# Patient Record
Sex: MALE | Race: OTHER | Employment: OTHER | URBAN - METROPOLITAN AREA
[De-identification: names, ages, dates, MRNs, and addresses within clinical notes are randomized per-mention and may not be internally consistent; named-entity substitution may affect disease eponyms.]

---

## 2018-05-11 ENCOUNTER — APPOINTMENT (OUTPATIENT)
Dept: CT IMAGING | Age: 68
DRG: 389 | End: 2018-05-11
Attending: EMERGENCY MEDICINE
Payer: MEDICARE

## 2018-05-11 ENCOUNTER — HOSPITAL ENCOUNTER (INPATIENT)
Age: 68
LOS: 5 days | Discharge: NURSING, REHAB OR LONG-TERM CARE | DRG: 389 | End: 2018-05-17
Attending: EMERGENCY MEDICINE | Admitting: HOSPITALIST
Payer: MEDICARE

## 2018-05-11 DIAGNOSIS — K56.609 SBO (SMALL BOWEL OBSTRUCTION) (HCC): Primary | ICD-10-CM

## 2018-05-11 LAB
ANION GAP SERPL CALC-SCNC: 12 MMOL/L (ref 3–18)
BASOPHILS # BLD: 0 K/UL (ref 0–0.06)
BASOPHILS NFR BLD: 0 % (ref 0–2)
BUN SERPL-MCNC: 18 MG/DL (ref 7–18)
BUN/CREAT SERPL: 23 (ref 12–20)
CALCIUM SERPL-MCNC: 8.3 MG/DL (ref 8.5–10.1)
CHLORIDE SERPL-SCNC: 101 MMOL/L (ref 100–108)
CO2 SERPL-SCNC: 25 MMOL/L (ref 21–32)
CREAT SERPL-MCNC: 0.78 MG/DL (ref 0.6–1.3)
DIFFERENTIAL METHOD BLD: ABNORMAL
EOSINOPHIL # BLD: 0 K/UL (ref 0–0.4)
EOSINOPHIL NFR BLD: 0 % (ref 0–5)
ERYTHROCYTE [DISTWIDTH] IN BLOOD BY AUTOMATED COUNT: 15.1 % (ref 11.6–14.5)
GLUCOSE SERPL-MCNC: 244 MG/DL (ref 74–99)
HCT VFR BLD AUTO: 45.3 % (ref 36–48)
HGB BLD-MCNC: 15.5 G/DL (ref 13–16)
LYMPHOCYTES # BLD: 2.9 K/UL (ref 0.9–3.6)
LYMPHOCYTES NFR BLD: 25 % (ref 21–52)
MCH RBC QN AUTO: 29.5 PG (ref 24–34)
MCHC RBC AUTO-ENTMCNC: 34.2 G/DL (ref 31–37)
MCV RBC AUTO: 86.3 FL (ref 74–97)
MONOCYTES # BLD: 0.9 K/UL (ref 0.05–1.2)
MONOCYTES NFR BLD: 8 % (ref 3–10)
NEUTS SEG # BLD: 7.6 K/UL (ref 1.8–8)
NEUTS SEG NFR BLD: 67 % (ref 40–73)
PLATELET # BLD AUTO: 284 K/UL (ref 135–420)
PMV BLD AUTO: 10.9 FL (ref 9.2–11.8)
POTASSIUM SERPL-SCNC: 3.5 MMOL/L (ref 3.5–5.5)
RBC # BLD AUTO: 5.25 M/UL (ref 4.7–5.5)
SODIUM SERPL-SCNC: 138 MMOL/L (ref 136–145)
WBC # BLD AUTO: 11.4 K/UL (ref 4.6–13.2)

## 2018-05-11 PROCEDURE — 83036 HEMOGLOBIN GLYCOSYLATED A1C: CPT | Performed by: HOSPITALIST

## 2018-05-11 PROCEDURE — 96375 TX/PRO/DX INJ NEW DRUG ADDON: CPT

## 2018-05-11 PROCEDURE — 96361 HYDRATE IV INFUSION ADD-ON: CPT

## 2018-05-11 PROCEDURE — 96374 THER/PROPH/DIAG INJ IV PUSH: CPT

## 2018-05-11 PROCEDURE — 80048 BASIC METABOLIC PNL TOTAL CA: CPT | Performed by: EMERGENCY MEDICINE

## 2018-05-11 PROCEDURE — 36415 COLL VENOUS BLD VENIPUNCTURE: CPT | Performed by: HOSPITALIST

## 2018-05-11 PROCEDURE — 74011636320 HC RX REV CODE- 636/320: Performed by: EMERGENCY MEDICINE

## 2018-05-11 PROCEDURE — 74011250636 HC RX REV CODE- 250/636: Performed by: EMERGENCY MEDICINE

## 2018-05-11 PROCEDURE — 85025 COMPLETE CBC W/AUTO DIFF WBC: CPT | Performed by: EMERGENCY MEDICINE

## 2018-05-11 PROCEDURE — 99285 EMERGENCY DEPT VISIT HI MDM: CPT

## 2018-05-11 PROCEDURE — 74177 CT ABD & PELVIS W/CONTRAST: CPT

## 2018-05-11 RX ORDER — POLYVINYL ALCOHOL 14 MG/ML
1 SOLUTION/ DROPS OPHTHALMIC AS NEEDED
COMMUNITY
End: 2020-01-01

## 2018-05-11 RX ORDER — FENOFIBRATE 67 MG/1
67 CAPSULE ORAL
COMMUNITY
End: 2020-01-01

## 2018-05-11 RX ORDER — ONDANSETRON 2 MG/ML
4 INJECTION INTRAMUSCULAR; INTRAVENOUS
Status: COMPLETED | OUTPATIENT
Start: 2018-05-11 | End: 2018-05-11

## 2018-05-11 RX ORDER — GLUCOSAMINE SULFATE 1500 MG
1000 POWDER IN PACKET (EA) ORAL DAILY
COMMUNITY
End: 2020-01-01

## 2018-05-11 RX ORDER — MORPHINE SULFATE 10 MG/ML
4 INJECTION, SOLUTION INTRAMUSCULAR; INTRAVENOUS
Status: COMPLETED | OUTPATIENT
Start: 2018-05-11 | End: 2018-05-11

## 2018-05-11 RX ORDER — MECLIZINE HCL 12.5 MG 12.5 MG/1
12.5 TABLET ORAL
COMMUNITY
End: 2020-01-01

## 2018-05-11 RX ADMIN — SODIUM CHLORIDE 1000 ML: 900 INJECTION, SOLUTION INTRAVENOUS at 23:03

## 2018-05-11 RX ADMIN — IOPAMIDOL 100 ML: 612 INJECTION, SOLUTION INTRAVENOUS at 23:41

## 2018-05-11 RX ADMIN — MORPHINE SULFATE 4 MG: 10 INJECTION INTRAMUSCULAR; INTRAVENOUS; SUBCUTANEOUS at 23:01

## 2018-05-11 RX ADMIN — ONDANSETRON 4 MG: 2 INJECTION INTRAMUSCULAR; INTRAVENOUS at 23:01

## 2018-05-12 ENCOUNTER — APPOINTMENT (OUTPATIENT)
Dept: GENERAL RADIOLOGY | Age: 68
DRG: 389 | End: 2018-05-12
Attending: EMERGENCY MEDICINE
Payer: MEDICARE

## 2018-05-12 PROBLEM — K56.609 SMALL BOWEL OBSTRUCTION (HCC): Status: ACTIVE | Noted: 2018-05-12

## 2018-05-12 PROBLEM — F81.9 COGNITIVE DEVELOPMENTAL DELAY: Status: ACTIVE | Noted: 2018-05-12

## 2018-05-12 LAB
EST. AVERAGE GLUCOSE BLD GHB EST-MCNC: 171 MG/DL
GLUCOSE BLD STRIP.AUTO-MCNC: 140 MG/DL (ref 70–110)
GLUCOSE BLD STRIP.AUTO-MCNC: 160 MG/DL (ref 70–110)
GLUCOSE BLD STRIP.AUTO-MCNC: 191 MG/DL (ref 70–110)
GLUCOSE BLD STRIP.AUTO-MCNC: 200 MG/DL (ref 70–110)
HBA1C MFR BLD: 7.6 % (ref 4.2–5.6)

## 2018-05-12 PROCEDURE — 74011250636 HC RX REV CODE- 250/636

## 2018-05-12 PROCEDURE — 0D9670Z DRAINAGE OF STOMACH WITH DRAINAGE DEVICE, VIA NATURAL OR ARTIFICIAL OPENING: ICD-10-PCS | Performed by: INTERNAL MEDICINE

## 2018-05-12 PROCEDURE — 74011250636 HC RX REV CODE- 250/636: Performed by: HOSPITALIST

## 2018-05-12 PROCEDURE — 77030037878 HC DRSG MEPILEX >48IN BORD MOLN -B

## 2018-05-12 PROCEDURE — 74011636637 HC RX REV CODE- 636/637: Performed by: HOSPITALIST

## 2018-05-12 PROCEDURE — 77030010545

## 2018-05-12 PROCEDURE — 74011250636 HC RX REV CODE- 250/636: Performed by: EMERGENCY MEDICINE

## 2018-05-12 PROCEDURE — 65270000029 HC RM PRIVATE

## 2018-05-12 PROCEDURE — 71045 X-RAY EXAM CHEST 1 VIEW: CPT

## 2018-05-12 PROCEDURE — 82962 GLUCOSE BLOOD TEST: CPT

## 2018-05-12 RX ORDER — MORPHINE SULFATE 10 MG/ML
4 INJECTION, SOLUTION INTRAMUSCULAR; INTRAVENOUS
Status: DISCONTINUED | OUTPATIENT
Start: 2018-05-12 | End: 2018-05-12

## 2018-05-12 RX ORDER — MAGNESIUM SULFATE 100 %
4 CRYSTALS MISCELLANEOUS AS NEEDED
Status: DISCONTINUED | OUTPATIENT
Start: 2018-05-12 | End: 2018-05-17 | Stop reason: HOSPADM

## 2018-05-12 RX ORDER — DEXTROSE MONOHYDRATE 25 G/50ML
25-50 INJECTION, SOLUTION INTRAVENOUS AS NEEDED
Status: DISCONTINUED | OUTPATIENT
Start: 2018-05-12 | End: 2018-05-17 | Stop reason: HOSPADM

## 2018-05-12 RX ORDER — MORPHINE SULFATE 10 MG/ML
4 INJECTION, SOLUTION INTRAMUSCULAR; INTRAVENOUS
Status: COMPLETED | OUTPATIENT
Start: 2018-05-12 | End: 2018-05-12

## 2018-05-12 RX ORDER — MORPHINE SULFATE 10 MG/ML
INJECTION, SOLUTION INTRAMUSCULAR; INTRAVENOUS
Status: DISPENSED
Start: 2018-05-12 | End: 2018-05-12

## 2018-05-12 RX ORDER — DEXTROSE, SODIUM CHLORIDE, AND POTASSIUM CHLORIDE 5; .45; .15 G/100ML; G/100ML; G/100ML
125 INJECTION INTRAVENOUS CONTINUOUS
Status: DISCONTINUED | OUTPATIENT
Start: 2018-05-12 | End: 2018-05-17 | Stop reason: HOSPADM

## 2018-05-12 RX ORDER — INSULIN LISPRO 100 [IU]/ML
INJECTION, SOLUTION INTRAVENOUS; SUBCUTANEOUS EVERY 6 HOURS
Status: DISCONTINUED | OUTPATIENT
Start: 2018-05-12 | End: 2018-05-17 | Stop reason: HOSPADM

## 2018-05-12 RX ADMIN — DEXTROSE MONOHYDRATE, SODIUM CHLORIDE, AND POTASSIUM CHLORIDE 125 ML/HR: 50; 4.5; 1.49 INJECTION, SOLUTION INTRAVENOUS at 04:26

## 2018-05-12 RX ADMIN — SODIUM CHLORIDE 1000 ML: 900 INJECTION, SOLUTION INTRAVENOUS at 01:20

## 2018-05-12 RX ADMIN — INSULIN LISPRO 4 UNITS: 100 INJECTION, SOLUTION INTRAVENOUS; SUBCUTANEOUS at 18:36

## 2018-05-12 RX ADMIN — INSULIN LISPRO 2 UNITS: 100 INJECTION, SOLUTION INTRAVENOUS; SUBCUTANEOUS at 23:35

## 2018-05-12 RX ADMIN — MORPHINE SULFATE 4 MG: 10 INJECTION, SOLUTION INTRAMUSCULAR; INTRAVENOUS at 01:24

## 2018-05-12 RX ADMIN — DEXTROSE MONOHYDRATE, SODIUM CHLORIDE, AND POTASSIUM CHLORIDE 125 ML/HR: 50; 4.5; 1.49 INJECTION, SOLUTION INTRAVENOUS at 22:47

## 2018-05-12 RX ADMIN — DEXTROSE MONOHYDRATE, SODIUM CHLORIDE, AND POTASSIUM CHLORIDE 125 ML/HR: 50; 4.5; 1.49 INJECTION, SOLUTION INTRAVENOUS at 15:06

## 2018-05-12 NOTE — CONSULTS
[facility]  Colon and Rectal Surgery  [address] [address], [address] [place] [address]                Colon and Rectal Surgery Consult    Subjective:      Sonia Avila is a 79 y.o. male who is being seen for evaluation of small bowel obstruction. He has a history of cerebral palsy, mental retardation, and hypertension. History was obtained from medical records. The patient presented to ED last night  from [facility] with abdominal pain and emesis. Initial CT scan in ED showed low grade small bowel obstruction. Following admission to [facility], the patient was treated with bowel rest, NG decompression and IVF. He appears to be comfortable. Patient Active Problem List    Diagnosis Date Noted    Small bowel obstruction ([address] 05/12/2018    Cognitive developmental delay 05/12/2018    Hypotension 06/12/2015    Tachycardia 06/12/2015    Lactic acidosis 06/12/2015    Cerebral palsy ([address] 06/12/2015    History of post-polio syndrome 06/12/2015    Hypertension 06/12/2015    Mild mental retardation 06/12/2015    UTI (lower urinary tract infection) 06/11/2015     Past Medical History:   Diagnosis Date    Cerebral palsy ([address]     Hypertension     Mild mental retardation     Polio       History reviewed. No pertinent surgical history. Social History   Substance Use Topics    Smoking status: Never Smoker    Smokeless tobacco: Not on file    Alcohol use No      History reviewed. No pertinent family history. Prior to Admission medications    Medication Sig Start Date End Date Taking? Authorizing Provider   cholecalciferol (VITAMIN D3) 1,000 unit cap Take 1,000 Units by mouth daily. Yes Mak Gannon, MD   meclizine (ANTIVERT) 12.5 mg tablet Take 12.5 mg by mouth three (3) times daily as needed. Yes Mak Other, MD   fenofibrate micronized (LOFIBRA) 67 mg capsule Take 67 mg by mouth every morning.    Yes Mak Gannon MD   polyvinyl alcohol (LIQUIFILM TEARS) 1.4 % ophthalmic solution Administer 1 Drop to both eyes as needed. Yes Mak Gannon MD   aspirin 81 mg chewable tablet Take 81 mg by mouth daily. Yes Mak Gannon MD   baclofen (LIORESAL) 10 mg tablet Take  by mouth three (3) times daily. Yes Mak Gannon MD   calcium carbonate (TUMS) 200 mg calcium (500 mg) chew Take 1 Tab by mouth three (3) times daily. Yes Mak Gannon MD   metFORMIN (GLUCOPHAGE) 850 mg tablet Take 500 mg by mouth two (2) times daily (with meals). Yes Mak Gannon MD   multivitamin, tx-iron-ca-min (THERAPY M) 9 mg iron-400 mcg tab tablet Take 1 Tab by mouth daily. Yes Mak Gannon MD   loperamide (IMMODIUM) 2 mg tablet Take 2 mg by mouth four (4) times daily as needed for Diarrhea. Yes Mak Gannon MD   fenofibrate nanocrystallized (TRICOR) 48 mg tablet Take 1 Tab by mouth daily. 6/15/15   Jim Pena MD   polyvinyl alcohol (LIQUIFILM TEARS) 1.4 % ophthalmic solution Administer 1 Drop to both eyes four (4) times daily. Mak Gannon MD   FENOFIBRATE MICRONIZED PO Take 200 mg by mouth daily. Mak Gannon MD   calcium-vitamin D (OYSTER SHELL CALCIUM-VIT D3) 250-125 mg-unit tablet Take 1 Tab by mouth two (2) times a day. Mak Gannon MD   polyethylene glycol (MIRALAX) 17 gram packet Take 17 g by mouth every other day. Mak Gannon MD   camphor-methyl salicyl-menthol (SALONPAS) ptmd 1 Patch by Apply Externally route daily. Mak Gannon MD   ergocalciferol (VITAMIN D2) 50,000 unit capsule Take 50,000 Units by mouth every Tuesday. Mak Gannon MD   lisinopril (PRINIVIL, ZESTRIL) 2.5 mg tablet Take 2.5 mg by mouth daily. Mak Gannon MD   acetaminophen (TYLENOL ARTHRITIS PAIN) 650 mg CR tablet Take 650 mg by mouth every six (6) hours as needed for Pain. Mak Gannon MD   promethazine (PHENERGAN) 25 mg tablet Take 25 mg by mouth every eight (8) hours as needed for Nausea.     Mak Gannon MD     Current Facility-Administered Medications   Medication Dose Route Frequency    dextrose 5% - 0.45% NaCl with KCl 20 mEq/L infusion  125 mL/hr IntraVENous CONTINUOUS    insulin lispro (HUMALOG) injection   SubCUTAneous Q6H    glucose chewable tablet 16 g  4 Tab Oral PRN    glucagon (GLUCAGEN) injection 1 mg  1 mg IntraMUSCular PRN    dextrose (D50) infusion 12.5-25 g  25-50 mL IntraVENous PRN      No Known Allergies    Review of Systems:    Pertinent items are noted in the History of Present Illness. Objective:        Visit Vitals    /84 (BP 1 Location: Right arm, BP Patient Position: Supine)    Pulse 84    Temp 98.3 °F (36.8 °C)    Resp 18    Ht 5' 5\" (1.651 m)    Wt 60.8 kg (134 lb)    SpO2 99%    BMI 22.3 kg/m2       Physical Exam:   GENERAL: fatigued, cooperative, no distress, slowed mentation, appears stated age  LUNG: clear to auscultation bilaterally  HEART: regular rate and rhythm  ABDOMEN: soft, minimally distended. Bowel sounds normal. No masses,  no organomegaly. Non-tender  EXTREMITIES:  extremities normal, atraumatic, no cyanosis or edema       Imaging:  reviewed  CT of Abdomen and Pelvis (5/11/2018)  IMPRESSION:  1. Mildly distended and fluid opacified bowel loops throughout the abdomen and  pelvis. Morphology is somewhat nonspecific, suggestive of either ileus or  partial/intermittent but relatively low-grade small bowel obstruction. Normal  appendix. Prominent rectal fecal material suggests impaction. 2. Moderate right hydroureteronephrosis with layering dependent, nonobstructive  5 mm calculus within the right extrarenal pelvis. Distal right ureterovesicular  junction appears somewhat abnormal, raising potential of intermittent  ureterovesicular junction obstruction. This appears relatively chronic and not  acute. There is also nonspecific mural thickening throughout the bladder,  concerning for cystitis.   3. 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 4. Bilateral L5 pars interarticularis defects. 5. Patulous and fluid opacified distal thoracic esophagus. Lab/Data Review:  CMP:   Lab Results   Component Value Date/Time     05/11/2018 10:51 PM    K 3.5 05/11/2018 10:51 PM     05/11/2018 10:51 PM    CO2 25 05/11/2018 10:51 PM    AGAP 12 05/11/2018 10:51 PM     (H) 05/11/2018 10:51 PM    BUN 18 05/11/2018 10:51 PM    CREA 0.78 05/11/2018 10:51 PM    GFRAA >60 05/11/2018 10:51 PM    GFRNA >60 05/11/2018 10:51 PM    CA 8.3 (L) 05/11/2018 10:51 PM     CBC:   Lab Results   Component Value Date/Time    WBC 11.4 05/11/2018 10:51 PM    HGB 15.5 05/11/2018 10:51 PM    HCT 45.3 05/11/2018 10:51 PM     05/11/2018 10:51 PM     Recent Glucose Results:   Lab Results   Component Value Date/Time     (H) 05/11/2018 10:51 PM         Assessment:      Nadia Adamson is a 79 y.o. Male with a history of cerebral palsy, mental retardation, and hypertension who presents with small bowel obstruction. He has evidence of mild fecal impaction also. Plan:     Continue NG decompression, IVF, and bowel rest.    Fleets enema later today if needed. Thank you for allowing me to participate in the patient's care.             Fili Giordano MD, FACS, FASCRS  Colon and Rectal Surgery  [facility]  Office [phone]  Fax     [phone]  5/12/2018  3:19 PM

## 2018-05-12 NOTE — PROGRESS NOTES
Problem: Falls - Risk of  Goal: *Absence of Falls  Document Shwetha Fall Risk and appropriate interventions in the flowsheet. Outcome: Progressing Towards Goal  Fall Risk Interventions:       Mentation Interventions: Adequate sleep, hydration, pain control, Bed/chair exit alarm, Door open when patient unattended, Evaluate medications/consider consulting pharmacy, More frequent rounding, Reorient patient, Room close to nurse's station    Medication Interventions: Bed/chair exit alarm, Evaluate medications/consider consulting pharmacy    Elimination Interventions: Bed/chair exit alarm, Call light in reach             Problem: Pressure Injury - Risk of  Goal: *Prevention of pressure injury  Document Alex Scale and appropriate interventions in the flowsheet. Outcome: Progressing Towards Goal  Pressure Injury Interventions:  Sensory Interventions: Assess changes in LOC, Check visual cues for pain, Keep linens dry and wrinkle-free, Minimize linen layers, Pad between skin to skin, Pressure redistribution bed/mattress (bed type)    Moisture Interventions: Absorbent underpads, Check for incontinence Q2 hours and as needed, Internal/External urinary devices    Activity Interventions: Assess need for specialty bed, Pressure redistribution bed/mattress(bed type)    Mobility Interventions: Assess need for specialty bed, HOB 30 degrees or less, Pressure redistribution bed/mattress (bed type), Turn and reposition approx.  every two hours(pillow and wedges)    Nutrition Interventions: Document food/fluid/supplement intake, Offer support with meals,snacks and hydration    Friction and Shear Interventions: HOB 30 degrees or less

## 2018-05-12 NOTE — PROGRESS NOTES
I was not able to assess the patient at this time and will perform a follow up visit in a few days. Carrie Barrios M.Div.   [facility]  [phone]

## 2018-05-12 NOTE — ED PROVIDER NOTES
EMERGENCY DEPARTMENT HISTORY AND PHYSICAL EXAM    10:25 PM      Date: 5/11/2018  Patient Name: Cheryl Schuler    History of Presenting Illness     Chief Complaint   Patient presents with    Vomiting         History Provided By: Patient    Chief Complaint: Emesis  Duration:  Days (x1)  Timing:  Intermittent  Location: N/A  Quality: Coffee-ground  Severity: N/A  Modifying Factors: No identifiable modifying factors   Associated Symptoms: Exhibits abdominal pain; denies SOB and CP      Additional History (Context): Cheryl Schuler is a 79 y.o. male with cerebral palsy, mental retardation, and HTN who presents to the ED from [address] [place] [address] for evaluation of intermittent emesis onset today. Per [employer] staff, pt had two episodes of coffee-ground emesis this morning and one more this evening. [facility] did a KUB that showed possible KBO/ileus. Pt has c/o diffuse abdominal pain ut denies CP and SOB. Pt is a poor historian. PCP: Lawyer Kirstin MD    Current Facility-Administered Medications   Medication Dose Route Frequency Provider Last Rate Last Dose    dextrose 5% - 0.45% NaCl with KCl 20 mEq/L infusion  125 mL/hr IntraVENous CONTINUOUS Elkin Whitman  mL/hr at 05/12/18 0426 125 mL/hr at 05/12/18 0426    morphine 10 mg/ml injection                Past History     Past Medical History:  Past Medical History:   Diagnosis Date    Cerebral palsy ([address]     Hypertension     Mild mental retardation     Polio        Past Surgical History:  History reviewed. No pertinent surgical history. Family History:  History reviewed. No pertinent family history. Social History:  Social History   Substance Use Topics    Smoking status: Never Smoker    Smokeless tobacco: None    Alcohol use No       Allergies:  No Known Allergies      Review of Systems     Review of Systems   Respiratory: Negative for shortness of breath. Cardiovascular: Negative for chest pain.    Gastrointestinal: Positive for abdominal pain and vomiting. All other systems reviewed and are negative. Physical Exam     Visit Vitals    /73 (BP 1 Location: Right arm, BP Patient Position: At rest)    Pulse 85    Temp 97.9 °F (36.6 °C)    Resp 18    Ht 5' 5\" (1.651 m)    Wt 60.8 kg (134 lb)    SpO2 95%    BMI 22.3 kg/m2         Physical Exam   Constitutional:     General:  Well-developed, well-nourished, uncomfortable nontoxic nondiaphoretic  Head:  Normocephalic atraumatic. Eyes:  Pupils midrange extraocular movements intact. No pallor or conjunctival injection. Nose:  No rhinorrhea, inspection grossly normal.    Ears:  Grossly normal to inspection, no discharge. Mouth:  Mucous membranes moist, no appreciable intraoral lesion. Neck/Back:  Trachea midline, no asymmetry. Chest:  Grossly normal inspection, symmetric chest rise. Pulmonary:  Clear to auscultation bilaterally no wheezes rhonchi or rales. Cardiovascular:  S1-S2 no murmurs rubs or gallops. Abdomen: Distended diffusely tender no focality no CVA tenderness  Extremities: Chronic changes, is a palpable pulses in all 4 extremities  Neurologic:  Alert and oriented  Skin:  Warm and dry  Psychiatric:  Grossly normal mood and affect. Nursing note reviewed, vital signs reviewed. Diagnostic Study Results     Labs -  Recent Results (from the past 12 hour(s))   CBC WITH AUTOMATED DIFF    Collection Time: 05/11/18 10:51 PM   Result Value Ref Range    WBC 11.4 4.6 - 13.2 K/uL    RBC 5.25 4.70 - 5.50 M/uL    HGB 15.5 13.0 - 16.0 g/dL    HCT 45.3 36.0 - 48.0 %    MCV 86.3 74.0 - 97.0 FL    MCH 29.5 24.0 - 34.0 PG    MCHC 34.2 31.0 - 37.0 g/dL    RDW 15.1 (H) 11.6 - 14.5 %    PLATELET 805 710 - 475 K/uL    MPV 10.9 9.2 - 11.8 FL    NEUTROPHILS 67 40 - 73 %    LYMPHOCYTES 25 21 - 52 %    MONOCYTES 8 3 - 10 %    EOSINOPHILS 0 0 - 5 %    BASOPHILS 0 0 - 2 %    ABS. NEUTROPHILS 7.6 1.8 - 8.0 K/UL    ABS. LYMPHOCYTES 2.9 0.9 - 3.6 K/UL    ABS. MONOCYTES 0.9 0.05 - 1.2 K/UL    ABS. EOSINOPHILS 0.0 0.0 - 0.4 K/UL    ABS. BASOPHILS 0.0 0.0 - 0.06 K/UL    DF AUTOMATED     METABOLIC PANEL, BASIC    Collection Time: 05/11/18 10:51 PM   Result Value Ref Range    Sodium 138 136 - 145 mmol/L    Potassium 3.5 3.5 - 5.5 mmol/L    Chloride 101 100 - 108 mmol/L    CO2 25 21 - 32 mmol/L    Anion gap 12 3.0 - 18 mmol/L    Glucose 244 (H) 74 - 99 mg/dL    BUN 18 7.0 - 18 MG/DL    Creatinine 0.78 0.6 - 1.3 MG/DL    BUN/Creatinine ratio 23 (H) 12 - 20      GFR est AA >60 >60 ml/min/1.73m2    GFR est non-AA >60 >60 ml/min/1.73m2    Calcium 8.3 (L) 8.5 - 10.1 MG/DL       Radiologic Studies -   CT ABD PELV W CONT      Final Results:    Findings:   1. Numerous loops of mildly dilated, fluid filled loops of proximal small bowel with some air fluid levels with more decompressed loops of distal small bowel in the RLQ. Findings consistent with ileus versus early SBO. 2. Large amount of fecal material in the rectum concerning for fecal impaction. 3. Large right extrarenal pelvis with dependent 5 mm calculus. Right sided hydroureter. No ureteral stone. The peripelvic fat planes are preserved arguing against acute hydronephrosis. Other findings:   - Non-obstructing punctate left lower pole renal calculus   - Bilateral adrenal gland thickening without focal nodularity   - Bilateral depended densities likely representing atelectasis, superimposed infiltrate not excluded   - Numerous bilateral renal cysts   - Cholecystectomy   - Hepatic steatosis            Medical Decision Making   I am the first provider for this patient. I reviewed the vital signs, available nursing notes, past medical history, past surgical history, family history and social history. Vital Signs-Reviewed the patient's vital signs.     Pulse Oximetry Analysis -  95% on room air (Non-hypoxic)     Cardiac Monitor:  Rate: 117  Rhythm:  Sinus Tachycardia     Records Reviewed: Nursing Notes and Old Medical Records (Time of Review: 10:25 PM)    ED Course: Progress Notes, Reevaluation, and Consults:      ED course:  Patient sent in from nursing home with abnormal abdominal x-ray, reports coffee ground emesis ×3, his abdomen is markedly distended, concerning for small bowel obstruction. Did have feces retained in the fall, this also could be a constipation causing his obstruction. He is afebrile and  tachycardic saturation normal on room air abdomen is distended and minimally tender but no guarding or peritoneal signs. We'll hydrate check labs and CT    Labs unremarkable      CT small bowel obstruction, stool in vault    NG tube placed by nursing staff, it seems deep and was withdrawn    Patient reevaluated, reports relief of his pain, has been pain associated with NG tube placement, given morphine for that pain. His abdomen was reexamined, seems to have gone down slightly, no focal tenderness. Pain is reasonable for trial of conservative therapy, be admitted to medicine for further management        Patient's presentation, history, physical exam and laboratory evaluations were reviewed. I felt the patient would benefit from inpatient management and treatment. Consult:  Discussed care with Dr. Roz Ruby. Standard discussion; including history of patients chief complaint, available diagnostic results, and treatment course. Patient was accepted to their service. Disposition:    Admitted to medicine service      Portions of this chart were created with Dragon medical speech to text program.   Unrecognized errors may be present. Diagnosis     Clinical Impression:   1. SBO (small bowel obstruction) ([facility])        Disposition: Admit    Follow-up Information     None           Current Discharge Medication List      CONTINUE these medications which have NOT CHANGED    Details   cholecalciferol (VITAMIN D3) 1,000 unit cap Take 1,000 Units by mouth daily.       meclizine (ANTIVERT) 12.5 mg tablet Take 12.5 mg by mouth three (3) times daily as needed. !! fenofibrate micronized (LOFIBRA) 67 mg capsule Take 67 mg by mouth every morning. !! polyvinyl alcohol (LIQUIFILM TEARS) 1.4 % ophthalmic solution Administer 1 Drop to both eyes as needed. aspirin 81 mg chewable tablet Take 81 mg by mouth daily. baclofen (LIORESAL) 10 mg tablet Take  by mouth three (3) times daily. calcium carbonate (TUMS) 200 mg calcium (500 mg) chew Take 1 Tab by mouth three (3) times daily. metFORMIN (GLUCOPHAGE) 850 mg tablet Take 500 mg by mouth two (2) times daily (with meals). multivitamin, tx-iron-ca-min (THERAPY M) 9 mg iron-400 mcg tab tablet Take 1 Tab by mouth daily. loperamide (IMMODIUM) 2 mg tablet Take 2 mg by mouth four (4) times daily as needed for Diarrhea. fenofibrate nanocrystallized (TRICOR) 48 mg tablet Take 1 Tab by mouth daily. Qty: 30 Tab, Refills: 0      !! polyvinyl alcohol (LIQUIFILM TEARS) 1.4 % ophthalmic solution Administer 1 Drop to both eyes four (4) times daily. !! FENOFIBRATE MICRONIZED PO Take 200 mg by mouth daily. calcium-vitamin D (OYSTER SHELL CALCIUM-VIT D3) 250-125 mg-unit tablet Take 1 Tab by mouth two (2) times a day. polyethylene glycol (MIRALAX) 17 gram packet Take 17 g by mouth every other day. camphor-methyl salicyl-menthol (SALONPAS) ptmd 1 Patch by Apply Externally route daily. ergocalciferol (VITAMIN D2) 50,000 unit capsule Take 50,000 Units by mouth every Tuesday. lisinopril (PRINIVIL, ZESTRIL) 2.5 mg tablet Take 2.5 mg by mouth daily. acetaminophen (TYLENOL ARTHRITIS PAIN) 650 mg CR tablet Take 650 mg by mouth every six (6) hours as needed for Pain. promethazine (PHENERGAN) 25 mg tablet Take 25 mg by mouth every eight (8) hours as needed for Nausea. !! - Potential duplicate medications found.  Please discuss with provider.        _______________________________    Attestations:  [address] Serene Odonnell acting as a scribe for and in the presence of Jim Jackson MD      May 11, 2018 at 10:25 PM       Provider Attestation:      I personally performed the services described in the documentation, reviewed the documentation, as recorded by the scribe in my presence, and it accurately and completely records my words and actions.  May 11, 2018 at 10:25 PM - Jim Jackson MD    _______________________________

## 2018-05-12 NOTE — ED TRIAGE NOTES
Vomiting brown emesis x3.   [facility] did a KUB that showed Venida Mean

## 2018-05-12 NOTE — PROGRESS NOTES
0710 Bedside and Verbal shift change report given to Enzo Lobo (oncoming nurse) by Otelia Sandhoff RN (off going nurse). Report included the following information SBAR, Kardex, [facility] and Recent Results,nontele      2020 Shift assessment completed,patient is a/o x 2 ,call bell within reach ,bed lowered and locked, no signs of distress noted      1220 Shift reassessment completed,patient soundly sleeping, no changes in previous assessment      1620 Shift reassessment done,call bell within reach no signs of distress noted      1925 Bedside and Verbal shift change report given to Otelia Sandhoff  RN (oncoming nurse) by James Pringle RN (off going nurse).  Report included the following information SBAR, Kardex, [facility] and Recent Results

## 2018-05-12 NOTE — PROGRESS NOTES
Pt seen and examined. Already 1.1L in NG suction. Pt feeling improved.   Continue NG decompression, IVF, and bowel rest per Surgeons

## 2018-05-12 NOTE — H&P
History and Physical    Patient: Lorena Meredith               Sex: male          DOA: 5/11/2018       YOB: 1950      Age:  79 y.o.        LOS:  LOS: 0 days        HPI:     Lorena Meredith is a 79 y.o. male who presented to the ER from [facility]. He has a long history of Cerebral Palsy and Post Polio Syndrome. He had abdominal pain with decreased oral intake. No fever. No cough. In the ER he was found to have SBO. NG tube was placed and put on suction. He does not have a surgical abdomen currently. He will be admitted for ongoing management. Past Medical History:   Diagnosis Date    Cerebral palsy ([address]     Hypertension     Mild mental retardation     Polio        Social History:   Tobacco use:  Patient does not smoke   Alcohol use:  Patient does not use alcohol   Patient is from [facility]    Family History:   Multiple family members with HTN    Review of Systems    Constitutional:  No fever or weight loss  HEENT:  No headache or visual changes  Cardiovascular:  No chest pain or diaphoresis  Respiratory:  No coughing, wheezing, or shortness of breath. GI:  Abdominal pain without vomiting. :  No hematuria or dysuria  Skin:  No rashes or moles  Neuro:  No seizures or syncope  Hematological:  No bruising or bleeding  Endocrine:  No diabetes or thyroid disease    Physical Exam:      Visit Vitals    /73 (BP 1 Location: Right arm, BP Patient Position: At rest)    Pulse 85    Temp 97.9 °F (36.6 °C)    Resp 18    Ht 5' 5\" (1.651 m)    Wt 60.8 kg (134 lb)    SpO2 95%    BMI 22.3 kg/m2       Physical Exam:    Gen:  No distress, alert  HEENT:  Normal cephalic atraumatic, extra-occular movements are intact. Neck:  Supple, No JVD  Lungs:  Clear bilaterally, no wheeze, no rales, normal effort  Heart:  Regular Rate and Rhythm, normal S1 and S2, no edema  Abdomen:  Soft, non tender, normal bowel sounds, no guarding.   Extremities:  Well perfused, no cyanosis or edema  Neurological:  Awake and alert, CN's are intact, normal strength throughout extremities  Skin:  No rashes or moles  Mental Status:  Normal thought process, does not appear anxious    Laboratory Studies:    BMP:   Lab Results   Component Value Date/Time     05/11/2018 10:51 PM    K 3.5 05/11/2018 10:51 PM     05/11/2018 10:51 PM    CO2 25 05/11/2018 10:51 PM    AGAP 12 05/11/2018 10:51 PM     (H) 05/11/2018 10:51 PM    BUN 18 05/11/2018 10:51 PM    CREA 0.78 05/11/2018 10:51 PM    GFRAA >60 05/11/2018 10:51 PM    GFRNA >60 05/11/2018 10:51 PM     CBC:   Lab Results   Component Value Date/Time    WBC 11.4 05/11/2018 10:51 PM    HGB 15.5 05/11/2018 10:51 PM    HCT 45.3 05/11/2018 10:51 PM     05/11/2018 10:51 PM       Assessment/Plan     Principal Problem:    Small bowel obstruction ([address] (5/12/2018)    Active Problems:    Cerebral palsy ([address] (6/12/2015)      History of post-polio syndrome (6/12/2015)      Cognitive developmental delay (5/12/2018)        PLAN:    Continue with NG to suction  NPO  IVF   Will consult with surgery regarding NG tube  DVT prophylaxis.

## 2018-05-12 NOTE — PROGRESS NOTES
Nutrition initial assessment      RECOMMENDATIONS:     1. Advance diet when medically indicated  2. Monitor weight, labs and PO intake  3. RD to follow     GOALS:     1. PO intake meets >75% of protein/calorie needs by 5/17  2. Weight maintenance (+/- 1-2 lb) by 5/19    ASSESSMENT:     Weight status is classified as normal per BMI of 22.3. However, patient is at risk d/t BMI <23 and age >65 years. Currently NPO w/ NGT to suction. Pt w/ hypocalcemia and hyperglycemia; A1C (7.6). Labs noted. Nutrition recommendations listed. RD to follow. Nutrition Diagnoses: Altered GI function related to SBO as evidenced by NPO w/ NGT to suction    Altered nutrition related lab values related to diabetes as evidenced by A1C (7.6). Nutrition Risk:  [] High  [x] Moderate []  Low    SUBJECTIVE/OBJECTIVE:   Pt admitted for SBO. PMHx including HTN, Cerebral Palsy and Post Polio Syndrome. Pt is from SNF and is non verbal.  Stage II pressure injury to sacrum noted. Pt seen in room resting appears thin. NGT to suction with  output noted;  ~50 mL in wall canister. Will monitor diet advancement. Information Obtained from:    [x] Chart Review   [x] Patient   [] Family/Caregiver   [] Nurse/Physician   [] Interdisciplinary Meeting/Rounds    Diet: NPO w/ NGT to LIS  Medications: [x] Reviewed  IV: D5 1/2 NS w/ KCl 20 mEq/L @125 mL/hr (510 Kcal/day)   Allergies: [x] Reviewed   Encounter Diagnoses     ICD-10-CM ICD-9-CM   1.  SBO (small bowel obstruction) ([facility] K56.609 560.9     Past Medical History:   Diagnosis Date    [facility] ([facility]     Hypertension     Mild mental retardation     Polio       Labs:    Lab Results   Component Value Date/Time    Sodium 138 05/11/2018 10:51 PM    Potassium 3.5 05/11/2018 10:51 PM    Chloride 101 05/11/2018 10:51 PM    CO2 25 05/11/2018 10:51 PM    Anion gap 12 05/11/2018 10:51 PM    Glucose 244 (H) 05/11/2018 10:51 PM    BUN 18 05/11/2018 10:51 PM    Creatinine 0.78 05/11/2018 10:51 PM Calcium 8.3 (L) 05/11/2018 10:51 PM    Albumin 3.1 (L) 06/11/2015 04:20 PM     Anthropometrics: BMI (calculated): 22.3  Last 3 Recorded Weights in this Encounter    05/11/18 2223   Weight: 60.8 kg (134 lb)      Ht Readings from Last 1 Encounters:   05/11/18 5' 5\" (1.651 m)     Weight Metrics 5/11/2018 6/12/2015   Weight 134 lb 100 lb   BMI 22.3 kg/m2 19.53 kg/m2       Patient Vitals for the past 100 hrs:   % Diet Eaten   05/12/18 0907 0 %     IBW: 136 lb %IBW: 99%    Estimated Nutrition Needs: [x] MSJ  [] Other:  Calories:  6575-3258 Kcal Based on:   [x] Actual BW    Protein:   61-73 g Based on:   [x] Actual BW    Fluid:       2986-2491 ml Based on:   [x] Actual BW      [x] No Cultural, Jainism or ethnic dietary need identified.     [] Cultural, Jainism and ethnic food preferences identified and addressed     Wt Status:  [x] Normal (18.6 - 24.9) [] Underweight (<18.5) [] Overweight (25 - 29.9) [] Mild Obesity (30 - 34.9)  [] Moderate Obesity (35 - 39.9) [] Morbid Obesity (40+)     Nutrition Problems Identified:   [x] Suboptimal PO intake v/s NPO  [] Food Allergies  [x] Difficulty chewing/swallowing/poor dentition  [] Constipation/Diarrhea   [] Nausea/Vomiting   [] None  [] Other:     Plan:   [] Therapeutic Diet  []  Obtained/adjusted food preferences/tolerances and/or snacks options   []  Supplements added   [] Occupational therapy following for feeding techniques  []  HS snack added   [x]  Modify diet texture   []  Modify diet for food allergies   []  Educate patient   []  Assist with menu selection   []  Monitor PO intake on meal rounds   [x]  Continue inpatient monitoring and intervention   []  Participated in discharge planning/Interdisciplinary rounds/Team meetings   []  Other:     Education Needs:   [x] Not appropriate for teaching at this time d/t NPO    [] Identified and addressed    Nutrition Monitoring and Evaluation:  [x] Continue ongoing monitoring and intervention  [] Jagdeep Kimbrough

## 2018-05-12 NOTE — PROGRESS NOTES
Pt received from ED in [facility], alert and in no apparent distress. Moved to bed in room 3018 without incident. Dual skin verification completed with Shruti Lang RN, small stage II pressure injury on sacral region surrounded by nonblanchable red skin. Unable to complete all admission documentation due to pt cognitive impairment. Soap suds enema performed at 0600 hours, scant stool produced. .    Bedside shift change report given to Missael Broderick (oncoming nurse) by Raymond Pascual RN (offgoing nurse). Report included the following information SBAR, Kardex, ED Summary, Intake/Output, MAR and Recent Results.

## 2018-05-12 NOTE — PROGRESS NOTES
Reason for Admission:  Small bowel obstruction, Cerebral palsy, Cognitive developmental delay                   RRAT Score:   17               Do you (patient/family) have any concerns for transition/discharge?   none                Plan for utilizing home health:  Resident of [facility]       Likelihood of readmission?  mod             Transition of Care Plan:     Return to nursing facility    Patient is a resident of [facility]. He is bed bound. There is no phone number listed for the patients NOK. The only contact number os for a Sarai Marie,  [phone], this number is from 2015. Dialing this number and a recording states the number is not in service. The emergency  is listed as Renard Lutz, no phone number listed. He is see by facility doctors at [facility]. His discharge plan is to return to [address] [place] [address]. Matched to [address] [place] [address]. Care Management Interventions  PCP Verified by CM:  Yes  Palliative Care Criteria Met (RRAT>21 & CHF Dx)?: No  Mode of Transport at Discharge: BLS  Transition of Care Consult (CM Consult): SNF ([facility])  Partner SNF: Yes  MyChart Signup: No  Discharge Durable Medical Equipment: No  Health Maintenance Reviewed: Yes  Physical Therapy Consult: No  Occupational Therapy Consult: No  Speech Therapy Consult: No  Current Support Network: [facility] [address]  Confirm Follow Up Transport: Other (see comment) (s)  Plan discussed with Pt/Family/Caregiver: Yes   Resource Information Provided?: No  Discharge Location  Discharge Placement: Skilled nursing facility

## 2018-05-13 LAB
ANION GAP SERPL CALC-SCNC: 6 MMOL/L (ref 3–18)
BASOPHILS # BLD: 0 K/UL (ref 0–0.06)
BASOPHILS NFR BLD: 0 % (ref 0–2)
BUN SERPL-MCNC: 11 MG/DL (ref 7–18)
BUN/CREAT SERPL: 19 (ref 12–20)
CALCIUM SERPL-MCNC: 8.1 MG/DL (ref 8.5–10.1)
CHLORIDE SERPL-SCNC: 107 MMOL/L (ref 100–108)
CO2 SERPL-SCNC: 28 MMOL/L (ref 21–32)
CREAT SERPL-MCNC: 0.59 MG/DL (ref 0.6–1.3)
DIFFERENTIAL METHOD BLD: ABNORMAL
EOSINOPHIL # BLD: 0.3 K/UL (ref 0–0.4)
EOSINOPHIL NFR BLD: 2 % (ref 0–5)
ERYTHROCYTE [DISTWIDTH] IN BLOOD BY AUTOMATED COUNT: 15.4 % (ref 11.6–14.5)
GLUCOSE BLD STRIP.AUTO-MCNC: 140 MG/DL (ref 70–110)
GLUCOSE BLD STRIP.AUTO-MCNC: 148 MG/DL (ref 70–110)
GLUCOSE BLD STRIP.AUTO-MCNC: 167 MG/DL (ref 70–110)
GLUCOSE BLD STRIP.AUTO-MCNC: 183 MG/DL (ref 70–110)
GLUCOSE SERPL-MCNC: 147 MG/DL (ref 74–99)
HCT VFR BLD AUTO: 42.3 % (ref 36–48)
HGB BLD-MCNC: 13.9 G/DL (ref 13–16)
LYMPHOCYTES # BLD: 3.6 K/UL (ref 0.9–3.6)
LYMPHOCYTES NFR BLD: 27 % (ref 21–52)
MCH RBC QN AUTO: 29 PG (ref 24–34)
MCHC RBC AUTO-ENTMCNC: 32.9 G/DL (ref 31–37)
MCV RBC AUTO: 88.1 FL (ref 74–97)
MONOCYTES # BLD: 1 K/UL (ref 0.05–1.2)
MONOCYTES NFR BLD: 7 % (ref 3–10)
NEUTS SEG # BLD: 8.4 K/UL (ref 1.8–8)
NEUTS SEG NFR BLD: 64 % (ref 40–73)
PLATELET # BLD AUTO: 262 K/UL (ref 135–420)
PMV BLD AUTO: 11.1 FL (ref 9.2–11.8)
POTASSIUM SERPL-SCNC: 3.8 MMOL/L (ref 3.5–5.5)
RBC # BLD AUTO: 4.8 M/UL (ref 4.7–5.5)
SODIUM SERPL-SCNC: 141 MMOL/L (ref 136–145)
WBC # BLD AUTO: 13.2 K/UL (ref 4.6–13.2)

## 2018-05-13 PROCEDURE — 82962 GLUCOSE BLOOD TEST: CPT

## 2018-05-13 PROCEDURE — 74011250636 HC RX REV CODE- 250/636: Performed by: HOSPITALIST

## 2018-05-13 PROCEDURE — 36415 COLL VENOUS BLD VENIPUNCTURE: CPT | Performed by: HOSPITALIST

## 2018-05-13 PROCEDURE — 85025 COMPLETE CBC W/AUTO DIFF WBC: CPT | Performed by: HOSPITALIST

## 2018-05-13 PROCEDURE — 80048 BASIC METABOLIC PNL TOTAL CA: CPT | Performed by: HOSPITALIST

## 2018-05-13 PROCEDURE — 65270000029 HC RM PRIVATE

## 2018-05-13 PROCEDURE — 74011636637 HC RX REV CODE- 636/637: Performed by: HOSPITALIST

## 2018-05-13 RX ADMIN — DEXTROSE MONOHYDRATE, SODIUM CHLORIDE, AND POTASSIUM CHLORIDE 125 ML/HR: 50; 4.5; 1.49 INJECTION, SOLUTION INTRAVENOUS at 06:37

## 2018-05-13 RX ADMIN — INSULIN LISPRO 2 UNITS: 100 INJECTION, SOLUTION INTRAVENOUS; SUBCUTANEOUS at 07:12

## 2018-05-13 RX ADMIN — DEXTROSE MONOHYDRATE, SODIUM CHLORIDE, AND POTASSIUM CHLORIDE 125 ML/HR: 50; 4.5; 1.49 INJECTION, SOLUTION INTRAVENOUS at 22:10

## 2018-05-13 RX ADMIN — INSULIN LISPRO 2 UNITS: 100 INJECTION, SOLUTION INTRAVENOUS; SUBCUTANEOUS at 12:30

## 2018-05-13 RX ADMIN — DEXTROSE MONOHYDRATE, SODIUM CHLORIDE, AND POTASSIUM CHLORIDE 125 ML/HR: 50; 4.5; 1.49 INJECTION, SOLUTION INTRAVENOUS at 14:26

## 2018-05-13 NOTE — PROGRESS NOTES
[facility] 3S CARDIAC TELE  [address] [place] [address]  [phone]  Colon and Rectal Surgery Progress Note      Patient: Gregg Hawkins MRN: [id number]  SSN: [id number]    YOB: 1950  Age: 79 y.o. Sex: male      Admit Date: 5/11/2018    LOS: 1 day     Subjective:     Appears comfortable. Not clear if the patient responded to enema therapy. Objective:     Vitals:    05/13/18 0130 05/13/18 0434 05/13/18 0830 05/13/18 1225   BP: 119/78 114/76 102/73 105/71   Pulse: 80 76 86 83   Resp: 18 16 15 20   Temp: 99.1 °F (37.3 °C) 98.2 °F (36.8 °C) 98 °F (36.7 °C) 98.1 °F (36.7 °C)   SpO2: 95% 94% 96% 93%   Weight:       Height:            Intake and Output:  Current Shift: 05/13 0701 - 05/13 1900  In: 193.8 [I.V.:193.8]  Out: 1700   Last three shifts: 05/11 1901 - 05/13 0700  In: 4152.1 [I.V.:3252.1]  Out: 1000 [Urine:1000]    NG output 1700 mL (past 24 hours)    Physical Exam:   GENERAL: alert, cooperative, no distress, slowed mentation, appears stated age  LUNG: clear to auscultation bilaterally  HEART: regular rate and rhythm  ABDOMEN: soft, non-tender. Bowel sounds few. No masses,  no organomegaly. Mild distention.   EXTREMITIES:  extremities normal, atraumatic, no cyanosis or edema    Lab/Data Review:  CMP:   Lab Results   Component Value Date/Time     05/13/2018 04:45 AM    K 3.8 05/13/2018 04:45 AM     05/13/2018 04:45 AM    CO2 28 05/13/2018 04:45 AM    AGAP 6 05/13/2018 04:45 AM     (H) 05/13/2018 04:45 AM    BUN 11 05/13/2018 04:45 AM    CREA 0.59 (L) 05/13/2018 04:45 AM    GFRAA >60 05/13/2018 04:45 AM    GFRNA >60 05/13/2018 04:45 AM    CA 8.1 (L) 05/13/2018 04:45 AM     CBC:   Lab Results   Component Value Date/Time    WBC 13.2 05/13/2018 04:45 AM    HGB 13.9 05/13/2018 04:45 AM    HCT 42.3 05/13/2018 04:45 AM     05/13/2018 04:45 AM     Recent Glucose Results:   Lab Results   Component Value Date/Time     (H) 05/13/2018 04:45 AM         Assessment: Principal Problem:    Small bowel obstruction ([facility] (5/12/2018)    Active Problems:    Cerebral palsy ([facility] (6/12/2015)      History of post-polio syndrome (6/12/2015)      Cognitive developmental delay (5/12/2018)    Clinically stable. NG output still high. Plan:     Continue NG, bowel rest, IVF. Check KUB tomorrow.         Symone Guevara MD, FACS, FASCRS  Colon and Rectal Surgery  [facility]  Office [phone]  Fax     [phone]  5/13/2018  3:32 PM

## 2018-05-13 NOTE — PROGRESS NOTES
26- Report received from Grace Richardson RN and assumed care of patient. Patient alert and oriented to self and place. No signs of distress. NG tube to suction. Black sharpie gemma placed on NG tube to ensure tube does not get displaced. Will continue to monitor. 0800- Mouth care provided at this time. 1000- Dr. Earl Garvey at bedside. New orders received. 1200- Mouth care provided at this time. 1415- Patient resting comfortably. No signs of distress. Will continue to monitor. 1600- Mouth care provided at this time. Bedside and Verbal shift change report given to Grace Richardson RN  (oncoming nurse) by Mey Scott RN  (offgoing nurse). Report included the following information SBAR, Kardex, Intake/Output, MAR and Recent Results.

## 2018-05-13 NOTE — PROGRESS NOTES
Problem: Falls - Risk of  Goal: *Absence of Falls  Document Shwetha Fall Risk and appropriate interventions in the flowsheet. Outcome: Progressing Towards Goal  Fall Risk Interventions:       Mentation Interventions: Adequate sleep, hydration, pain control, Bed/chair exit alarm, Door open when patient unattended, More frequent rounding, Reorient patient    Medication Interventions: Bed/chair exit alarm, Patient to call before getting OOB    Elimination Interventions: Bed/chair exit alarm, Elevated toilet seat, Toileting schedule/hourly rounds             Problem: Pressure Injury - Risk of  Goal: *Prevention of pressure injury  Document Alex Scale and appropriate interventions in the flowsheet. Outcome: Progressing Towards Goal  Pressure Injury Interventions:  Sensory Interventions: Assess changes in LOC, Keep linens dry and wrinkle-free, Pressure redistribution bed/mattress (bed type)    Moisture Interventions: Absorbent underpads, Apply protective barrier, creams and emollients, Minimize layers    Activity Interventions: Increase time out of bed, Pressure redistribution bed/mattress(bed type)    Mobility Interventions: HOB 30 degrees or less, Turn and reposition approx.  every two hours(pillow and wedges)    Nutrition Interventions: Document food/fluid/supplement intake, Offer support with meals,snacks and hydration    Friction and Shear Interventions: Apply protective barrier, creams and emollients, HOB 30 degrees or less

## 2018-05-13 NOTE — PROGRESS NOTES
2000-no signs of distress. Ordered meds given throughout the night. Patient slept entire night without incident. Bedside shift change report given to YAS Leigh (oncoming nurse) by Myles Camarena (offgoing nurse). Report included the following information SBAR.

## 2018-05-13 NOTE — PROGRESS NOTES
Progress Note      Patient: Mara Arenas               Sex: male          DOA: 5/11/2018       YOB: 1950      Age:  79 y.o.        LOS:  LOS: 1 day             CHIEF COMPLAINT:  SBO    Subjective:     NG is in place  Patient feels much more comfortable    Objective:      Visit Vitals    /76    Pulse 76    Temp 98.2 °F (36.8 °C)    Resp 16    Ht 5' 5\" (1.651 m)    Wt 60.8 kg (134 lb)    SpO2 94%    BMI 22.3 kg/m2       Physical Exam:  Gen:  No distress, no complaint  Lungs:  Clear bilaterally, no wheeze or rhonchi  Heart:  Regular rate and rhythm, no murmurs or gallops  Abdomen:  Soft, non-tender, NG in place        Lab/Data Reviewed:  BMP:   Lab Results   Component Value Date/Time     05/13/2018 04:45 AM    K 3.8 05/13/2018 04:45 AM     05/13/2018 04:45 AM    CO2 28 05/13/2018 04:45 AM    AGAP 6 05/13/2018 04:45 AM     (H) 05/13/2018 04:45 AM    BUN 11 05/13/2018 04:45 AM    CREA 0.59 (L) 05/13/2018 04:45 AM    GFRAA >60 05/13/2018 04:45 AM    GFRNA >60 05/13/2018 04:45 AM     CBC:   Lab Results   Component Value Date/Time    WBC 13.2 05/13/2018 04:45 AM    HGB 13.9 05/13/2018 04:45 AM    HCT 42.3 05/13/2018 04:45 AM     05/13/2018 04:45 AM           Assessment/Plan     Principal Problem:    Small bowel obstruction ([address] (5/12/2018)    Active Problems:    Cerebral palsy ([address] (6/12/2015)      History of post-polio syndrome (6/12/2015)      Cognitive developmental delay (5/12/2018)        Plan:  Continue with conservative management  Hopefully he will not require operative management  NG out when okay with surgery, it is still maintaining output  DVT prophylaxis

## 2018-05-14 ENCOUNTER — APPOINTMENT (OUTPATIENT)
Dept: GENERAL RADIOLOGY | Age: 68
DRG: 389 | End: 2018-05-14
Attending: COLON & RECTAL SURGERY
Payer: MEDICARE

## 2018-05-14 LAB
GLUCOSE BLD STRIP.AUTO-MCNC: 134 MG/DL (ref 70–110)
GLUCOSE BLD STRIP.AUTO-MCNC: 135 MG/DL (ref 70–110)
GLUCOSE BLD STRIP.AUTO-MCNC: 140 MG/DL (ref 70–110)
GLUCOSE BLD STRIP.AUTO-MCNC: 164 MG/DL (ref 70–110)

## 2018-05-14 PROCEDURE — 65270000029 HC RM PRIVATE

## 2018-05-14 PROCEDURE — 77030037878 HC DRSG MEPILEX >48IN BORD MOLN -B

## 2018-05-14 PROCEDURE — 82962 GLUCOSE BLOOD TEST: CPT

## 2018-05-14 PROCEDURE — 74019 RADEX ABDOMEN 2 VIEWS: CPT

## 2018-05-14 NOTE — PROGRESS NOTES
Hospitalist Progress Note  Mickey Arciniega MD  Internal medicine/ Hospitalist    Daily Progress Note: 2018 2:31 PM      Interval history / Subjective:   Idalmis Juan is a 79 y.o. male who presented to the ER from SNF. He has a long history of Cerebral Palsy and Post Polio Syndrome. He had abdominal pain with decreased oral intake. No fever. No cough. In the ER he was found to have SBO. NG tube was placed and put on suction. Pt did not have acute abdomen. Surgery saw patient on  and advised to continue NG decompression,iv fluid,enema as patient was also noted to have fecal impaction. Xray abdomen done today showing nonobstructing bowel gas pattern. Surgery has instructed clamping NG,then evaluate prior to removing it. Current Facility-Administered Medications   Medication Dose Route Frequency    dextrose 5% - 0.45% NaCl with KCl 20 mEq/L infusion  125 mL/hr IntraVENous CONTINUOUS    insulin lispro (HUMALOG) injection   SubCUTAneous Q6H    glucose chewable tablet 16 g  4 Tab Oral PRN    glucagon (GLUCAGEN) injection 1 mg  1 mg IntraMUSCular PRN    dextrose (D50) infusion 12.5-25 g  25-50 mL IntraVENous PRN        Review of Systems  No complaint. Objective:     Visit Vitals    /72    Pulse 100    Temp 97.9 °F (36.6 °C)    Resp 16    Ht 5' 5\" (1.651 m)    Wt 61.2 kg (134 lb 14.7 oz)    SpO2 95%    BMI 22.45 kg/m2      O2 Device: Room air    Temp (24hrs), Av.9 °F (36.6 °C), Min:97.4 °F (36.3 °C), Max:98.2 °F (36.8 °C)      701 - 1900  In: -   Out: 800 [Urine:300]  1901 -  0700  In: 5354.2 [I.V.:4454.2]  Out: 1181 [CIXIB:1638]  P/E  NAD,lying comfortably in bed. Heent:perrla,at/nc,mouth moist.  Lungs ctab  Heart s1s2 nl,no m/g  Abdm:soft,not tender,bs present. Extr:no edema,good pedis pulses. Neuro:cerebral palsy.   Data Review    Recent Results (from the past 12 hour(s))   GLUCOSE, POC    Collection Time: 18  6:18 AM   Result Value Ref Range Glucose (POC) 140 (H) 70 - 110 mg/dL   GLUCOSE, POC    Collection Time: 05/14/18 11:46 AM   Result Value Ref Range    Glucose (POC) 135 (H) 70 - 110 mg/dL         Assessment/Plan:     Principal Problem:    Small bowel obstruction ([address] (5/12/2018)    Active Problems:    Cerebral palsy ([facility] (6/12/2015)      History of post-polio syndrome (6/12/2015)      Cognitive developmental delay (5/12/2018)      Care Plan   1-Small bowel obstruction:    -NPO    -Xray today:Nonobstructing bowel gas pattern. Enteric tube in place.    -Surgery has instructed clamping NG tube today,then assess for vomiting,abdominal pain. If none,then d/c NGT. -Will continue to follow surgery instructions    2-Hyperglycemia    -On ssi    3-Cerebral palsy/Debility/Cognitive developmental delay    -Supportive care.     DVT prophylaxis:scds  Full code  Disposition:possibly tomorrow

## 2018-05-14 NOTE — PROGRESS NOTES
[facility] 3S CARDIAC TELE  [address] [place] [address]  [phone]  Colon and Rectal Surgery Progress Note      Patient: Shanice Maravilla MRN: [id number]  SSN: [id number]    YOB: 1950  Age: 79 y.o. Sex: male      Admit Date: 5/11/2018    LOS: 2 days       I examined the patient independently and agree with evaluation as documented by the nurse practitioner, Danii Hidalgo NP    Patient feels well even with NG clamped. Hungry. Remove NG.  NPO for now.         Jimenez Diamond MD, FACS, FASCRS  Colon and Rectal Surgery  [facility]  Office [phone]  Fax     [phone]  5/14/2018  6:14 PM

## 2018-05-14 NOTE — INTERDISCIPLINARY ROUNDS
IDR Summary      Patient: Martha Escoto MRN: [id number]    Age: 79 y.o.  : 1950     Admit Diagnosis: Small bowel obstruction (HCC)  Cerebral palsy ([address]  Cognitive developmental delay      Problems pertinent to hospital stay: NGT clamped today- repeat Xray normal    Consults:Case Management     Testing due for patient today? NO    Nutrition plan:Yes     Mobility needs: Yes      Lines/Tubes:   IV: YES   Needed: YES  [place]: NO  Needed:NO  Central Line: NO Needed: NO      VTE Prophylaxis: Mechanical      Care Management:  Discharge plan: back to [facility]   PCP: Smita Moon MD    : NO  Financial concerns:No   Interventions:       LOS: 2 days     Expected days until discharge: TBD      Signed:      MARCIANO Varma  [facility]  Hospitalist Division  Pager:  [phone]  Office:  [phone]

## 2018-05-14 NOTE — PROGRESS NOTES
[facility] 3S CARDIAC TELE  [address] [place] [address]  [phone]  Colon and Rectal Surgery Progress Note      Patient: Leslie Walden MRN: [id number]  SSN: [id number]    YOB: 1950  Age: 79 y.o. Sex: male      Admit Date: 5/11/2018    LOS: 2 days     Subjective:   Patient alert, requesting water  NG tube connected to suction with large output  He denies pain at this time  According to primary nurse patient had multiple small BM's after receiving laxative    Objective:     Vitals:    05/14/18 0003 05/14/18 0516 05/14/18 0841 05/14/18 1149   BP: 112/74 105/72 103/75 100/72   Pulse: 86 (!) 107 (!) 109 100   Resp: 16 16 14 16   Temp: 98.2 °F (36.8 °C) 98.1 °F (36.7 °C) 97.4 °F (36.3 °C) 97.9 °F (36.6 °C)   SpO2: 97% 95% 94% 95%   Weight:  61.2 kg (134 lb 14.7 oz)     Height:            Intake and Output:  Current Shift: 05/14 0701 - 05/14 1900  In: -   Out: 800 [Urine:300]  Last three shifts: 05/12 1901 - 05/14 0700  In: 5354.2 [I.V.:4454.2]  Out: 6075 [Urine:1875]    Physical Exam:   GENERAL: alert, cooperative, no distress, appears stated age  LUNG: clear to auscultation bilaterally  HEART: regular rate and rhythm, S1, S2 normal, no murmur, click, rub or gallop  ABDOMEN: soft, non-tender, non distended. Bowel sounds normal. No masses,  no organomegaly. NG tube connected to suction with large output, patient tolerating well. NEUROLOGIC: negative    Lab/Data Review: All lab results for the last 24 hours reviewed. Assessment:     Principal Problem:    Small bowel obstruction ([facility] (5/12/2018)    Active Problems:    Cerebral palsy ([address] (6/12/2015)      History of post-polio syndrome (6/12/2015)      Cognitive developmental delay (5/12/2018)        Plan:   Continue NPO for now  KUB negative for obstruction, clamp NG to evaluate vomiting or pain prior to removal  No surgical intervention required at this time, will continue to monitor.        Signed By: Kashmir Cisneros NP        May 14, 2018

## 2018-05-14 NOTE — PROGRESS NOTES
2000-no signs of distress. Ordered meds given throughout night. Patient slept entire night. Bedside shift change report given to YAS Kauffman (oncoming nurse) by Christina Jimenez (offgoing nurse). Report included the following information SBAR.

## 2018-05-14 NOTE — PROGRESS NOTES
Problem: Falls - Risk of  Goal: *Absence of Falls  Document Shwetha Fall Risk and appropriate interventions in the flowsheet. Outcome: Progressing Towards Goal  Fall Risk Interventions:       Mentation Interventions: Adequate sleep, hydration, pain control, Bed/chair exit alarm, Door open when patient unattended, Evaluate medications/consider consulting pharmacy, More frequent rounding, Reorient patient, Room close to nurse's station    Medication Interventions: Bed/chair exit alarm, Evaluate medications/consider consulting pharmacy    Elimination Interventions: Call light in reach, Patient to call for help with toileting needs, Toileting schedule/hourly rounds    History of Falls Interventions: Bed/chair exit alarm, Door open when patient unattended, Evaluate medications/consider consulting pharmacy, Investigate reason for fall, Room close to nurse's station        Problem: Pressure Injury - Risk of  Goal: *Prevention of pressure injury  Document Alex Scale and appropriate interventions in the flowsheet. Outcome: Progressing Towards Goal  Pressure Injury Interventions:  Sensory Interventions: Assess changes in LOC, Assess need for specialty bed, Check visual cues for pain, Keep linens dry and wrinkle-free, Minimize linen layers, Pad between skin to skin    Moisture Interventions: Absorbent underpads, Internal/External urinary devices, Limit adult briefs    Activity Interventions: Assess need for specialty bed, Pressure redistribution bed/mattress(bed type)    Mobility Interventions: HOB 30 degrees or less, Assess need for specialty bed, Pressure redistribution bed/mattress (bed type), Turn and reposition approx.  every two hours(pillow and wedges)    Nutrition Interventions: Document food/fluid/supplement intake    Friction and Shear Interventions: Apply protective barrier, creams and emollients, Foam dressings/transparent film/skin sealants, HOB 30 degrees or less, Lift sheet

## 2018-05-14 NOTE — PROGRESS NOTES
Bedside shift change report given to Daly RN (oncoming nurse) by Hank Caballero RN (offgoing nurse). Report included the following information SBAR, Kardex, Intake/Output, MAR and Recent Results. 0730 -- Patient off unit for XR of Abdomen. 0503 -- Patient back on unit from XR of Abdomen.      1115 -- Reassessment completed, no change in patient condition, will continue to monitor. 1323 -- Spoke with Carol Jones NP to clamp the NG Tube.      1530 -- Reassessment completed, no change in patient condition, will continue to monitor. [phone] -- Spoke with Dr. Diana Santos, instructions were to pull NG Tube, but do not feed the patient. 7811 -- NG Tube removed, abdomen not distended, will continue to monitor.      Bedside shift change report given to Severo Hopkins, [address]) by Sydni Isbell RN (offgoing nurse). Report included the following information SBAR, Kardex, Intake/Output, MAR and Recent Results.

## 2018-05-14 NOTE — PROGRESS NOTES
Problem: Falls - Risk of  Goal: *Absence of Falls  Document Shwetha Fall Risk and appropriate interventions in the flowsheet. Outcome: Progressing Towards Goal  Fall Risk Interventions:       Mentation Interventions: Adequate sleep, hydration, pain control    Medication Interventions: Bed/chair exit alarm    Elimination Interventions: Call light in reach    History of Falls Interventions: Bed/chair exit alarm, Door open when patient unattended, Evaluate medications/consider consulting pharmacy, Investigate reason for fall, Room close to nurse's station        Problem: Pressure Injury - Risk of  Goal: *Prevention of pressure injury  Document Alex Scale and appropriate interventions in the flowsheet.    Outcome: Progressing Towards Goal  Pressure Injury Interventions:  Sensory Interventions: Assess changes in LOC    Moisture Interventions: Absorbent underpads    Activity Interventions: Assess need for specialty bed    Mobility Interventions: HOB 30 degrees or less    Nutrition Interventions: Document food/fluid/supplement intake    Friction and Shear Interventions: Apply protective barrier, creams and emollients, Foam dressings/transparent film/skin sealants, HOB 30 degrees or less, Lift sheet

## 2018-05-14 NOTE — PROGRESS NOTES
Bedside shift change report given to [facility] RN  (oncoming nurse) by Romero Barrett (offgoing nurse). Report included the following information SBAR, Kardex, Intake/Output, MAR and Recent Results. Pt rested quietly throughout evening, had several loose bowel movements over the course of the shift. Bedside shift change report given to Daly RN (oncoming nurse) by [facility] RN and Candace Daniel (offgoing nurse). Report included the following information SBAR, Kardex, Intake/Output, MAR and Recent Results.

## 2018-05-14 NOTE — DIABETES MGMT
GLYCEMIC CONTROL-  Lab Results   Component Value Date/Time    Hemoglobin A1c 7.6 (H) 05/11/2018 10:51 PM    equivalent  to ave Blood Glucose of 168 mg/dl for 2-3 months prior to admission  BG well controlled on corrective lispro.  Delano LOUISE

## 2018-05-15 LAB
GLUCOSE BLD STRIP.AUTO-MCNC: 123 MG/DL (ref 70–110)
GLUCOSE BLD STRIP.AUTO-MCNC: 129 MG/DL (ref 70–110)
GLUCOSE BLD STRIP.AUTO-MCNC: 149 MG/DL (ref 70–110)
GLUCOSE BLD STRIP.AUTO-MCNC: 151 MG/DL (ref 70–110)
GLUCOSE BLD STRIP.AUTO-MCNC: 155 MG/DL (ref 70–110)

## 2018-05-15 PROCEDURE — 77030037878 HC DRSG MEPILEX >48IN BORD MOLN -B

## 2018-05-15 PROCEDURE — 82962 GLUCOSE BLOOD TEST: CPT

## 2018-05-15 PROCEDURE — 65270000029 HC RM PRIVATE

## 2018-05-15 PROCEDURE — 74011636637 HC RX REV CODE- 636/637: Performed by: HOSPITALIST

## 2018-05-15 PROCEDURE — 74011250636 HC RX REV CODE- 250/636: Performed by: HOSPITALIST

## 2018-05-15 RX ADMIN — INSULIN LISPRO 2 UNITS: 100 INJECTION, SOLUTION INTRAVENOUS; SUBCUTANEOUS at 17:42

## 2018-05-15 RX ADMIN — DEXTROSE MONOHYDRATE, SODIUM CHLORIDE, AND POTASSIUM CHLORIDE 125 ML/HR: 50; 4.5; 1.49 INJECTION, SOLUTION INTRAVENOUS at 12:10

## 2018-05-15 RX ADMIN — INSULIN LISPRO 2 UNITS: 100 INJECTION, SOLUTION INTRAVENOUS; SUBCUTANEOUS at 01:18

## 2018-05-15 RX ADMIN — DEXTROSE MONOHYDRATE, SODIUM CHLORIDE, AND POTASSIUM CHLORIDE 125 ML/HR: 50; 4.5; 1.49 INJECTION, SOLUTION INTRAVENOUS at 20:22

## 2018-05-15 RX ADMIN — DEXTROSE MONOHYDRATE, SODIUM CHLORIDE, AND POTASSIUM CHLORIDE 125 ML/HR: 50; 4.5; 1.49 INJECTION, SOLUTION INTRAVENOUS at 03:48

## 2018-05-15 NOTE — PROGRESS NOTES
Hospitalist Progress Note  Moustapha Hernandez MD  Internal medicine/ Hospitalist    Daily Progress Note: 5/15/2018 2:31 PM      Interval history / Subjective:   Kristin Manriquez is a 79 y.o. male who presented to the ER from SNF. He has a long history of Cerebral Palsy and Post Polio Syndrome. He had abdominal pain with decreased oral intake. No fever. No cough. In the ER he was found to have SBO. NG tube was placed and put on suction. Pt did not have acute abdomen. Surgery saw patient on  and advised to continue NG decompression,iv fluid,enema as patient was also noted to have fecal impaction. Xray abdomen done today showing nonobstructing bowel gas pattern. Surgery has instructed clamping NG,then evaluate prior to removing it. Current Facility-Administered Medications   Medication Dose Route Frequency    dextrose 5% - 0.45% NaCl with KCl 20 mEq/L infusion  125 mL/hr IntraVENous CONTINUOUS    insulin lispro (HUMALOG) injection   SubCUTAneous Q6H    glucose chewable tablet 16 g  4 Tab Oral PRN    glucagon (GLUCAGEN) injection 1 mg  1 mg IntraMUSCular PRN    dextrose (D50) infusion 12.5-25 g  25-50 mL IntraVENous PRN        Review of Systems  No complaint. Objective:     Visit Vitals    /78 (BP 1 Location: Right arm, BP Patient Position: Supine)    Pulse 75    Temp 97.9 °F (36.6 °C)    Resp 17    Ht 5' 5\" (1.651 m)    Wt 53.7 kg (118 lb 6.4 oz)    SpO2 96%    BMI 19.7 kg/m2      O2 Device: Room air    Temp (24hrs), Av.1 °F (36.7 °C), Min:97.8 °F (36.6 °C), Max:98.2 °F (36.8 °C)          1901 - 05/15 0700  In: 2954.2 [I.V.:2954.2]  Out: 9902 [Urine:1050]  P/E  NAD,lying comfortably in bed. Heent:perrla,at/nc,mouth moist.  Lungs ctab  Heart s1s2 nl,no m/g  Abdm:soft,not tender,bs present. Extr:no edema,good pedis pulses. Neuro:cerebral palsy.   Data Review    Recent Results (from the past 12 hour(s))   GLUCOSE, POC    Collection Time: 05/15/18  6:09 AM   Result Value Ref Range Glucose (POC) 123 (H) 70 - 110 mg/dL   GLUCOSE, POC    Collection Time: 05/15/18 12:01 PM   Result Value Ref Range    Glucose (POC) 149 (H) 70 - 110 mg/dL         Assessment/Plan:     Principal Problem:    Small bowel obstruction ([address] (5/12/2018)    Active Problems:    Cerebral palsy ([facility] [address] (6/12/2015)      History of post-polio syndrome (6/12/2015)      Cognitive developmental delay (5/12/2018)      Care Plan   1-Small bowel obstruction:    -NPO    -Xray today:Nonobstructing bowel gas pattern. Enteric tube in place.    -On 5/14,Surgery has instructed clamping NG tube today,then assess for vomiting,abdominal pain. If none,then d/c NGT.    -NGT d/gino since yesterday,surgery to decide if pt to start po today    -Will continue to follow surgery instructions    2-Hyperglycemia    -On ssi    3-Cerebral palsy/Debility/Cognitive developmental delay    -Supportive care.     DVT prophylaxis:scds  Full code  Disposition:possibly tomorrow

## 2018-05-15 NOTE — PROGRESS NOTES
Bedside shift change report given to Sharif Bonilla RN (oncoming nurse) by Corrina Oliver RN (offgoing nurse). Report included the following information SBAR, Kardex, Intake/Output, MAR and Accordion. Pt resting in bed, AxOx3. Disoriented to time. On room air. Lungs are clear. Condom cath on and intact. Urine draining. Fluids running. No c/o pain. NAD. Bed locked and in low position. Call bell within reach. Will continue to monitor. 2339-- reassessment complete. No changes in pt's condition. Will continue to monitor. 0118-- 2 units of humalog administered. Pt tolerated well. Will continue to monitor. 3713-- new bag of potassium chloride hung    0545-- pt bathed. Linens, gown, and condom cath changed. Vandana care performed. Pt tolerated well.     5503-- BS- 123. Humalog not indicated. Pt resting in bed. No other needs at this time. Bedside shift change report given to YAS Kauffman (oncoming nurse) by Sharif Bonilla RN (offgoing nurse). Report included the following information SBAR, Kardex, Intake/Output, MAR, Accordion and Cardiac Rhythm Sinus Tach.

## 2018-05-15 NOTE — PROGRESS NOTES
[facility] 3S CARDIAC TELE  [address] [place] [address]  [phone]  Colon and Rectal Surgery Progress Note      Patient: Adriano Bailey MRN: [id number]  SSN: [id number]    YOB: 1950  Age: 79 y.o. Sex: male      Admit Date: 5/11/2018    LOS: 3 days     Subjective:   Feeling well today, \"Hungry\"  No Nausea/vomiting  BM today  NG removed yesterday    Objective:     Vitals:    05/14/18 2352 05/15/18 0320 05/15/18 0737 05/15/18 1224   BP: 117/81 110/79 124/80 115/78   Pulse: 79 78 76 75   Resp: 16 17 17 17   Temp: 98.1 °F (36.7 °C) 98.2 °F (36.8 °C) 97.8 °F (36.6 °C) 97.9 °F (36.6 °C)   SpO2: 97% 98% 100% 96%   Weight:  53.7 kg (118 lb 6.4 oz)     Height:  5' 5\" (1.651 m)          Intake and Output:  Current Shift:    Last three shifts: 05/13 1901 - 05/15 0700  In: 2954.2 [I.V.:2954.2]  Out: 3050 [Urine:1050]    Physical Exam:   GENERAL: alert, cooperative, no distress, appears stated age  LUNG: clear to auscultation bilaterally  HEART: regular rate and rhythm, S1, S2 normal, no murmur, click, rub or gallop  ABDOMEN: soft, non-tender, non distended. Bowel sounds normal. No masses,  no organomegaly    Lab/Data Review: All lab results for the last 24 hours reviewed.        Assessment:     Principal Problem:    Small bowel obstruction ([address] (5/12/2018)    Active Problems:    Cerebral palsy ([address] (6/12/2015)      History of post-polio syndrome (6/12/2015)      Cognitive developmental delay (5/12/2018)        Plan:   No surgical intervention required at this time,   Advance to clear liquids    Signed By: Levi Quiles NP        May 15, 2018

## 2018-05-15 NOTE — PROGRESS NOTES
1924  Received bedside verbal shift report from [address] [place] [address]. Pt lying in bed resting comfortably. Piv # 20  to right a/c with D5 1/2 NS w/ 20 Meq KCl infusing at 100 ml/hr. condom cath to gravity with clear yellow urine draining. Call bell within reach, side rails up x 3, bed low and locked. No complaints offered, pt instructed to call for assistance. Bedside and Verbal shift change report given to sierra hughes rn (oncoming nurse) by Robb Cameron rn (offgoing nurse). Report included the following information SBAR, Kardex, Intake/Output, MAR, Recent Results and Cardiac Rhythm . Chace Valdez

## 2018-05-15 NOTE — PROGRESS NOTES
Bedside shift change report given to Daly RN (oncoming nurse) by Kassandra Pena RN (offgoing nurse). Report included the following information SBAR, Kardex, Intake/Output, MAR and Recent Results.      1210 -- Medications given, well tolerated, will continue to monitor.      1224 -- Reassessment completed, no change in patient condition, will continue to monitor.      1551 -- Reassessment completed, no change in patient condition, will continue to monitor.      Bedside shift change report given to Pinky Burt, [address]) by Macie Alexandre RN (offgoing nurse). Report included the following information SBAR, Kardex, Intake/Output, MAR and Recent Results.

## 2018-05-15 NOTE — PROGRESS NOTES
[facility] 3S CARDIAC TELE  [address] [place] [address]  [phone]  Colon and Rectal Surgery Progress Note      Patient: Bharath Macias MRN: [id number]  SSN: [id number]    YOB: 1950  Age: 79 y.o.   Sex: male      Admit Date: 5/11/2018    LOS: 3 days       I examined the patient independently and agree with evaluation as documented by the nurse practitioner, DAVID Weiss MD, [facility], FASCRS  Colon and Rectal Surgery  [facility]  Office [phone]  Fax     [phone]  5/15/2018  3:52 PM

## 2018-05-15 NOTE — PROGRESS NOTES
Problem: Falls - Risk of  Goal: *Absence of Falls  Document Shwetha Fall Risk and appropriate interventions in the flowsheet. Outcome: Progressing Towards Goal  Fall Risk Interventions:       Mentation Interventions: Adequate sleep, hydration, pain control, Door open when patient unattended    Medication Interventions: Bed/chair exit alarm    Elimination Interventions: Call light in reach, Toileting schedule/hourly rounds    History of Falls Interventions: Bed/chair exit alarm        Problem: Pressure Injury - Risk of  Goal: *Prevention of pressure injury  Document Alex Scale and appropriate interventions in the flowsheet.    Outcome: Progressing Towards Goal  Pressure Injury Interventions:  Sensory Interventions: Assess changes in LOC, Check visual cues for pain, Keep linens dry and wrinkle-free, Pressure redistribution bed/mattress (bed type)    Moisture Interventions: Absorbent underpads, Check for incontinence Q2 hours and as needed, Maintain skin hydration (lotion/cream)    Activity Interventions: Pressure redistribution bed/mattress(bed type)    Mobility Interventions: Pressure redistribution bed/mattress (bed type)    Nutrition Interventions: Document food/fluid/supplement intake, Offer support with meals,snacks and hydration    Friction and Shear Interventions: Apply protective barrier, creams and emollients, Foam dressings/transparent film/skin sealants

## 2018-05-15 NOTE — INTERDISCIPLINARY ROUNDS
IDR Summary      Patient: She Yap MRN: [id number]    Age: 79 y.o.  : 1950     Admit Diagnosis: Small bowel obstruction (HCC)  Cerebral palsy ([facility] [address]  Cognitive developmental delay      Problems pertinent to hospital stay: NGT removed - need clarification on diet orders- nurse to follow up with surgery team    Consults:Case Management     Testing due for patient today? NO    Nutrition plan:Yes     Mobility needs: Yes      Lines/Tubes:   IV: YES   Needed: YES  [place]: NO  Needed:NO  Central Line: NO Needed: NO      VTE Prophylaxis: Mechanical      Care Management:  Discharge plan: back to [facility]   PCP: Joseph Barrow MD    : NO  Financial concerns:No   Interventions:       LOS: 3 days     Expected days until discharge: possibly tomorrow      Signed:      MARCIANO Conde  [facility]  Hospitalist Division  Pager:  [phone]  Office:  [phone]

## 2018-05-15 NOTE — CDMP QUERY
In order to reflect severity of illness please document per  nurse    =>Stage 2 pressure injury to sacrum POA  =>Other Explanation of clinical findings  =>Unable to Determine (no explanation of clinical findings)    The medical record reflects the following:    Risk: 78 yo male w/PMH cerebral palsy, resides in SNF    Clinical Indicators: Per  NN  5/12  0300 Dual skin verification completed with Fer Blue RN, small stage II pressure injury on sacral region surrounded by nonblanchable red skin      Treatment: Wound care, mepilex applied, turn & repositioning    Please clarify and document your clinical opinion in the progress notes and discharge summary including the definitive and/or presumptive diagnosis, (suspected or probable), related to the above clinical findings. Please include clinical findings supporting your diagnosis. If you DECLINE this query or would like to communicate with [facility], please utilize the \"[employer] message box\" at the TOP of the Progress Note on the right.       Thank you,  Matt Pearl RN [facility] [phone]

## 2018-05-16 LAB
ANION GAP SERPL CALC-SCNC: 10 MMOL/L (ref 3–18)
BUN SERPL-MCNC: 8 MG/DL (ref 7–18)
BUN/CREAT SERPL: 17 (ref 12–20)
CALCIUM SERPL-MCNC: 7.3 MG/DL (ref 8.5–10.1)
CHLORIDE SERPL-SCNC: 110 MMOL/L (ref 100–108)
CO2 SERPL-SCNC: 22 MMOL/L (ref 21–32)
CREAT SERPL-MCNC: 0.46 MG/DL (ref 0.6–1.3)
GLUCOSE BLD STRIP.AUTO-MCNC: 156 MG/DL (ref 70–110)
GLUCOSE BLD STRIP.AUTO-MCNC: 175 MG/DL (ref 70–110)
GLUCOSE BLD STRIP.AUTO-MCNC: 195 MG/DL (ref 70–110)
GLUCOSE SERPL-MCNC: 148 MG/DL (ref 74–99)
POTASSIUM SERPL-SCNC: 4.2 MMOL/L (ref 3.5–5.5)
SODIUM SERPL-SCNC: 142 MMOL/L (ref 136–145)

## 2018-05-16 PROCEDURE — 77030037878 HC DRSG MEPILEX >48IN BORD MOLN -B

## 2018-05-16 PROCEDURE — 65270000029 HC RM PRIVATE

## 2018-05-16 PROCEDURE — 36415 COLL VENOUS BLD VENIPUNCTURE: CPT | Performed by: INTERNAL MEDICINE

## 2018-05-16 PROCEDURE — 80048 BASIC METABOLIC PNL TOTAL CA: CPT | Performed by: INTERNAL MEDICINE

## 2018-05-16 PROCEDURE — 74011636637 HC RX REV CODE- 636/637: Performed by: HOSPITALIST

## 2018-05-16 PROCEDURE — 74011250636 HC RX REV CODE- 250/636: Performed by: HOSPITALIST

## 2018-05-16 PROCEDURE — 82962 GLUCOSE BLOOD TEST: CPT

## 2018-05-16 RX ADMIN — DEXTROSE MONOHYDRATE, SODIUM CHLORIDE, AND POTASSIUM CHLORIDE 125 ML/HR: 50; 4.5; 1.49 INJECTION, SOLUTION INTRAVENOUS at 22:12

## 2018-05-16 RX ADMIN — INSULIN LISPRO 2 UNITS: 100 INJECTION, SOLUTION INTRAVENOUS; SUBCUTANEOUS at 06:28

## 2018-05-16 RX ADMIN — INSULIN LISPRO 2 UNITS: 100 INJECTION, SOLUTION INTRAVENOUS; SUBCUTANEOUS at 12:38

## 2018-05-16 RX ADMIN — INSULIN LISPRO 2 UNITS: 100 INJECTION, SOLUTION INTRAVENOUS; SUBCUTANEOUS at 18:00

## 2018-05-16 RX ADMIN — DEXTROSE MONOHYDRATE, SODIUM CHLORIDE, AND POTASSIUM CHLORIDE 125 ML/HR: 50; 4.5; 1.49 INJECTION, SOLUTION INTRAVENOUS at 13:14

## 2018-05-16 RX ADMIN — DEXTROSE MONOHYDRATE, SODIUM CHLORIDE, AND POTASSIUM CHLORIDE 125 ML/HR: 50; 4.5; 1.49 INJECTION, SOLUTION INTRAVENOUS at 06:28

## 2018-05-16 NOTE — PROGRESS NOTES
[facility] 3S CARDIAC TELE  [address] [place] [address]  [phone]  Colon and Rectal Surgery Progress Note      Patient: Sonia Avila MRN: [id number]  SSN: [id number]    YOB: 1950  Age: 79 y.o. Sex: male      Admit Date: 5/11/2018    LOS: 4 days     Subjective:   Resting in bed  Denies pain, nausea, vomiting  Tolerating liquid diet    Objective:     Vitals:    05/15/18 2030 05/16/18 0014 05/16/18 0403 05/16/18 0754   BP: 107/72 115/76 115/76 131/88   Pulse: 71 70 68 74   Resp: 16 16 16 17   Temp: 98.2 °F (36.8 °C) 98.2 °F (36.8 °C) 97.9 °F (36.6 °C) 98.4 °F (36.9 °C)   SpO2: 97% 98% 97% 96%   Weight:   53.7 kg (118 lb 6.4 oz)    Height:   5' 5\" (1.651 m)         Intake and Output:  Current Shift: 05/16 0701 - 05/16 1900  In: 240 [P.O.:240]  Out: -   Last three shifts: 05/14 1901 - 05/16 0700  In: 2100 [P.O.:600; I.V.:1500]  Out: 1630 [Urine:1630]    Physical Exam:   GENERAL: alert, cooperative, no distress, appears stated age  LUNG: clear to auscultation bilaterally  HEART: regular rate and rhythm, S1, S2 normal, no murmur, click, rub or gallop  ABDOMEN: soft, non-tender. Bowel sounds normal. No masses,  no organomegaly    Lab/Data Review: All lab results for the last 24 hours reviewed.          Assessment:     Principal Problem:    Small bowel obstruction ([address] (5/12/2018)    Active Problems:    Cerebral palsy ([address] (6/12/2015)      History of post-polio syndrome (6/12/2015)      Cognitive developmental delay (5/12/2018)        Plan:   Advanced to soft diet  Patient doing well, possible discharge today if he tolerates soft diet    Signed By: Preethi Cavanaugh NP        May 16, 2018

## 2018-05-16 NOTE — PROGRESS NOTES
[facility] 3S CARDIAC TELE  [address] [place] [address]  [phone]  Colon and Rectal Surgery Progress Note      Patient: Bravo Lane MRN: [id number]  SSN: [id number]    YOB: 1950  Age: 79 y.o.   Sex: male      Admit Date: 5/11/2018    LOS: 4 days       I examined the patient independently and agree with evaluation as documented by the nurse practitioner, DAVID Hearn MD, [address], FASCRS  Colon and Rectal Surgery  [facility]  Office [phone]  Fax     [phone]  5/16/2018  3:50 PM

## 2018-05-16 NOTE — ANCILLARY DISCHARGE INSTRUCTIONS
Patient and/or next of kin has been given the [facility] Important Message From Medicare About Your Rights\" letter and all questions were answered.

## 2018-05-16 NOTE — PROGRESS NOTES
Hospitalist Progress Note  Keysha Laureano MD  Internal medicine/ Hospitalist    Daily Progress Note: 2018 2:31 PM      Interval history / Subjective:   Viktor Dougherty is a 79 y.o. male who presented to the ER from SNF. He has a long history of Cerebral Palsy and Post Polio Syndrome. He had abdominal pain with decreased oral intake. No fever. No cough. In the ER he was found to have SBO. NG tube was placed and put on suction. Pt did not have acute abdomen. Surgery saw patient on  and advised to continue NG decompression,iv fluid,enema as patient was also noted to have fecal impaction. Xray abdomen done today showing nonobstructing bowel gas pattern. Surgery has instructed clamping NG,then evaluate prior to removing it which was done on . Pt allowed clears on 5/15. Current Facility-Administered Medications   Medication Dose Route Frequency    dextrose 5% - 0.45% NaCl with KCl 20 mEq/L infusion  125 mL/hr IntraVENous CONTINUOUS    insulin lispro (HUMALOG) injection   SubCUTAneous Q6H    glucose chewable tablet 16 g  4 Tab Oral PRN    glucagon (GLUCAGEN) injection 1 mg  1 mg IntraMUSCular PRN    dextrose (D50) infusion 12.5-25 g  25-50 mL IntraVENous PRN        Review of Systems  No complaint. Objective:     Visit Vitals    /88 (BP 1 Location: Right arm, BP Patient Position: At rest)    Pulse 74    Temp 98.4 °F (36.9 °C)    Resp 17    Ht 5' 5\" (1.651 m)    Wt 53.7 kg (118 lb 6.4 oz)    SpO2 96%    BMI 19.7 kg/m2      O2 Device: Room air    Temp (24hrs), Av.1 °F (36.7 °C), Min:97.9 °F (36.6 °C), Max:98.4 °F (36.9 °C)      701 -  1900  In: 240 [P.O.:240]  Out: -   1901 -  0700  In: 2100 [P.O.:600; I.V.:1500]  Out: 1630 [Urine:1630]  P/E  NAD,lying comfortably in bed. Heent:perrla,at/nc,mouth moist.  Lungs ctab  Heart s1s2 nl,no m/g  Abdm:soft,not tender,bs present. Extr:no edema,good pedis pulses.   Neuro:more awake today  Data Review    Recent Results (from the past 12 hour(s))   GLUCOSE, POC    Collection Time: 05/15/18 11:40 PM   Result Value Ref Range    Glucose (POC) 129 (H) 70 - 088 mg/dL   METABOLIC PANEL, BASIC    Collection Time: 05/16/18  4:00 AM   Result Value Ref Range    Sodium 142 136 - 145 mmol/L    Potassium 4.2 3.5 - 5.5 mmol/L    Chloride 110 (H) 100 - 108 mmol/L    CO2 22 21 - 32 mmol/L    Anion gap 10 3.0 - 18 mmol/L    Glucose 148 (H) 74 - 99 mg/dL    BUN 8 7.0 - 18 MG/DL    Creatinine 0.46 (L) 0.6 - 1.3 MG/DL    BUN/Creatinine ratio 17 12 - 20      GFR est AA >60 >60 ml/min/1.73m2    GFR est non-AA >60 >60 ml/min/1.73m2    Calcium 7.3 (L) 8.5 - 10.1 MG/DL   GLUCOSE, POC    Collection Time: 05/16/18  6:00 AM   Result Value Ref Range    Glucose (POC) 156 (H) 70 - 110 mg/dL         Assessment/Plan:     Principal Problem:    Small bowel obstruction (HCC) (5/12/2018)    Active Problems:    Cerebral palsy ([address] (6/12/2015)      History of post-polio syndrome (6/12/2015)      Cognitive developmental delay (5/12/2018)      Care Plan   1-Small bowel obstruction:    -NPO initially    -Xray 5/14:Nonobstructing bowel gas pattern. Enteric tube in place.    -On 5/14,Surgery has instructed clamping NG tube today,then assess for vomiting,abdominal pain. If none,then d/c NGT.    -NGT d/gino since 5/14. Started on clears on 5/15    -Will continue to follow surgery instructions    2-Hyperglycemia    -On ssi    3-Cerebral palsy/Debility/Cognitive developmental delay    -Supportive care.     DVT prophylaxis:scds  Full code  Disposition:once cleared by surgery

## 2018-05-16 NOTE — PROGRESS NOTES
Problem: Falls - Risk of  Goal: *Absence of Falls  Document Shwetha Fall Risk and appropriate interventions in the flowsheet. Outcome: Progressing Towards Goal  Fall Risk Interventions:       Mentation Interventions: Adequate sleep, hydration, pain control, Door open when patient unattended, Evaluate medications/consider consulting pharmacy, More frequent rounding, Reorient patient, Toileting rounds    Medication Interventions: Evaluate medications/consider consulting pharmacy    Elimination Interventions: Call light in reach, Patient to call for help with toileting needs, Toileting schedule/hourly rounds    History of Falls Interventions: Consult care management for discharge planning, Door open when patient unattended, Evaluate medications/consider consulting pharmacy        Problem: Pressure Injury - Risk of  Goal: *Prevention of pressure injury  Document Alex Scale and appropriate interventions in the flowsheet. Pressure Injury Interventions:  Sensory Interventions: Assess changes in LOC, Avoid rigorous massage over bony prominences, Check visual cues for pain, Keep linens dry and wrinkle-free, Minimize linen layers, Pressure redistribution bed/mattress (bed type), Turn and reposition approx. every two hours (pillows and wedges if needed)    Moisture Interventions: Absorbent underpads, Apply protective barrier, creams and emollients, Check for incontinence Q2 hours and as needed, Internal/External urinary devices, Maintain skin hydration (lotion/cream), Minimize layers, Offer toileting Q_hr    Activity Interventions: Pressure redistribution bed/mattress(bed type)    Mobility Interventions: HOB 30 degrees or less, Pressure redistribution bed/mattress (bed type), Turn and reposition approx.  every two hours(pillow and wedges)    Nutrition Interventions: Document food/fluid/supplement intake    Friction and Shear Interventions: Apply protective barrier, creams and emollients, Foam dressings/transparent film/skin sealants, HOB 30 degrees or less, Lift sheet, Minimize layers

## 2018-05-16 NOTE — MANAGEMENT PLAN
Discharge/Transition Planning    Updated Mel at [facility] about pt. States may d/c today if tolerating diet. Appears more likely it will be tomorrow discharge. PCS form completed for today or tomorrow discharge. Bed at [facility] is booked for pt.  Pt is Long Term Resident        Imtiaz Fuchs RN BSN  Outcomes Manager  Pager # [phone]

## 2018-05-16 NOTE — PROGRESS NOTES
conducted a Follow up consultation and Spiritual Assessment for Idalmis Juan, who is a 79 y.o.,male. The  provided the following Interventions:  Continued the relationship of care and support. Listened empathically. Offered prayer and assurance of continued prayer on patients behalf. Chart reviewed. The following outcomes were achieved:  Patient expressed gratitude for pastoral care visit. Assessment:  There are no further spiritual or Anabaptist issues which require Spiritual Care Services interventions at this time. Plan:  Chaplains will continue to follow and will provide pastoral care on an as needed/requested basis.  recommends bedside caregivers page  on duty if patient shows signs of acute spiritual or emotional distress.      [address] [facility]   Staff [address] [facility]   [phone]

## 2018-05-17 VITALS
SYSTOLIC BLOOD PRESSURE: 131 MMHG | HEIGHT: 65 IN | WEIGHT: 124 LBS | OXYGEN SATURATION: 100 % | BODY MASS INDEX: 20.66 KG/M2 | DIASTOLIC BLOOD PRESSURE: 93 MMHG | RESPIRATION RATE: 16 BRPM | HEART RATE: 95 BPM | TEMPERATURE: 97.4 F

## 2018-05-17 LAB
GLUCOSE BLD STRIP.AUTO-MCNC: 142 MG/DL (ref 70–110)
GLUCOSE BLD STRIP.AUTO-MCNC: 155 MG/DL (ref 70–110)
GLUCOSE BLD STRIP.AUTO-MCNC: 218 MG/DL (ref 70–110)

## 2018-05-17 PROCEDURE — 74011636637 HC RX REV CODE- 636/637: Performed by: HOSPITALIST

## 2018-05-17 PROCEDURE — 74011250636 HC RX REV CODE- 250/636: Performed by: HOSPITALIST

## 2018-05-17 PROCEDURE — 82962 GLUCOSE BLOOD TEST: CPT

## 2018-05-17 RX ADMIN — DEXTROSE MONOHYDRATE, SODIUM CHLORIDE, AND POTASSIUM CHLORIDE 125 ML/HR: 50; 4.5; 1.49 INJECTION, SOLUTION INTRAVENOUS at 06:08

## 2018-05-17 RX ADMIN — INSULIN LISPRO 4 UNITS: 100 INJECTION, SOLUTION INTRAVENOUS; SUBCUTANEOUS at 14:16

## 2018-05-17 RX ADMIN — INSULIN LISPRO 2 UNITS: 100 INJECTION, SOLUTION INTRAVENOUS; SUBCUTANEOUS at 01:56

## 2018-05-17 NOTE — ROUTINE PROCESS
Bedside and Verbal shift change report given to YAS Bello (oncoming nurse) by Lucy Dykes RN (offgoing nurse). Report included the following information Kardex, Intake/Output, MAR and Recent Results.

## 2018-05-17 NOTE — PROGRESS NOTES
Chart reviewed. Pt will discharge back to [facility] [address] today via [facility] @ 1500. Met with pt & made him aware of above. No current phone# to [facility]. Pt's nurse made aware of above. Available as needed. Jamia Ortiz,RN,ext [phone]. Transition of Care (QUINCY) Plan:  Return to [address] Transportation:       How is patient being transported at discharge? [facility]      When? 5/17/2018 @ 1500     Is transport scheduled? YES    Follow-up appointment and transportation:     PCP? NA: follows with MD @ nursing home     Specialist?  NA     Time/Date? NA      Who is transporting to the follow-up appointment? Nursing home      Is transport for follow up appointment scheduled? NA    Communication plan (with patient/family): Who is being called? Patient or Next of Kin? Responsible party? Patient      What number(s) is to be used? NA, nursing home      What service provider is calling for [facility] services? NA      When are they calling? NA    Readmission Risk? (Green/Low; Yellow/Moderate; Red/High):    Mod

## 2018-05-17 NOTE — PROGRESS NOTES
Transportation at Discharge:  5/17/18    Transport Company: [id number])  Reference number:None    Estimated pick-up time: 3:00p    Method of Transport[facility] stret[facility] / [facility]  Authorization:  [id number] (per Soarida meyer/[facility])    Made DC transportation arrangements at the request of Outcomes Manager Jamia Macedo, [facility] Specialist ext [phone]

## 2018-05-17 NOTE — PROGRESS NOTES
0740 Received pt resting on bed, upper extremities placed on pillows, both arms cannot be moved. Pt alert and oriented to person and place. Pt does not seem to understand when asked of too much questions all at once. IV site no sign of infiltration. Abdomen is soft, active bowel sounds. Pt on clear liquid diet, no nausea no vomiting, denies any pain at this time. Pt on bed rest. Pt has redness on the groin and pressure ulcer on sacrum area with Mepilex on. Condom cath intact. 1100 Pt now on soft diet. Per NP Leandra Gonzalez, inform Dr. Lynda Pan once pt has eaten and tolerated his soft diet so MD can discharge pt.    1200 Denies any pain, no nausea, no vomiting as of this time. IV site no sign of infiltration. Arms on pillows. VS within normal range. Report given to incoming RN.

## 2018-05-17 NOTE — ROUTINE PROCESS
Bedside and Verbal shift change report given to [address] [facility] (oncoming nurse) by Nallely Lozano (offgoing nurse). Report included the following information SBAR, Kardex, Intake/Output, MAR and Recent Results. 0945 Pt resting in bed with no complaints of pain and no change to condition. Will continue to monitor. 1200 Pt resting in bed with no complaints of     1528 Called report to Trival RN at [facility]. Report consisted of situation, background, assessment, and recommendation. Questions answered and clarifications provided.

## 2018-05-17 NOTE — ROUTINE PROCESS
Bedside and Verbal shift change report given to [facility] (oncoming nurse) by Lucas Hogan (offgoing nurse). Report included the following information SBAR, Kardex, Intake/Output, MAR and Recent Results. 0945 Pt resting in bed with no complaints of pain and no change to condition. Will continue to monitor.

## 2018-05-17 NOTE — ROUTINE PROCESS
Bedside and Verbal shift change report given to Eugenia SWEENEY RN (oncoming nurse) by Juanpablo Mcdonald RN (offgoing nurse). Report included the following information Kardex, Intake/Output, MAR and Recent Results. Pt is not on cardiac monitoring, in bed resting. 2348  Shift assessment completed. Denies pain. 0520  Pt in bed sleeping. Denies pain. Bedside shift change report given to King Rsoi RN (oncoming nurse) by Pilar Bhardwaj RN (offgoing nurse). Report included the following information SBAR, Kardex, Intake/Output, MAR and Recent Results.

## 2018-05-17 NOTE — PROGRESS NOTES
Nutrition follow-up/Plan of Care      RECOMMENDATIONS:     1. GI Soft   2. Monitor weight, labs and PO intake  3. RD to follow     GOALS:     1. PO intake meets >75% of protein/calorie needs by 5/24  2. Weight maintenance (+/- 1-2 lb) by 5/24    ASSESSMENT:     Weight status is classified as normal per BMI of 22.3. However, patient is at risk d/t BMI <23 and age >65 years. Labs noted. BG range (142-218) over the past 24 hours. A1C (7.6). Nutrition recommendations listed. RD to follow. Nutrition Risk:  [] High  [] Moderate [x]  Low    SUBJECTIVE/OBJECTIVE:   Pt admitted for SBO. PMHx including HTN, Cerebral Palsy and Post Polio Syndrome. Pt is from SNF and is non verbal.  Stage II pressure injury to sacrum noted. Pt seen in room after lunch. NGT was d/c and diet gradually advanced. Pt now on GI soft and tolerating well. Appetite has been good and PO intake is improving. Pt to D/C later today. Information Obtained from:    [x] Chart Review   [x] Patient   [] Family/Caregiver   [] Nurse/Physician   [] Interdisciplinary Meeting/Rounds    Diet: GI Soft  Medications: [x] Reviewed  IV: D5 1/2 NS w/ KCl 20 mEq/L @125 mL/hr (510 Kcal/day)   Allergies: [x] Reviewed   Encounter Diagnoses     ICD-10-CM ICD-9-CM   1.  [facility] K56.609 560.9     Past Medical History:   Diagnosis Date    Cerebral palsy ([facility]     Hypertension     Mild mental retardation     Polio       Labs:    Lab Results   Component Value Date/Time    Sodium 142 05/16/2018 04:00 AM    Potassium 4.2 05/16/2018 04:00 AM    Chloride 110 (H) 05/16/2018 04:00 AM    CO2 22 05/16/2018 04:00 AM    Anion gap 10 05/16/2018 04:00 AM    Glucose 148 (H) 05/16/2018 04:00 AM    BUN 8 05/16/2018 04:00 AM    Creatinine 0.46 (L) 05/16/2018 04:00 AM    Calcium 7.3 (L) 05/16/2018 04:00 AM    Albumin 3.1 (L) 06/11/2015 04:20 PM     Anthropometrics: BMI (calculated): 20.6  Last 3 Recorded Weights in this Encounter    05/15/18 0320 05/16/18 0403 05/17/18 0511   Weight: 53.7 kg (118 lb 6.4 oz) 53.7 kg (118 lb 6.4 oz) 56.2 kg (124 lb)      Ht Readings from Last 1 Encounters:   05/16/18 5' 5\" (1.651 m)     Weight Metrics 5/17/2018 6/12/2015   Weight 124 lb 100 lb   BMI 20.63 kg/m2 19.53 kg/m2       Patient Vitals for the past 100 hrs:   % Diet Eaten   05/17/18 1336 100 %   05/17/18 0908 100 %   05/16/18 1853 50 %   05/16/18 1230 50 %   05/16/18 1025 100 %   05/15/18 1700 100 %         Estimated Nutrition Needs: [x] MSJ  [] Other:  Calories:  2063-2119 Kcal Based on:   [x] Actual BW    Protein:   61-73 g Based on:   [x] Actual BW    Fluid:       7078-3428 ml Based on:   [x] Actual BW     Nutrition Problems Identified:   [] Suboptimal PO intake  [] Food Allergies  [x] Difficulty chewing/swallowing/poor dentition  [] Constipation/Diarrhea   [] Nausea/Vomiting   [] None  [] Other:     Plan:   [x] Therapeutic Diet  []  Obtained/adjusted food preferences/tolerances and/or snacks options   []  Supplements added   [] Occupational therapy following for feeding techniques  []  HS snack added   [x]  Modify diet texture   []  Modify diet for food allergies   []  Educate patient   []  Assist with menu selection   [x]  Monitor PO intake on meal rounds   [x]  Continue inpatient monitoring and intervention   []  Participated in discharge planning/Interdisciplinary rounds/Team meetings   []  Other:     Education Needs:   [x] Not appropriate for teaching at this time   [] Identified and addressed    Nutrition Monitoring and Evaluation:  [x] Continue ongoing monitoring and intervention  [] Jagdeep Meeks

## 2018-05-17 NOTE — DISCHARGE SUMMARY
Discharge Summary    Patient: Leslie Walden               Sex: male          DOA: 5/11/2018         YOB: 1950      Age:  79 y.o.        LOS:  LOS: 5 days                Admit Date: 5/11/2018    Discharge Date: 5/17/2018    Admission Diagnoses: Small bowel obstruction ([facility]  Cerebral palsy ([facility]  Cognitive developmental delay    Discharge Diagnoses:    Problem List as of 5/17/2018  Never Reviewed          Codes Class Noted - Resolved    * (Principal)Small bowel obstruction ([facility] ICD-10-CM: S41.130  ICD-9-CM: 560.9  5/12/2018 - Present        Cognitive developmental delay ICD-10-CM: F81.9  ICD-9-CM: 315.9  5/12/2018 - Present        Hypotension ICD-10-CM: I95.9  ICD-9-CM: 458.9  6/12/2015 - Present        Tachycardia ICD-10-CM: R00.0  ICD-9-CM: 785.0  6/12/2015 - Present        Lactic acidosis ICD-10-CM: E87.2  ICD-9-CM: 276.2  6/12/2015 - Present        Cerebral palsy (HCC) (Chronic) ICD-10-CM: G80.9  ICD-9-CM: 343.9  6/12/2015 - Present        History of post-polio syndrome (Chronic) ICD-10-CM: N51.59  ICD-9-CM: V12.02  6/12/2015 - Present        Hypertension (Chronic) ICD-10-CM: I10  ICD-9-CM: 401.9  6/12/2015 - Present        Mild mental retardation (Chronic) ICD-10-CM: F70  ICD-9-CM: 542  6/12/2015 - Present        UTI (lower urinary tract infection) ICD-10-CM: N39.0  ICD-9-CM: 599.0  6/11/2015 - Present              Discharge Medications:     Current Discharge Medication List      CONTINUE these medications which have NOT CHANGED    Details   cholecalciferol (VITAMIN D3) 1,000 unit cap Take 1,000 Units by mouth daily. meclizine (ANTIVERT) 12.5 mg tablet Take 12.5 mg by mouth three (3) times daily as needed. !! fenofibrate micronized (LOFIBRA) 67 mg capsule Take 67 mg by mouth every morning. !! polyvinyl alcohol (LIQUIFILM TEARS) 1.4 % ophthalmic solution Administer 1 Drop to both eyes as needed. aspirin 81 mg chewable tablet Take 81 mg by mouth daily.       baclofen (LIORESAL) 10 mg tablet Take  by mouth three (3) times daily. calcium carbonate (TUMS) 200 mg calcium (500 mg) chew Take 1 Tab by mouth three (3) times daily. metFORMIN (GLUCOPHAGE) 850 mg tablet Take 500 mg by mouth two (2) times daily (with meals). multivitamin, tx-iron-ca-min (THERAPY M) 9 mg iron-400 mcg tab tablet Take 1 Tab by mouth daily. loperamide (IMMODIUM) 2 mg tablet Take 2 mg by mouth four (4) times daily as needed for Diarrhea. fenofibrate nanocrystallized (TRICOR) 48 mg tablet Take 1 Tab by mouth daily. Qty: 30 Tab, Refills: 0      !! polyvinyl alcohol (LIQUIFILM TEARS) 1.4 % ophthalmic solution Administer 1 Drop to both eyes four (4) times daily. !! FENOFIBRATE MICRONIZED PO Take 200 mg by mouth daily. calcium-vitamin D (OYSTER SHELL CALCIUM-VIT D3) 250-125 mg-unit tablet Take 1 Tab by mouth two (2) times a day. polyethylene glycol (MIRALAX) 17 gram packet Take 17 g by mouth every other day. camphor-methyl salicyl-menthol (SALONPAS) ptmd 1 Patch by Apply Externally route daily. ergocalciferol (VITAMIN D2) 50,000 unit capsule Take 50,000 Units by mouth every Tuesday. lisinopril (PRINIVIL, ZESTRIL) 2.5 mg tablet Take 2.5 mg by mouth daily. acetaminophen (TYLENOL ARTHRITIS PAIN) 650 mg CR tablet Take 650 mg by mouth every six (6) hours as needed for Pain. promethazine (PHENERGAN) 25 mg tablet Take 25 mg by mouth every eight (8) hours as needed for Nausea. !! - Potential duplicate medications found. Please discuss with provider.           Follow-up:pcp    Discharge Condition:good    Activity:as tolerated    Diet:per routine    Disposition:[facility]    Labs:  Labs: Results:       Chemistry Recent Labs      05/16/18   0400   GLU  148*   NA  142   K  4.2   CL  110*   CO2  22   BUN  8   CREA  0.46*   CA  7.3*   AGAP  10   BUCR  17      CBC w/Diff No results for input(s): WBC, RBC, HGB, HCT, PLT, GRANS, LYMPH, EOS, HGBEXT, HCTEXT, PLTEXT in the last 72 hours. Cardiac Enzymes No results for input(s): CPK, CKND1, JOHN in the last 72 hours. No lab exists for component: CKRMB, TROIP   Coagulation No results for input(s): PTP, INR, APTT in the last 72 hours. No lab exists for component: INREXT    Lipid Panel Lab Results   Component Value Date/Time    Cholesterol, total 159 06/11/2015 10:00 PM    HDL Cholesterol 19 (L) 06/11/2015 10:00 PM    LDL, calculated 107 (H) 06/11/2015 10:00 PM    VLDL, calculated 33 06/11/2015 10:00 PM    Triglyceride 165 (H) 06/11/2015 10:00 PM    CHOL/HDL Ratio 8.4 (H) 06/11/2015 10:00 PM      BNP No results for input(s): BNPP in the last 72 hours. Liver Enzymes No results for input(s): TP, ALB, TBIL, AP, SGOT, GPT in the last 72 hours. No lab exists for component: DBIL   Thyroid Studies No results found for: T4, T3U, TSH, TSHEXT       Imaging:  CT abdm/pelvis on 5/11/2018:  1. Mildly distended and fluid opacified bowel loops throughout the abdomen and  pelvis. Morphology is somewhat nonspecific, suggestive of either ileus or  partial/intermittent but relatively low-grade small bowel obstruction. Normal  appendix. Prominent rectal fecal material suggests impaction. 2. Moderate right hydroureteronephrosis with layering dependent, nonobstructive  5 mm calculus within the right extrarenal pelvis. Distal right ureterovesicular  junction appears somewhat abnormal, raising potential of intermittent  ureterovesicular junction obstruction. This appears relatively chronic and not  acute. There is also nonspecific mural thickening throughout the bladder,  concerning for cystitis. 3. 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 4. Bilateral L5 pars interarticularis defects. 5. Patulous and fluid opacified distal thoracic esophagus. Xray abdomen on 5/14/2018:   Nonobstructing bowel gas pattern.  Enteric tube in place.    Consults: colorectal surgery:    Treatment Team: Treatment Team: Attending Provider: Agnes Terrell MD; Consulting Provider: Shayne Whyte MD; Care Manager: Brooke Rolle; Consulting Provider: Sania Sotomayor MD; Utilization Review: Charlotte You RN    Significant Diagnostic Studies: labs: see recent lab results    History/Hospital Course:  Greg Stevens a 79 y. o. male who presented to the ER from SNF. Felicia Schmitz has a long history of Cerebral Palsy and Post Polio Syndrome.  He had abdominal pain with decreased oral intake.  No fever.  No cough.  In the ER he was found to have SBO.  NG tube was placed and put on suction. Pt did not have acute abdomen. Surgery saw patient on 5/12 and advised to continue NG decompression,iv fluid,enema as patient was also noted to have fecal impaction. Xray abdomen on 5/14 showing nonobstructing bowel gas pattern. Surgery has instructed clamping NG,then evaluate prior to removing it which was done on 5/14. Pt allowed clears on 5/15. Pt now tolerating diet well. 1-Small bowel obstruction:    -NPO initially    -Xray 5/14:Nonobstructing bowel gas pattern. Enteric tube in place.    -On 5/14,Surgery has instructed clamping NG tube 5/14,then assess for vomiting,abdominal pain. If none,then d/c NGT.    -NGT d/gino since 5/14. Started on clears on 5/15    -Pt tolerating diet well. Cleared for discharge     2-Hyperglycemia    -Was on ssi     3-Cerebral palsy/Debility/Cognitive developmental delay    -Supportive care. Discharge plan discussed with colorectal surgery,. Time for discharge:35 minutes.     Agnes Terrell MD  May 17, 2018

## 2018-05-18 NOTE — ROUTINE PROCESS
1527 Previous not is locked. Below is the completion of the aforementioned note.     ---------------------------------------------------------------------------------------------------------------------------------------------------------  1115 Pt resting in bed with no complaints of pain and no change to condition. Will continue to monitor. 1300 Pt resting in bed with no complaints of pain and no change to condition. Will continue to monitor. Pt tolerated both breakfast and lunch without difficulty. 645 [address] report to Arron SORENSON at [facility]. Report consisted of situation, background, assessment, and recommendation. 1540 Pt transferred via medical transport. Condom cath removed and brief placed on pt for transport.

## 2019-01-01 ENCOUNTER — HOSPITAL ENCOUNTER (OUTPATIENT)
Dept: WOUND CARE | Age: 69
Discharge: HOME | End: 2019-12-03

## 2019-01-01 ENCOUNTER — APPOINTMENT (OUTPATIENT)
Dept: GENERAL RADIOLOGY | Age: 69
DRG: 871 | End: 2019-01-01
Attending: HOSPITALIST
Payer: MEDICARE

## 2019-01-01 ENCOUNTER — APPOINTMENT (OUTPATIENT)
Dept: CT IMAGING | Age: 69
DRG: 871 | End: 2019-01-01
Attending: EMERGENCY MEDICINE
Payer: MEDICARE

## 2019-01-01 ENCOUNTER — HOSPITAL ENCOUNTER (INPATIENT)
Age: 69
LOS: 8 days | Discharge: NURSING, REHAB OR LONG-TERM CARE | DRG: 871 | End: 2019-11-25
Attending: EMERGENCY MEDICINE | Admitting: INTERNAL MEDICINE
Payer: MEDICARE

## 2019-01-01 ENCOUNTER — APPOINTMENT (OUTPATIENT)
Dept: ULTRASOUND IMAGING | Age: 69
DRG: 871 | End: 2019-01-01
Attending: NURSE PRACTITIONER
Payer: MEDICARE

## 2019-01-01 ENCOUNTER — APPOINTMENT (OUTPATIENT)
Dept: GENERAL RADIOLOGY | Age: 69
DRG: 871 | End: 2019-01-01
Attending: EMERGENCY MEDICINE
Payer: MEDICARE

## 2019-01-01 VITALS
WEIGHT: 135 LBS | OXYGEN SATURATION: 98 % | DIASTOLIC BLOOD PRESSURE: 78 MMHG | SYSTOLIC BLOOD PRESSURE: 119 MMHG | HEART RATE: 100 BPM | HEIGHT: 64 IN | TEMPERATURE: 97.3 F | BODY MASS INDEX: 23.05 KG/M2 | RESPIRATION RATE: 18 BRPM

## 2019-01-01 VITALS
OXYGEN SATURATION: 97 % | TEMPERATURE: 97 F | SYSTOLIC BLOOD PRESSURE: 97 MMHG | DIASTOLIC BLOOD PRESSURE: 62 MMHG | HEART RATE: 106 BPM

## 2019-01-01 DIAGNOSIS — E87.0 HYPERNATREMIA: ICD-10-CM

## 2019-01-01 DIAGNOSIS — A41.9 SEPTIC SHOCK (HCC): Primary | ICD-10-CM

## 2019-01-01 DIAGNOSIS — R33.9 URINARY RETENTION: ICD-10-CM

## 2019-01-01 DIAGNOSIS — D72.829 LEUKOCYTOSIS, UNSPECIFIED TYPE: ICD-10-CM

## 2019-01-01 DIAGNOSIS — R65.21 SEPTIC SHOCK (HCC): Primary | ICD-10-CM

## 2019-01-01 DIAGNOSIS — R73.9 HYPERGLYCEMIA: ICD-10-CM

## 2019-01-01 LAB
ADMINISTERED INITIALS, ADMINIT: NORMAL
ALBUMIN SERPL-MCNC: 3.1 G/DL (ref 3.4–5)
ALBUMIN/GLOB SERPL: 0.8 {RATIO} (ref 0.8–1.7)
ALP SERPL-CCNC: 105 U/L (ref 45–117)
ALT SERPL-CCNC: 44 U/L (ref 16–61)
ANION GAP SERPL CALC-SCNC: 10 MMOL/L (ref 3–18)
ANION GAP SERPL CALC-SCNC: 13 MMOL/L (ref 3–18)
ANION GAP SERPL CALC-SCNC: 2 MMOL/L (ref 3–18)
ANION GAP SERPL CALC-SCNC: 3 MMOL/L (ref 3–18)
ANION GAP SERPL CALC-SCNC: 4 MMOL/L (ref 3–18)
ANION GAP SERPL CALC-SCNC: 4 MMOL/L (ref 3–18)
ANION GAP SERPL CALC-SCNC: 5 MMOL/L (ref 3–18)
ANION GAP SERPL CALC-SCNC: 6 MMOL/L (ref 3–18)
ANION GAP SERPL CALC-SCNC: 7 MMOL/L (ref 3–18)
ANION GAP SERPL CALC-SCNC: 7 MMOL/L (ref 3–18)
ANION GAP SERPL CALC-SCNC: 8 MMOL/L (ref 3–18)
ANION GAP SERPL CALC-SCNC: 9 MMOL/L (ref 3–18)
APPEARANCE UR: ABNORMAL
APPEARANCE UR: ABNORMAL
AST SERPL-CCNC: 19 U/L (ref 10–38)
ATRIAL RATE: 114 BPM
BACTERIA SPEC CULT: ABNORMAL
BACTERIA SPEC CULT: NORMAL
BACTERIA URNS QL MICRO: ABNORMAL /HPF
BACTERIA URNS QL MICRO: ABNORMAL /HPF
BASOPHILS # BLD: 0 K/UL (ref 0–0.1)
BASOPHILS NFR BLD: 0 % (ref 0–2)
BILIRUB SERPL-MCNC: 0.7 MG/DL (ref 0.2–1)
BILIRUB UR QL: ABNORMAL
BILIRUB UR QL: NEGATIVE
BNP SERPL-MCNC: 578 PG/ML (ref 0–900)
BUN SERPL-MCNC: 10 MG/DL (ref 7–18)
BUN SERPL-MCNC: 12 MG/DL (ref 7–18)
BUN SERPL-MCNC: 14 MG/DL (ref 7–18)
BUN SERPL-MCNC: 14 MG/DL (ref 7–18)
BUN SERPL-MCNC: 16 MG/DL (ref 7–18)
BUN SERPL-MCNC: 18 MG/DL (ref 7–18)
BUN SERPL-MCNC: 22 MG/DL (ref 7–18)
BUN SERPL-MCNC: 27 MG/DL (ref 7–18)
BUN SERPL-MCNC: 27 MG/DL (ref 7–18)
BUN SERPL-MCNC: 28 MG/DL (ref 7–18)
BUN SERPL-MCNC: 35 MG/DL (ref 7–18)
BUN SERPL-MCNC: 36 MG/DL (ref 7–18)
BUN SERPL-MCNC: 67 MG/DL (ref 7–18)
BUN/CREAT SERPL: 19 (ref 12–20)
BUN/CREAT SERPL: 21 (ref 12–20)
BUN/CREAT SERPL: 22 (ref 12–20)
BUN/CREAT SERPL: 24 (ref 12–20)
BUN/CREAT SERPL: 25 (ref 12–20)
BUN/CREAT SERPL: 26 (ref 12–20)
BUN/CREAT SERPL: 26 (ref 12–20)
BUN/CREAT SERPL: 31 (ref 12–20)
BUN/CREAT SERPL: 31 (ref 12–20)
BUN/CREAT SERPL: 43 (ref 12–20)
BUN/CREAT SERPL: 43 (ref 12–20)
BUN/CREAT SERPL: 44 (ref 12–20)
BUN/CREAT SERPL: 47 (ref 12–20)
BUN/CREAT SERPL: 48 (ref 12–20)
BUN/CREAT SERPL: 56 (ref 12–20)
CALCIUM SERPL-MCNC: 6.6 MG/DL (ref 8.5–10.1)
CALCIUM SERPL-MCNC: 6.6 MG/DL (ref 8.5–10.1)
CALCIUM SERPL-MCNC: 7 MG/DL (ref 8.5–10.1)
CALCIUM SERPL-MCNC: 7.1 MG/DL (ref 8.5–10.1)
CALCIUM SERPL-MCNC: 7.2 MG/DL (ref 8.5–10.1)
CALCIUM SERPL-MCNC: 7.2 MG/DL (ref 8.5–10.1)
CALCIUM SERPL-MCNC: 7.5 MG/DL (ref 8.5–10.1)
CALCIUM SERPL-MCNC: 7.7 MG/DL (ref 8.5–10.1)
CALCIUM SERPL-MCNC: 8 MG/DL (ref 8.5–10.1)
CALCIUM SERPL-MCNC: 8.1 MG/DL (ref 8.5–10.1)
CALCIUM SERPL-MCNC: 8.2 MG/DL (ref 8.5–10.1)
CALCIUM SERPL-MCNC: 8.2 MG/DL (ref 8.5–10.1)
CALCIUM SERPL-MCNC: 8.3 MG/DL (ref 8.5–10.1)
CALCIUM SERPL-MCNC: 8.8 MG/DL (ref 8.5–10.1)
CALCIUM SERPL-MCNC: 9.6 MG/DL (ref 8.5–10.1)
CALCULATED P AXIS, ECG09: 85 DEGREES
CALCULATED R AXIS, ECG10: 99 DEGREES
CALCULATED T AXIS, ECG11: 51 DEGREES
CHLORIDE SERPL-SCNC: 115 MMOL/L (ref 100–111)
CHLORIDE SERPL-SCNC: 115 MMOL/L (ref 100–111)
CHLORIDE SERPL-SCNC: 116 MMOL/L (ref 100–111)
CHLORIDE SERPL-SCNC: 122 MMOL/L (ref 100–111)
CHLORIDE SERPL-SCNC: 122 MMOL/L (ref 100–111)
CHLORIDE SERPL-SCNC: 125 MMOL/L (ref 100–111)
CHLORIDE SERPL-SCNC: 125 MMOL/L (ref 100–111)
CHLORIDE SERPL-SCNC: 126 MMOL/L (ref 100–111)
CHLORIDE SERPL-SCNC: 128 MMOL/L (ref 100–111)
CHLORIDE SERPL-SCNC: 130 MMOL/L (ref 100–111)
CHLORIDE SERPL-SCNC: 136 MMOL/L (ref 100–111)
CHLORIDE SERPL-SCNC: 137 MMOL/L (ref 100–111)
CHLORIDE SERPL-SCNC: 138 MMOL/L (ref 100–111)
CHLORIDE SERPL-SCNC: 140 MMOL/L (ref 100–111)
CHLORIDE SERPL-SCNC: 140 MMOL/L (ref 100–111)
CK MB CFR SERPL CALC: 1.5 % (ref 0–4)
CK MB CFR SERPL CALC: 1.7 % (ref 0–4)
CK MB SERPL-MCNC: 1.5 NG/ML (ref 5–25)
CK MB SERPL-MCNC: 1.7 NG/ML (ref 5–25)
CK SERPL-CCNC: 103 U/L (ref 39–308)
CK SERPL-CCNC: 103 U/L (ref 39–308)
CO2 SERPL-SCNC: 16 MMOL/L (ref 21–32)
CO2 SERPL-SCNC: 17 MMOL/L (ref 21–32)
CO2 SERPL-SCNC: 18 MMOL/L (ref 21–32)
CO2 SERPL-SCNC: 19 MMOL/L (ref 21–32)
CO2 SERPL-SCNC: 20 MMOL/L (ref 21–32)
CO2 SERPL-SCNC: 21 MMOL/L (ref 21–32)
CO2 SERPL-SCNC: 22 MMOL/L (ref 21–32)
CO2 SERPL-SCNC: 22 MMOL/L (ref 21–32)
CO2 SERPL-SCNC: 23 MMOL/L (ref 21–32)
CO2 SERPL-SCNC: 24 MMOL/L (ref 21–32)
CO2 SERPL-SCNC: 25 MMOL/L (ref 21–32)
CO2 SERPL-SCNC: 25 MMOL/L (ref 21–32)
CO2 SERPL-SCNC: 26 MMOL/L (ref 21–32)
COLOR UR: ABNORMAL
COLOR UR: YELLOW
CREAT SERPL-MCNC: 0.42 MG/DL (ref 0.6–1.3)
CREAT SERPL-MCNC: 0.46 MG/DL (ref 0.6–1.3)
CREAT SERPL-MCNC: 0.48 MG/DL (ref 0.6–1.3)
CREAT SERPL-MCNC: 0.52 MG/DL (ref 0.6–1.3)
CREAT SERPL-MCNC: 0.56 MG/DL (ref 0.6–1.3)
CREAT SERPL-MCNC: 0.58 MG/DL (ref 0.6–1.3)
CREAT SERPL-MCNC: 0.62 MG/DL (ref 0.6–1.3)
CREAT SERPL-MCNC: 0.62 MG/DL (ref 0.6–1.3)
CREAT SERPL-MCNC: 0.63 MG/DL (ref 0.6–1.3)
CREAT SERPL-MCNC: 0.65 MG/DL (ref 0.6–1.3)
CREAT SERPL-MCNC: 0.66 MG/DL (ref 0.6–1.3)
CREAT SERPL-MCNC: 0.7 MG/DL (ref 0.6–1.3)
CREAT SERPL-MCNC: 0.71 MG/DL (ref 0.6–1.3)
CREAT SERPL-MCNC: 0.84 MG/DL (ref 0.6–1.3)
CREAT SERPL-MCNC: 1.53 MG/DL (ref 0.6–1.3)
D50 ADMINISTERED, D50ADM: 0 ML
D50 ORDER, D50ORD: 0 ML
DIAGNOSIS, 93000: NORMAL
DIFFERENTIAL METHOD BLD: ABNORMAL
EOSINOPHIL # BLD: 0 K/UL (ref 0–0.4)
EOSINOPHIL # BLD: 0.5 K/UL (ref 0–0.4)
EOSINOPHIL # BLD: 0.5 K/UL (ref 0–0.4)
EOSINOPHIL # BLD: 0.7 K/UL (ref 0–0.4)
EOSINOPHIL NFR BLD: 0 % (ref 0–5)
EOSINOPHIL NFR BLD: 5 % (ref 0–5)
EOSINOPHIL NFR BLD: 5 % (ref 0–5)
EOSINOPHIL NFR BLD: 6 % (ref 0–5)
EPITH CASTS URNS QL MICRO: ABNORMAL /LPF (ref 0–5)
EPITH CASTS URNS QL MICRO: NEGATIVE /LPF (ref 0–5)
ERYTHROCYTE [DISTWIDTH] IN BLOOD BY AUTOMATED COUNT: 16.3 % (ref 11.6–14.5)
ERYTHROCYTE [DISTWIDTH] IN BLOOD BY AUTOMATED COUNT: 16.6 % (ref 11.6–14.5)
ERYTHROCYTE [DISTWIDTH] IN BLOOD BY AUTOMATED COUNT: 16.6 % (ref 11.6–14.5)
ERYTHROCYTE [DISTWIDTH] IN BLOOD BY AUTOMATED COUNT: 16.9 % (ref 11.6–14.5)
ERYTHROCYTE [DISTWIDTH] IN BLOOD BY AUTOMATED COUNT: 17 % (ref 11.6–14.5)
ERYTHROCYTE [DISTWIDTH] IN BLOOD BY AUTOMATED COUNT: 17.8 % (ref 11.6–14.5)
EST. AVERAGE GLUCOSE BLD GHB EST-MCNC: 235 MG/DL
FLUAV AG NPH QL IA: NEGATIVE
FLUAV RNA SPEC QL NAA+PROBE: NEGATIVE
FLUBV AG NOSE QL IA: NEGATIVE
FLUBV RNA SPEC QL NAA+PROBE: NEGATIVE
GLOBULIN SER CALC-MCNC: 3.8 G/DL (ref 2–4)
GLUCOSE BLD STRIP.AUTO-MCNC: 105 MG/DL (ref 70–110)
GLUCOSE BLD STRIP.AUTO-MCNC: 108 MG/DL (ref 70–110)
GLUCOSE BLD STRIP.AUTO-MCNC: 114 MG/DL (ref 70–110)
GLUCOSE BLD STRIP.AUTO-MCNC: 117 MG/DL (ref 70–110)
GLUCOSE BLD STRIP.AUTO-MCNC: 132 MG/DL (ref 70–110)
GLUCOSE BLD STRIP.AUTO-MCNC: 134 MG/DL (ref 70–110)
GLUCOSE BLD STRIP.AUTO-MCNC: 138 MG/DL (ref 70–110)
GLUCOSE BLD STRIP.AUTO-MCNC: 139 MG/DL (ref 70–110)
GLUCOSE BLD STRIP.AUTO-MCNC: 140 MG/DL (ref 70–110)
GLUCOSE BLD STRIP.AUTO-MCNC: 142 MG/DL (ref 70–110)
GLUCOSE BLD STRIP.AUTO-MCNC: 143 MG/DL (ref 70–110)
GLUCOSE BLD STRIP.AUTO-MCNC: 151 MG/DL (ref 70–110)
GLUCOSE BLD STRIP.AUTO-MCNC: 152 MG/DL (ref 70–110)
GLUCOSE BLD STRIP.AUTO-MCNC: 153 MG/DL (ref 70–110)
GLUCOSE BLD STRIP.AUTO-MCNC: 156 MG/DL (ref 70–110)
GLUCOSE BLD STRIP.AUTO-MCNC: 160 MG/DL (ref 70–110)
GLUCOSE BLD STRIP.AUTO-MCNC: 161 MG/DL (ref 70–110)
GLUCOSE BLD STRIP.AUTO-MCNC: 163 MG/DL (ref 70–110)
GLUCOSE BLD STRIP.AUTO-MCNC: 163 MG/DL (ref 70–110)
GLUCOSE BLD STRIP.AUTO-MCNC: 172 MG/DL (ref 70–110)
GLUCOSE BLD STRIP.AUTO-MCNC: 173 MG/DL (ref 70–110)
GLUCOSE BLD STRIP.AUTO-MCNC: 178 MG/DL (ref 70–110)
GLUCOSE BLD STRIP.AUTO-MCNC: 178 MG/DL (ref 70–110)
GLUCOSE BLD STRIP.AUTO-MCNC: 180 MG/DL (ref 70–110)
GLUCOSE BLD STRIP.AUTO-MCNC: 181 MG/DL (ref 70–110)
GLUCOSE BLD STRIP.AUTO-MCNC: 189 MG/DL (ref 70–110)
GLUCOSE BLD STRIP.AUTO-MCNC: 189 MG/DL (ref 70–110)
GLUCOSE BLD STRIP.AUTO-MCNC: 193 MG/DL (ref 70–110)
GLUCOSE BLD STRIP.AUTO-MCNC: 193 MG/DL (ref 70–110)
GLUCOSE BLD STRIP.AUTO-MCNC: 196 MG/DL (ref 70–110)
GLUCOSE BLD STRIP.AUTO-MCNC: 199 MG/DL (ref 70–110)
GLUCOSE BLD STRIP.AUTO-MCNC: 204 MG/DL (ref 70–110)
GLUCOSE BLD STRIP.AUTO-MCNC: 207 MG/DL (ref 70–110)
GLUCOSE BLD STRIP.AUTO-MCNC: 210 MG/DL (ref 70–110)
GLUCOSE BLD STRIP.AUTO-MCNC: 219 MG/DL (ref 70–110)
GLUCOSE BLD STRIP.AUTO-MCNC: 220 MG/DL (ref 70–110)
GLUCOSE BLD STRIP.AUTO-MCNC: 225 MG/DL (ref 70–110)
GLUCOSE BLD STRIP.AUTO-MCNC: 228 MG/DL (ref 70–110)
GLUCOSE BLD STRIP.AUTO-MCNC: 228 MG/DL (ref 70–110)
GLUCOSE BLD STRIP.AUTO-MCNC: 231 MG/DL (ref 70–110)
GLUCOSE BLD STRIP.AUTO-MCNC: 234 MG/DL (ref 70–110)
GLUCOSE BLD STRIP.AUTO-MCNC: 237 MG/DL (ref 70–110)
GLUCOSE BLD STRIP.AUTO-MCNC: 242 MG/DL (ref 70–110)
GLUCOSE BLD STRIP.AUTO-MCNC: 250 MG/DL (ref 70–110)
GLUCOSE BLD STRIP.AUTO-MCNC: 251 MG/DL (ref 70–110)
GLUCOSE BLD STRIP.AUTO-MCNC: 257 MG/DL (ref 70–110)
GLUCOSE BLD STRIP.AUTO-MCNC: 260 MG/DL (ref 70–110)
GLUCOSE BLD STRIP.AUTO-MCNC: 269 MG/DL (ref 70–110)
GLUCOSE BLD STRIP.AUTO-MCNC: 269 MG/DL (ref 70–110)
GLUCOSE BLD STRIP.AUTO-MCNC: 290 MG/DL (ref 70–110)
GLUCOSE BLD STRIP.AUTO-MCNC: 302 MG/DL (ref 70–110)
GLUCOSE BLD STRIP.AUTO-MCNC: 304 MG/DL (ref 70–110)
GLUCOSE BLD STRIP.AUTO-MCNC: 311 MG/DL (ref 70–110)
GLUCOSE BLD STRIP.AUTO-MCNC: 321 MG/DL (ref 70–110)
GLUCOSE BLD STRIP.AUTO-MCNC: 328 MG/DL (ref 70–110)
GLUCOSE BLD STRIP.AUTO-MCNC: 329 MG/DL (ref 70–110)
GLUCOSE BLD STRIP.AUTO-MCNC: 426 MG/DL (ref 70–110)
GLUCOSE BLD STRIP.AUTO-MCNC: 516 MG/DL (ref 70–110)
GLUCOSE BLD STRIP.AUTO-MCNC: 548 MG/DL (ref 70–110)
GLUCOSE BLD STRIP.AUTO-MCNC: 572 MG/DL (ref 70–110)
GLUCOSE BLD STRIP.AUTO-MCNC: 88 MG/DL (ref 70–110)
GLUCOSE SERPL-MCNC: 121 MG/DL (ref 74–99)
GLUCOSE SERPL-MCNC: 132 MG/DL (ref 74–99)
GLUCOSE SERPL-MCNC: 138 MG/DL (ref 74–99)
GLUCOSE SERPL-MCNC: 151 MG/DL (ref 74–99)
GLUCOSE SERPL-MCNC: 161 MG/DL (ref 74–99)
GLUCOSE SERPL-MCNC: 171 MG/DL (ref 74–99)
GLUCOSE SERPL-MCNC: 180 MG/DL (ref 74–99)
GLUCOSE SERPL-MCNC: 203 MG/DL (ref 74–99)
GLUCOSE SERPL-MCNC: 221 MG/DL (ref 74–99)
GLUCOSE SERPL-MCNC: 250 MG/DL (ref 74–99)
GLUCOSE SERPL-MCNC: 256 MG/DL (ref 74–99)
GLUCOSE SERPL-MCNC: 283 MG/DL (ref 74–99)
GLUCOSE SERPL-MCNC: 310 MG/DL (ref 74–99)
GLUCOSE SERPL-MCNC: 339 MG/DL (ref 74–99)
GLUCOSE SERPL-MCNC: 985 MG/DL (ref 74–99)
GLUCOSE UR STRIP.AUTO-MCNC: >1000 MG/DL
GLUCOSE UR STRIP.AUTO-MCNC: >1000 MG/DL
GLUCOSE, GLC: 105 MG/DL
GLUCOSE, GLC: 114 MG/DL
GLUCOSE, GLC: 117 MG/DL
GLUCOSE, GLC: 132 MG/DL
GLUCOSE, GLC: 134 MG/DL
GLUCOSE, GLC: 138 MG/DL
GLUCOSE, GLC: 142 MG/DL
GLUCOSE, GLC: 151 MG/DL
GLUCOSE, GLC: 152 MG/DL
GLUCOSE, GLC: 153 MG/DL
GLUCOSE, GLC: 160 MG/DL
GLUCOSE, GLC: 178 MG/DL
GLUCOSE, GLC: 178 MG/DL
GLUCOSE, GLC: 181 MG/DL
GLUCOSE, GLC: 193 MG/DL
GLUCOSE, GLC: 199 MG/DL
GLUCOSE, GLC: 207 MG/DL
GLUCOSE, GLC: 210 MG/DL
GLUCOSE, GLC: 219 MG/DL
GLUCOSE, GLC: 234 MG/DL
GLUCOSE, GLC: 304 MG/DL
GLUCOSE, GLC: 426 MG/DL
GLUCOSE, GLC: 516 MG/DL
GLUCOSE, GLC: 572 MG/DL
GLUCOSE, GLC: 600 MG/DL
GLUCOSE, GLC: 88 MG/DL
HADV DNA SPEC QL NAA+PROBE: NEGATIVE
HBA1C MFR BLD: 9.8 % (ref 4.2–5.6)
HCT VFR BLD AUTO: 34.4 % (ref 36–48)
HCT VFR BLD AUTO: 35.6 % (ref 36–48)
HCT VFR BLD AUTO: 35.9 % (ref 36–48)
HCT VFR BLD AUTO: 36.1 % (ref 36–48)
HCT VFR BLD AUTO: 38.9 % (ref 36–48)
HCT VFR BLD AUTO: 54.6 % (ref 36–48)
HGB BLD-MCNC: 10.8 G/DL (ref 13–16)
HGB BLD-MCNC: 11.2 G/DL (ref 13–16)
HGB BLD-MCNC: 11.6 G/DL (ref 13–16)
HGB BLD-MCNC: 11.7 G/DL (ref 13–16)
HGB BLD-MCNC: 12.5 G/DL (ref 13–16)
HGB BLD-MCNC: 16.4 G/DL (ref 13–16)
HGB UR QL STRIP: ABNORMAL
HGB UR QL STRIP: ABNORMAL
HIGH TARGET, HITG: 180 MG/DL
HMPV RNA SPEC QL NAA+PROBE: NEGATIVE
HPIV1 RNA SPEC QL NAA+PROBE: NEGATIVE
HPIV2 RNA SPEC QL NAA+PROBE: NEGATIVE
HPIV3 RNA SPEC QL NAA+PROBE: NEGATIVE
INSULIN ADMINSTERED, INSADM: 0.5 UNITS/HOUR
INSULIN ADMINSTERED, INSADM: 0.6 UNITS/HOUR
INSULIN ADMINSTERED, INSADM: 0.9 UNITS/HOUR
INSULIN ADMINSTERED, INSADM: 1 UNITS/HOUR
INSULIN ADMINSTERED, INSADM: 1.2 UNITS/HOUR
INSULIN ADMINSTERED, INSADM: 1.4 UNITS/HOUR
INSULIN ADMINSTERED, INSADM: 10.8 UNITS/HOUR
INSULIN ADMINSTERED, INSADM: 11 UNITS/HOUR
INSULIN ADMINSTERED, INSADM: 15.4 UNITS/HOUR
INSULIN ADMINSTERED, INSADM: 18.2 UNITS/HOUR
INSULIN ADMINSTERED, INSADM: 2 UNITS/HOUR
INSULIN ADMINSTERED, INSADM: 2.3 UNITS/HOUR
INSULIN ADMINSTERED, INSADM: 2.8 UNITS/HOUR
INSULIN ADMINSTERED, INSADM: 2.8 UNITS/HOUR
INSULIN ADMINSTERED, INSADM: 3.7 UNITS/HOUR
INSULIN ADMINSTERED, INSADM: 4.4 UNITS/HOUR
INSULIN ADMINSTERED, INSADM: 4.7 UNITS/HOUR
INSULIN ADMINSTERED, INSADM: 4.9 UNITS/HOUR
INSULIN ADMINSTERED, INSADM: 5 UNITS/HOUR
INSULIN ADMINSTERED, INSADM: 5.2 UNITS/HOUR
INSULIN ADMINSTERED, INSADM: 5.3 UNITS/HOUR
INSULIN ADMINSTERED, INSADM: 6.4 UNITS/HOUR
INSULIN ADMINSTERED, INSADM: 7.3 UNITS/HOUR
INSULIN ADMINSTERED, INSADM: 7.5 UNITS/HOUR
INSULIN ORDER, INSORD: 0.5 UNITS/HOUR
INSULIN ORDER, INSORD: 0.6 UNITS/HOUR
INSULIN ORDER, INSORD: 0.9 UNITS/HOUR
INSULIN ORDER, INSORD: 1 UNITS/HOUR
INSULIN ORDER, INSORD: 1.2 UNITS/HOUR
INSULIN ORDER, INSORD: 1.4 UNITS/HOUR
INSULIN ORDER, INSORD: 10.8 UNITS/HOUR
INSULIN ORDER, INSORD: 11 UNITS/HOUR
INSULIN ORDER, INSORD: 15.4 UNITS/HOUR
INSULIN ORDER, INSORD: 18.2 UNITS/HOUR
INSULIN ORDER, INSORD: 2 UNITS/HOUR
INSULIN ORDER, INSORD: 2.3 UNITS/HOUR
INSULIN ORDER, INSORD: 2.8 UNITS/HOUR
INSULIN ORDER, INSORD: 2.8 UNITS/HOUR
INSULIN ORDER, INSORD: 3.7 UNITS/HOUR
INSULIN ORDER, INSORD: 4.4 UNITS/HOUR
INSULIN ORDER, INSORD: 4.7 UNITS/HOUR
INSULIN ORDER, INSORD: 4.9 UNITS/HOUR
INSULIN ORDER, INSORD: 5 UNITS/HOUR
INSULIN ORDER, INSORD: 5.2 UNITS/HOUR
INSULIN ORDER, INSORD: 5.3 UNITS/HOUR
INSULIN ORDER, INSORD: 6.4 UNITS/HOUR
INSULIN ORDER, INSORD: 7.3 UNITS/HOUR
INSULIN ORDER, INSORD: 7.5 UNITS/HOUR
KETONES UR QL STRIP.AUTO: ABNORMAL MG/DL
KETONES UR QL STRIP.AUTO: NEGATIVE MG/DL
LACTATE BLD-SCNC: 2.3 MMOL/L (ref 0.4–2)
LACTATE BLD-SCNC: 4.7 MMOL/L (ref 0.4–2)
LACTATE SERPL-SCNC: 1.8 MMOL/L (ref 0.4–2)
LACTATE SERPL-SCNC: 2 MMOL/L (ref 0.4–2)
LACTATE SERPL-SCNC: 2.2 MMOL/L (ref 0.4–2)
LACTATE SERPL-SCNC: 2.8 MMOL/L (ref 0.4–2)
LACTATE SERPL-SCNC: 3.2 MMOL/L (ref 0.4–2)
LACTATE SERPL-SCNC: 3.2 MMOL/L (ref 0.4–2)
LACTATE SERPL-SCNC: 3.3 MMOL/L (ref 0.4–2)
LACTATE SERPL-SCNC: 3.7 MMOL/L (ref 0.4–2)
LACTATE SERPL-SCNC: 4.5 MMOL/L (ref 0.4–2)
LEUKOCYTE ESTERASE UR QL STRIP.AUTO: ABNORMAL
LEUKOCYTE ESTERASE UR QL STRIP.AUTO: ABNORMAL
LIPASE SERPL-CCNC: 176 U/L (ref 73–393)
LIPASE SERPL-CCNC: 811 U/L (ref 73–393)
LOW TARGET, LOT: 140 MG/DL
LYMPHOCYTES # BLD: 2 K/UL (ref 0.9–3.6)
LYMPHOCYTES # BLD: 2.5 K/UL (ref 0.9–3.6)
LYMPHOCYTES # BLD: 2.7 K/UL (ref 0.9–3.6)
LYMPHOCYTES # BLD: 2.8 K/UL (ref 0.9–3.6)
LYMPHOCYTES NFR BLD: 13 % (ref 21–52)
LYMPHOCYTES NFR BLD: 20 % (ref 21–52)
LYMPHOCYTES NFR BLD: 26 % (ref 21–52)
LYMPHOCYTES NFR BLD: 27 % (ref 21–52)
MAGNESIUM SERPL-MCNC: 1.3 MG/DL (ref 1.6–2.6)
MAGNESIUM SERPL-MCNC: 1.7 MG/DL (ref 1.6–2.6)
MAGNESIUM SERPL-MCNC: 1.7 MG/DL (ref 1.6–2.6)
MAGNESIUM SERPL-MCNC: 1.8 MG/DL (ref 1.6–2.6)
MAGNESIUM SERPL-MCNC: 1.8 MG/DL (ref 1.6–2.6)
MAGNESIUM SERPL-MCNC: 1.9 MG/DL (ref 1.6–2.6)
MAGNESIUM SERPL-MCNC: 1.9 MG/DL (ref 1.6–2.6)
MAGNESIUM SERPL-MCNC: 2 MG/DL (ref 1.6–2.6)
MAGNESIUM SERPL-MCNC: 2.1 MG/DL (ref 1.6–2.6)
MAGNESIUM SERPL-MCNC: 2.2 MG/DL (ref 1.6–2.6)
MAGNESIUM SERPL-MCNC: 2.3 MG/DL (ref 1.6–2.6)
MAGNESIUM SERPL-MCNC: 2.3 MG/DL (ref 1.6–2.6)
MAGNESIUM SERPL-MCNC: 2.5 MG/DL (ref 1.6–2.6)
MAGNESIUM SERPL-MCNC: 3.4 MG/DL (ref 1.6–2.6)
MCH RBC QN AUTO: 27.8 PG (ref 24–34)
MCH RBC QN AUTO: 27.9 PG (ref 24–34)
MCH RBC QN AUTO: 28.3 PG (ref 24–34)
MCH RBC QN AUTO: 29 PG (ref 24–34)
MCH RBC QN AUTO: 29.2 PG (ref 24–34)
MCH RBC QN AUTO: 29.3 PG (ref 24–34)
MCHC RBC AUTO-ENTMCNC: 30 G/DL (ref 31–37)
MCHC RBC AUTO-ENTMCNC: 31.2 G/DL (ref 31–37)
MCHC RBC AUTO-ENTMCNC: 31.4 G/DL (ref 31–37)
MCHC RBC AUTO-ENTMCNC: 32.1 G/DL (ref 31–37)
MCHC RBC AUTO-ENTMCNC: 32.1 G/DL (ref 31–37)
MCHC RBC AUTO-ENTMCNC: 32.9 G/DL (ref 31–37)
MCV RBC AUTO: 88 FL (ref 74–97)
MCV RBC AUTO: 88.1 FL (ref 74–97)
MCV RBC AUTO: 88.7 FL (ref 74–97)
MCV RBC AUTO: 89.5 FL (ref 74–97)
MCV RBC AUTO: 90.9 FL (ref 74–97)
MCV RBC AUTO: 97.5 FL (ref 74–97)
MINUTES UNTIL NEXT BG, NBG: 60 MIN
MONOCYTES # BLD: 0.5 K/UL (ref 0.05–1.2)
MONOCYTES # BLD: 0.6 K/UL (ref 0.05–1.2)
MONOCYTES # BLD: 0.7 K/UL (ref 0.05–1.2)
MONOCYTES # BLD: 1.4 K/UL (ref 0.05–1.2)
MONOCYTES NFR BLD: 5 % (ref 3–10)
MONOCYTES NFR BLD: 6 % (ref 3–10)
MONOCYTES NFR BLD: 6 % (ref 3–10)
MONOCYTES NFR BLD: 7 % (ref 3–10)
MULTIPLIER, MUL: 0.01
MULTIPLIER, MUL: 0.02
MULTIPLIER, MUL: 0.03
MULTIPLIER, MUL: 0.04
MULTIPLIER, MUL: 0.05
MULTIPLIER, MUL: 0.06
NEUTS SEG # BLD: 15.4 K/UL (ref 1.8–8)
NEUTS SEG # BLD: 6.2 K/UL (ref 1.8–8)
NEUTS SEG # BLD: 6.7 K/UL (ref 1.8–8)
NEUTS SEG # BLD: 7.1 K/UL (ref 1.8–8)
NEUTS SEG NFR BLD: 62 % (ref 40–73)
NEUTS SEG NFR BLD: 63 % (ref 40–73)
NEUTS SEG NFR BLD: 69 % (ref 40–73)
NEUTS SEG NFR BLD: 80 % (ref 40–73)
NITRITE UR QL STRIP.AUTO: NEGATIVE
NITRITE UR QL STRIP.AUTO: POSITIVE
ORDER INITIALS, ORDINIT: NORMAL
P-R INTERVAL, ECG05: 168 MS
PH UR STRIP: 5 [PH] (ref 5–8)
PH UR STRIP: 6.5 [PH] (ref 5–8)
PHOSPHATE SERPL-MCNC: 1.7 MG/DL (ref 2.5–4.9)
PHOSPHATE SERPL-MCNC: 2 MG/DL (ref 2.5–4.9)
PHOSPHATE SERPL-MCNC: 2.2 MG/DL (ref 2.5–4.9)
PHOSPHATE SERPL-MCNC: 2.7 MG/DL (ref 2.5–4.9)
PHOSPHATE SERPL-MCNC: 3.5 MG/DL (ref 2.5–4.9)
PHOSPHATE SERPL-MCNC: 3.5 MG/DL (ref 2.5–4.9)
PHOSPHATE SERPL-MCNC: 3.9 MG/DL (ref 2.5–4.9)
PLATELET # BLD AUTO: 171 K/UL (ref 135–420)
PLATELET # BLD AUTO: 189 K/UL (ref 135–420)
PLATELET # BLD AUTO: 192 K/UL (ref 135–420)
PLATELET # BLD AUTO: 200 K/UL (ref 135–420)
PLATELET # BLD AUTO: 208 K/UL (ref 135–420)
PLATELET # BLD AUTO: 304 K/UL (ref 135–420)
PMV BLD AUTO: 11.2 FL (ref 9.2–11.8)
PMV BLD AUTO: 11.3 FL (ref 9.2–11.8)
PMV BLD AUTO: 11.5 FL (ref 9.2–11.8)
PMV BLD AUTO: 11.5 FL (ref 9.2–11.8)
PMV BLD AUTO: 11.7 FL (ref 9.2–11.8)
PMV BLD AUTO: 12.2 FL (ref 9.2–11.8)
POTASSIUM SERPL-SCNC: 2.5 MMOL/L (ref 3.5–5.5)
POTASSIUM SERPL-SCNC: 3.1 MMOL/L (ref 3.5–5.5)
POTASSIUM SERPL-SCNC: 3.2 MMOL/L (ref 3.5–5.5)
POTASSIUM SERPL-SCNC: 3.4 MMOL/L (ref 3.5–5.5)
POTASSIUM SERPL-SCNC: 3.7 MMOL/L (ref 3.5–5.5)
POTASSIUM SERPL-SCNC: 3.8 MMOL/L (ref 3.5–5.5)
POTASSIUM SERPL-SCNC: 3.9 MMOL/L (ref 3.5–5.5)
POTASSIUM SERPL-SCNC: 3.9 MMOL/L (ref 3.5–5.5)
POTASSIUM SERPL-SCNC: 4.1 MMOL/L (ref 3.5–5.5)
POTASSIUM SERPL-SCNC: 4.1 MMOL/L (ref 3.5–5.5)
POTASSIUM SERPL-SCNC: 4.2 MMOL/L (ref 3.5–5.5)
POTASSIUM SERPL-SCNC: 4.4 MMOL/L (ref 3.5–5.5)
POTASSIUM SERPL-SCNC: 4.5 MMOL/L (ref 3.5–5.5)
PROT SERPL-MCNC: 6.9 G/DL (ref 6.4–8.2)
PROT UR STRIP-MCNC: 300 MG/DL
PROT UR STRIP-MCNC: NEGATIVE MG/DL
Q-T INTERVAL, ECG07: 318 MS
QRS DURATION, ECG06: 68 MS
QTC CALCULATION (BEZET), ECG08: 438 MS
RBC # BLD AUTO: 3.88 M/UL (ref 4.7–5.5)
RBC # BLD AUTO: 4.01 M/UL (ref 4.7–5.5)
RBC # BLD AUTO: 4.04 M/UL (ref 4.7–5.5)
RBC # BLD AUTO: 4.1 M/UL (ref 4.7–5.5)
RBC # BLD AUTO: 4.28 M/UL (ref 4.7–5.5)
RBC # BLD AUTO: 5.6 M/UL (ref 4.7–5.5)
RBC #/AREA URNS HPF: ABNORMAL /HPF (ref 0–5)
RBC #/AREA URNS HPF: ABNORMAL /HPF (ref 0–5)
RHINOVIRUS RNA SPEC QL NAA+PROBE: NEGATIVE
RSV A RNA SPEC QL NAA+PROBE: NEGATIVE
RSV B RNA SPEC QL NAA+PROBE: NEGATIVE
SERVICE CMNT-IMP: ABNORMAL
SERVICE CMNT-IMP: NORMAL
SODIUM SERPL-SCNC: 142 MMOL/L (ref 136–145)
SODIUM SERPL-SCNC: 145 MMOL/L (ref 136–145)
SODIUM SERPL-SCNC: 146 MMOL/L (ref 136–145)
SODIUM SERPL-SCNC: 150 MMOL/L (ref 136–145)
SODIUM SERPL-SCNC: 152 MMOL/L (ref 136–145)
SODIUM SERPL-SCNC: 152 MMOL/L (ref 136–145)
SODIUM SERPL-SCNC: 154 MMOL/L (ref 136–145)
SODIUM SERPL-SCNC: 154 MMOL/L (ref 136–145)
SODIUM SERPL-SCNC: 156 MMOL/L (ref 136–145)
SODIUM SERPL-SCNC: 157 MMOL/L (ref 136–145)
SODIUM SERPL-SCNC: 160 MMOL/L (ref 136–145)
SODIUM SERPL-SCNC: 163 MMOL/L (ref 136–145)
SODIUM SERPL-SCNC: 163 MMOL/L (ref 136–145)
SODIUM SERPL-SCNC: 165 MMOL/L (ref 136–145)
SODIUM SERPL-SCNC: 165 MMOL/L (ref 136–145)
SP GR UR REFRACTOMETRY: >1.03 (ref 1–1.03)
SP GR UR REFRACTOMETRY: >1.03 (ref 1–1.03)
SPECIMEN SOURCE: NORMAL
TROPONIN I SERPL-MCNC: 0.04 NG/ML (ref 0–0.04)
TROPONIN I SERPL-MCNC: 0.07 NG/ML (ref 0–0.04)
TROPONIN I SERPL-MCNC: 0.07 NG/ML (ref 0–0.04)
TROPONIN I SERPL-MCNC: 0.08 NG/ML (ref 0–0.04)
UROBILINOGEN UR QL STRIP.AUTO: 0.2 EU/DL (ref 0.2–1)
UROBILINOGEN UR QL STRIP.AUTO: 0.2 EU/DL (ref 0.2–1)
VANCOMYCIN TROUGH SERPL-MCNC: 17.4 UG/ML (ref 10–20)
VENTRICULAR RATE, ECG03: 114 BPM
WBC # BLD AUTO: 10.3 K/UL (ref 4.6–13.2)
WBC # BLD AUTO: 10.9 K/UL (ref 4.6–13.2)
WBC # BLD AUTO: 11.9 K/UL (ref 4.6–13.2)
WBC # BLD AUTO: 13.9 K/UL (ref 4.6–13.2)
WBC # BLD AUTO: 19.3 K/UL (ref 4.6–13.2)
WBC # BLD AUTO: 9.8 K/UL (ref 4.6–13.2)
WBC URNS QL MICRO: ABNORMAL /HPF (ref 0–4)
WBC URNS QL MICRO: ABNORMAL /HPF (ref 0–4)

## 2019-01-01 PROCEDURE — 74011250637 HC RX REV CODE- 250/637: Performed by: INTERNAL MEDICINE

## 2019-01-01 PROCEDURE — 65610000006 HC RM INTENSIVE CARE

## 2019-01-01 PROCEDURE — 74011000258 HC RX REV CODE- 258: Performed by: HOSPITALIST

## 2019-01-01 PROCEDURE — 74011250636 HC RX REV CODE- 250/636: Performed by: PHYSICIAN ASSISTANT

## 2019-01-01 PROCEDURE — 74011636637 HC RX REV CODE- 636/637: Performed by: INTERNAL MEDICINE

## 2019-01-01 PROCEDURE — 74011000258 HC RX REV CODE- 258: Performed by: PHYSICIAN ASSISTANT

## 2019-01-01 PROCEDURE — 97161 PT EVAL LOW COMPLEX 20 MIN: CPT

## 2019-01-01 PROCEDURE — 80048 BASIC METABOLIC PNL TOTAL CA: CPT

## 2019-01-01 PROCEDURE — 82962 GLUCOSE BLOOD TEST: CPT

## 2019-01-01 PROCEDURE — 84100 ASSAY OF PHOSPHORUS: CPT

## 2019-01-01 PROCEDURE — 77030037877 HC DRSG MEPILEX >48IN BORD MOLN -A

## 2019-01-01 PROCEDURE — 74011250637 HC RX REV CODE- 250/637: Performed by: PHYSICIAN ASSISTANT

## 2019-01-01 PROCEDURE — 96368 THER/DIAG CONCURRENT INF: CPT

## 2019-01-01 PROCEDURE — 74011636637 HC RX REV CODE- 636/637: Performed by: PHYSICIAN ASSISTANT

## 2019-01-01 PROCEDURE — 92610 EVALUATE SWALLOWING FUNCTION: CPT

## 2019-01-01 PROCEDURE — 74011250637 HC RX REV CODE- 250/637: Performed by: HOSPITALIST

## 2019-01-01 PROCEDURE — 84484 ASSAY OF TROPONIN QUANT: CPT

## 2019-01-01 PROCEDURE — 71260 CT THORAX DX C+: CPT

## 2019-01-01 PROCEDURE — 97535 SELF CARE MNGMENT TRAINING: CPT

## 2019-01-01 PROCEDURE — 74011250636 HC RX REV CODE- 250/636: Performed by: INTERNAL MEDICINE

## 2019-01-01 PROCEDURE — 83605 ASSAY OF LACTIC ACID: CPT

## 2019-01-01 PROCEDURE — 74011250636 HC RX REV CODE- 250/636: Performed by: EMERGENCY MEDICINE

## 2019-01-01 PROCEDURE — 83735 ASSAY OF MAGNESIUM: CPT

## 2019-01-01 PROCEDURE — 87186 SC STD MICRODIL/AGAR DIL: CPT

## 2019-01-01 PROCEDURE — 74011250636 HC RX REV CODE- 250/636: Performed by: HOSPITALIST

## 2019-01-01 PROCEDURE — 83690 ASSAY OF LIPASE: CPT

## 2019-01-01 PROCEDURE — 74011636637 HC RX REV CODE- 636/637: Performed by: HOSPITALIST

## 2019-01-01 PROCEDURE — 85027 COMPLETE CBC AUTOMATED: CPT

## 2019-01-01 PROCEDURE — 74011000250 HC RX REV CODE- 250: Performed by: INTERNAL MEDICINE

## 2019-01-01 PROCEDURE — 3E033XZ INTRODUCTION OF VASOPRESSOR INTO PERIPHERAL VEIN, PERCUTANEOUS APPROACH: ICD-10-PCS | Performed by: EMERGENCY MEDICINE

## 2019-01-01 PROCEDURE — 96367 TX/PROPH/DG ADDL SEQ IV INF: CPT

## 2019-01-01 PROCEDURE — 85025 COMPLETE CBC W/AUTO DIFF WBC: CPT

## 2019-01-01 PROCEDURE — 65270000029 HC RM PRIVATE

## 2019-01-01 PROCEDURE — 81001 URINALYSIS AUTO W/SCOPE: CPT

## 2019-01-01 PROCEDURE — 77010033678 HC OXYGEN DAILY

## 2019-01-01 PROCEDURE — 97166 OT EVAL MOD COMPLEX 45 MIN: CPT

## 2019-01-01 PROCEDURE — 80202 ASSAY OF VANCOMYCIN: CPT

## 2019-01-01 PROCEDURE — 74230 X-RAY XM SWLNG FUNCJ C+: CPT

## 2019-01-01 PROCEDURE — 74011000258 HC RX REV CODE- 258: Performed by: EMERGENCY MEDICINE

## 2019-01-01 PROCEDURE — 74011636637 HC RX REV CODE- 636/637: Performed by: EMERGENCY MEDICINE

## 2019-01-01 PROCEDURE — 83880 ASSAY OF NATRIURETIC PEPTIDE: CPT

## 2019-01-01 PROCEDURE — 36415 COLL VENOUS BLD VENIPUNCTURE: CPT

## 2019-01-01 PROCEDURE — 83036 HEMOGLOBIN GLYCOSYLATED A1C: CPT

## 2019-01-01 PROCEDURE — 82550 ASSAY OF CK (CPK): CPT

## 2019-01-01 PROCEDURE — 87804 INFLUENZA ASSAY W/OPTIC: CPT

## 2019-01-01 PROCEDURE — 74011000258 HC RX REV CODE- 258: Performed by: INTERNAL MEDICINE

## 2019-01-01 PROCEDURE — 87633 RESP VIRUS 12-25 TARGETS: CPT

## 2019-01-01 PROCEDURE — 77030037878 HC DRSG MEPILEX >48IN BORD MOLN -B

## 2019-01-01 PROCEDURE — 87086 URINE CULTURE/COLONY COUNT: CPT

## 2019-01-01 PROCEDURE — 99285 EMERGENCY DEPT VISIT HI MDM: CPT

## 2019-01-01 PROCEDURE — 77030041247 HC PROTECTOR HEEL HEELMEDIX MDII -B

## 2019-01-01 PROCEDURE — 92526 ORAL FUNCTION THERAPY: CPT

## 2019-01-01 PROCEDURE — 96365 THER/PROPH/DIAG IV INF INIT: CPT

## 2019-01-01 PROCEDURE — 87641 MR-STAPH DNA AMP PROBE: CPT

## 2019-01-01 PROCEDURE — 76775 US EXAM ABDO BACK WALL LIM: CPT

## 2019-01-01 PROCEDURE — 80053 COMPREHEN METABOLIC PANEL: CPT

## 2019-01-01 PROCEDURE — 74011250637 HC RX REV CODE- 250/637: Performed by: EMERGENCY MEDICINE

## 2019-01-01 PROCEDURE — 71045 X-RAY EXAM CHEST 1 VIEW: CPT

## 2019-01-01 PROCEDURE — C1751 CATH, INF, PER/CENT/MIDLINE: HCPCS

## 2019-01-01 PROCEDURE — 87077 CULTURE AEROBIC IDENTIFY: CPT

## 2019-01-01 PROCEDURE — 96366 THER/PROPH/DIAG IV INF ADDON: CPT

## 2019-01-01 PROCEDURE — 74011636320 HC RX REV CODE- 636/320: Performed by: EMERGENCY MEDICINE

## 2019-01-01 PROCEDURE — 96361 HYDRATE IV INFUSION ADD-ON: CPT

## 2019-01-01 PROCEDURE — 77030038269 HC DRN EXT URIN PURWCK BARD -A

## 2019-01-01 PROCEDURE — 92611 MOTION FLUOROSCOPY/SWALLOW: CPT

## 2019-01-01 PROCEDURE — 74011000255 HC RX REV CODE- 255: Performed by: HOSPITALIST

## 2019-01-01 PROCEDURE — 74011000250 HC RX REV CODE- 250: Performed by: EMERGENCY MEDICINE

## 2019-01-01 PROCEDURE — 51702 INSERT TEMP BLADDER CATH: CPT

## 2019-01-01 PROCEDURE — 93005 ELECTROCARDIOGRAM TRACING: CPT

## 2019-01-01 PROCEDURE — 87040 BLOOD CULTURE FOR BACTERIA: CPT

## 2019-01-01 PROCEDURE — 74011000250 HC RX REV CODE- 250: Performed by: PHYSICIAN ASSISTANT

## 2019-01-01 PROCEDURE — 77030021352 HC CBL LD SYS DISP COVD -B

## 2019-01-01 RX ORDER — NOREPINEPHRINE BIT/0.9 % NACL 8 MG/250ML
2-16 INFUSION BOTTLE (ML) INTRAVENOUS
Status: DISCONTINUED | OUTPATIENT
Start: 2019-01-01 | End: 2019-01-01

## 2019-01-01 RX ORDER — POTASSIUM CHLORIDE 7.45 MG/ML
10 INJECTION INTRAVENOUS
Status: DISPENSED | OUTPATIENT
Start: 2019-01-01 | End: 2019-01-01

## 2019-01-01 RX ORDER — SODIUM,POTASSIUM PHOSPHATES 280-250MG
1 POWDER IN PACKET (EA) ORAL 4 TIMES DAILY
Status: COMPLETED | OUTPATIENT
Start: 2019-01-01 | End: 2019-01-01

## 2019-01-01 RX ORDER — INSULIN LISPRO 100 [IU]/ML
INJECTION, SOLUTION INTRAVENOUS; SUBCUTANEOUS
Status: DISCONTINUED | OUTPATIENT
Start: 2019-01-01 | End: 2019-01-01

## 2019-01-01 RX ORDER — DEXTROSE MONOHYDRATE 50 MG/ML
150 INJECTION, SOLUTION INTRAVENOUS CONTINUOUS
Status: DISCONTINUED | OUTPATIENT
Start: 2019-01-01 | End: 2019-01-01

## 2019-01-01 RX ORDER — DEXTROSE MONOHYDRATE AND SODIUM CHLORIDE 5; .225 G/100ML; G/100ML
150 INJECTION, SOLUTION INTRAVENOUS CONTINUOUS
Status: DISCONTINUED | OUTPATIENT
Start: 2019-01-01 | End: 2019-01-01

## 2019-01-01 RX ORDER — MAGNESIUM SULFATE 1 G/100ML
1 INJECTION INTRAVENOUS ONCE
Status: COMPLETED | OUTPATIENT
Start: 2019-01-01 | End: 2019-01-01

## 2019-01-01 RX ORDER — TERAZOSIN 5 MG/1
5 CAPSULE ORAL DAILY
Qty: 30 CAP | Refills: 0 | Status: SHIPPED | OUTPATIENT
Start: 2019-01-01

## 2019-01-01 RX ORDER — SODIUM CHLORIDE 0.9 % (FLUSH) 0.9 %
5-40 SYRINGE (ML) INJECTION EVERY 8 HOURS
Status: DISCONTINUED | OUTPATIENT
Start: 2019-01-01 | End: 2019-01-01 | Stop reason: HOSPADM

## 2019-01-01 RX ORDER — INSULIN GLARGINE 100 [IU]/ML
6 INJECTION, SOLUTION SUBCUTANEOUS ONCE
Status: COMPLETED | OUTPATIENT
Start: 2019-01-01 | End: 2019-01-01

## 2019-01-01 RX ORDER — FINASTERIDE 5 MG/1
5 TABLET, FILM COATED ORAL DAILY
Status: DISCONTINUED | OUTPATIENT
Start: 2019-01-01 | End: 2019-01-01 | Stop reason: HOSPADM

## 2019-01-01 RX ORDER — INSULIN GLARGINE 100 [IU]/ML
20 INJECTION, SOLUTION SUBCUTANEOUS DAILY
Status: DISCONTINUED | OUTPATIENT
Start: 2019-01-01 | End: 2019-01-01 | Stop reason: HOSPADM

## 2019-01-01 RX ORDER — MAGNESIUM SULFATE 100 %
4 CRYSTALS MISCELLANEOUS AS NEEDED
Status: DISCONTINUED | OUTPATIENT
Start: 2019-01-01 | End: 2019-01-01

## 2019-01-01 RX ORDER — POTASSIUM CHLORIDE 7.45 MG/ML
10 INJECTION INTRAVENOUS
Status: COMPLETED | OUTPATIENT
Start: 2019-01-01 | End: 2019-01-01

## 2019-01-01 RX ORDER — POTASSIUM CHLORIDE 7.45 MG/ML
10 INJECTION INTRAVENOUS ONCE
Status: COMPLETED | OUTPATIENT
Start: 2019-01-01 | End: 2019-01-01

## 2019-01-01 RX ORDER — HEPARIN SODIUM 5000 [USP'U]/ML
5000 INJECTION, SOLUTION INTRAVENOUS; SUBCUTANEOUS EVERY 8 HOURS
Status: DISCONTINUED | OUTPATIENT
Start: 2019-01-01 | End: 2019-01-01 | Stop reason: HOSPADM

## 2019-01-01 RX ORDER — TERAZOSIN 5 MG/1
5 CAPSULE ORAL DAILY
Status: DISCONTINUED | OUTPATIENT
Start: 2019-01-01 | End: 2019-01-01 | Stop reason: HOSPADM

## 2019-01-01 RX ORDER — AMOXICILLIN 250 MG/1
500 CAPSULE ORAL EVERY 8 HOURS
Status: DISCONTINUED | OUTPATIENT
Start: 2019-01-01 | End: 2019-01-01

## 2019-01-01 RX ORDER — SODIUM CHLORIDE 450 MG/100ML
75 INJECTION, SOLUTION INTRAVENOUS CONTINUOUS
Status: DISPENSED | OUTPATIENT
Start: 2019-01-01 | End: 2019-01-01

## 2019-01-01 RX ORDER — FAMOTIDINE 20 MG/1
20 TABLET, FILM COATED ORAL 2 TIMES DAILY
Status: DISCONTINUED | OUTPATIENT
Start: 2019-01-01 | End: 2019-01-01 | Stop reason: HOSPADM

## 2019-01-01 RX ORDER — ACETAMINOPHEN 650 MG/1
325 SUPPOSITORY RECTAL
Status: COMPLETED | OUTPATIENT
Start: 2019-01-01 | End: 2019-01-01

## 2019-01-01 RX ORDER — VANCOMYCIN/0.9 % SOD CHLORIDE 1.5G/250ML
1500 PLASTIC BAG, INJECTION (ML) INTRAVENOUS ONCE
Status: COMPLETED | OUTPATIENT
Start: 2019-01-01 | End: 2019-01-01

## 2019-01-01 RX ORDER — INSULIN GLARGINE 100 [IU]/ML
18 INJECTION, SOLUTION SUBCUTANEOUS DAILY
Status: DISCONTINUED | OUTPATIENT
Start: 2019-01-01 | End: 2019-01-01

## 2019-01-01 RX ORDER — DEXTROSE MONOHYDRATE 50 MG/ML
75 INJECTION, SOLUTION INTRAVENOUS CONTINUOUS
Status: DISCONTINUED | OUTPATIENT
Start: 2019-01-01 | End: 2019-01-01

## 2019-01-01 RX ORDER — SODIUM CHLORIDE 9 MG/ML
200 INJECTION, SOLUTION INTRAVENOUS ONCE
Status: COMPLETED | OUTPATIENT
Start: 2019-01-01 | End: 2019-01-01

## 2019-01-01 RX ORDER — SODIUM CHLORIDE 0.9 % (FLUSH) 0.9 %
5-40 SYRINGE (ML) INJECTION AS NEEDED
Status: DISCONTINUED | OUTPATIENT
Start: 2019-01-01 | End: 2019-01-01 | Stop reason: HOSPADM

## 2019-01-01 RX ORDER — GUAIFENESIN 100 MG/5ML
81 LIQUID (ML) ORAL DAILY
Status: DISCONTINUED | OUTPATIENT
Start: 2019-01-01 | End: 2019-01-01 | Stop reason: HOSPADM

## 2019-01-01 RX ORDER — SODIUM CHLORIDE 9 MG/ML
150 INJECTION, SOLUTION INTRAVENOUS CONTINUOUS
Status: CANCELLED | OUTPATIENT
Start: 2019-01-01

## 2019-01-01 RX ORDER — DEXTROSE MONOHYDRATE 100 MG/ML
125-250 INJECTION, SOLUTION INTRAVENOUS AS NEEDED
Status: DISCONTINUED | OUTPATIENT
Start: 2019-01-01 | End: 2019-01-01 | Stop reason: HOSPADM

## 2019-01-01 RX ORDER — DEXTROSE MONOHYDRATE 100 MG/ML
125-250 INJECTION, SOLUTION INTRAVENOUS AS NEEDED
Status: DISCONTINUED | OUTPATIENT
Start: 2019-01-01 | End: 2019-01-01 | Stop reason: SDUPTHER

## 2019-01-01 RX ORDER — MAGNESIUM SULFATE 100 %
4 CRYSTALS MISCELLANEOUS AS NEEDED
Status: DISCONTINUED | OUTPATIENT
Start: 2019-01-01 | End: 2019-01-01 | Stop reason: HOSPADM

## 2019-01-01 RX ORDER — INSULIN LISPRO 100 [IU]/ML
3 INJECTION, SOLUTION INTRAVENOUS; SUBCUTANEOUS
Status: DISCONTINUED | OUTPATIENT
Start: 2019-01-01 | End: 2019-01-01 | Stop reason: HOSPADM

## 2019-01-01 RX ORDER — POTASSIUM CHLORIDE 750 MG/1
40 TABLET, FILM COATED, EXTENDED RELEASE ORAL ONCE
Status: ACTIVE | OUTPATIENT
Start: 2019-01-01 | End: 2019-01-01

## 2019-01-01 RX ORDER — INSULIN GLARGINE 100 [IU]/ML
12 INJECTION, SOLUTION SUBCUTANEOUS DAILY
Status: DISCONTINUED | OUTPATIENT
Start: 2019-01-01 | End: 2019-01-01

## 2019-01-01 RX ORDER — LEVOFLOXACIN 5 MG/ML
750 INJECTION, SOLUTION INTRAVENOUS
Status: DISCONTINUED | OUTPATIENT
Start: 2019-01-01 | End: 2019-01-01

## 2019-01-01 RX ORDER — INSULIN LISPRO 100 [IU]/ML
INJECTION, SOLUTION INTRAVENOUS; SUBCUTANEOUS
Status: DISCONTINUED | OUTPATIENT
Start: 2019-01-01 | End: 2019-01-01 | Stop reason: HOSPADM

## 2019-01-01 RX ORDER — BACLOFEN 10 MG/1
10 TABLET ORAL 3 TIMES DAILY
Status: DISCONTINUED | OUTPATIENT
Start: 2019-01-01 | End: 2019-01-01 | Stop reason: HOSPADM

## 2019-01-01 RX ORDER — INSULIN LISPRO 100 [IU]/ML
8 INJECTION, SOLUTION INTRAVENOUS; SUBCUTANEOUS ONCE
Status: COMPLETED | OUTPATIENT
Start: 2019-01-01 | End: 2019-01-01

## 2019-01-01 RX ORDER — MAGNESIUM SULFATE HEPTAHYDRATE 40 MG/ML
2 INJECTION, SOLUTION INTRAVENOUS ONCE
Status: COMPLETED | OUTPATIENT
Start: 2019-01-01 | End: 2019-01-01

## 2019-01-01 RX ORDER — LANOLIN ALCOHOL/MO/W.PET/CERES
400 CREAM (GRAM) TOPICAL ONCE
Status: COMPLETED | OUTPATIENT
Start: 2019-01-01 | End: 2019-01-01

## 2019-01-01 RX ORDER — DEXTROSE MONOHYDRATE AND SODIUM CHLORIDE 5; .45 G/100ML; G/100ML
150 INJECTION, SOLUTION INTRAVENOUS CONTINUOUS
Status: DISCONTINUED | OUTPATIENT
Start: 2019-01-01 | End: 2019-01-01

## 2019-01-01 RX ORDER — FINASTERIDE 5 MG/1
5 TABLET, FILM COATED ORAL DAILY
Qty: 30 TAB | Refills: 0 | Status: SHIPPED | OUTPATIENT
Start: 2019-01-01

## 2019-01-01 RX ORDER — POTASSIUM CHLORIDE 7.45 MG/ML
10 INJECTION INTRAVENOUS
Status: DISCONTINUED | OUTPATIENT
Start: 2019-01-01 | End: 2019-01-01

## 2019-01-01 RX ORDER — SODIUM CHLORIDE 450 MG/100ML
150 INJECTION, SOLUTION INTRAVENOUS CONTINUOUS
Status: DISCONTINUED | OUTPATIENT
Start: 2019-01-01 | End: 2019-01-01

## 2019-01-01 RX ORDER — LEVOFLOXACIN 5 MG/ML
750 INJECTION, SOLUTION INTRAVENOUS EVERY 24 HOURS
Status: DISCONTINUED | OUTPATIENT
Start: 2019-01-01 | End: 2019-01-01

## 2019-01-01 RX ORDER — ACETAMINOPHEN 325 MG/1
650 TABLET ORAL
Status: DISCONTINUED | OUTPATIENT
Start: 2019-01-01 | End: 2019-01-01 | Stop reason: HOSPADM

## 2019-01-01 RX ORDER — SODIUM CHLORIDE 450 MG/100ML
500 INJECTION, SOLUTION INTRAVENOUS ONCE
Status: COMPLETED | OUTPATIENT
Start: 2019-01-01 | End: 2019-01-01

## 2019-01-01 RX ORDER — ACETAMINOPHEN 650 MG/1
650 SUPPOSITORY RECTAL
Status: DISCONTINUED | OUTPATIENT
Start: 2019-01-01 | End: 2019-01-01 | Stop reason: HOSPADM

## 2019-01-01 RX ADMIN — DEXTROSE MONOHYDRATE 75 ML/HR: 5 INJECTION, SOLUTION INTRAVENOUS at 05:24

## 2019-01-01 RX ADMIN — FAMOTIDINE 20 MG: 20 TABLET ORAL at 17:43

## 2019-01-01 RX ADMIN — INSULIN LISPRO 3 UNITS: 100 INJECTION, SOLUTION INTRAVENOUS; SUBCUTANEOUS at 22:42

## 2019-01-01 RX ADMIN — ASPIRIN 81 MG 81 MG: 81 TABLET ORAL at 08:34

## 2019-01-01 RX ADMIN — INSULIN LISPRO 9 UNITS: 100 INJECTION, SOLUTION INTRAVENOUS; SUBCUTANEOUS at 12:16

## 2019-01-01 RX ADMIN — SODIUM CHLORIDE 75 ML/HR: 450 INJECTION, SOLUTION INTRAVENOUS at 08:45

## 2019-01-01 RX ADMIN — INSULIN GLARGINE 18 UNITS: 100 INJECTION, SOLUTION SUBCUTANEOUS at 09:28

## 2019-01-01 RX ADMIN — SODIUM CHLORIDE 1000 ML: 900 INJECTION, SOLUTION INTRAVENOUS at 11:14

## 2019-01-01 RX ADMIN — AMOXICILLIN 500 MG: 250 CAPSULE ORAL at 14:00

## 2019-01-01 RX ADMIN — BACLOFEN 10 MG: 10 TABLET ORAL at 22:00

## 2019-01-01 RX ADMIN — HEPARIN SODIUM 5000 UNITS: 5000 INJECTION INTRAVENOUS; SUBCUTANEOUS at 17:23

## 2019-01-01 RX ADMIN — INSULIN LISPRO 9 UNITS: 100 INJECTION, SOLUTION INTRAVENOUS; SUBCUTANEOUS at 12:35

## 2019-01-01 RX ADMIN — BACLOFEN 10 MG: 10 TABLET ORAL at 09:13

## 2019-01-01 RX ADMIN — POTASSIUM CHLORIDE 10 MEQ: 7.46 INJECTION, SOLUTION INTRAVENOUS at 03:08

## 2019-01-01 RX ADMIN — HEPARIN SODIUM 5000 UNITS: 5000 INJECTION INTRAVENOUS; SUBCUTANEOUS at 09:40

## 2019-01-01 RX ADMIN — PIPERACILLIN AND TAZOBACTAM 3.38 G: 3; .375 INJECTION, POWDER, LYOPHILIZED, FOR SOLUTION INTRAVENOUS at 10:56

## 2019-01-01 RX ADMIN — NOREPINEPHRINE BITARTRATE 6 MCG/MIN: 1 INJECTION INTRAVENOUS at 15:08

## 2019-01-01 RX ADMIN — HEPARIN SODIUM 5000 UNITS: 5000 INJECTION INTRAVENOUS; SUBCUTANEOUS at 17:36

## 2019-01-01 RX ADMIN — AMOXICILLIN 500 MG: 250 CAPSULE ORAL at 05:25

## 2019-01-01 RX ADMIN — INSULIN LISPRO 12 UNITS: 100 INJECTION, SOLUTION INTRAVENOUS; SUBCUTANEOUS at 17:36

## 2019-01-01 RX ADMIN — INSULIN LISPRO 3 UNITS: 100 INJECTION, SOLUTION INTRAVENOUS; SUBCUTANEOUS at 12:00

## 2019-01-01 RX ADMIN — SODIUM CHLORIDE 15.4 UNITS/HR: 9 INJECTION, SOLUTION INTRAVENOUS at 13:57

## 2019-01-01 RX ADMIN — INSULIN LISPRO 4 UNITS: 100 INJECTION, SOLUTION INTRAVENOUS; SUBCUTANEOUS at 21:59

## 2019-01-01 RX ADMIN — POTASSIUM CHLORIDE 10 MEQ: 7.46 INJECTION, SOLUTION INTRAVENOUS at 10:33

## 2019-01-01 RX ADMIN — SODIUM CHLORIDE 10 ML: 9 INJECTION, SOLUTION INTRAMUSCULAR; INTRAVENOUS; SUBCUTANEOUS at 13:07

## 2019-01-01 RX ADMIN — Medication 10 ML: at 17:24

## 2019-01-01 RX ADMIN — FAMOTIDINE 20 MG: 20 TABLET ORAL at 10:12

## 2019-01-01 RX ADMIN — HEPARIN SODIUM 5000 UNITS: 5000 INJECTION INTRAVENOUS; SUBCUTANEOUS at 17:44

## 2019-01-01 RX ADMIN — HEPARIN SODIUM 5000 UNITS: 5000 INJECTION INTRAVENOUS; SUBCUTANEOUS at 17:54

## 2019-01-01 RX ADMIN — PIPERACILLIN AND TAZOBACTAM 3.38 G: 3; .375 INJECTION, POWDER, LYOPHILIZED, FOR SOLUTION INTRAVENOUS at 12:12

## 2019-01-01 RX ADMIN — HEPARIN SODIUM 5000 UNITS: 5000 INJECTION INTRAVENOUS; SUBCUTANEOUS at 09:13

## 2019-01-01 RX ADMIN — HEPARIN SODIUM 5000 UNITS: 5000 INJECTION INTRAVENOUS; SUBCUTANEOUS at 02:00

## 2019-01-01 RX ADMIN — ACETAMINOPHEN 650 MG: 325 TABLET, FILM COATED ORAL at 15:04

## 2019-01-01 RX ADMIN — BACLOFEN 10 MG: 10 TABLET ORAL at 09:42

## 2019-01-01 RX ADMIN — SODIUM CHLORIDE 200 ML/HR: 900 INJECTION, SOLUTION INTRAVENOUS at 14:00

## 2019-01-01 RX ADMIN — FAMOTIDINE 20 MG: 10 INJECTION, SOLUTION INTRAVENOUS at 21:32

## 2019-01-01 RX ADMIN — TERAZOSIN HYDROCHLORIDE ANHYDROUS 5 MG: 5 CAPSULE ORAL at 13:06

## 2019-01-01 RX ADMIN — HEPARIN SODIUM 5000 UNITS: 5000 INJECTION INTRAVENOUS; SUBCUTANEOUS at 09:34

## 2019-01-01 RX ADMIN — INSULIN LISPRO 3 UNITS: 100 INJECTION, SOLUTION INTRAVENOUS; SUBCUTANEOUS at 08:38

## 2019-01-01 RX ADMIN — INSULIN LISPRO 2 UNITS: 100 INJECTION, SOLUTION INTRAVENOUS; SUBCUTANEOUS at 09:36

## 2019-01-01 RX ADMIN — ASPIRIN 81 MG 81 MG: 81 TABLET ORAL at 09:15

## 2019-01-01 RX ADMIN — SODIUM CHLORIDE 10 ML: 9 INJECTION, SOLUTION INTRAMUSCULAR; INTRAVENOUS; SUBCUTANEOUS at 21:52

## 2019-01-01 RX ADMIN — FINASTERIDE 5 MG: 5 TABLET, FILM COATED ORAL at 10:25

## 2019-01-01 RX ADMIN — SODIUM CHLORIDE 10 ML: 9 INJECTION, SOLUTION INTRAMUSCULAR; INTRAVENOUS; SUBCUTANEOUS at 06:00

## 2019-01-01 RX ADMIN — SODIUM CHLORIDE 500 ML: 450 INJECTION, SOLUTION INTRAVENOUS at 16:32

## 2019-01-01 RX ADMIN — ASPIRIN 81 MG 81 MG: 81 TABLET ORAL at 08:46

## 2019-01-01 RX ADMIN — FINASTERIDE 5 MG: 5 TABLET, FILM COATED ORAL at 08:50

## 2019-01-01 RX ADMIN — INSULIN LISPRO 3 UNITS: 100 INJECTION, SOLUTION INTRAVENOUS; SUBCUTANEOUS at 17:00

## 2019-01-01 RX ADMIN — FAMOTIDINE 20 MG: 20 TABLET ORAL at 08:37

## 2019-01-01 RX ADMIN — INSULIN GLARGINE 12 UNITS: 100 INJECTION, SOLUTION SUBCUTANEOUS at 08:37

## 2019-01-01 RX ADMIN — AMOXICILLIN 500 MG: 250 CAPSULE ORAL at 05:00

## 2019-01-01 RX ADMIN — FAMOTIDINE 20 MG: 20 TABLET ORAL at 09:15

## 2019-01-01 RX ADMIN — BARIUM SULFATE 15 ML: 400 SUSPENSION ORAL at 11:40

## 2019-01-01 RX ADMIN — SODIUM CHLORIDE 10 ML: 9 INJECTION, SOLUTION INTRAMUSCULAR; INTRAVENOUS; SUBCUTANEOUS at 06:04

## 2019-01-01 RX ADMIN — SODIUM CHLORIDE 6.4 UNITS/HR: 9 INJECTION, SOLUTION INTRAVENOUS at 19:37

## 2019-01-01 RX ADMIN — POTASSIUM CHLORIDE 10 MEQ: 7.46 INJECTION, SOLUTION INTRAVENOUS at 12:54

## 2019-01-01 RX ADMIN — MAGNESIUM SULFATE HEPTAHYDRATE 2 G: 40 INJECTION, SOLUTION INTRAVENOUS at 21:07

## 2019-01-01 RX ADMIN — INSULIN LISPRO 9 UNITS: 100 INJECTION, SOLUTION INTRAVENOUS; SUBCUTANEOUS at 22:28

## 2019-01-01 RX ADMIN — TERAZOSIN HYDROCHLORIDE ANHYDROUS 5 MG: 5 CAPSULE ORAL at 08:46

## 2019-01-01 RX ADMIN — POTASSIUM CHLORIDE 10 MEQ: 7.46 INJECTION, SOLUTION INTRAVENOUS at 11:32

## 2019-01-01 RX ADMIN — ASPIRIN 81 MG 81 MG: 81 TABLET ORAL at 13:06

## 2019-01-01 RX ADMIN — POTASSIUM CHLORIDE 10 MEQ: 7.46 INJECTION, SOLUTION INTRAVENOUS at 01:01

## 2019-01-01 RX ADMIN — HEPARIN SODIUM 5000 UNITS: 5000 INJECTION INTRAVENOUS; SUBCUTANEOUS at 02:21

## 2019-01-01 RX ADMIN — SODIUM CHLORIDE 11 UNITS/HR: 9 INJECTION, SOLUTION INTRAVENOUS at 16:18

## 2019-01-01 RX ADMIN — POTASSIUM & SODIUM PHOSPHATES POWDER PACK 280-160-250 MG 1 PACKET: 280-160-250 PACK at 22:42

## 2019-01-01 RX ADMIN — INSULIN LISPRO 6 UNITS: 100 INJECTION, SOLUTION INTRAVENOUS; SUBCUTANEOUS at 13:06

## 2019-01-01 RX ADMIN — HEPARIN SODIUM 5000 UNITS: 5000 INJECTION INTRAVENOUS; SUBCUTANEOUS at 02:14

## 2019-01-01 RX ADMIN — PIPERACILLIN AND TAZOBACTAM 3.38 G: 3; .375 INJECTION, POWDER, LYOPHILIZED, FOR SOLUTION INTRAVENOUS at 03:08

## 2019-01-01 RX ADMIN — INSULIN LISPRO 3 UNITS: 100 INJECTION, SOLUTION INTRAVENOUS; SUBCUTANEOUS at 10:16

## 2019-01-01 RX ADMIN — SODIUM CHLORIDE 10 ML: 9 INJECTION, SOLUTION INTRAMUSCULAR; INTRAVENOUS; SUBCUTANEOUS at 16:14

## 2019-01-01 RX ADMIN — DEXTROSE MONOHYDRATE 150 ML/HR: 5 INJECTION, SOLUTION INTRAVENOUS at 20:32

## 2019-01-01 RX ADMIN — INSULIN GLARGINE 20 UNITS: 100 INJECTION, SOLUTION SUBCUTANEOUS at 08:34

## 2019-01-01 RX ADMIN — MAGNESIUM SULFATE HEPTAHYDRATE 1 G: 1 INJECTION, SOLUTION INTRAVENOUS at 11:33

## 2019-01-01 RX ADMIN — POTASSIUM BICARBONATE 40 MEQ: 782 TABLET, EFFERVESCENT ORAL at 11:32

## 2019-01-01 RX ADMIN — HEPARIN SODIUM 5000 UNITS: 5000 INJECTION INTRAVENOUS; SUBCUTANEOUS at 02:25

## 2019-01-01 RX ADMIN — HEPARIN SODIUM 5000 UNITS: 5000 INJECTION INTRAVENOUS; SUBCUTANEOUS at 09:32

## 2019-01-01 RX ADMIN — FINASTERIDE 5 MG: 5 TABLET, FILM COATED ORAL at 13:23

## 2019-01-01 RX ADMIN — SODIUM CHLORIDE 10 ML: 9 INJECTION, SOLUTION INTRAMUSCULAR; INTRAVENOUS; SUBCUTANEOUS at 21:47

## 2019-01-01 RX ADMIN — FAMOTIDINE 20 MG: 10 INJECTION, SOLUTION INTRAVENOUS at 08:37

## 2019-01-01 RX ADMIN — INSULIN LISPRO 6 UNITS: 100 INJECTION, SOLUTION INTRAVENOUS; SUBCUTANEOUS at 08:34

## 2019-01-01 RX ADMIN — AMOXICILLIN 500 MG: 250 CAPSULE ORAL at 14:19

## 2019-01-01 RX ADMIN — PIPERACILLIN AND TAZOBACTAM 3.38 G: 3; .375 INJECTION, POWDER, LYOPHILIZED, FOR SOLUTION INTRAVENOUS at 03:00

## 2019-01-01 RX ADMIN — INSULIN LISPRO 9 UNITS: 100 INJECTION, SOLUTION INTRAVENOUS; SUBCUTANEOUS at 12:05

## 2019-01-01 RX ADMIN — FAMOTIDINE 20 MG: 10 INJECTION, SOLUTION INTRAVENOUS at 08:46

## 2019-01-01 RX ADMIN — Medication 400 MG: at 09:13

## 2019-01-01 RX ADMIN — HEPARIN SODIUM 5000 UNITS: 5000 INJECTION INTRAVENOUS; SUBCUTANEOUS at 09:33

## 2019-01-01 RX ADMIN — DEXTROSE MONOHYDRATE 75 ML/HR: 5 INJECTION, SOLUTION INTRAVENOUS at 15:30

## 2019-01-01 RX ADMIN — FAMOTIDINE 20 MG: 10 INJECTION, SOLUTION INTRAVENOUS at 09:32

## 2019-01-01 RX ADMIN — BACLOFEN 10 MG: 10 TABLET ORAL at 22:45

## 2019-01-01 RX ADMIN — INSULIN LISPRO 9 UNITS: 100 INJECTION, SOLUTION INTRAVENOUS; SUBCUTANEOUS at 17:54

## 2019-01-01 RX ADMIN — TERAZOSIN HYDROCHLORIDE ANHYDROUS 5 MG: 5 CAPSULE ORAL at 10:12

## 2019-01-01 RX ADMIN — SODIUM CHLORIDE 75 ML/HR: 450 INJECTION, SOLUTION INTRAVENOUS at 17:07

## 2019-01-01 RX ADMIN — BACLOFEN 10 MG: 10 TABLET ORAL at 18:30

## 2019-01-01 RX ADMIN — POTASSIUM & SODIUM PHOSPHATES POWDER PACK 280-160-250 MG 1 PACKET: 280-160-250 PACK at 14:00

## 2019-01-01 RX ADMIN — ACETAMINOPHEN 650 MG: 325 TABLET, FILM COATED ORAL at 02:29

## 2019-01-01 RX ADMIN — SODIUM CHLORIDE 10 ML: 9 INJECTION, SOLUTION INTRAMUSCULAR; INTRAVENOUS; SUBCUTANEOUS at 14:43

## 2019-01-01 RX ADMIN — HEPARIN SODIUM 5000 UNITS: 5000 INJECTION INTRAVENOUS; SUBCUTANEOUS at 10:56

## 2019-01-01 RX ADMIN — BACLOFEN 10 MG: 10 TABLET ORAL at 09:33

## 2019-01-01 RX ADMIN — DEXTROSE MONOHYDRATE 50 ML/HR: 5 INJECTION, SOLUTION INTRAVENOUS at 08:47

## 2019-01-01 RX ADMIN — DIBASIC SODIUM PHOSPHATE, MONOBASIC POTASSIUM PHOSPHATE AND MONOBASIC SODIUM PHOSPHATE 1 TABLET: 852; 155; 130 TABLET ORAL at 18:20

## 2019-01-01 RX ADMIN — DEXTROSE MONOHYDRATE 75 ML/HR: 5 INJECTION, SOLUTION INTRAVENOUS at 23:03

## 2019-01-01 RX ADMIN — INSULIN GLARGINE 20 UNITS: 100 INJECTION, SOLUTION SUBCUTANEOUS at 08:58

## 2019-01-01 RX ADMIN — DEXTROSE MONOHYDRATE AND SODIUM CHLORIDE 150 ML/HR: 5; .45 INJECTION, SOLUTION INTRAVENOUS at 05:00

## 2019-01-01 RX ADMIN — POTASSIUM CHLORIDE 10 MEQ: 7.46 INJECTION, SOLUTION INTRAVENOUS at 08:37

## 2019-01-01 RX ADMIN — BACLOFEN 10 MG: 10 TABLET ORAL at 16:40

## 2019-01-01 RX ADMIN — ACETAMINOPHEN 325 MG: 650 SUPPOSITORY RECTAL at 11:13

## 2019-01-01 RX ADMIN — SODIUM CHLORIDE 1000 ML: 900 INJECTION, SOLUTION INTRAVENOUS at 15:14

## 2019-01-01 RX ADMIN — SODIUM CHLORIDE 10 ML: 9 INJECTION, SOLUTION INTRAMUSCULAR; INTRAVENOUS; SUBCUTANEOUS at 22:00

## 2019-01-01 RX ADMIN — INSULIN GLARGINE 6 UNITS: 100 INJECTION, SOLUTION SUBCUTANEOUS at 11:33

## 2019-01-01 RX ADMIN — FAMOTIDINE 20 MG: 10 INJECTION, SOLUTION INTRAVENOUS at 21:41

## 2019-01-01 RX ADMIN — INSULIN LISPRO 9 UNITS: 100 INJECTION, SOLUTION INTRAVENOUS; SUBCUTANEOUS at 21:46

## 2019-01-01 RX ADMIN — SODIUM CHLORIDE 1000 ML: 900 INJECTION, SOLUTION INTRAVENOUS at 12:51

## 2019-01-01 RX ADMIN — SODIUM CHLORIDE 750 MG: 900 INJECTION, SOLUTION INTRAVENOUS at 23:39

## 2019-01-01 RX ADMIN — SODIUM CHLORIDE 10 ML: 9 INJECTION, SOLUTION INTRAMUSCULAR; INTRAVENOUS; SUBCUTANEOUS at 14:24

## 2019-01-01 RX ADMIN — HEPARIN SODIUM 5000 UNITS: 5000 INJECTION INTRAVENOUS; SUBCUTANEOUS at 01:27

## 2019-01-01 RX ADMIN — HEPARIN SODIUM 5000 UNITS: 5000 INJECTION INTRAVENOUS; SUBCUTANEOUS at 17:28

## 2019-01-01 RX ADMIN — ACETAMINOPHEN 650 MG: 325 TABLET, FILM COATED ORAL at 20:26

## 2019-01-01 RX ADMIN — BACLOFEN 10 MG: 10 TABLET ORAL at 17:43

## 2019-01-01 RX ADMIN — INSULIN GLARGINE 20 UNITS: 100 INJECTION, SOLUTION SUBCUTANEOUS at 08:37

## 2019-01-01 RX ADMIN — SODIUM CHLORIDE 10 ML: 9 INJECTION, SOLUTION INTRAMUSCULAR; INTRAVENOUS; SUBCUTANEOUS at 19:17

## 2019-01-01 RX ADMIN — PIPERACILLIN AND TAZOBACTAM 3.38 G: 3; .375 INJECTION, POWDER, LYOPHILIZED, FOR SOLUTION INTRAVENOUS at 11:32

## 2019-01-01 RX ADMIN — INSULIN LISPRO 12 UNITS: 100 INJECTION, SOLUTION INTRAVENOUS; SUBCUTANEOUS at 23:00

## 2019-01-01 RX ADMIN — BACLOFEN 10 MG: 10 TABLET ORAL at 17:54

## 2019-01-01 RX ADMIN — INSULIN LISPRO 3 UNITS: 100 INJECTION, SOLUTION INTRAVENOUS; SUBCUTANEOUS at 12:04

## 2019-01-01 RX ADMIN — SODIUM CHLORIDE 10 ML: 9 INJECTION, SOLUTION INTRAMUSCULAR; INTRAVENOUS; SUBCUTANEOUS at 14:00

## 2019-01-01 RX ADMIN — HEPARIN SODIUM 5000 UNITS: 5000 INJECTION INTRAVENOUS; SUBCUTANEOUS at 02:57

## 2019-01-01 RX ADMIN — HEPARIN SODIUM 5000 UNITS: 5000 INJECTION INTRAVENOUS; SUBCUTANEOUS at 10:01

## 2019-01-01 RX ADMIN — INSULIN GLARGINE 20 UNITS: 100 INJECTION, SOLUTION SUBCUTANEOUS at 10:13

## 2019-01-01 RX ADMIN — INSULIN LISPRO 3 UNITS: 100 INJECTION, SOLUTION INTRAVENOUS; SUBCUTANEOUS at 17:44

## 2019-01-01 RX ADMIN — BARIUM SULFATE 15 ML: 400 PASTE ORAL at 11:40

## 2019-01-01 RX ADMIN — LEVOFLOXACIN 750 MG: 5 INJECTION, SOLUTION INTRAVENOUS at 13:21

## 2019-01-01 RX ADMIN — AMOXICILLIN 500 MG: 250 CAPSULE ORAL at 06:03

## 2019-01-01 RX ADMIN — FAMOTIDINE 20 MG: 10 INJECTION, SOLUTION INTRAVENOUS at 09:34

## 2019-01-01 RX ADMIN — INSULIN LISPRO 3 UNITS: 100 INJECTION, SOLUTION INTRAVENOUS; SUBCUTANEOUS at 08:37

## 2019-01-01 RX ADMIN — INSULIN LISPRO 6 UNITS: 100 INJECTION, SOLUTION INTRAVENOUS; SUBCUTANEOUS at 22:45

## 2019-01-01 RX ADMIN — INSULIN LISPRO 9 UNITS: 100 INJECTION, SOLUTION INTRAVENOUS; SUBCUTANEOUS at 16:14

## 2019-01-01 RX ADMIN — PIPERACILLIN AND TAZOBACTAM 3.38 G: 3; .375 INJECTION, POWDER, LYOPHILIZED, FOR SOLUTION INTRAVENOUS at 19:39

## 2019-01-01 RX ADMIN — INSULIN LISPRO 2 UNITS: 100 INJECTION, SOLUTION INTRAVENOUS; SUBCUTANEOUS at 08:59

## 2019-01-01 RX ADMIN — FAMOTIDINE 20 MG: 20 TABLET ORAL at 17:23

## 2019-01-01 RX ADMIN — BACLOFEN 10 MG: 10 TABLET ORAL at 23:03

## 2019-01-01 RX ADMIN — HEPARIN SODIUM 5000 UNITS: 5000 INJECTION INTRAVENOUS; SUBCUTANEOUS at 19:39

## 2019-01-01 RX ADMIN — TERAZOSIN HYDROCHLORIDE ANHYDROUS 5 MG: 5 CAPSULE ORAL at 09:15

## 2019-01-01 RX ADMIN — SODIUM CHLORIDE 10 ML: 9 INJECTION, SOLUTION INTRAMUSCULAR; INTRAVENOUS; SUBCUTANEOUS at 05:46

## 2019-01-01 RX ADMIN — BACLOFEN 10 MG: 10 TABLET ORAL at 22:42

## 2019-01-01 RX ADMIN — HEPARIN SODIUM 5000 UNITS: 5000 INJECTION INTRAVENOUS; SUBCUTANEOUS at 18:20

## 2019-01-01 RX ADMIN — FAMOTIDINE 20 MG: 10 INJECTION, SOLUTION INTRAVENOUS at 20:52

## 2019-01-01 RX ADMIN — INSULIN GLARGINE 12 UNITS: 100 INJECTION, SOLUTION SUBCUTANEOUS at 14:41

## 2019-01-01 RX ADMIN — BACLOFEN 10 MG: 10 TABLET ORAL at 17:25

## 2019-01-01 RX ADMIN — DEXTROSE MONOHYDRATE AND SODIUM CHLORIDE 150 ML/HR: 5; .45 INJECTION, SOLUTION INTRAVENOUS at 22:43

## 2019-01-01 RX ADMIN — FINASTERIDE 5 MG: 5 TABLET, FILM COATED ORAL at 08:53

## 2019-01-01 RX ADMIN — AMOXICILLIN 500 MG: 250 CAPSULE ORAL at 22:42

## 2019-01-01 RX ADMIN — AMOXICILLIN 500 MG: 250 CAPSULE ORAL at 15:04

## 2019-01-01 RX ADMIN — INSULIN LISPRO 3 UNITS: 100 INJECTION, SOLUTION INTRAVENOUS; SUBCUTANEOUS at 12:38

## 2019-01-01 RX ADMIN — POTASSIUM & SODIUM PHOSPHATES POWDER PACK 280-160-250 MG 1 PACKET: 280-160-250 PACK at 10:01

## 2019-01-01 RX ADMIN — DEXTROSE MONOHYDRATE 75 ML/HR: 5 INJECTION, SOLUTION INTRAVENOUS at 02:26

## 2019-01-01 RX ADMIN — FAMOTIDINE 20 MG: 10 INJECTION, SOLUTION INTRAVENOUS at 21:52

## 2019-01-01 RX ADMIN — NOREPINEPHRINE BITARTRATE 8 MCG/MIN: 1 INJECTION INTRAVENOUS at 15:29

## 2019-01-01 RX ADMIN — POTASSIUM CHLORIDE 10 MEQ: 7.46 INJECTION, SOLUTION INTRAVENOUS at 02:01

## 2019-01-01 RX ADMIN — SODIUM CHLORIDE 18.2 UNITS/HR: 9 INJECTION, SOLUTION INTRAVENOUS at 15:10

## 2019-01-01 RX ADMIN — BACLOFEN 10 MG: 10 TABLET ORAL at 10:12

## 2019-01-01 RX ADMIN — SODIUM CHLORIDE 1000 MG: 900 INJECTION, SOLUTION INTRAVENOUS at 12:08

## 2019-01-01 RX ADMIN — AMOXICILLIN 500 MG: 250 CAPSULE ORAL at 21:32

## 2019-01-01 RX ADMIN — DIBASIC SODIUM PHOSPHATE, MONOBASIC POTASSIUM PHOSPHATE AND MONOBASIC SODIUM PHOSPHATE 1 TABLET: 852; 155; 130 TABLET ORAL at 09:13

## 2019-01-01 RX ADMIN — POTASSIUM CHLORIDE 10 MEQ: 7.46 INJECTION, SOLUTION INTRAVENOUS at 23:57

## 2019-01-01 RX ADMIN — DEXTROSE MONOHYDRATE 75 ML/HR: 5 INJECTION, SOLUTION INTRAVENOUS at 13:31

## 2019-01-01 RX ADMIN — SODIUM CHLORIDE 10 ML: 9 INJECTION, SOLUTION INTRAMUSCULAR; INTRAVENOUS; SUBCUTANEOUS at 21:41

## 2019-01-01 RX ADMIN — POTASSIUM CHLORIDE 10 MEQ: 7.46 INJECTION, SOLUTION INTRAVENOUS at 22:48

## 2019-01-01 RX ADMIN — INSULIN LISPRO 8 UNITS: 100 INJECTION, SOLUTION INTRAVENOUS; SUBCUTANEOUS at 05:27

## 2019-01-01 RX ADMIN — SODIUM CHLORIDE 10 ML: 9 INJECTION, SOLUTION INTRAMUSCULAR; INTRAVENOUS; SUBCUTANEOUS at 14:20

## 2019-01-01 RX ADMIN — HEPARIN SODIUM 5000 UNITS: 5000 INJECTION INTRAVENOUS; SUBCUTANEOUS at 10:15

## 2019-01-01 RX ADMIN — NOREPINEPHRINE BITARTRATE 10 MCG/MIN: 1 INJECTION INTRAVENOUS at 16:19

## 2019-01-01 RX ADMIN — INSULIN LISPRO 6 UNITS: 100 INJECTION, SOLUTION INTRAVENOUS; SUBCUTANEOUS at 12:30

## 2019-01-01 RX ADMIN — PIPERACILLIN SODIUM AND TAZOBACTAM SODIUM 4.5 G: 4; .5 INJECTION, POWDER, LYOPHILIZED, FOR SOLUTION INTRAVENOUS at 12:42

## 2019-01-01 RX ADMIN — POTASSIUM & SODIUM PHOSPHATES POWDER PACK 280-160-250 MG 1 PACKET: 280-160-250 PACK at 17:43

## 2019-01-01 RX ADMIN — FAMOTIDINE 20 MG: 20 TABLET ORAL at 18:20

## 2019-01-01 RX ADMIN — TERAZOSIN HYDROCHLORIDE ANHYDROUS 5 MG: 5 CAPSULE ORAL at 08:37

## 2019-01-01 RX ADMIN — NOREPINEPHRINE BITARTRATE 2 MCG/MIN: 1 INJECTION INTRAVENOUS at 14:34

## 2019-01-01 RX ADMIN — AMOXICILLIN 500 MG: 250 CAPSULE ORAL at 22:00

## 2019-01-01 RX ADMIN — PIPERACILLIN AND TAZOBACTAM 3.38 G: 3; .375 INJECTION, POWDER, LYOPHILIZED, FOR SOLUTION INTRAVENOUS at 02:57

## 2019-01-01 RX ADMIN — SODIUM CHLORIDE 10 ML: 9 INJECTION, SOLUTION INTRAMUSCULAR; INTRAVENOUS; SUBCUTANEOUS at 06:29

## 2019-01-01 RX ADMIN — DEXTROSE MONOHYDRATE 150 ML/HR: 5 INJECTION, SOLUTION INTRAVENOUS at 03:44

## 2019-01-01 RX ADMIN — PIPERACILLIN AND TAZOBACTAM 3.38 G: 3; .375 INJECTION, POWDER, LYOPHILIZED, FOR SOLUTION INTRAVENOUS at 18:57

## 2019-01-01 RX ADMIN — VANCOMYCIN HYDROCHLORIDE 1500 MG: 10 INJECTION, POWDER, LYOPHILIZED, FOR SOLUTION INTRAVENOUS at 13:18

## 2019-01-01 RX ADMIN — INSULIN LISPRO 3 UNITS: 100 INJECTION, SOLUTION INTRAVENOUS; SUBCUTANEOUS at 09:27

## 2019-01-01 RX ADMIN — ASPIRIN 81 MG 81 MG: 81 TABLET ORAL at 08:37

## 2019-01-01 RX ADMIN — INSULIN LISPRO 3 UNITS: 100 INJECTION, SOLUTION INTRAVENOUS; SUBCUTANEOUS at 08:47

## 2019-01-01 RX ADMIN — INSULIN LISPRO 3 UNITS: 100 INJECTION, SOLUTION INTRAVENOUS; SUBCUTANEOUS at 10:14

## 2019-01-01 RX ADMIN — FAMOTIDINE 20 MG: 20 TABLET ORAL at 08:33

## 2019-01-01 RX ADMIN — INSULIN LISPRO 3 UNITS: 100 INJECTION, SOLUTION INTRAVENOUS; SUBCUTANEOUS at 18:10

## 2019-01-01 RX ADMIN — AMOXICILLIN 500 MG: 250 CAPSULE ORAL at 21:23

## 2019-01-01 RX ADMIN — SODIUM CHLORIDE 10.8 UNITS/HR: 9 INJECTION, SOLUTION INTRAVENOUS at 12:48

## 2019-01-01 RX ADMIN — FINASTERIDE 5 MG: 5 TABLET, FILM COATED ORAL at 08:34

## 2019-01-01 RX ADMIN — BARIUM SULFATE 148 ML: 0.81 POWDER, FOR SUSPENSION ORAL at 11:39

## 2019-01-01 RX ADMIN — INSULIN GLARGINE 20 UNITS: 100 INJECTION, SOLUTION SUBCUTANEOUS at 11:35

## 2019-01-01 RX ADMIN — INSULIN LISPRO 3 UNITS: 100 INJECTION, SOLUTION INTRAVENOUS; SUBCUTANEOUS at 08:35

## 2019-01-01 RX ADMIN — NOREPINEPHRINE BITARTRATE 4 MCG/MIN: 1 INJECTION INTRAVENOUS at 14:48

## 2019-01-01 RX ADMIN — ASPIRIN 81 MG 81 MG: 81 TABLET ORAL at 10:12

## 2019-01-01 RX ADMIN — AMOXICILLIN 500 MG: 250 CAPSULE ORAL at 06:21

## 2019-01-01 RX ADMIN — INSULIN LISPRO 12 UNITS: 100 INJECTION, SOLUTION INTRAVENOUS; SUBCUTANEOUS at 18:20

## 2019-01-01 RX ADMIN — HEPARIN SODIUM 5000 UNITS: 5000 INJECTION INTRAVENOUS; SUBCUTANEOUS at 18:58

## 2019-01-01 RX ADMIN — AMOXICILLIN 500 MG: 250 CAPSULE ORAL at 13:23

## 2019-01-01 RX ADMIN — DEXTROSE MONOHYDRATE AND SODIUM CHLORIDE 125 ML/HR: 5; .225 INJECTION, SOLUTION INTRAVENOUS at 08:16

## 2019-01-01 RX ADMIN — FINASTERIDE 5 MG: 5 TABLET, FILM COATED ORAL at 13:54

## 2019-01-01 RX ADMIN — INSULIN LISPRO 6 UNITS: 100 INJECTION, SOLUTION INTRAVENOUS; SUBCUTANEOUS at 18:10

## 2019-01-01 RX ADMIN — FAMOTIDINE 20 MG: 20 TABLET ORAL at 17:28

## 2019-01-01 RX ADMIN — INSULIN LISPRO 3 UNITS: 100 INJECTION, SOLUTION INTRAVENOUS; SUBCUTANEOUS at 13:23

## 2019-01-01 RX ADMIN — HEPARIN SODIUM 5000 UNITS: 5000 INJECTION INTRAVENOUS; SUBCUTANEOUS at 01:00

## 2019-01-01 RX ADMIN — SODIUM CHLORIDE 728 ML: 900 INJECTION, SOLUTION INTRAVENOUS at 11:34

## 2019-01-01 RX ADMIN — BACLOFEN 10 MG: 10 TABLET ORAL at 10:01

## 2019-01-01 RX ADMIN — IOPAMIDOL 80 ML: 612 INJECTION, SOLUTION INTRAVENOUS at 12:44

## 2019-01-01 RX ADMIN — PIPERACILLIN AND TAZOBACTAM 3.38 G: 3; .375 INJECTION, POWDER, LYOPHILIZED, FOR SOLUTION INTRAVENOUS at 18:24

## 2019-01-01 RX ADMIN — INSULIN LISPRO 3 UNITS: 100 INJECTION, SOLUTION INTRAVENOUS; SUBCUTANEOUS at 12:16

## 2019-11-17 PROBLEM — E87.0 HYPERNATREMIA: Status: ACTIVE | Noted: 2019-01-01

## 2019-11-17 PROBLEM — N17.9 AKI (ACUTE KIDNEY INJURY) (HCC): Status: ACTIVE | Noted: 2019-01-01

## 2019-11-17 PROBLEM — R77.8 ELEVATED TROPONIN: Status: ACTIVE | Noted: 2019-01-01

## 2019-11-17 PROBLEM — R33.9 URINARY RETENTION: Status: ACTIVE | Noted: 2019-01-01

## 2019-11-17 PROBLEM — N13.30 HYDRONEPHROSIS: Status: ACTIVE | Noted: 2019-01-01

## 2019-11-17 PROBLEM — E11.00 UNCONTROLLED TYPE 2 DIABETES MELLITUS WITH HYPEROSMOLAR NONKETOTIC HYPERGLYCEMIA (HCC): Status: ACTIVE | Noted: 2019-01-01

## 2019-11-17 PROBLEM — A41.9 SEPSIS (HCC): Status: ACTIVE | Noted: 2019-01-01

## 2019-11-17 NOTE — PROGRESS NOTES
Pharmacy Dosing Services: Vancomycin Indication: CAP Day of therapy: 1 Other Antimicrobials (Include dose, start day & day of therapy): 
Pip/tazo 3.375 grams IV every 8 hours extended infusion Levofloxacin 750 mg IV every 48 hours Loading dose (date given): 1500 mg 
Current Maintenance dose: None at this time Goal Vancomycin Level: 15-20 
(Trough 15-20 for most infections, 20 for meningitis/osteomyelitis, pre-HD level ~25) Vancomycin Level (if drawn): None at this time Significant Cultures: Pending Renal function stable? (unstable defined as SCr increase of 0.5 mg/dL or > 50% increase from baseline, whichever is greater) (Y/N): N?  
 
CAPD, Hemodialysis or Renal Replacement Therapy (Y/N): N Recent Labs 19 
1101 CREA 1.53* BUN 67* WBC 19.3* Temp (24hrs), Av.4 °F (38 °C), Min:98.1 °F (36.7 °C), Max:102.6 °F (39.2 °C) Creatinine Clearance (Creatinine Clearance (ml/min)): 40 ml/min Regimen assessment: New start - Possible MATTIE - address first thing in AM - BMP ordered Maintenance dose: 1000 mg IV every 24 hours Next scheduled level: Trough on  at 1330 Pharmacy will follow daily and adjust medications as appropriate for renal function and/or serum levels.  
 
Thank you, 
Dion Borjas, PHARMD

## 2019-11-17 NOTE — H&P
Internal Medicine History and Physical 
   
 
 
Subjective HPI: Amanda Kinsey is a 81 y.o. male with a PMHx of Cerebral palsy, HTN who presented to the ED from nursing home due to complaints of SOB and dysphagia. Per nursing staff, he is usually fed wet bread, but a new CNA this morning gave him dry bread which he struggled swallowing. Once bread was moistened, he had no further difficulty finishing his breakfast. As far as the SOB, patient states this has persisted for 1 week. He is not able to tell me any more about it. When asked about abdominal pain, he also states \"1 week\". Unsure of patient's mental status at baseline, and no family at bedside. Patient reportedly has quadriparesis at baseline. In the ED, labs are significant for hyperosmolar, hyperglycemic, nonketotic state; hypernatremia; MATTIE; trop 0.08. Temp initially elevated at 102. 6. He is hypotensive started on levophed. CT abd/pelvis revealed b/l hydronephrosis with distended bladder, b/l nonobstructing ureteral stones. Nephrology, PCCM, urology were consulted. Patient will be admitted to ICU for further management. PMHx: 
Past Medical History:  
Diagnosis Date  Cerebral palsy ([facility] [address]  Hypertension  Mild mental retardation  Polio PSurgHx: 
No past surgical history on file. SocialHx: 
Social History Socioeconomic History  Marital status: LEGALLY  Spouse name: Not on file  Number of children: Not on file  Years of education: Not on file  Highest education level: Not on file Occupational History  Not on file Social Needs  Financial resource strain: Not on file  Food insecurity:  
  Worry: Not on file Inability: Not on file  Transportation needs:  
  Medical: Not on file Non-medical: Not on file Tobacco Use  Smoking status: Never Smoker Substance and Sexual Activity  Alcohol use: No  
 Drug use: Not on file  Sexual activity: Not on file Lifestyle  Physical activity:  
  Days per week: Not on file Minutes per session: Not on file  Stress: Not on file Relationships  Social connections:  
  Talks on phone: Not on file Gets together: Not on file Attends Hindu service: Not on file Active member of club or organization: Not on file Attends meetings of clubs or organizations: Not on file Relationship status: Not on file  Intimate partner violence:  
  Fear of current or ex partner: Not on file Emotionally abused: Not on file Physically abused: Not on file Forced sexual activity: Not on file Other Topics Concern  Not on file Social History Narrative  Not on file FamilyHx: 
No family history on file. Home Medications: 
Prior to Admission Medications Prescriptions Last Dose Informant Patient Reported? Taking? FENOFIBRATE MICRONIZED PO   Yes No  
Sig: Take 200 mg by mouth daily. acetaminophen (TYLENOL ARTHRITIS PAIN) 650 mg CR tablet   Yes No  
Sig: Take 650 mg by mouth every six (6) hours as needed for Pain. aspirin 81 mg chewable tablet   Yes No  
Sig: Take 81 mg by mouth daily. baclofen (LIORESAL) 10 mg tablet   Yes No  
Sig: Take  by mouth three (3) times daily. calcium carbonate (TUMS) 200 mg calcium (500 mg) chew   Yes No  
Sig: Take 1 Tab by mouth three (3) times daily. calcium-vitamin D (OYSTER SHELL CALCIUM-VIT D3) 250-125 mg-unit tablet   Yes No  
Sig: Take 1 Tab by mouth two (2) times a day. camphor-methyl salicyl-menthol (SALONPAS) ptmd   Yes No  
Si Patch by Apply Externally route daily. cholecalciferol (VITAMIN D3) 1,000 unit cap   Yes No  
Sig: Take 1,000 Units by mouth daily. ergocalciferol (VITAMIN D2) 50,000 unit capsule   Yes No  
Sig: Take 50,000 Units by mouth every Tuesday. fenofibrate micronized (LOFIBRA) 67 mg capsule   Yes No  
Sig: Take 67 mg by mouth every morning.   
fenofibrate nanocrystallized (TRICOR) 48 mg tablet   No No  
 Sig: Take 1 Tab by mouth daily. lisinopril (PRINIVIL, ZESTRIL) 2.5 mg tablet   Yes No  
Sig: Take 2.5 mg by mouth daily. loperamide (IMMODIUM) 2 mg tablet   Yes No  
Sig: Take 2 mg by mouth four (4) times daily as needed for Diarrhea. meclizine (ANTIVERT) 12.5 mg tablet   Yes No  
Sig: Take 12.5 mg by mouth three (3) times daily as needed. metFORMIN (GLUCOPHAGE) 850 mg tablet   Yes No  
Sig: Take 500 mg by mouth two (2) times daily (with meals). multivitamin, tx-iron-ca-min (THERAPY M) 9 mg iron-400 mcg tab tablet   Yes No  
Sig: Take 1 Tab by mouth daily. polyethylene glycol (MIRALAX) 17 gram packet   Yes No  
Sig: Take 17 g by mouth every other day. polyvinyl alcohol (LIQUIFILM TEARS) 1.4 % ophthalmic solution   Yes No  
Sig: Administer 1 Drop to both eyes four (4) times daily. polyvinyl alcohol (LIQUIFILM TEARS) 1.4 % ophthalmic solution   Yes No  
Sig: Administer 1 Drop to both eyes as needed. promethazine (PHENERGAN) 25 mg tablet   Yes No  
Sig: Take 25 mg by mouth every eight (8) hours as needed for Nausea. Facility-Administered Medications: None Allergies: 
No Known Allergies Review of Systems: 
CONST: Fever, weight loss, fatigue or chills HEENT: Recent changes in vision, vertigo, epistaxis, dysphagia and hoarseness CV: Chest pain, palpitations, HTN, edema and varicosities RESP: Cough, shortness of breath, wheezing, hemoptysis, snoring and reactive airway disease GI: Nausea, vomiting, abdominal pain, change in bowel habits, hematochezia, melena, and GERD  
: Hematuria, dysuria, frequency, urgency, nocturia and stress urinary incontinence MS: Weakness, joint pain and arthritis ENDO: Diabetes, thyroid disease, polyuria, polydipsia, polyphagia, poor wound healing, heat intolerance, cold intolerance LYMPH/HEME: Anemia, bruising and history of blood transfusions INTEG: Dermatitis, abnormal moles NEURO: Dizziness, headache, fainting, seizures and stroke PSYCH: Anxiety and depression Objective Visit Vitals BP (!) 81/59 Pulse 99 Temp 98.1 °F (36.7 °C) Resp 20 Wt 57.4 kg (126 lb 9 oz) SpO2 99% BMI 21.06 kg/m² Physical Exam: 
General Appearance: NAD, conversant, but doesn't answer all questions appropriately HENT: normocephalic/atraumatic, dry mucus membranes Lungs: CTA with normal respiratory effort Cardiovascular: RRR, no m/r/g Abdomen: soft, non-tender, normal bowel sounds Extremities: no cyanosis, no peripheral edema Neuro: unable to move BUE/BLE extremities Laboratory Studies: 
CMP:  
Lab Results Component Value Date/Time  (H) 11/17/2019 11:01 AM  
 K 4.4 11/17/2019 11:01 AM  
  (H) 11/17/2019 11:01 AM  
 CO2 22 11/17/2019 11:01 AM  
 AGAP 13 11/17/2019 11:01 AM  
  (HH) 11/17/2019 11:01 AM  
 BUN 67 (H) 11/17/2019 11:01 AM  
 CREA 1.53 (H) 11/17/2019 11:01 AM  
 GFRAA 55 (L) 11/17/2019 11:01 AM  
 GFRNA 46 (L) 11/17/2019 11:01 AM  
 CA 9.6 11/17/2019 11:01 AM  
 MG 2.3 11/17/2019 11:01 AM  
 PHOS 3.9 11/17/2019 11:00 AM  
 ALB 3.1 (L) 11/17/2019 11:01 AM  
 TP 6.9 11/17/2019 11:01 AM  
 GLOB 3.8 11/17/2019 11:01 AM  
 AGRAT 0.8 11/17/2019 11:01 AM  
 SGOT 19 11/17/2019 11:01 AM  
 ALT 44 11/17/2019 11:01 AM  
 
CBC:  
Lab Results Component Value Date/Time WBC 19.3 (H) 11/17/2019 11:01 AM  
 HGB 16.4 (H) 11/17/2019 11:01 AM  
 HCT 54.6 (H) 11/17/2019 11:01 AM  
  11/17/2019 11:01 AM  
 
All Cardiac Markers in the last 24 hours:  
Lab Results Component Value Date/Time TROIQ 0.08 (H) 11/17/2019 11:01 AM  
 
 
Imaging Reviewed: 
Ct Chest Abd Pelv W Cont Result Date: 11/17/2019 EXAM: CT of the chest, abdomen and pelvis CLINICAL HISTORY/INDICATION: fever/sepsis/abdominal pain/cough/SOB -Additional: None COMPARISON: CT abdomen and pelvis from 05/11/18 TECHNIQUE: Axial CT imaging of the chest, abdomen and pelvis was performed after the uneventful administration of 100 cc of Isovue-300 intravenous contrast. Multiplanar reformats were generated. One or more dose reduction techniques were used on this CT: automated exposure control, adjustment of the mAs and/or kVp according to patient size, and iterative reconstruction techniques. The specific techniques used on this CT exam have been documented in the patient's electronic medical record. Digital Imaging and Communications in Medicine (DICOM) format image data are available to nonaffiliated external healthcare facilities or entities on a secure, media free, reciprocally searchable basis with patient authorization for at least a 12-month period after this study. _______________ FINDINGS: Chest: Lungs: No suspicious nodule or mass. Bilateral dependent atelectasis is present. Pleura: Normal, with no effusion or pneumothorax. Airway: Bronchial wall thickening with opacified small airways is present at both lung bases compatible with bronchiolitis Mediastinum: Normal heart size. No pericardial effusion. Given the limitations of cardiac motion, great vessels are unremarkable. Lymph Nodes: No enlarged lymph nodes. Diffuse muscular atrophy is present =============== Abdomen And Pelvis: Liver, Biliary: The liver demonstrates diminished density compatible with hepatic steatosis. No focal liver masses are present. No biliary dilation. The gallbladder has been previously removed. Pancreas: Normal. Spleen: Normal. Adrenals: Normal. Kidneys: Bilateral renal cysts are present. Both kidneys demonstrate hydronephrosis. There are layering calculi present within the distended urinary collecting system/pelvis. Bilateral hydroureter is present. The dilated ureters can be followed to the bladder. There are small layering calculi present within the dilated ureters. Lymph Nodes: No enlarged lymph nodes. Gastrointestinal Tract: There is a large amount of fecal material noted within the rectum.  The overlying the conus appears mildly thickened. The appearance is consistent with stercoral colitis. Vasculature: Atherosclerosis is present Pelvic Organs:  No suspicious masses Bones: No acute or aggressive osseous abnormalities identified. Chronic spondylosis is present Other: The bladder is distended suggesting urinary retention _______________ IMPRESSION: Bilateral hydronephrosis is present. The bladder is distended with a diffusely mildly thickened wall. The appearance suggests urinary retention. There are nonobstructing bilateral urinary tract calculi seen both within the dilated ureters as well as within the dilated renal collecting systems Large amount of fecal material within the rectum with adjacent colonic wall thickening consistent with stercoral colitis Status post cholecystectomy Hepatic steatosis Mild bronchial wall thickening and opacification at the lung bases compatible with bronchiolitis Xr Chest [facility] Result Date: 11/17/2019 EXAM: Portable Chest CLINICAL INDICATION: meets SIRS criteria -Additional: Shortness of breath, difficulty swallowing COMPARISON: 05/12/18 TECHNIQUE: AP portable view at 1048 ______________ FINDINGS: HEART AND MEDIASTINUM: The heart size is normal. LUNGS AND AIRWAYS: The lungs are clear. PLEURA: No pleural effusion or pneumothorax. BONES: Chronic deformity of the left shoulder is present OTHER: Shunt catheter appears present overlying the left chest. Cardiac monitor leads are present overlying the chest ______________ IMPRESSION: No active cardiopulmonary disease. EKG:  
Sinus tach Rightward axis Assessment/Plan Principal Problem: 
  Uncontrolled type 2 diabetes mellitus with hyperosmolar nonketotic hyperglycemia ([address] (11/17/2019) Active Problems: 
  Sepsis ([address] (11/17/2019) Hypernatremia (11/17/2019) MATTIE (acute kidney injury) ([address] (11/17/2019) Hydronephrosis (11/17/2019) UTI (urinary tract infection) (6/11/2015) Cerebral palsy ([address] (6/12/2015) Elevated troponin (11/17/2019) Urinary retention (11/17/2019) Hyperosmolar, hyperglycemia 
- insulin gtt, manage per protocol 
- 7.6L free water deficit; Serum osm 393 
- IV fluids - 4L NS given in ED, will switch to 1/2NS given severity of hypernatremia 
- monitor for s/sx of osmotic demyelination due to hypertonic injury 
- decrease tonicity slowly to avoid cerebral edema; goal for blood glucose reduction should be 50 to 75 mg/dL per hour Sepsis 
- hypotension, elevated WBC, febrile (102.6) on arrival 
- UTI is only source, CXR clear, no other evidence of infection 
- hypotension and elevated WBC could be explained by hypovolemia and hemoconcentration - BCx, Ucx obtained - IV fluids 
- vanc/zosyn/levaquin and de-escalate as cultures result 
- cont levophed and wean as tolerated - PCCM consulted - appreciate Hypernatremia 
- nephrology consulted - appreciate 
- corrected sodium is 171 
- monitor BMP q6 
- aim for 10-12meq decrease per 24 hours - switch to 1/2NS, has received >4 L NS in ED MATTIE 
- 2/2 dehydration, hydronephrosis - IV fluids 
- monitor BMP Hydronephrosis 2/2 urinary retention 
- urology consulted - appreciate 
- continue holm Elevated trop - trend cardiac enzymes - Cont acceptable home medications for chronic conditions  
- DVT protocol I have personally reviewed all pertinent labs, films and EKGs that have officially resulted. I reviewed available electronic documentation outlining the initial presentation as well as the emergency room physician's encounter. Mary Ellen Wells PA-C [address] [place], [place] [address] Office:  [phone] Pager: [phone]

## 2019-11-17 NOTE — PROGRESS NOTES
Problem: Discharge Planning Goal: *Discharge to safe environment Outcome: Progressing Towards Goal 
 Plan is to return to [facility] for [address] Reason for Admission:  Hypernatremia, Hydronephrosis, Sepsis RRAT Score:     17 Do you (patient/family) have any concerns for transition/discharge? Plan for utilizing home health:  no  
 
Current Advanced Directive/Advance Care Plan:  none Transition of Care Plan:   [address]. Pt is a resident at [facility] Chart reviewed. Unable to verify facesheet information. Pt from [facility]. Bed bound, total care. Has Medicare A&B and [facility] Medicaid. Pt is a full code on his nursing home records. No next of kin. Difficulty communicating, can say yes or no, nods head. Care Management Interventions PCP Verified by CM: No(pt unable to communicate) Palliative Care Criteria Met (RRAT>21 & CHF Dx)?: No 
Mode of Transport at Discharge: BLS Transition of Care Consult (CM Consult): Discharge Planning Physical Therapy Consult: No 
Occupational Therapy Consult: No 
Speech Therapy Consult: No 
Current Support Network: [address] [place] [address] Confirm Follow Up Transport: Other (see comment)(resident at [facility]) Plan discussed with Pt/Family/Caregiver: Yes(told pt he's returning to [facility] but not sure if pt comprehend) Discharge Location Discharge Placement: [address] Tried to call [employer] but no one in the unit answered.

## 2019-11-17 NOTE — ED TRIAGE NOTES
Pt brought to ED via EMS from [facility] for shortness of breath. Pt also complaining of difficulty swallowing.

## 2019-11-17 NOTE — ED NOTES
Called [facility] and spoke with [place] [employer] at [phone]. Per staff, patient has SOB and tachycardia started today. New CNA fed patient breakfast this morning, gave patient dry bread and patient was having difficulty swallowing. CNA normally wet bread first before giving to patient. After bread was made moist, patient did not have difficulty swallowing. Patient can normally move head only (unable to move rest of body), very talkative and sing.

## 2019-11-17 NOTE — ED PROVIDER NOTES
EMERGENCY DEPARTMENT HISTORY AND PHYSICAL EXAM 
 
11:12 AM 
 
 
Date: 11/17/2019 Patient Name: Ion Jesus History of Presenting Illness Chief Complaint Patient presents with  Shortness of Breath  Dysphagia  Irregular Heart Beat HISTORY: Ion Jesus is a 81 y.o. male with medical history as listed below who presents with fever and shortness of breath since yesterday. History is very limited as patient is unable to provide much and EMS history was also limited. Patient is a resident of [address] [place] [address]. He has a history of cerebral palsy. Per EMS they were called because the patient appeared to be having trouble breathing and trouble swallowing. It is unclear exactly when his symptoms started but likely started sometime yesterday. He was noted to have a fever yesterday. Per EMS he is currently at his baseline and his speech and mentation. Very difficult to understand. Code S Level 1 called by RN. Upon arrival, patient is tachycardic is warm to the touch and EMS reported that he had a fever yesterday. I canceled the code S as the patient likely needs a sepsis work-up and was reported to be at his normal self. PCP: Angle Villarreal MD 
 
Current Facility-Administered Medications Medication Dose Route Frequency Provider Last Rate Last Dose  acetaminophen (TYLENOL) suppository 325 mg  325 mg Rectal NOW Palak Kelley MD      
 sodium chloride 0.9 % bolus infusion 1,000 mL  1,000 mL IntraVENous ONCE Palak Kelley MD      
 Followed by  
Maxime Bello sodium chloride 0.9 % bolus infusion 728 mL  728 mL IntraVENous ONCE Palak Kelley MD      
 
Current Outpatient Medications Medication Sig Dispense Refill  cholecalciferol (VITAMIN D3) 1,000 unit cap Take 1,000 Units by mouth daily.  meclizine (ANTIVERT) 12.5 mg tablet Take 12.5 mg by mouth three (3) times daily as needed.  fenofibrate micronized (LOFIBRA) 67 mg capsule Take 67 mg by mouth every morning.  polyvinyl alcohol (LIQUIFILM TEARS) 1.4 % ophthalmic solution Administer 1 Drop to both eyes as needed.  fenofibrate nanocrystallized (TRICOR) 48 mg tablet Take 1 Tab by mouth daily. 30 Tab 0  
 polyvinyl alcohol (LIQUIFILM TEARS) 1.4 % ophthalmic solution Administer 1 Drop to both eyes four (4) times daily.  aspirin 81 mg chewable tablet Take 81 mg by mouth daily.  baclofen (LIORESAL) 10 mg tablet Take  by mouth three (3) times daily.  calcium carbonate (TUMS) 200 mg calcium (500 mg) chew Take 1 Tab by mouth three (3) times daily.  FENOFIBRATE MICRONIZED PO Take 200 mg by mouth daily.  metFORMIN (GLUCOPHAGE) 850 mg tablet Take 500 mg by mouth two (2) times daily (with meals).  calcium-vitamin D (OYSTER SHELL CALCIUM-VIT D3) 250-125 mg-unit tablet Take 1 Tab by mouth two (2) times a day.  polyethylene glycol (MIRALAX) 17 gram packet Take 17 g by mouth every other day.  camphor-methyl salicyl-menthol (SALONPAS) ptmd 1 Patch by Apply Externally route daily.  multivitamin, tx-iron-ca-min (THERAPY M) 9 mg iron-400 mcg tab tablet Take 1 Tab by mouth daily.  ergocalciferol (VITAMIN D2) 50,000 unit capsule Take 50,000 Units by mouth every Tuesday.  lisinopril (PRINIVIL, ZESTRIL) 2.5 mg tablet Take 2.5 mg by mouth daily.  acetaminophen (TYLENOL ARTHRITIS PAIN) 650 mg CR tablet Take 650 mg by mouth every six (6) hours as needed for Pain.  loperamide (IMMODIUM) 2 mg tablet Take 2 mg by mouth four (4) times daily as needed for Diarrhea.  promethazine (PHENERGAN) 25 mg tablet Take 25 mg by mouth every eight (8) hours as needed for Nausea. Past History Past Medical History: 
Past Medical History:  
Diagnosis Date  Cerebral palsy ([address]  Hypertension  Mild mental retardation  Polio Past Surgical History: No past surgical history on file. Family History: No family history on file. Social History: 
Social History Tobacco Use  Smoking status: Never Smoker Substance Use Topics  Alcohol use: No  
 Drug use: Not on file Allergies: 
No Known Allergies Review of Systems Review of Systems Constitutional: Positive for fever. Respiratory: Positive for shortness of breath. Cardiovascular: Negative for chest pain. Gastrointestinal: Positive for abdominal pain. Neurological: Positive for weakness. All other systems reviewed and are negative. Physical Exam  
 
Visit Vitals /78 Pulse (!) 136 Temp (!) 102.6 °F (39.2 °C) Resp 30 Wt 57.6 kg (126 lb 14.4 oz) SpO2 90% BMI 21.12 kg/m² Physical Exam 
General Exam: Patient is a well developed and well nourished in mild distress. Patient does not appear acutely ill or toxic. Chronically ill appearing, frail. Eye Exam: Lids and conjunctiva are normal 
ENT Exam: The general head and facial exam is normal.  The neck is supple without meningeal signs. No significant adenopathy. Oral Mucosa is dry. Pulmonary Exam: No respiratory distress. The respiratory rate is tachypneic. No stridor. The breath sounds are equal bilaterally. There are no wheezes, rales, or rhonchi noted. Cardiac Exam: The cardiac rate is tachycardic and rhythm are normal.  No significant murmurs, rubs, or gallops. The peripheral pulses of the upper extremities are normal. 
Abdominal Exam: Abdomen is soft and non-distended. No pulsatile masses. Mild generalized abdominal tenderness. there is no rebound or guarding noted. Skin and Soft Tissue: The skin is warm and dry, without significant abnormality. Good color. Musculoskeletal Exam: No peripheral edema. Muscle atrophy to all extremities. Diagnostic Study Results Labs - No results found for this or any previous visit (from the past 12 hour(s)). Radiologic Studies -  
XR CHEST PORT Final Result IMPRESSION:  
  
No active cardiopulmonary disease. CT CHEST ABD PELV W CONT    (Results Pending) Medical Decision Making I am the first provider for this patient. I reviewed the vital signs, available nursing notes, past medical history, past surgical history, family history and social history. Vital Signs-Reviewed the patient's vital signs. EKG: Interpreted by the EP. EKG performed at 1152. Interpretation rate 114, sinus tachycardia, no STEMI Records Reviewed: Nursing Notes (Time of Review: 11:12 AM) 
 
ED Course: Progress Notes, Reevaluation, and Consults: 
 
 
Provider Notes (Medical Decision Making): Pt arriving with fever/tachycardia/tachypnea. Sepsis workup initiated. Appears to be at baseline neurologically. Labs reviewed - leukocytosis, Hypernatremia with hyperglycemia, true Na likely closer to 171. CXR clear. Initial urine not impressive although this was from specimen after traumatic straight cath. Started on broad spectrum antibiotics. CT chest/abd/pelv with bladder distension and hydro. Greenwood inserted and urine cleared. Urology consulted given presence of stones - stones however not causing an obstruction. Sepsis likely from urinary source. BP borderline. Given fluid bolus but still with low BP. Started on low dose levophed. Likely needs further fluid boluses given hypernatremia and total free water deficit. Discussed with ICU. Has at least 2 18G peripheral IV. Okay for vasopressor through these IVs for now as pt may not need pressor for long. Discussed case with hospitalist and ICU. 
 
11:00 AM 
Per initial RN, Jairo Botello, pt had purulent appearing urine. Ever she was not able to catch the specimen and then straight catheter in. However she encountered resistance and it was reportedly a traumatic attempt at straight cath and she was not successful. She placed a condom catheter. Urine specimen is grossly bloody. 11:45 AM 
Requested RN to obtain info from José Miguel as history was very limited. Per [facility] they noted that he was having tachycardia and tachypnea this morning. A new CNA was attempting to feed him this morning and she was unaware that he is bread needs to be moistened. Once they did that as it was better. At baseline he is only oriented to self and place. He does not move anything other than his head. Consult:  Discussed care with Dr. Lin Becerril (UROLOGY). Standard discussion; including history of patients chief complaint, available diagnostic results, and treatment course. Reviewed CT results (read report to Dr. Lin Becerril). Agrees with plan for Greenwood catheter. Feels this is what the patient likely needs to help with relieving this obstruction as it does not appear that the stones are causing the obstruction. He will see the patient. Agrees with admission to hospitalist. 
2:05 PM, 11/17/2019 Consult:  Discussed care with Dr. Lidia Urbina (hospitalist). Standard discussion; including history of patients chief complaint, available diagnostic results, and treatment course. Will see the pt. 
2:30 PM, 11/17/2019 Consult:  Discussed care with Dr. Ebenezer Norton (ICU). Standard discussion; including history of patients chief complaint, available diagnostic results, and treatment course. Will see the pt. Discussed that levophed was ordered to be started at low dose and concern pt may need significant amount of fluids (more than the 1700ml sepsis bolus) given hypernatremia and free water deficit. 2:41 PM, 11/17/2019 Critical Care Time:  
Critical Care Time: The services I provided to this patient were to treat and/or prevent clinically significant deterioration that could result in the failure of one or more body systems and/or organ systems due to septic shock. Services included the following: 
-reviewing nursing notes and old charts 
-vital sign assessments 
-direct patient care 
-medication orders and management 
-interpreting and reviewing diagnostic studies/labs 
-re-evaluations -documentation time Aggregate critical care time was 53 minutes, which includes only time during which I was engaged in work directly related to the patient's care as described above, whether I was at bedside or elsewhere in the Emergency Department. It did not include time spent performing other reported procedures or the services of residents, students, nurses, or advance practice providers. Era Jackson MD 
 
2:41 PM 
 
 
Diagnosis Clinical Impression: 1. Septic shock ([address] 2. Urinary retention 3. Hypernatremia 4. Hyperglycemia 5. Leukocytosis, unspecified type Disposition: ADMIT Follow-up Information None Patient's Medications Start Taking No medications on file Continue Taking ACETAMINOPHEN (TYLENOL ARTHRITIS PAIN) 650 MG CR TABLET    Take 650 mg by mouth every six (6) hours as needed for Pain. ASPIRIN 81 MG CHEWABLE TABLET    Take 81 mg by mouth daily. BACLOFEN (LIORESAL) 10 MG TABLET    Take  by mouth three (3) times daily. CALCIUM CARBONATE (TUMS) 200 MG CALCIUM (500 MG) CHEW    Take 1 Tab by mouth three (3) times daily. CALCIUM-VITAMIN D (OYSTER SHELL CALCIUM-VIT D3) 250-125 MG-UNIT TABLET    Take 1 Tab by mouth two (2) times a day. CAMPHOR-METHYL SALICYL-MENTHOL (SALONPAS) PTMD    1 Patch by Apply Externally route daily. CHOLECALCIFEROL (VITAMIN D3) 1,000 UNIT CAP    Take 1,000 Units by mouth daily. ERGOCALCIFEROL (VITAMIN D2) 50,000 UNIT CAPSULE    Take 50,000 Units by mouth every Tuesday. FENOFIBRATE MICRONIZED (LOFIBRA) 67 MG CAPSULE    Take 67 mg by mouth every morning. FENOFIBRATE MICRONIZED PO    Take 200 mg by mouth daily. FENOFIBRATE NANOCRYSTALLIZED (TRICOR) 48 MG TABLET    Take 1 Tab by mouth daily. LISINOPRIL (PRINIVIL, ZESTRIL) 2.5 MG TABLET    Take 2.5 mg by mouth daily. LOPERAMIDE (IMMODIUM) 2 MG TABLET    Take 2 mg by mouth four (4) times daily as needed for Diarrhea. MECLIZINE (ANTIVERT) 12.5 MG TABLET    Take 12.5 mg by mouth three (3) times daily as needed. METFORMIN (GLUCOPHAGE) 850 MG TABLET    Take 500 mg by mouth two (2) times daily (with meals). MULTIVITAMIN, TX-IRON-CA-MIN (THERAPY M) 9 MG IRON-400 MCG TAB TABLET    Take 1 Tab by mouth daily. POLYETHYLENE GLYCOL (MIRALAX) 17 GRAM PACKET    Take 17 g by mouth every other day. POLYVINYL ALCOHOL (LIQUIFILM TEARS) 1.4 % OPHTHALMIC SOLUTION    Administer 1 Drop to both eyes four (4) times daily. POLYVINYL ALCOHOL (LIQUIFILM TEARS) 1.4 % OPHTHALMIC SOLUTION    Administer 1 Drop to both eyes as needed. PROMETHAZINE (PHENERGAN) 25 MG TABLET    Take 25 mg by mouth every eight (8) hours as needed for Nausea. These Medications have changed No medications on file Stop Taking No medications on file  
 
_______________________________ Please note that this dictation was completed with [employer] voice recognition software. Quite often unanticipated grammatical, syntax, homophones, and other interpretive errors are inadvertently transcribed by the computer software. Please disregard these errors. Please excuse any errors that have escaped final proofreading.

## 2019-11-17 NOTE — ED NOTES
TRANSFER - ED to INPATIENT REPORT: 
 
SBAR report made available to receiving floor on this patient being transferred to [facility] ICU [phone])  for routine progression of care Admitting diagnosis Hypernatremia [E87.0] Hydronephrosis [N13.30] Sepsis ([facility] [address] [facility] [A41.9] Information from the following report(s) SBAR, ED Summary, Intake/Output, MAR, Recent Results and Cardiac Rhythm sinus rhythm was made available to receiving floor. Lines:  
Peripheral IV 11/17/19 Right Wrist (Active) Site Assessment Clean, dry, & intact 11/17/2019 11:00 AM  
Phlebitis Assessment 0 11/17/2019 11:00 AM  
Infiltration Assessment 0 11/17/2019 11:00 AM  
Dressing Status Clean, dry, & intact 11/17/2019 11:00 AM  
Dressing Type Transparent 11/17/2019 11:00 AM  
Hub Color/Line Status Flushed 11/17/2019 11:00 AM  
   
Peripheral IV 11/17/19 Right Wrist (Active) Site Assessment Clean, dry, & intact 11/17/2019 11:30 AM  
Phlebitis Assessment 0 11/17/2019 11:30 AM  
Infiltration Assessment 0 11/17/2019 11:30 AM  
Dressing Type Transparent 11/17/2019 11:30 AM  
Hub Color/Line Status Flushed 11/17/2019 11:30 AM  
   
Peripheral IV 11/17/19 Right Hand (Active) Site Assessment Clean, dry, & intact 11/17/2019  2:38 PM  
Phlebitis Assessment 0 11/17/2019  2:38 PM  
Infiltration Assessment 0 11/17/2019  2:38 PM  
Dressing Status Clean, dry, & intact 11/17/2019  2:38 PM  
Dressing Type Transparent 11/17/2019  2:38 PM  
Hub Color/Line Status Flushed  11/17/2019  2:38 PM  
  
Opportunity for questions and clarification was provided. Patient is only aware of  person Patient is  incontinent and non-ambulatory Patient transported with: 
 Monitor O2 @ 4 liters MAP (Monitor): 67 =Monitored (most recent) Vitals w/ MEWS Score (last day) Date/Time MEWS Score Pulse Resp Temp BP Level of Consciousness SpO2  
 11/17/19 1620  3  80  26  98.8 °F (37.1 °C)  (!) 88/60  Alert  99 % 11/17/19 1610    84  21    (!) 84/56    99 % 11/17/19 1600    83  25    (!) 86/56    98 % 11/17/19 1550    84  25    (!) 84/57    98 % 11/17/19 1530    86  23    91/63    98 % 11/17/19 1510    84  26    (!) 80/54    98 % 11/17/19 1500    88  25    (!) 74/51    99 % 11/17/19 1430    88  (!) 31    (!) 71/45    100 % 11/17/19 1400    99  20    (!) 81/59    99 % 11/17/19 1350    98  27    (!) 77/53    98 % 11/17/19 13:24:57        98.1 °F (36.7 °C)        
 11/17/19 1310    97  (!) 32    (!) 73/50      
 11/17/19 1300    99  30    (!) 81/55    99 % 11/17/19 1200    (!) 112  (!) 34    93/64    97 % 11/17/19 1130    (!) 119  25    93/67    96 % 11/17/19 1100    (!) 134  (!) 36    100/76  Alert  91 % 11/17/19 1043    (!) 136  30  (!) 102.6 °F (39.2 °C)  105/78    90 % Septic Patients:  
 
Lactic Acid Lab Results Component Value Date LACPOC 4.70 ([facility]) 11/17/2019 LACPOC 2.30 ([facility]) 11/17/2019  
 (Most recent on top) Repeat drawn: YES Time: 1640 ALL LACTIC ACIDS GREATER THAN 2 MUST BE REPEATED POC WITHIN 4 HOURS OR PRIOR TO ADMISSION Total Fluid Bolus initiated and documented on MAR: YES All ordered antibiotics initiated within first 3 hours of TIME ZERO?   YES

## 2019-11-17 NOTE — PROGRESS NOTES
Physical Exam  
Skin:  
 
  
1708 Admitted to ICU. Pt is alert; responding appropriately; oriented to person only. Extremities are flaccid; withdraws to pain. Pt is able to move his head from side to side. SR on the monitor. On Levophed gtt. On Insulin gtt per gluco stabilizer. Mouth is very dry. Oral care done Greenwood intact, with small urethral bleeding noted. Urine output is adequate 1930 Bedside and Verbal shift change report given to Cee Valdez (oncoming nurse) by Lars Peters RN 
 (offgoing nurse).  Report included the following information SBAR, Kardex, Intake/Output, MAR, Recent Results and Cardiac Rhythm SR.

## 2019-11-17 NOTE — PROGRESS NOTES
Admitted with: 
Sepsis Hyperosmolar nonketotic hyperglycemia 
arf 
b hydronephrosis 2/2 urinary retention Hypernatremia Agree with current management Received 4l NS and this was appropriate despite the hypernatremia as NS is IVF of choice in volume depletion and his sbp was as low as 70's in ER Now that has stabilized from this perspective is appropriate to start to address the na with 1/2ns Greenwood in and note gu consulted for the hydro 
abx per medicine team for uti/sepsis

## 2019-11-18 NOTE — PROGRESS NOTES
Progress Note Patient: Tomasa Ramírez               Sex: male          DOA: 11/17/2019 YOB: 1950      Age:  81 y.o.        LOS:  LOS: 1 day CHIEF COMPLAINT:  Shortness of Breath; Dysphagia; and Irregular Heart Beat Assessment/Plan:  
 
Principal Problem: 
  Uncontrolled type 2 diabetes mellitus with hyperosmolar nonketotic hyperglycemia ([facility] (11/17/2019) Active Problems: 
  UTI (urinary tract infection) (6/11/2015) Cerebral palsy ([address] (6/12/2015) Hydronephrosis (11/17/2019) Hypernatremia (11/17/2019) Sepsis ([address] (11/17/2019) Elevated troponin (11/17/2019) Urinary retention (11/17/2019) MATTIE (acute kidney injury) ([facility] (11/17/2019) PLAN: 
Hyperosmolar, hyperglycemia 
- insulin gtt, manage per protocol 
- 7.6L free water deficit; Serum osm 393 
- IV fluids - 4L NS given in ED, will switch to 1/2NS given severity of hypernatremia 
- monitor for s/sx of osmotic demyelination due to hypertonic injury 
- decrease tonicity slowly to avoid cerebral edema; goal for blood glucose reduction should be 50 to 75 mg/dL per hour Sepsis 
- hypotension, elevated WBC, febrile (102.6) on arrival 
- UTI is only source, CXR clear, no other evidence of infection 
- hypotension and elevated WBC could be explained by hypovolemia and hemoconcentration - BCx, Ucx obtained - IV fluids 
- vanc/zosyn/levaquin and de-escalate as cultures result 
- cont levophed and wean as tolerated - PCCM consulted - appreciate Hypernatremia 
- nephrology consulted - appreciate 
- corrected sodium is 171 
- monitor BMP q6 
- aim for 10-12meq decrease per 24 hours - switch to 1/2NS, has received >4 L NS in ED MATTEI 
- 2/2 dehydration, hydronephrosis - IV fluids 
- monitor BMP Hydronephrosis 2/2 urinary retention 
- urology consulted - appreciate 
- continue holm Elevated trop - trend cardiac enzymes Dysphagia - SLP consulted, MBS ordered - Cont acceptable home medications for chronic conditions  
- DVT protocol Subjective:  
 
Pt not in distress, he seemed to indicate he wanted food but unable to comprehend fully due to speech abnormalities. Objective:  
 
Visit Vitals /63 (BP 1 Location: Right arm, BP Patient Position: At rest) Pulse 65 Temp 97.4 °F (36.3 °C) Resp 20 Ht 5' 4\" (1.626 m) Wt 53.3 kg (117 lb 8.1 oz) SpO2 99% BMI 20.17 kg/m² Physical Exam: 
Ears: hearing is intact Eyes: Icterus is not present Lungs: clear to auscultation bilaterally Heart: regular rate and rhythm, S1, S2 normal, no murmur, click, rub or gallop Gastrointestinal: soft, non-tender. Bowel sounds normal. No masses,  no organomegaly Neurological:  New Focal Deficits are not present Psychiatric:  Mood is stable Lab/Data Reviewed: 
CMP:  
Lab Results Component Value Date/Time  (H) 11/18/2019 10:30 AM  
 K 4.2 11/18/2019 10:30 AM  
  (H) 11/18/2019 10:30 AM  
 CO2 19 (L) 11/18/2019 10:30 AM  
 AGAP 5 11/18/2019 10:30 AM  
  (H) 11/18/2019 10:30 AM  
 BUN 27 (H) 11/18/2019 10:30 AM  
 CREA 0.58 (L) 11/18/2019 10:30 AM  
 GFRAA >60 11/18/2019 10:30 AM  
 GFRNA >60 11/18/2019 10:30 AM  
 CA 7.1 (L) 11/18/2019 10:30 AM  
 MG 2.2 11/18/2019 10:30 AM  
 
CBC:  
Lab Results Component Value Date/Time WBC 13.9 (H) 11/18/2019 06:50 AM  
 HGB 12.5 (L) 11/18/2019 06:50 AM  
 HCT 38.9 11/18/2019 06:50 AM  
  11/18/2019 06:50 AM  
 
 
Imaging Reviewed: 
Ct Chest Abd Pelv W Cont Result Date: 11/17/2019 EXAM: CT of the chest, abdomen and pelvis CLINICAL HISTORY/INDICATION: fever/sepsis/abdominal pain/cough/SOB -Additional: None COMPARISON: CT abdomen and pelvis from 05/11/18 TECHNIQUE: Axial CT imaging of the chest, abdomen and pelvis was performed after the uneventful administration of 100 cc of Isovue-300 intravenous contrast. Multiplanar reformats were generated. One or more dose reduction techniques were used on this CT: automated exposure control, adjustment of the mAs and/or kVp according to patient size, and iterative reconstruction techniques. The specific techniques used on this CT exam have been documented in the patient's electronic medical record. Digital Imaging and Communications in Medicine (DICOM) format image data are available to nonaffiliated external healthcare facilities or entities on a secure, media free, reciprocally searchable basis with patient authorization for at least a 12-month period after this study. _______________ FINDINGS: Chest: Lungs: No suspicious nodule or mass. Bilateral dependent atelectasis is present. Pleura: Normal, with no effusion or pneumothorax. Airway: Bronchial wall thickening with opacified small airways is present at both lung bases compatible with bronchiolitis Mediastinum: Normal heart size. No pericardial effusion. Given the limitations of cardiac motion, great vessels are unremarkable. Lymph Nodes: No enlarged lymph nodes. Diffuse muscular atrophy is present =============== Abdomen And Pelvis: Liver, Biliary: The liver demonstrates diminished density compatible with hepatic steatosis. No focal liver masses are present. No biliary dilation. The gallbladder has been previously removed. Pancreas: Normal. Spleen: Normal. Adrenals: Normal. Kidneys: Bilateral renal cysts are present. Both kidneys demonstrate hydronephrosis. There are layering calculi present within the distended urinary collecting system/pelvis. Bilateral hydroureter is present. The dilated ureters can be followed to the bladder. There are small layering calculi present within the dilated ureters. Lymph Nodes: No enlarged lymph nodes. Gastrointestinal Tract: There is a large amount of fecal material noted within the rectum. The overlying the conus appears mildly thickened. The appearance is consistent with stercoral colitis. Vasculature: Atherosclerosis is present Pelvic Organs:  No suspicious masses Bones: No acute or aggressive osseous abnormalities identified. Chronic spondylosis is present Other: The bladder is distended suggesting urinary retention _______________ IMPRESSION: Bilateral hydronephrosis is present. The bladder is distended with a diffusely mildly thickened wall. The appearance suggests urinary retention. There are nonobstructing bilateral urinary tract calculi seen both within the dilated ureters as well as within the dilated renal collecting systems Large amount of fecal material within the rectum with adjacent colonic wall thickening consistent with stercoral colitis Status post cholecystectomy Hepatic steatosis Mild bronchial wall thickening and opacification at the lung bases compatible with bronchiolitis

## 2019-11-18 NOTE — PROGRESS NOTES
Speech Pathology Note MBS completed with recs of puree solids via HALF tsp only and HONEY thick liquids via TSP ONLY, strict aspiration precautions. Pt must be 55-60* for all po feeds and >/= 45* at least 30 minutes following. Full report to follow. Thank you, 
Michelet Atkinson MS, CCC/SLP Speech- Language Pathologist

## 2019-11-18 NOTE — PROGRESS NOTES
Problem: Diabetes Self-Management Goal: *Disease process and treatment process Description Define diabetes and identify own type of diabetes; list 3 options for treating diabetes. Outcome: Progressing Towards Goal 
Goal: *Incorporating nutritional management into lifestyle Description Describe effect of type, amount and timing of food on blood glucose; list 3 methods for planning meals. Outcome: Progressing Towards Goal 
Goal: *Incorporating physical activity into lifestyle Description State effect of exercise on blood glucose levels. Outcome: Progressing Towards Goal 
Goal: *Developing strategies to promote health/change behavior Description Define the ABC's of diabetes; identify appropriate screenings, schedule and personal plan for screenings. Outcome: Progressing Towards Goal 
Goal: *Using medications safely Description State effect of diabetes medications on diabetes; name diabetes medication taking, action and side effects. Outcome: Progressing Towards Goal 
Goal: *Monitoring blood glucose, interpreting and using results Description Identify recommended blood glucose targets  and personal targets. Outcome: Progressing Towards Goal 
Goal: *Prevention, detection, treatment of acute complications Description List symptoms of hyper- and hypoglycemia; describe how to treat low blood sugar and actions for lowering  high blood glucose level. Outcome: Progressing Towards Goal 
Goal: *Prevention, detection and treatment of chronic complications Description Define the natural course of diabetes and describe the relationship of blood glucose levels to long term complications of diabetes. Outcome: Progressing Towards Goal 
Goal: *Developing strategies to address psychosocial issues Description Describe feelings about living with diabetes; identify support needed and support network Outcome: Progressing Towards Goal 
Goal: *Insulin pump training Outcome: Progressing Towards Goal 
 Goal: *Sick day guidelines Outcome: Progressing Towards Goal 
Goal: *Patient Specific Goal (EDIT GOAL, INSERT TEXT) Outcome: Progressing Towards Goal 
  
Problem: Patient Education: Go to Patient Education Activity Goal: Patient/Family Education Outcome: Progressing Towards Goal 
  
Problem: Discharge Planning Goal: *Discharge to safe environment Outcome: Progressing Towards Goal 
  
Problem: Pressure Injury - Risk of 
Goal: *Prevention of pressure injury Description Document Alex Scale and appropriate interventions in the flowsheet. Outcome: Progressing Towards Goal 
Note:  
Pressure Injury Interventions: 
Sensory Interventions: Assess changes in LOC, Assess need for specialty bed, Minimize linen layers, Pad between skin to skin, Turn and reposition approx. every two hours (pillows and wedges if needed) Moisture Interventions: Absorbent underpads, Apply protective barrier, creams and emollients, Internal/External urinary devices, Minimize layers Activity Interventions: Assess need for specialty bed, Pressure redistribution bed/mattress(bed type) Mobility Interventions: Assess need for specialty bed, HOB 30 degrees or less, PT/OT evaluation, Turn and reposition approx. every two hours(pillow and wedges) Nutrition Interventions: Discuss nutritional consult with provider Friction and Shear Interventions: Foam dressings/transparent film/skin sealants, HOB 30 degrees or less Problem: Patient Education: Go to Patient Education Activity Goal: Patient/Family Education Outcome: Progressing Towards Goal 
  
Problem: Falls - Risk of 
Goal: *Absence of Falls Description Document Rashid Mcgowan Fall Risk and appropriate interventions in the flowsheet. Outcome: Progressing Towards Goal 
Note:  
Fall Risk Interventions: 
  
 
Mentation Interventions: Adequate sleep, hydration, pain control, Bed/chair exit alarm, Door open when patient unattended Medication Interventions: Bed/chair exit alarm, Evaluate medications/consider consulting pharmacy Elimination Interventions: Bed/chair exit alarm, Call light in reach, Toileting schedule/hourly rounds Problem: Patient Education: Go to Patient Education Activity Goal: Patient/Family Education Outcome: Progressing Towards Goal

## 2019-11-18 NOTE — PROGRESS NOTES
Problem: Mobility Impaired (Adult and Pediatric) Goal: *Acute Goals and Plan of Care (Insert Text) Description Physical Therapy Goals Initiated 11/18/2019 and to be accomplished within 7 day(s) 1. Patient will participate in [facility] for LE ROM to prevent LE contractures and skin breakdown PLOF: total care, larry lift to w/c per patient PHYSICAL THERAPY EVALUATION Patient: Eric Kaye (73 y.o. male) Date: 11/18/2019 Primary Diagnosis: Hypernatremia [E87.0] Hydronephrosis [N13.30] Sepsis ([facility] [address] [A41.9] Precautions: fall PLOF: see above ASSESSMENT : 
Based on the objective data described below, the patient presents at baseline which is total assist with mobility. Pt will benefit from [facility] to maintain LE ROM to prevent contractures and skin breakdown. Patient will benefit from skilled intervention to address the above impairments. Patient's rehabilitation potential is considered to be Good for stated goals Factors which may influence rehabilitation potential include:  
? None noted ? Mental ability/status ? Medical condition ? Home/family situation and support systems ? Safety awareness 
? Pain tolerance/management 
? Other: PLAN : 
Recommendations and Planned Interventions:  
?           Bed Mobility Training             ? Neuromuscular Re-Education ? Transfer Training                   ? Orthotic/Prosthetic Training 
? Gait Training                          ? Modalities ? Therapeutic Exercises           ? Edema Management/Control ? Therapeutic Activities            ? Family Training/Education ? Patient Education ? Other (comment): FMP for LE ROM Frequency/Duration: Patient will be followed by physical therapy tech 3-5 times a week to address goals of LE FMP.  
Discharge Recommendations: return to LTC  
 Further Equipment Recommendations for Discharge: N/A  
 
SUBJECTIVE:  
Patient nodded yes to participating in PT OBJECTIVE DATA SUMMARY:  
 
Past Medical History:  
Diagnosis Date Cerebral palsy ([address] Hypertension Mild mental retardation Polio No past surgical history on file. Barriers to Learning/Limitations: yes;  altered mental status (i.e.Sedation, Confusion) Compensate with: Visual Cues and Tactile Cues Home Situation: 
Home Situation Home Environment: Assisted living Care Facility Name: [facility] # Steps to Enter: 0 One/Two Story Residence: One story Living Alone: No 
Support Systems: Assisted living Patient Expects to be Discharged to[de-identified] Assisted living Current DME Used/Available at Home: None Critical Behavior: 
Neurologic State: Alert Orientation Level: Oriented to person;Oriented to place Cognition: Follows commands Safety/Judgement: Fall prevention;Lack of insight into deficits Strength:   
Strength: Grossly decreased, non-functional(B LES) Tone & Sensation:  
Tone: Abnormal(B LEs) Range Of Motion: 
AROM: Grossly decreased, non-functional(B LEs) PROM: Generally decreased, functional(B LEs) Functional Mobility: 
Bed Mobility: 
Rolling: Total assistance Therapeutic Exercises: PROM to B LEs at all joints Pain: 
Pain level pre-treatment: 0/10 Pain level post-treatment: 0/10 Pain Intervention(s) : n/a Activity Tolerance:  
fair Please refer to the flowsheet for vital signs taken during this treatment. After treatment:  
?         Patient left in no apparent distress sitting up in chair ? Patient left in no apparent distress in bed 
? Call bell left within reach ? Nursing notified ? Caregiver present ? Bed alarm activated ? SCDs applied COMMUNICATION/EDUCATION:  
?         Role of Physical Therapy in the acute care setting. ?         Fall prevention education was provided and the patient/caregiver indicated understanding. ? Patient/family have participated as able in goal setting and plan of care. ?         Patient/family agree to work toward stated goals and plan of care. ?         Patient understands intent and goals of therapy, but is neutral about his/her participation. ? Patient is unable to participate in goal setting/plan of care: ongoing with therapy staff. ?         Other: Thank you for this referral. 
Emili Metzger, PT Time Calculation: 10 mins Eval Complexity: History: HIGH Complexity :3+ comorbidities / personal factors will impact the outcome/ POC Exam:LOW Complexity : 1-2 Standardized tests and measures addressing body structure, function, activity limitation and / or participation in recreation  Presentation: MEDIUM Complexity : Evolving with changing characteristics  Clinical Decision Making:Low Complexity    Overall Complexity:LOW

## 2019-11-18 NOTE — PROGRESS NOTES
conducted an initial consultation and Spiritual Assessment for Chip Regalado, who is a 81 y.o.,male. Patients Primary Language is: [place]. According to the patients EMR Yazdanism Affiliation is: Non Latter day.  
 
The reason the Patient came to the hospital is:  
Patient Active Problem List  
 Diagnosis Date Noted  Uncontrolled type 2 diabetes mellitus with hyperosmolar nonketotic hyperglycemia ([address] 11/17/2019  Hydronephrosis 11/17/2019  Hypernatremia 11/17/2019  Sepsis ([address] 11/17/2019  Elevated troponin 11/17/2019  Urinary retention 11/17/2019  MATTIE (acute kidney injury) ([address] 11/17/2019  Small bowel obstruction ([address] 05/12/2018  Cognitive developmental delay 05/12/2018  Hypotension 06/12/2015  Tachycardia 06/12/2015  Lactic acidosis 06/12/2015  Cerebral palsy ([facility] [address] 06/12/2015  History of post-polio syndrome 06/12/2015  Hypertension 06/12/2015  Mild mental retardation 06/12/2015  UTI (urinary tract infection) 06/11/2015 The  provided the following Interventions: 
 attempted to Initiate  a relationship of care and support with patient in room 2606 this morning but found the patient not responsive and resting peacefully at this time. No family seen at this visit. Provided information about Spiritual Care Services. Offered prayer and assurance of continued prayers on patients behalf. Chart reviewed. Assessment: 
Patient does not have any Denominational/cultural needs that will affect patients preferences in health care. There are no further spiritual or Denominational issues which require Spiritual Care Services interventions at this time. Plan: 
Chaplains will continue to follow and will provide pastoral care on an as needed/requested basis Kaylah Alexander 3 Board Certified [employer] Spiritual Care  
[phone]

## 2019-11-18 NOTE — CONSULTS
1. Septic shock ([facility] [address] 2. Urinary retention 3. Hypernatremia 4. Hyperglycemia 5. Leukocytosis, unspecified type ASSESSMENT:  
Ion Jesus is a 81 y.o. male Admitted with urosepsis Urinary retention Bilateral hydronephrosis secondary to his retention MATTIE PLAN:   
Continue holm catheter I do not recommend cysto and stent placement at this point as his hydronephrosis appears to be secondary to his retention and his ureteral stones appear to be fine debris laying in the depending portions of his dilated ureters, they are nonobstructive. Agree with broad spectrum ATBs per IM Await urine cx results 
following Sarai Spence MD 
 
[phone] Chief Complaint Patient presents with  Shortness of Breath  Dysphagia  Irregular Heart Beat HISTORY OF PRESENT ILLNESS:  Ion Jesus is a 81 y.o. male who is seen in consultation as referred by [facility]  for urosepsis, urinary retention, bilateral hydronephrosis. HPI: 
Location bladder Duration unknown Associated signs/symptoms fevers Modifying factors none Severity severe Mr Renetta Hyde was transferred from his SNF today and found to be febrile to 102. 6. He is difficult to attain what seems to be a reliable history. He reports he voids into a diaper at home and denies dysuria and gross hematuria. Otherwise his voiding history is unknown. In the ED he was found to have bilateral hydronephrosis and a distended bladder. Small nonobstructing stones in his renal pelvis and ureters. Holm catheter was placed and broad spectrum ATBx started. No flowsheet data found. Past Medical History:  
Diagnosis Date  Cerebral palsy ([facility] [address]  Hypertension  Mild mental retardation  Polio No past surgical history on file. Social History Tobacco Use  Smoking status: Never Smoker Substance Use Topics  Alcohol use: No  
 Drug use: Not on file No Known Allergies No family history on file. Current Facility-Administered Medications Medication Dose Route Frequency Provider Last Rate Last Dose  VANCOMYCIN INFORMATION NOTE   Other Rx Dosing/Monitoring Amber Tucker PA      
 insulin regular (NOVOLIN R, HUMULIN R) 100 Units in 0.9% sodium chloride 100 mL infusion  0-50 Units/hr IntraVENous TITRATE Amber Tucker PA 5.2 mL/hr at 11/17/19 1831 5.2 Units/hr at 11/17/19 1831  
 glucose chewable tablet 16 g  4 Tab Oral PRN Amber Tucker PA      
 glucagon (GLUCAGEN) injection 1 mg  1 mg IntraMUSCular PRN Amber Tucker PA      
 dextrose 10% infusion 125-250 mL  125-250 mL IntraVENous PRN Amber Tucker PA      
 Please enter Height in [facility]  1 Each Other NOW Amber Tucker PA      
 NOREPINephrine (LEVOPHED) 8 mg in 0.9% NS 250ml infusion  2-16 mcg/min IntraVENous TITRATE Amber Tucker PA 18.8 mL/hr at 11/17/19 1914 10 mcg/min at 11/17/19 1914  sodium chloride (NS) flush 5-40 mL  5-40 mL IntraVENous Q8H Amber Tucker PA      
 sodium chloride (NS) flush 5-40 mL  5-40 mL IntraVENous PRN Amber Tucker PA      
 heparin (porcine) injection 5,000 Units  5,000 Units SubCUTAneous Q8H Amber Tucker PA      
 acetaminophen (TYLENOL) tablet 650 mg  650 mg Oral Q6H PRN Amber Tucker PA Or  
 acetaminophen (TYLENOL) suppository 650 mg  650 mg Rectal Q6H PRN Amber Tucker PA      
 0.45% sodium chloride infusion  75 mL/hr IntraVENous CONTINUOUS Amber Tucker PA 75 mL/hr at 11/17/19 1707 75 mL/hr at 11/17/19 1707  [START ON 11/20/2019] VANCOMYCIN INFORMATION NOTE   Other Aarti Gresham MD      
 [START ON 11/18/2019] vancomycin (VANCOCIN) 1,000 mg in 0.9% sodium chloride (MBP/ADV) 250 mL adv  1,000 mg IntraVENous Q24H Clifford Simmons MD      
 [START ON 11/19/2019] levoFLOXacin (LEVAQUIN) 750 mg in D5W IVPB  750 mg IntraVENous Q48H Juan Balderas MD      
 piperacillin-tazobactam (ZOSYN) 3.375 g in 0.9% sodium chloride (MBP/ADV) 100 mL MBP  #EXTENDED 4-HOUR INFUSION##  3.375 g IntraVENous Q8H Juan Balderas MD      
 
 
Review of Systems Per Dr Oni Lu CONST: Fever, weight loss, fatigue or chills HEENT: Recent changes in vision, vertigo, epistaxis, dysphagia and hoarseness CV: Chest pain, palpitations, HTN, edema and varicosities RESP: Cough, shortness of breath, wheezing, hemoptysis, snoring and reactive airway disease GI: Nausea, vomiting, abdominal pain, change in bowel habits, hematochezia, melena, and GERD  
: Hematuria, dysuria, frequency, urgency, nocturia and stress urinary incontinence MS: Weakness, joint pain and arthritis ENDO: Diabetes, thyroid disease, polyuria, polydipsia, polyphagia, poor wound healing, heat intolerance, cold intolerance LYMPH/HEME: Anemia, bruising and history of blood transfusions INTEG: Dermatitis, abnormal moles NEURO: Dizziness, headache, fainting, seizures and stroke PSYCH: Anxiety and depression PHYSICAL EXAMINATION:  
 
Physical Examination:  
Visit Vitals BP 99/63 Pulse 74 Temp 98 °F (36.7 °C) Resp 23 Ht 5' 4\" (1.626 m) Wt 117 lb 8.1 oz (53.3 kg) SpO2 100% BMI 20.17 kg/m² General appearance - alert, well appearing, and in no distress Eyes - sclera anicteric Chest - no tachypnea, retractions or cyanosis Abdomen - soft, nontender, nondistended, no masses or organomegaly  Male - no penile lesions or discharge, no testicular masses or tenderness, no hernias, rectum normal, no masses, prostate size 20 grms and flat, symmetric, no nodules or tenderness Neurological - unable to move upper and lower extremities Extremities - atrophic REVIEW OF LABS AND IMAGING:   
 
CT 11/17/19 IMPRESSION: 
  
Bilateral hydronephrosis is present. The bladder is distended with a diffusely mildly thickened wall. The appearance suggests urinary retention. There are 
nonobstructing bilateral urinary tract calculi seen both within the dilated 
ureters as well as within the dilated renal collecting systems 
  
Large amount of fecal material within the rectum with adjacent colonic wall 
thickening consistent with stercoral colitis 
  
Status post cholecystectomy 
  
Hepatic steatosis 
  
Mild bronchial wall thickening and opacification at the lung bases compatible 
with bronchiolitis Labs: Results:  
Chemistry Recent Labs 11/17/19 
1101 * * K 4.4  
* CO2 22 BUN 67* CREA 1.53* CA 9.6 AGAP 13 BUCR 44*   
TP 6.9 ALB 3.1*  
GLOB 3.8 AGRAT 0.8 CBC w/Diff Recent Labs 11/17/19 
1101 WBC 19.3*  
RBC 5.60* HGB 16.4* HCT 54.6*  
 GRANS 80* LYMPH 13* EOS 0 Cultures No results for input(s): CULT in the last 72 hours. All Micro Results Procedure Component Value Units Date/Time RESPIRATORY VIRAL PANEL, PCR [[id number]] Order Status:  Sent Specimen:  Sputum CULTURE, URINE [[id number]] Collected:  11/17/19 1445 Order Status:  Completed Specimen:  Cath Urine Updated:  11/17/19 1459 CULTURE, URINE [[id number]] Collected:  11/17/19 1128 Order Status:  Completed Specimen:  Cath Urine Updated:  11/17/19 1459 INFLUENZA A & B AG (RAPID TEST) [[id number]] Collected:  11/17/19 1143 Order Status:  Completed Specimen:  Nasopharyngeal from Nasal washing Updated:  11/17/19 1211 Influenza A Antigen NEGATIVE Comment: A negative result does not exclude influenza virus infection, seasonal or H1N1 due to suboptimal sensitivity. If influenza is circulating in your community, a diagnosis of influenza should be considered based on a patients clinical presentation and empiric antiviral treatment should be considered, if indicated.   
  
  Influenza B Antigen NEGATIVE      
 CULTURE, BLOOD [[id number]] Collected:  11/17/19 1143 Order Status:  Completed Specimen:  Blood Updated:  11/17/19 1148 CULTURE, BLOOD [[id number]] Collected:  11/17/19 1101 Order Status:  Completed Specimen:  Blood Updated:  11/17/19 1139 Urinalysis Color Date Value Ref Range Status 11/17/2019 YELLOW   Final  
 
Appearance Date Value Ref Range Status 11/17/2019 CLOUDY   Final  
 
Specific gravity Date Value Ref Range Status 11/17/2019 >1.030 (H) 1.005 - 1.030 Final  
 
pH (UA) Date Value Ref Range Status 11/17/2019 5.0 5.0 - 8.0   Final  
 
Protein Date Value Ref Range Status 11/17/2019 NEGATIVE  NEG mg/dL Final  
 
Ketone Date Value Ref Range Status 11/17/2019 TRACE (A) NEG mg/dL Final  
 
Bilirubin Date Value Ref Range Status 11/17/2019 NEGATIVE  NEG   Final  
 
Blood Date Value Ref Range Status 11/17/2019 LARGE (A) NEG   Final  
 
Urobilinogen Date Value Ref Range Status 11/17/2019 0.2 0.2 - 1.0 EU/dL Final  
 
Nitrites Date Value Ref Range Status 11/17/2019 NEGATIVE  NEG   Final  
 
Leukocyte Esterase Date Value Ref Range Status 11/17/2019 MODERATE (A) NEG   Final  
 
Potassium Date Value Ref Range Status 11/17/2019 4.4 3.5 - 5.5 mmol/L Final  
 
Creatinine Date Value Ref Range Status 11/17/2019 1.53 (H) 0.6 - 1.3 MG/DL Final  
 
BUN Date Value Ref Range Status 11/17/2019 67 (H) 7.0 - 18 MG/DL Final  
  
PSA No results for input(s): PSA in the last 72 hours.   
Coagulation No results found for: PTP, INR, APTT, INREXT

## 2019-11-18 NOTE — PROGRESS NOTES
Speech Pathology Note Swallow eval completed b/s with recs of NPO, ice chips PRN for oral care/ comfort/ swallow stim until MBS completed. Orders written. Study to be completed later this date. Full report to follow. Thank you for this referral, 
Duglas Murguia MS, CCC/SLP Speech- Language Pathologist

## 2019-11-18 NOTE — PROGRESS NOTES
Problem: Dysphagia (Adult) Goal: *Acute Goals and Plan of Care (Insert Text) Description Dysphagia Present: mod oral, mod- severe pharyngeal 
Aspiration: silent with thin and nectar Recommendations: 
Diet: puree via half tsp, HONEY via TSP Meds: crushed in puree Aspiration Precautions Oral Care TID Other: HOB 55-60* for all po feeds, >/= 45* at least 30 minutes following Goals:  Patient will: 1. Tolerate PO trials with no overt s/s aspiration or distress in 4/5 trials 2. Utilize compensatory swallow strategies/maneuvers (decrease bite/sip, size/rate, alt. liq/sol) with min cues in 4/5 trials 3. Perform oral-motor/laryngeal strengthening exercises with min cues to increase oropharyngeal swallow function 4. Complete an objective swallow study (i.e., MBSS) to assess swallow integrity, r/o aspiration, and determine of safest LRD, min A-- met 11/18/19 11/18/2019 1142 by Rosenda Serrano Outcome: Progressing Towards Goal 
 
SPEECH PATHOLOGY MODIFIED BARIUM SWALLOW STUDY & TREATMENT Patient: Tomasa Ramírez (47 y.o. male) Date: 11/18/2019 Primary Diagnosis: Hypernatremia [E87.0] Hydronephrosis [N13.30] Sepsis ([facility] [address] [A41.9] Precautions: silent aspiration PLOF:as per H&P 
 
ASSESSMENT : 
Based on the objective data described below, the patient presents with moderate oral, moderate- severe pharyngeal dysphagia with silent aspiration during the swallow of thin via tsp and honey thick via straw, silent penetration and aspiration after the swallow of nectar via tsp. Pt able to follow throat clear and repeat swallow, however, no improvement in swallow function. Pt also accepted honey thick via tsp/ straw, and pudding via half tsp. Pt demo slow oral manipulation with mild tongue pumping noted, slow A-P transit, premature spillage of all liquids into pyriform sinuses, mildly delayed swallow response with decreased epiglottic inversion, improved with weight of pudding solids.   Pt with mild- moderate valleculae and pyriform sinus residue following liquids which pt was able to mostly clear with spontaneous repeat swallow. No penetration or aspiration of honey via tsp or pudding via half tsp. Rec: initiate puree solids via HALF TSP, HONEY thick liquids via TSP ONLY, strict aspiration precautions, HOB 55-60* for all po feeds and >/= 45* for at least 30 minutes following, meds crushed in puree. Treatment: 
Skilled therapy initiated: Educated pt on MBS results, aspiration precautions, and importance of compensatory swallow techniques to decrease aspiration risk (HOB 55-60* for all po intake and >/= 45* ~30 minutes after po, puree via half tsp, honey via tsp); pt shook head in understanding, ?level carry over? SLP to follow as indicated. Patient will benefit from skilled intervention to address the above impairments. Patient's rehabilitation potential is considered to be Fair Factors which may influence rehabilitation potential include:  
? None noted ? Mental ability/status ? Medical condition ? Home/family situation and support systems ? Safety awareness ? Pain tolerance/management ? Other: PLAN : 
Recommendations and Planned Interventions: 
 initiate puree solids via HALF TSP, HONEY thick liquids via TSP ONLY, strict aspiration precautions, HOB 55-60* for all po feeds and >/= 45* for at least 30 minutes following, meds crushed in puree. Frequency/Duration: Patient will be followed by speech-language pathology 1-2 times per day/3-5 days per week to address goals. Discharge Recommendations: Missael Michel and To Be Determined SUBJECTIVE:  
Patient stated Is that me?  when looking at screen during study OBJECTIVE:  
 
Past Medical History:  
Diagnosis Date Cerebral palsy ([facility] [address] Hypertension Mild mental retardation Polio No past surgical history on file. Home Situation:  
Home Situation Home Environment: Assisted living Care Facility Name: [facility] # Steps to Enter: 0 One/Two Story Residence: One story Living Alone: No 
Support Systems: Assisted living Patient Expects to be Discharged to[de-identified] Assisted living Current DME Used/Available at Home: None Diet prior to admission: unknown Current Diet:  NPO, ice chips PRN Radiologist: [facility] Film Views: Lateral;Fluoro Patient Position: Hausted chair Trial 1: Trial 2:  
Consistency Presented: Thin liquid; Nectar thick liquid;Honey thick liquid;Pudding How Presented: Spoon;Straw;Successive swallows;SLP-fed/presented Bolus Acceptance: No impairment Bolus Formation/Control: Impaired: Delayed;Premature spillage; Anterior;Piecemeal  :    
Propulsion: Delayed (# of seconds); Tongue pumping Oral Residue: Lingual    
Initiation of Swallow: Triggered at pyriform sinus(es) Timing: Pooling 1-5 sec Penetration: During swallow; After swallow; To laryngeal vestibule;From initial swallow;From residual    
Aspiration/Timing: During; After;From initial swallow;From residual;Silent Pharyngeal Clearance: Vallecular residue;Pyriform residue ;10-50% Attempted Modifications: Double swallow;Spoon Effective Modifications: Spoon;Small sips and bites Cues for Modifications: Moderate Trial 3: Trial 4:  
     
     
     
     
 :    :    
     
     
    
     
     
     
     
     
     
     
     
 
Decreased Tongue Base Retraction?: Yes Laryngeal Elevation: Incomplete laryngeal closure(improved with weight of pudding) Aspiration/Penetration Score: 8 (Aspiration-Contrast passes cords/glottis with no effort to eject, ie/silent aspiration) Pharyngeal Symmetry: Not assessed Pharyngeal-Esophageal Segment: No impairment Pharyngeal Dysfunction: Decreased tongue base retraction;Decreased strength;Decreased elevation/closure;Decreased pharyngeal wall constriction Oral Phase Severity: Mild-moderate Pharyngeal Phase Severity: Moderately severe 8-point Penetration-Aspiration Scale: Score 8 PAIN: 
Pain level pre-treatment: 0/10 Pain level post-treatment: 0/10 COMMUNICATION/EDUCATION:  
?  Patient educated regarding MBS results and diet recommendations. ?  Patient/family have participated as able in goal setting and plan of care. ?  Patient/family agree to work toward stated goals and plan of care. ?  Patient understands intent and goals of therapy, but is neutral about his/her participation. ? Patient is unable to participate in goal setting and plan of care. Thank you for this referral. 
Michelet Atkinson MS, CCC/SLP Time Calculation: 36 mins MBS Time: 25 minutes Treatment Time: 11 minutes

## 2019-11-18 NOTE — DIABETES MGMT
NUTRITIONAL ASSESSMENT GLYCEMIC CONTROL/ PLAN OF CARE Winifred Guillen           81 y.o.           11/17/2019 1. Septic shock ([address] 2. Urinary retention 3. Hypernatremia 4. Hyperglycemia 5. Leukocytosis, unspecified type INTERVENTIONS/PLAN:  
1. When pt ready to transition off insulin drip, suggest starting 12 units Lantus/day with insulin drip to continue x 2 hours after first Lantus injection. Also suggest corrective lispro, normal insulin sensitivity ACHS. 2.  Honey thick po supplements BID ([place] Instant Breakfast) to provide additional protein for wound healing. 3.  Monitor glycemic control, labs and weights. ASSESSMENT:  
Nutritional Status:  Pt is 100% ideal weight (BMI - 20.2 kg/m2; normal BMI) but with thin apparance and muscle atrophy noted upper and lower extremities r/t cerebral palsy. MBS results, labs noted. Last  Na - 160 Nutrition Diagnoses:  
Difficulty swallowing due to dysphagia as evidenced by MBS results and orders for pureed diet via 1/2 tsp, honey thick liquids via teaspoon. Altered nutrition related labs due to T2DM as evidenced by admission with HHS, glucose of 985 mg/dl, A1C - 9.8%. Increased nutrient needs due to wound healing as evidenced by stage 2 wound sacrum. Diabetes Management:  
 
Recent blood glucose: Within target range (non-ICU: <140; ICU<180): [x] Yes   []  No 
 
Current Insulin regimen:  
Glucostabilizer insulin drip now infusing at 3.7 units/hr for last BG of 160 mg/dL. Home medication/insulin regimen:  
Per [facility]:  Metformin 500 mg BID HbA1c: 9.8% - ave BG ~ 234 mg/dL over past 3 months Adequate glycemic control PTA:  [] Yes  [x] No 
  
 
SUBJECTIVE/OBJECTIVE: Information obtained from: chart review, MD, RN 
Pt with PMHx of Cerebral palsy, Mental retardation, HTN, and DM, presented to the ER from [facility] with c/o SOB, Fever, dysphagia, UTI, urinary retention, hypernatremia, sepsis, MATTIE. [facility] show pt was receiving mechancal soft diet w/ thin liquids. Diet:  Orders written for pureed via 1/2 tsp, honey thick liquids via teaspoon. No data found. Medications: [x]                Reviewed IVF:  D5 0.2% NS at 125 ml/hr Most Recent POC Glucose:  
Recent Labs 11/18/19 
1030 11/18/19 
0650 11/17/19 
2300 11/17/19 
1836 * 221* 121* 256* Labs:  
Lab Results Component Value Date/Time Hemoglobin A1c 9.8 (H) 11/17/2019 11:00 AM  
 
Lab Results Component Value Date/Time Hemoglobin A1c 9.8 (H) 11/17/2019 11:00 AM  
 Hemoglobin A1c 7.6 (H) 05/11/2018 10:51 PM  
 Hemoglobin A1c 5.7 06/11/2015 10:00 PM  
 
Lab Results Component Value Date/Time Sodium 160 (H) 11/18/2019 10:30 AM  
 Potassium 4.2 11/18/2019 10:30 AM  
 Chloride 136 (H) 11/18/2019 10:30 AM  
 CO2 19 (L) 11/18/2019 10:30 AM  
 Anion gap 5 11/18/2019 10:30 AM  
 Glucose 180 (H) 11/18/2019 10:30 AM  
 BUN 27 (H) 11/18/2019 10:30 AM  
 Creatinine 0.58 (L) 11/18/2019 10:30 AM  
 Calcium 7.1 (L) 11/18/2019 10:30 AM  
 Magnesium 2.2 11/18/2019 10:30 AM  
 Phosphorus 3.9 11/17/2019 11:00 AM  
 Albumin 3.1 (L) 11/17/2019 11:01 AM  
 
 
Anthropometrics: IBW : 53.2 kg (117 lb 4.6 oz)([place]wi method minus 10%), % IBW (Calculated): 100.19 %, BMI (calculated): 20.2 Wt Readings from Last 1 Encounters:  
11/17/19 53.3 kg (117 lb 8.1 oz) Ht Readings from Last 1 Encounters:  
11/17/19 5' 4\" (1.626 m) Last Weight Metrics: 
Weight Loss Metrics 11/17/2019 5/17/2018 6/12/2015 Today's Wt 117 lb 8.1 oz 124 lb 100 lb BMI 20.17 kg/m2 20.63 kg/m2 19.53 kg/m2 Estimated Nutrition Needs:  1866 Kcals/day, Protein (g): 80 g Fluid (ml): 1900 ml Based on:   [x]          Actual BW    []          ABW   []            Adjusted BW   
    
Nutrition Interventions: 
Insulin recommendations Po supplements - No Added Sugar [employer] Instant Breakfast BID - honey thick Goal: Blood glucose will be within target range of  mg/dL by 11/21/19. Pt will consume > 75% meals by 11/23/19. Weight maintenance (+/- 1-2 kg) or weight gain of 1-2 lbs/week by 11/30/19. Nutrition Monitoring and Evaluation   
 
[x]     Monitor po intake on meal rounds 
[x]     Continue inpatient monitoring and intervention 
[]     Other: 
 
 
Nutrition Risk:  [x]   High     []  Moderate    []  Minimal/Uncompromised Cheryln Mcardle, RD, CDE Office:  [phone] [place] Pager:  [phone]

## 2019-11-18 NOTE — PROGRESS NOTES
[facility] Pulmonary, Critical Care, and Sleep Medicine Name: Berny Montanez MRN: [id number] : 1950 Hospital: [address] [place] [address] Date: 2019  Consulted By:  TYE BLAKE  
 
Critical Care History and Physical 
 
 
Subjective/History:  
Patient is a 81 y.o. male with hx Cerebral palsy, Mental retardation, HTN, and DM, presents to the ER from [facility] with c/o SOB, Fever, and Dysphagia x 1 day. Patient awake and obeying commands but garbled speech and is not oriented; per EMS, this is patient's baseline. In the ER patient found to be in septic shock due to urosepsis with Temp 102. 6. He was also found to have nonketotic hyperglycemia with , MATTIE, and Severe hypernatremia of 157 (171 when corrected for blood glucose). Patient given 2728cc NS bolus in ER, starting in insulin gtt, and broad spectrum antibiotics. His BP remained low at 80/54 and he was started on levophed peripherally. Overnight: levophed weaned off at 1:40am. UOP approx 50cc/hr overnight. Urine is still cloudy but no longer frankly purulent. MATTIE has improved overnight, as has his hyperglycemia. Hypernatremia slowly improving as desired/expected. Patient sitting up in bed this AM, denies pain, says he feels \"pretty good. \" The patient is critically ill and can not provide additional history due to Unable to comprehend. Past Medical History:  
Diagnosis Date  Cerebral palsy ([address]  Hypertension  Mild mental retardation  Polio Prior to Admission medications Medication Sig Start Date End Date Taking? Authorizing Provider  
cholecalciferol (VITAMIN D3) 1,000 unit cap Take 1,000 Units by mouth daily. Yes Other, MD Mak  
meclizine (ANTIVERT) 12.5 mg tablet Take 12.5 mg by mouth three (3) times daily as needed. Yes Other, MD Mak  
fenofibrate micronized (LOFIBRA) 67 mg capsule Take 67 mg by mouth every morning.    Yes Jagdeep, MD Mak  
 polyvinyl alcohol (LIQUIFILM TEARS) 1.4 % ophthalmic solution Administer 1 Drop to both eyes as needed. Yes Jagdeep, MD Mak  
fenofibrate nanocrystallized (TRICOR) 48 mg tablet Take 1 Tab by mouth daily. 6/15/15  Yes Sincere Groves MD  
polyvinyl alcohol (LIQUIFILM TEARS) 1.4 % ophthalmic solution Administer 1 Drop to both eyes four (4) times daily. Yes Jagdeep, MD Mak  
aspirin 81 mg chewable tablet Take 81 mg by mouth daily. Yes Jagdeep, MD Mak  
baclofen (LIORESAL) 10 mg tablet Take  by mouth three (3) times daily. Yes Mak Gannon MD  
calcium carbonate (TUMS) 200 mg calcium (500 mg) chew Take 1 Tab by mouth three (3) times daily. Yes Mak Gannon MD  
FENOFIBRATE MICRONIZED PO Take 200 mg by mouth daily. Yes Jagdeep, MD Mak  
metFORMIN (GLUCOPHAGE) 850 mg tablet Take 500 mg by mouth two (2) times daily (with meals). Yes Jagdeep, MD Mak  
calcium-vitamin D (OYSTER SHELL CALCIUM-VIT D3) 250-125 mg-unit tablet Take 1 Tab by mouth two (2) times a day. Yes Jagdeep, MD Mak  
polyethylene glycol (MIRALAX) 17 gram packet Take 17 g by mouth every other day. Yes Jagdeep, MD Mak  
camphor-methyl salicyl-menthol (SALONPAS) ptmd 1 Patch by Apply Externally route daily. Yes Jagdeep, MD Mak  
multivitamin, tx-iron-ca-min (THERAPY M) 9 mg iron-400 mcg tab tablet Take 1 Tab by mouth daily. Yes Jagdeep, MD Mak  
ergocalciferol (VITAMIN D2) 50,000 unit capsule Take 50,000 Units by mouth every Tuesday. Yes Jagdeep, MD Mak  
lisinopril (PRINIVIL, ZESTRIL) 2.5 mg tablet Take 2.5 mg by mouth daily. Yes Mak Gannon MD  
acetaminophen (TYLENOL ARTHRITIS PAIN) 650 mg CR tablet Take 650 mg by mouth every six (6) hours as needed for Pain. Yes Jagdeep, MD Mak  
loperamide (IMMODIUM) 2 mg tablet Take 2 mg by mouth four (4) times daily as needed for Diarrhea. Yes Mak Gannon MD  
promethazine (PHENERGAN) 25 mg tablet Take 25 mg by mouth every eight (8) hours as needed for Nausea.    Yes Other, MD Mak  
 
 Current Facility-Administered Medications Medication Dose Route Frequency  dextrose 5 % - 0.2% NaCl infusion  125 mL/hr IntraVENous CONTINUOUS  
 barium sulfate (VARIBAR NECTAR) 40 % (w/v) contrast suspension 15 mL  15 mL Oral RAD ONCE  
 barium sulfate (VARIBAR THIN) 40 % (w/v) oral powder 30 mL  30 mL Oral RAD ONCE  
 barium sulfate (EZ PAQUE) 96 % (w/w) contrast suspension 176 g  176 g Oral RAD ONCE  
 barium sulfate (VARIBAR HONEY) 40 % (w/v) 29% (w/w) contrast suspension 15 mL  15 mL Oral RAD ONCE  
 barium sulfate (VARIBAR PUDDING) 40 % (w/v), 30% (w/w) contrast oral paste 15 mL  15 mL Oral RAD ONCE  
 barium sulfate (E-Z-DISK) contrast tablet 700 mg  700 mg Oral RAD ONCE  
 VANCOMYCIN INFORMATION NOTE   Other Rx Dosing/Monitoring  insulin regular (NOVOLIN R, HUMULIN R) 100 Units in 0.9% sodium chloride 100 mL infusion  0-50 Units/hr IntraVENous TITRATE  sodium chloride (NS) flush 5-40 mL  5-40 mL IntraVENous Q8H  
 heparin (porcine) injection 5,000 Units  5,000 Units SubCUTAneous Q8H  
 [START ON 11/20/2019] VANCOMYCIN INFORMATION NOTE   Other ONCE  
 vancomycin (VANCOCIN) 1,000 mg in 0.9% sodium chloride (MBP/ADV) 250 mL adv  1,000 mg IntraVENous Q24H  piperacillin-tazobactam (ZOSYN) 3.375 g in 0.9% sodium chloride (MBP/ADV) 100 mL MBP  #EXTENDED 4-HOUR INFUSION##  3.375 g IntraVENous Q8H  
 famotidine (PF) (PEPCID) 20 mg in sodium chloride 0.9% 10 mL injection  20 mg IntraVENous Q12H  
 influenza vaccine 2019-20 (6 mos+)(PF) (FLUARIX/FLULAVAL/FLUZONE QUAD) injection 0.5 mL  0.5 mL IntraMUSCular PRIOR TO DISCHARGE No Known Allergies Social History Tobacco Use  Smoking status: Never Smoker Substance Use Topics  Alcohol use: No  
  
No family history on file. Review of Systems: 
Review of systems not obtained due to patient factors. Objective:  
Vital Signs:   
Visit Vitals /63 (BP 1 Location: Right arm, BP Patient Position: At rest) Pulse 65  
 Temp 97.4 °F (36.3 °C) Resp 20 Ht 5' 4\" (1.626 m) Wt 53.3 kg (117 lb 8.1 oz) SpO2 99% BMI 20.17 kg/m² O2 Device: Room air O2 Flow Rate (L/min): 4 l/min Temp (24hrs), Av.9 °F (36.6 °C), Min:97.4 °F (36.3 °C), Max:98.8 °F (37.1 °C) Intake/Output:  
Last shift:       07 - 1900 In: 5.3 [I.V.:5.3] Out: 160 [Urine:160] Last 3 shifts: 1901 -  07 In: 18069.4 [I.V.:45789.4] Out: 1615 [MTUQE:6857] Intake/Output Summary (Last 24 hours) at 2019 1052 Last data filed at 2019 1000 Gross per 24 hour Intake 82249.69 ml Output 1775 ml Net 9308.69 ml Physical Exam:  
General:   Well nourished adult male, Awake/alert, Atrophied upper and lower extremities related to his cerebral palsy, cooperative, NAD HEENT:   NCAT, PERRL, OP clear, MM are MOIST (as opposed to  when they were very dry) Neck: Supple, trachea midline. Lungs:   Symmetrical chest rise; good AE bilat; CTAB; no wheezes/rhonchi/rales noted. Heart:  RRR, S1, S2 normal, no m/r/g Abdomen:   Soft, non-tender. Bowel sounds normal.   
Extremities: Extremities with muscle atrophy, atraumatic, no cyanosis or edema. Pulses: 2+ and symmetric all extremities. Skin: Skin color, texture, turgor normal. No rashes or lesions Neurologic: Grossly nonfocal, moving all extremities, obeying commands, garbled/slurred speech, not oriented :  Cloudy urine in holm catheter Data:  
 
Recent Results (from the past 24 hour(s)) LIPASE Collection Time: 19 11:00 AM  
Result Value Ref Range Lipase 811 (H) 73 - 393 U/L  
PHOSPHORUS Collection Time: 19 11:00 AM  
Result Value Ref Range Phosphorus 3.9 2.5 - 4.9 MG/DL  
HEMOGLOBIN A1C WITH EAG Collection Time: 19 11:00 AM  
Result Value Ref Range Hemoglobin A1c 9.8 (H) 4.2 - 5.6 % Est. average glucose 235 mg/dL CULTURE, BLOOD Collection Time: 19 11:01 AM  
Result Value Ref Range Special Requests: PERIPHERAL Culture result: NO GROWTH AFTER 20 HOURS METABOLIC PANEL, COMPREHENSIVE Collection Time: 11/17/19 11:01 AM  
Result Value Ref Range Sodium 157 (H) 136 - 145 mmol/L Potassium 4.4 3.5 - 5.5 mmol/L Chloride 122 (H) 100 - 111 mmol/L  
 CO2 22 21 - 32 mmol/L Anion gap 13 3.0 - 18 mmol/L Glucose 985 (HH) 74 - 99 mg/dL BUN 67 (H) 7.0 - 18 MG/DL Creatinine 1.53 (H) 0.6 - 1.3 MG/DL  
 BUN/Creatinine ratio 44 (H) 12 - 20 GFR est AA 55 (L) >60 ml/min/1.73m2 GFR est non-AA 46 (L) >60 ml/min/1.73m2 Calcium 9.6 8.5 - 10.1 MG/DL Bilirubin, total 0.7 0.2 - 1.0 MG/DL  
 ALT (SGPT) 44 16 - 61 U/L  
 AST (SGOT) 19 10 - 38 U/L Alk. phosphatase 105 45 - 117 U/L Protein, total 6.9 6.4 - 8.2 g/dL Albumin 3.1 (L) 3.4 - 5.0 g/dL Globulin 3.8 2.0 - 4.0 g/dL A-G Ratio 0.8 0.8 - 1.7    
CBC WITH AUTOMATED DIFF Collection Time: 11/17/19 11:01 AM  
Result Value Ref Range WBC 19.3 (H) 4.6 - 13.2 K/uL  
 RBC 5.60 (H) 4.70 - 5.50 M/uL  
 HGB 16.4 (H) 13.0 - 16.0 g/dL HCT 54.6 (H) 36.0 - 48.0 % MCV 97.5 (H) 74.0 - 97.0 FL  
 MCH 29.3 24.0 - 34.0 PG  
 MCHC 30.0 (L) 31.0 - 37.0 g/dL  
 RDW 17.8 (H) 11.6 - 14.5 % PLATELET 840 409 - 390 K/uL MPV 12.2 (H) 9.2 - 11.8 FL  
 NEUTROPHILS 80 (H) 40 - 73 % LYMPHOCYTES 13 (L) 21 - 52 % MONOCYTES 7 3 - 10 % EOSINOPHILS 0 0 - 5 % BASOPHILS 0 0 - 2 %  
 ABS. NEUTROPHILS 15.4 (H) 1.8 - 8.0 K/UL  
 ABS. LYMPHOCYTES 2.5 0.9 - 3.6 K/UL  
 ABS. MONOCYTES 1.4 (H) 0.05 - 1.2 K/UL  
 ABS. EOSINOPHILS 0.0 0.0 - 0.4 K/UL  
 ABS. BASOPHILS 0.0 0.0 - 0.1 K/UL  
 DF AUTOMATED    
NT-PRO BNP Collection Time: 11/17/19 11:01 AM  
Result Value Ref Range NT pro- 0 - 900 PG/ML  
TROPONIN I Collection Time: 11/17/19 11:01 AM  
Result Value Ref Range Troponin-I, QT 0.08 (H) 0.0 - 0.045 NG/ML  
MAGNESIUM Collection Time: 11/17/19 11:01 AM  
Result Value Ref Range Magnesium 2.3 1.6 - 2.6 mg/dL POC LACTIC ACID Collection Time: 11/17/19 11:10 AM  
Result Value Ref Range Lactic Acid (POC) 2.30 (HH) 0.40 - 2.00 mmol/L  
URINALYSIS W/ RFLX MICROSCOPIC Collection Time: 11/17/19 11:28 AM  
Result Value Ref Range Color RED Appearance TURBID Specific gravity >1.030 (H) 1.005 - 1.030  
 pH (UA) 6.5 5.0 - 8.0 Protein 300 (A) NEG mg/dL Glucose >1,000 (A) NEG mg/dL Ketone NEGATIVE  NEG mg/dL Bilirubin SMALL (A) NEG Blood LARGE (A) NEG Urobilinogen 0.2 0.2 - 1.0 EU/dL Nitrites POSITIVE (A) NEG Leukocyte Esterase SMALL (A) NEG URINE MICROSCOPIC ONLY Collection Time: 11/17/19 11:28 AM  
Result Value Ref Range WBC 4 to 10 0 - 4 /hpf  
 RBC TOO NUMEROUS TO COUNT 0 - 5 /hpf Epithelial cells NEGATIVE  0 - 5 /lpf Bacteria 1+ (A) NEG /hpf CULTURE, BLOOD Collection Time: 11/17/19 11:43 AM  
Result Value Ref Range Special Requests: PERIPHERAL Culture result: NO GROWTH AFTER 20 HOURS INFLUENZA A & B AG (RAPID TEST) Collection Time: 11/17/19 11:43 AM  
Result Value Ref Range Influenza A Antigen NEGATIVE  NEG Influenza B Antigen NEGATIVE  NEG    
EKG, 12 LEAD, INITIAL Collection Time: 11/17/19 11:52 AM  
Result Value Ref Range Ventricular Rate 114 BPM  
 Atrial Rate 114 BPM  
 P-R Interval 168 ms QRS Duration 68 ms Q-T Interval 318 ms QTC Calculation (Bezet) 438 ms Calculated P Axis 85 degrees Calculated R Axis 99 degrees Calculated T Axis 51 degrees Diagnosis Sinus tachycardia Rightward axis Borderline ECG When compared with ECG of 11-JUN-2015 16:12, 
Vent. rate has increased BY  47 BPM 
ST no longer elevated in Inferior leads ST no longer elevated in Lateral leads Confirmed by Suresh Grullon ([id number]) on 11/18/2019 10:34:05 AM 
  
GLUCOSTABILIZER Collection Time: 11/17/19 12:47 PM  
Result Value Ref Range Glucose 600 mg/dL Insulin order 10.8 units/hour Insulin adminstered 10.8 units/hour Multiplier 0.020 Low target 140 mg/dL High target 180 mg/dL D50 order 0.0 ml  
 D50 administered 0.00 ml Minutes until next BG 60 min Order initials el Administered initials el GLUCOSE, POC Collection Time: 11/17/19  1:52 PM  
Result Value Ref Range Glucose (POC) 572 (HH) 70 - 110 mg/dL [facility]: 11/17/19  1:54 PM  
Result Value Ref Range Glucose 572 mg/dL Insulin order 15.4 units/hour Insulin adminstered 15.4 units/hour Multiplier 0.030 Low target 140 mg/dL High target 180 mg/dL D50 order 0.0 ml  
 D50 administered 0.00 ml Minutes until next BG 60 min Order initials el Administered initials el URINALYSIS W/ RFLX MICROSCOPIC Collection Time: 11/17/19  2:45 PM  
Result Value Ref Range Color YELLOW Appearance CLOUDY Specific gravity >1.030 (H) 1.005 - 1.030  
 pH (UA) 5.0 5.0 - 8.0 Protein NEGATIVE  NEG mg/dL Glucose >1,000 (A) NEG mg/dL Ketone TRACE (A) NEG mg/dL Bilirubin NEGATIVE  NEG Blood LARGE (A) NEG Urobilinogen 0.2 0.2 - 1.0 EU/dL Nitrites NEGATIVE  NEG Leukocyte Esterase MODERATE (A) NEG URINE MICROSCOPIC ONLY Collection Time: 11/17/19  2:45 PM  
Result Value Ref Range WBC 15 to 20 0 - 4 /hpf  
 RBC 25 to 30 0 - 5 /hpf Epithelial cells FEW 0 - 5 /lpf Bacteria FEW (A) NEG /hpf  
GLUCOSE, POC Collection Time: 11/17/19  3:04 PM  
Result Value Ref Range Glucose (POC) 516 (HH) 70 - 110 mg/dL [facility] Collection Time: 11/17/19  3:05 PM  
Result Value Ref Range Glucose 516 mg/dL Insulin order 18.2 units/hour Insulin adminstered 18.2 units/hour Multiplier 0.040 Low target 140 mg/dL High target 180 mg/dL D50 order 0.0 ml  
 D50 administered 0.00 ml Minutes until next BG 60 min Order initials el Administered initials el GLUCOSE, POC  Collection Time: 11/17/19  4:14 PM  
 Result Value Ref Range Glucose (POC) 426 (HH) 70 - 110 mg/dL [facility] Collection Time: 11/17/19  4:17 PM  
Result Value Ref Range Glucose 426 mg/dL Insulin order 11.0 units/hour Insulin adminstered 11.0 units/hour Multiplier 0.030 Low target 140 mg/dL High target 180 mg/dL D50 order 0.0 ml  
 D50 administered 0.00 ml Minutes until next BG 60 min Order initials el Administered initials el POC LACTIC ACID Collection Time: 11/17/19  4:44 PM  
Result Value Ref Range Lactic Acid (POC) 4.70 (HH) 0.40 - 2.00 mmol/L  
GLUCOSE, POC Collection Time: 11/17/19  5:09 PM  
Result Value Ref Range Glucose (POC) 304 (H) 70 - 110 mg/dL [facility] Collection Time: 11/17/19  5:11 PM  
Result Value Ref Range Glucose 304 mg/dL Insulin order 4.9 units/hour Insulin adminstered 4.9 units/hour Multiplier 0.020 Low target 140 mg/dL High target 180 mg/dL D50 order 0.0 ml  
 D50 administered 0.00 ml Minutes until next BG 60 min Order initials vv   
 Administered initials vv   
GLUCOSE, POC Collection Time: 11/17/19  6:30 PM  
Result Value Ref Range Glucose (POC) 234 (H) 70 - 110 mg/dL [facility] Collection Time: 11/17/19  6:30 PM  
Result Value Ref Range Glucose 234 mg/dL Insulin order 5.2 units/hour Insulin adminstered 5.2 units/hour Multiplier 0.030 Low target 140 mg/dL High target 180 mg/dL D50 order 0.0 ml  
 D50 administered 0.00 ml Minutes until next BG 60 min Order initials qm Administered initials qm MAGNESIUM Collection Time: 11/17/19  6:36 PM  
Result Value Ref Range Magnesium 1.3 (L) 1.6 - 2.6 mg/dL METABOLIC PANEL, BASIC Collection Time: 11/17/19  6:36 PM  
Result Value Ref Range Sodium 165 (HH) 136 - 145 mmol/L Potassium 2.5 (LL) 3.5 - 5.5 mmol/L Chloride 137 (H) 100 - 111 mmol/L  
 CO2 18 (L) 21 - 32 mmol/L  Anion gap 10 3.0 - 18 mmol/L  
 Glucose 256 (H) 74 - 99 mg/dL BUN 36 (H) 7.0 - 18 MG/DL Creatinine 0.84 0.6 - 1.3 MG/DL  
 BUN/Creatinine ratio 43 (H) 12 - 20 GFR est AA >60 >60 ml/min/1.73m2 GFR est non-AA >60 >60 ml/min/1.73m2 Calcium 6.6 (L) 8.5 - 10.1 MG/DL  
CARDIAC PANEL,(CK, CKMB & TROPONIN) Collection Time: 11/17/19  6:36 PM  
Result Value Ref Range  39 - 308 U/L  
 CK - MB 1.5 <3.6 ng/ml CK-MB Index 1.5 0.0 - 4.0 % Troponin-I, QT 0.07 (H) 0.0 - 0.045 NG/ML  
LACTIC ACID Collection Time: 11/17/19  6:36 PM  
Result Value Ref Range Lactic acid 4.5 (HH) 0.4 - 2.0 MMOL/L  
GLUCOSE, POC Collection Time: 11/17/19  7:36 PM  
Result Value Ref Range Glucose (POC) 219 (H) 70 - 110 mg/dL Kendy Alysa Collection Time: 11/17/19  7:36 PM  
Result Value Ref Range Glucose 219 mg/dL Insulin order 6.4 units/hour Insulin adminstered 6.4 units/hour Multiplier 0.040 Low target 140 mg/dL High target 180 mg/dL D50 order 0.0 ml  
 D50 administered 0.00 ml Minutes until next BG 60 min Order initials vv   
 Administered initials vv   
GLUCOSE, POC Collection Time: 11/17/19  8:32 PM  
Result Value Ref Range Glucose (POC) 178 (H) 70 - 110 mg/dL Kendy Alysa Collection Time: 11/17/19  8:33 PM  
Result Value Ref Range Glucose 178 mg/dL Insulin order 4.7 units/hour Insulin adminstered 4.7 units/hour Multiplier 0.040 Low target 140 mg/dL High target 180 mg/dL D50 order 0.0 ml  
 D50 administered 0.00 ml Minutes until next BG 60 min Order initials Akk Administered initials AKK GLUCOSE, POC Collection Time: 11/17/19  9:38 PM  
Result Value Ref Range Glucose (POC) 105 70 - 110 mg/dL Kendy Alysa Collection Time: 11/17/19  9:38 PM  
Result Value Ref Range Glucose 105 mg/dL Insulin order 1.4 units/hour Insulin adminstered 1.4 units/hour Multiplier 0.030 Low target 140 mg/dL High target 180 mg/dL D50 order 0.0 ml  
 D50 administered 0.00 ml Minutes until next BG 60 min Order initials AKK Administered initials AKK GLUCOSE, POC Collection Time: 11/17/19 10:36 PM  
Result Value Ref Range Glucose (POC) 88 70 - 110 mg/dL Charlotte Opitz Collection Time: 11/17/19 10:37 PM  
Result Value Ref Range Glucose 88 mg/dL Insulin order 0.6 units/hour Insulin adminstered 0.6 units/hour Multiplier 0.020 Low target 140 mg/dL High target 180 mg/dL D50 order 0.0 ml  
 D50 administered 0.00 ml Minutes until next BG 60 min Order initials AKK Administered initials akk LACTIC ACID Collection Time: 11/17/19 11:00 PM  
Result Value Ref Range Lactic acid 2.0 0.4 - 2.0 MMOL/L  
MAGNESIUM Collection Time: 11/17/19 11:00 PM  
Result Value Ref Range Magnesium 3.4 (H) 1.6 - 2.6 mg/dL METABOLIC PANEL, BASIC Collection Time: 11/17/19 11:00 PM  
Result Value Ref Range Sodium 165 (HH) 136 - 145 mmol/L Potassium 3.1 (L) 3.5 - 5.5 mmol/L Chloride 140 (H) 100 - 111 mmol/L  
 CO2 20 (L) 21 - 32 mmol/L Anion gap 5 3.0 - 18 mmol/L Glucose 121 (H) 74 - 99 mg/dL BUN 35 (H) 7.0 - 18 MG/DL Creatinine 0.62 0.6 - 1.3 MG/DL  
 BUN/Creatinine ratio 56 (H) 12 - 20 GFR est AA >60 >60 ml/min/1.73m2 GFR est non-AA >60 >60 ml/min/1.73m2 Calcium 7.5 (L) 8.5 - 10.1 MG/DL  
TROPONIN I Collection Time: 11/17/19 11:00 PM  
Result Value Ref Range Troponin-I, QT 0.07 (H) 0.0 - 0.045 NG/ML  
GLUCOSE, POC Collection Time: 11/17/19 11:40 PM  
Result Value Ref Range Glucose (POC) 114 (H) 70 - 110 mg/dL Charlotte Opitz Collection Time: 11/17/19 11:40 PM  
Result Value Ref Range Glucose 114 mg/dL Insulin order 0.5 units/hour Insulin adminstered 0.5 units/hour Multiplier 0.010 Low target 140 mg/dL High target 180 mg/dL D50 order 0.0 ml  
 D50 administered 0.00 ml Minutes until next BG 60 min Order initials LINO Administered initials AKK GLUCOSE, POC Collection Time: 11/18/19 12:32 AM  
Result Value Ref Range Glucose (POC) 153 (H) 70 - 110 mg/dL Ozzy Barer Collection Time: 11/18/19 12:34 AM  
Result Value Ref Range Glucose 153 mg/dL Insulin order 0.9 units/hour Insulin adminstered 0.9 units/hour Multiplier 0.010 Low target 140 mg/dL High target 180 mg/dL D50 order 0.0 ml  
 D50 administered 0.00 ml Minutes until next BG 60 min Order [facility] Administered initials AKK GLUCOSE, POC Collection Time: 11/18/19  1:39 AM  
Result Value Ref Range Glucose (POC) 151 (H) 70 - 110 mg/dL Ozzy Barer Collection Time: 11/18/19  1:40 AM  
Result Value Ref Range Glucose 151 mg/dL Insulin order 0.9 units/hour Insulin adminstered 0.9 units/hour Multiplier 0.010 Low target 140 mg/dL High target 180 mg/dL D50 order 0.0 ml  
 D50 administered 0.00 ml Minutes until next BG 60 min Order [facility] Administered initials akk GLUCOSE, POC Collection Time: 11/18/19  2:42 AM  
Result Value Ref Range Glucose (POC) 152 (H) 70 - 110 mg/dL Ozzy Barer Collection Time: 11/18/19  2:42 AM  
Result Value Ref Range Glucose 152 mg/dL Insulin order 0.9 units/hour Insulin adminstered 0.9 units/hour Multiplier 0.010 Low target 140 mg/dL High target 180 mg/dL D50 order 0.0 ml  
 D50 administered 0.00 ml Minutes until next BG 60 min Order [facility] Administered initials akk GLUCOSE, POC Collection Time: 11/18/19  3:50 AM  
Result Value Ref Range Glucose (POC) 178 (H) 70 - 110 mg/dL Ozzy Barer Collection Time: 11/18/19  3:51 AM  
Result Value Ref Range Glucose 178 mg/dL Insulin order 1.2 units/hour Insulin adminstered 1.2 units/hour Multiplier 0.010 Low target 140 mg/dL High target 180 mg/dL D50 order 0.0 ml  
 D50 administered 0.00 ml Minutes until next BG 60 min Order initials AKK Administered initials AKK GLUCOSE, POC Collection Time: 11/18/19  4:51 AM  
Result Value Ref Range Glucose (POC) 548 (HH) 70 - 110 mg/dL GLUCOSE, POC Collection Time: 11/18/19  4:52 AM  
Result Value Ref Range Glucose (POC) 199 (H) 70 - 110 mg/dL Lovella Resides Collection Time: 11/18/19  4:54 AM  
Result Value Ref Range Glucose 199 mg/dL Insulin order 2.8 units/hour Insulin adminstered 2.8 units/hour Multiplier 0.020 Low target 140 mg/dL High target 180 mg/dL D50 order 0.0 ml  
 D50 administered 0.00 ml Minutes until next BG 60 min Order initials AKK Administered initials AKK GLUCOSE, POC Collection Time: 11/18/19  5:47 AM  
Result Value Ref Range Glucose (POC) 207 (H) 70 - 110 mg/dL Lovella Resides Collection Time: 11/18/19  5:48 AM  
Result Value Ref Range Glucose 207 mg/dL Insulin order 4.4 units/hour Insulin adminstered 4.4 units/hour Multiplier 0.030 Low target 140 mg/dL High target 180 mg/dL D50 order 0.0 ml  
 D50 administered 0.00 ml Minutes until next BG 60 min Order initials AKK Administered initials AKK LACTIC ACID Collection Time: 11/18/19  6:15 AM  
Result Value Ref Range Lactic acid 2.8 (HH) 0.4 - 2.0 MMOL/L  
MAGNESIUM Collection Time: 11/18/19  6:50 AM  
Result Value Ref Range Magnesium 2.5 1.6 - 2.6 mg/dL CBC W/O DIFF Collection Time: 11/18/19  6:50 AM  
Result Value Ref Range WBC 13.9 (H) 4.6 - 13.2 K/uL  
 RBC 4.28 (L) 4.70 - 5.50 M/uL  
 HGB 12.5 (L) 13.0 - 16.0 g/dL HCT 38.9 36.0 - 48.0 % MCV 90.9 74.0 - 97.0 FL  
 MCH 29.2 24.0 - 34.0 PG  
 MCHC 32.1 31.0 - 37.0 g/dL  
 RDW 16.9 (H) 11.6 - 14.5 % PLATELET 821 470 - 235 K/uL MPV 11.3 9.2 - 11.8 FL  
LIPASE Collection Time: 11/18/19  6:50 AM  
Result Value Ref Range Lipase 176 73 - 218 U/L  
METABOLIC PANEL, BASIC Collection Time: 11/18/19  6:50 AM  
Result Value Ref Range Sodium 163 (HH) 136 - 145 mmol/L Potassium 4.2 3.5 - 5.5 mmol/L Chloride 138 (H) 100 - 111 mmol/L  
 CO2 19 (L) 21 - 32 mmol/L Anion gap 6 3.0 - 18 mmol/L Glucose 221 (H) 74 - 99 mg/dL BUN 28 (H) 7.0 - 18 MG/DL Creatinine 0.65 0.6 - 1.3 MG/DL  
 BUN/Creatinine ratio 43 (H) 12 - 20 GFR est AA >60 >60 ml/min/1.73m2 GFR est non-AA >60 >60 ml/min/1.73m2 Calcium 7.2 (L) 8.5 - 10.1 MG/DL  
CARDIAC PANEL,(CK, CKMB & TROPONIN) Collection Time: 11/18/19  6:50 AM  
Result Value Ref Range  39 - 308 U/L  
 CK - MB 1.7 <3.6 ng/ml CK-MB Index 1.7 0.0 - 4.0 % Troponin-I, QT 0.04 0.0 - 0.045 NG/ML  
GLUCOSE, POC Collection Time: 11/18/19  6:50 AM  
Result Value Ref Range Glucose (POC) 193 (H) 70 - 110 mg/dL Kendy Alysa Collection Time: 11/18/19  6:50 AM  
Result Value Ref Range Glucose 193 mg/dL Insulin order 5.3 units/hour Insulin adminstered 5.3 units/hour Multiplier 0.040 Low target 140 mg/dL High target 180 mg/dL D50 order 0.0 ml  
 D50 administered 0.00 ml Minutes until next BG 60 min Order initials AKK Administered initials AKK GLUCOSE, POC Collection Time: 11/18/19  7:57 AM  
Result Value Ref Range Glucose (POC) 210 (H) 70 - 110 mg/dL Kendy Alysa Collection Time: 11/18/19  7:57 AM  
Result Value Ref Range Glucose 210 mg/dL Insulin order 7.5 units/hour Insulin adminstered 7.5 units/hour Multiplier 0.050 Low target 140 mg/dL High target 180 mg/dL D50 order 0.0 ml  
 D50 administered 0.00 ml Minutes until next BG 60 min Order initials KT Administered initials KT   
GLUCOSE, POC Collection Time: 11/18/19  8:48 AM  
Result Value Ref Range Glucose (POC) 160 (H) 70 - 110 mg/dL Kendy Watt Collection Time: 11/18/19  8:49 AM  
Result Value Ref Range Glucose 160 mg/dL Insulin order 5.0 units/hour Insulin adminstered 5.0 units/hour  Multiplier 0.050   
 Low target 140 mg/dL High target 180 mg/dL D50 order 0.0 ml  
 D50 administered 0.00 ml Minutes until next BG 60 min Order initials KT Administered initials KT   
GLUCOSE, POC Collection Time: 11/18/19  9:53 AM  
Result Value Ref Range Glucose (POC) 181 (H) 70 - 110 mg/dL Sterling Dutta Collection Time: 11/18/19  9:57 AM  
Result Value Ref Range Glucose 181 mg/dL Insulin order 7.3 units/hour Insulin adminstered 7.3 units/hour Multiplier 0.060 Low target 140 mg/dL High target 180 mg/dL D50 order 0.0 ml  
 D50 administered 0.00 ml Minutes until next BG 60 min Order initials KT Administered initials KT No results for input(s): FIO2I, IFO2, HCO3I, IHCO3, HCOPOC, PCO2I, PCOPOC, IPHI, PHI, PHPOC, PO2I, PO2POC in the last 72 hours. No lab exists for component: IPOC2 Imaging: 
I have personally reviewed the patients radiographs and have reviewed the reports: 
 
CXR [11/17]: no infiltrates or other acute process CT CHEST/ABD/PELVIS [11/17]: bronchiolitis, hepatic steatosis, GB absent, bilateral hydronephrosis and hydroureter with small non-obstructing stones. IMPRESSION:  
74yoM with hx Cerebral palsy, Mental retardation, HTN, and DM, presents to the ER from [facility] with c/o SOB, Fever, and Dysphagia x 1 day, found to have septic shock due to urosepsis, MATTIE, Hypernatremia, and Nonketotic hyperosmolar hyperglycemia. PLAN:  
Respiratory: No active issues Cardiovascular: 1. Shock (resolved); Hx HTN: 
- shock now resolved but BP still soft; continue to hold home antihypertensives 2. NSTEMI:  
- trop peaked at 0.08 and since trended down; no ST changes on EKG, most likely type 2 nstemi from demand ischemia. Renal: 1. MATTIE; Hypovolemic Hypernatremia; Urosepsis: 
- Creatinine 1.53 up from a baseline of 0.46 in 5/2018; holm catheter placed and is draining frankly purulent urine.  Now Creatinine down to 0.65; BUN still high at 28 but improving, consistent with pre-renal azotemia. 
- Na 157 (corrected for glucose is 171) on admission >> now 163 (corrected for glucose is 165); aim for goal of no lower than 161 by this evening 
- continue BMP q4hrs 
- continue D51/4NS @ 125cc/hr.  
- UTI addressed below Infectious Disease: 1. Septic shock (resolved) due to UTI: 
- weaned off levophed early this AM; lactate improved from 4.5 to now 2.8.  
- UA consistent with UTI. Blood cultures and urine culture pending; CXR clear. Flu Ag negative. Started Vanc/Zosyn/Levoflox in the ER. Will continue the Vanc and Zosyn. - check MRSA nares and if negative can stop the Vanc.  
- CT Abdomen shows nonobstructing renal stones and bilateral hydronephrosis and hydroureter Endocrine: 1. Nonketotic hyperosmolar hyperglycemia: 
- Blood glucose 985 on admission with AG 13 and no ketones in urine.  
- BG now 180's-220's this AM on insulin gtt. Will await swallow eval results today to decide on his SSI scale depending on if he is cleared to eat or not. GI: 
1. Dysphagia: patient's nursing home reported he was having dysphagia prior to transfer;  
- consulted speech who plans on MBS today 
- continue NPO and GI prophylaxis Hematologic: 
No active issues; DVT prophylaxis Neurologic: 1. Hx Cerebral palsy and Mental Retardation: reportedly is at his neurologic baseline 
- consult PT/OT Best Practice: · Sepsis Bundle per Hospital Protocol · Glycemic control; avoid Hypoglycemia · IHI ICU Bundles: ·  Holm Bundle Followed · Stress ulcer prophylaxis. Famotidine · DVT prophylaxis. heparin · Need for Lines, holm assessed. · Restraints need. · Palliative care evaluation. NA 
· Code Status: FULL Total of 45 min critical care time spent at bedside during the course of care providing evaluation,management and care decisions and ordering appropriate treatment related to critical care problems exclusive of procedures. The reason for providing this level of medical care for this critically ill patient was due a critical illness that impaired one or more vital organ systems such that there was a high probability of imminent or life threatening deterioration in the patients condition. This care involved high complexity decision making to assess, manipulate, and support vital system functions, to treat this degree vital organ system failure and to prevent further life threatening deterioration of the patients condition. Nellie Torres MD 
Pulmonary & Critical Care

## 2019-11-18 NOTE — PROGRESS NOTES
Problem: Dysphagia (Adult) Goal: *Acute Goals and Plan of Care (Insert Text) Description Dysphagia Present: mod oral, mod- severe pharyngeal 
Aspiration: delayed weak cough Recommendations: 
Diet: ice chips PRN until MBS completed Meds: pending results of mBS Aspiration Precautions Oral Care TID Other: [facility] Goals:  Patient will: 1. Tolerate PO trials with no overt s/s aspiration or distress in 4/5 trials 2. Utilize compensatory swallow strategies/maneuvers (decrease bite/sip, size/rate, alt. liq/sol) with min cues in 4/5 trials 3. Perform oral-motor/laryngeal strengthening exercises with min cues to increase oropharyngeal swallow function 4. Complete an objective swallow study (i.e., MBSS) to assess swallow integrity, r/o aspiration, and determine of safest LRD, min A Outcome: Progressing Towards Goal 
 
 
SPEECH LANGUAGE PATHOLOGY BEDSIDE SWALLOW  
EVALUATION & TREATMENT Patient: Amanda Kinsey (88 y.o. male) Date: 11/18/2019 Primary Diagnosis: Hypernatremia [E87.0] Hydronephrosis [N13.30] Sepsis ([facility] [address] [A41.9] Precautions: aspiration PLOF: as per H&P 
 
ASSESSMENT : 
Based on the objective data described below, the patient presents with moderate oral, moderate- severe pharyngeal dysphagia in setting of septic shock due to UTI. Pt with h/o CP with mild MR, disoriented x4, staccato type speech. Pt with slow/ weak orolingual structures including V-P, natural dentition, poor condition. Pt unable to answer questions re: baseline diet, nursing received in report that pt was on thickened liquids. Pt demo slow, decreased labial seal, slow oral initiation with slow and disorganized manipulation, suspect premature spillage into pharynx, decreased, weak laryngeal elevation with multiple swallows per presentation, and audible gulp, delayed cough following completion of thin, nectar thick, and pudding consistencies.   Pt not safe for po feeds at current, rec: continue NPO with ice chips, MBS to r/o aspiration and determine swallow function, aspiration precautions. Orders written. Study to be completed later this date. TREATMENT : 
Skilled therapy initiated; Educated pt on aspiration precautions/ risks, overt s/sx aspiration, and pt's current swallow function; pt unable to verbalize comprehension. SLP to follow as indicated. Patient will benefit from skilled intervention to address the above impairments. Patient's rehabilitation potential is considered to be Guarded Factors which may influence rehabilitation potential include: ? None noted ? Mental ability/status ? Medical condition ? Home/family situation and support systems ? Safety awareness ? Pain tolerance/management ? Other: PLAN : 
Recommendations and Planned Interventions: 
continue NPO with ice chips, MBS to r/o aspiration and determine swallow function, aspiration precautions Frequency/Duration: Patient will be followed by speech-language pathology 1-2 times per day/3-5days per week to address goals. Discharge Recommendations: Missael Michel and To Be Determined SUBJECTIVE:  
Patient stated My throat is coated.  OBJECTIVE:  
 
Past Medical History:  
Diagnosis Date Cerebral palsy ([address] Hypertension Mild mental retardation Polio No past surgical history on file. Home Situation:  
Home Situation Home Environment: Assisted living Care Facility Name: [facility] # Steps to Enter: 0 One/Two Story Residence: One story Living Alone: No 
Support Systems: Assisted living Patient Expects to be Discharged to[de-identified] Assisted living Current DME Used/Available at Home: None Diet prior to admission: unknown, required wet bread at [facility] Current Diet:  NPO, ice chips Cognitive and Communication Status: 
Neurologic State: Alert Orientation Level: Disoriented X4 
 Cognition: Follows commands Perception: Cues to attend to left side of body Perseveration: Perseverates during conversation Safety/Judgement: Fall prevention, Lack of insight into deficits Oral Assessment: 
Oral Assessment Labial: Decreased rate;Decreased seal 
Dentition: Natural;Poor Oral Hygiene: poor Lingual: Decreased rate;Decreased strength Velum: Other (comment)(weak) Mandible: No impairment P.O. Trials: 
Patient Position: HOB 60* Vocal quality prior to P.O.: No impairment Consistency Presented: Thin liquid;Puree;Nectar thick liquid How Presented: SLP-fed/presented;Cup/sip;Spoon;Straw;Successive swallows Bolus Acceptance: No impairment Bolus Formation/Control: Impaired Type of Impairment: Delayed Propulsion: Delayed (# of seconds) Oral Residue: None Initiation of Swallow: Delayed (# of seconds) Laryngeal Elevation: Decreased;Weak Aspiration Signs/Symptoms: Change vocal quality;Weak cough;Delayed cough/throat clear Pharyngeal Phase Characteristics: Altered vocal quality; Audible swallow;Multiple swallows; Poor endurance; Suspected pharyngeal residue Effective Modifications: None Cues for Modifications: Maximal 
  
 
Oral Phase Severity: Moderate Pharyngeal Phase Severity : Moderate-severe PAIN: 
Pain level pre-treatment: 0/10 Pain level post-treatment: 0/10 After treatment:  
?            Patient left in no apparent distress sitting up in chair ? Patient left in no apparent distress in bed ? Call bell left within reach ? Nursing notified ? Family present ? Caregiver present ? Bed alarm activated COMMUNICATION/EDUCATION:  
?            Aspiration precautions; swallow safety; compensatory techniques. ?            Patient/family have participated as able in goal setting and plan of care. ?            Patient/family agree to work toward stated goals and plan of care. ?            Patient understands intent and goals of therapy; neutral about participation. ? Patient unable to participate in goal setting/plan of care; educ ongoing with interdisciplinary staff ? Posted safety precautions in patient's room. Thank you for this referral. 
Yovany Govea MS, CCC/SLP Time Calculation: 33 mins Evaluation Time: 23 minutes Treatment Time: 10 minutes

## 2019-11-18 NOTE — CONSULTS
Consult Note Consult requested by: Mirella Randolph MD 
 
ADMIT DATE: 11/17/2019 CONSULT DATE: November 18, 2019 Admission diagnosis: Uncontrolled type 2 diabetes mellitus with hyperosmolar nonketotic hyperglycemia ([address] Reason for Nephrology Consultation: MATTIE, hypernatremia Assessment: 1. MATTIE likely prerenal azotemia ,obst uropathy 2. Hyperosmolar nonketotic hyperglycemia 3. Sepsis  D/t UTI 4. b hydronephrosis 2/2 urinary retention 5. Hypernatremia Plan: 
 
1) BMP q4hrs 2) D5 1/4 NS @ 125 cc/hrs Goal correction 8-10 in 24 hrs  
3) follow urology recs 4) renally dose AB 5) keep MAP > 65 Please call with questions, 
 
Dayton Villa MD [facility] Cell [phone] Pager: [phone] HPI: Unique Epstein is a 81 y.o. male [facility]  male with hx Cerebral palsy, Mental retardation, HTN, and DM, presents to the ER from [place] with c/o SOB, Fever. On presentation  patient found to be in septic shock due to urosepsis . He was also found to have nonketotic hyperglycemia with , MATTIE, and Severe hypernatremia of 157 (171 when corrected for blood glucose). Patient given NS bolus in ER, starting on insulin gtt, and broad spectrum antibiotics. Was transiently on pressors, but was off by this AM. CT abdomen was noted to have bilateral hydro , also ureteric calculi which were nonobst. Urology was consulted ,holm was placed d/t suspected bladder outlet obst.  
 
  
Past Medical History:  
Diagnosis Date  Cerebral palsy ([facility] [address]  Hypertension  Mild mental retardation  Polio No past surgical history on file. Social History Socioeconomic History  Marital status: LEGALLY  Spouse name: Not on file  Number of children: Not on file  Years of education: Not on file  Highest education level: Not on file Occupational History  Not on file Social Needs  Financial resource strain: Not on file  Food insecurity:  
  Worry: Not on file Inability: Not on file  Transportation needs:  
  Medical: Not on file Non-medical: Not on file Tobacco Use  Smoking status: Never Smoker Substance and Sexual Activity  Alcohol use: No  
 Drug use: Not on file  Sexual activity: Not on file Lifestyle  Physical activity:  
  Days per week: Not on file Minutes per session: Not on file  Stress: Not on file Relationships  Social connections:  
  Talks on phone: Not on file Gets together: Not on file Attends Taoism service: Not on file Active member of club or organization: Not on file Attends meetings of clubs or organizations: Not on file Relationship status: Not on file  Intimate partner violence:  
  Fear of current or ex partner: Not on file Emotionally abused: Not on file Physically abused: Not on file Forced sexual activity: Not on file Other Topics Concern  Not on file Social History Narrative  Not on file No family history on file. No Known Allergies Home Medications:  
 
Medications Prior to Admission Medication Sig  cholecalciferol (VITAMIN D3) 1,000 unit cap Take 1,000 Units by mouth daily.  meclizine (ANTIVERT) 12.5 mg tablet Take 12.5 mg by mouth three (3) times daily as needed.  fenofibrate micronized (LOFIBRA) 67 mg capsule Take 67 mg by mouth every morning.  polyvinyl alcohol (LIQUIFILM TEARS) 1.4 % ophthalmic solution Administer 1 Drop to both eyes as needed.  fenofibrate nanocrystallized (TRICOR) 48 mg tablet Take 1 Tab by mouth daily.  polyvinyl alcohol (LIQUIFILM TEARS) 1.4 % ophthalmic solution Administer 1 Drop to both eyes four (4) times daily.  aspirin 81 mg chewable tablet Take 81 mg by mouth daily.  baclofen (LIORESAL) 10 mg tablet Take  by mouth three (3) times daily.  calcium carbonate (TUMS) 200 mg calcium (500 mg) chew Take 1 Tab by mouth three (3) times daily.  FENOFIBRATE MICRONIZED PO Take 200 mg by mouth daily.  metFORMIN (GLUCOPHAGE) 850 mg tablet Take 500 mg by mouth two (2) times daily (with meals).  calcium-vitamin D (OYSTER SHELL CALCIUM-VIT D3) 250-125 mg-unit tablet Take 1 Tab by mouth two (2) times a day.  polyethylene glycol (MIRALAX) 17 gram packet Take 17 g by mouth every other day.  camphor-methyl salicyl-menthol (SALONPAS) ptmd 1 Patch by Apply Externally route daily.  multivitamin, tx-iron-ca-min (THERAPY M) 9 mg iron-400 mcg tab tablet Take 1 Tab by mouth daily.  ergocalciferol (VITAMIN D2) 50,000 unit capsule Take 50,000 Units by mouth every Tuesday.  lisinopril (PRINIVIL, ZESTRIL) 2.5 mg tablet Take 2.5 mg by mouth daily.  acetaminophen (TYLENOL ARTHRITIS PAIN) 650 mg CR tablet Take 650 mg by mouth every six (6) hours as needed for Pain.  loperamide (IMMODIUM) 2 mg tablet Take 2 mg by mouth four (4) times daily as needed for Diarrhea.  promethazine (PHENERGAN) 25 mg tablet Take 25 mg by mouth every eight (8) hours as needed for Nausea. Current Inpatient Medications:  
 
Current Facility-Administered Medications Medication Dose Route Frequency  vancomycin (VANCOCIN) 750 mg in 0.9% sodium chloride 250 mL IVPB  750 mg IntraVENous Q12H  
 [START ON 11/19/2019] VANCOMYCIN INFORMATION NOTE   Other ONCE  
 insulin glargine (LANTUS) injection 12 Units  12 Units SubCUTAneous DAILY  insulin lispro (HUMALOG) injection   SubCUTAneous AC&HS  
 glucose chewable tablet 16 g  4 Tab Oral PRN  
 glucagon (GLUCAGEN) injection 1 mg  1 mg IntraMUSCular PRN  
 dextrose 10% infusion 125-250 mL  125-250 mL IntraVENous PRN  
 dextrose 5% infusion  150 mL/hr IntraVENous CONTINUOUS  
 VANCOMYCIN INFORMATION NOTE   Other Rx Dosing/Monitoring  sodium chloride (NS) flush 5-40 mL  5-40 mL IntraVENous Q8H  
 sodium chloride (NS) flush 5-40 mL  5-40 mL IntraVENous PRN  
  heparin (porcine) injection 5,000 Units  5,000 Units SubCUTAneous Q8H  
 acetaminophen (TYLENOL) tablet 650 mg  650 mg Oral Q6H PRN Or  
 acetaminophen (TYLENOL) suppository 650 mg  650 mg Rectal Q6H PRN  piperacillin-tazobactam (ZOSYN) 3.375 g in 0.9% sodium chloride (MBP/ADV) 100 mL MBP  #EXTENDED 4-HOUR INFUSION##  3.375 g IntraVENous Q8H  
 famotidine (PF) (PEPCID) 20 mg in sodium chloride 0.9% 10 mL injection  20 mg IntraVENous Q12H  
 influenza vaccine 2019-20 (6 mos+)(PF) (FLUARIX/FLULAVAL/FLUZONE QUAD) injection 0.5 mL  0.5 mL IntraMUSCular PRIOR TO DISCHARGE Review of Systems:  
Unable to provide as patient nonverbal  
 
 
Physical Assessment:  
 
Vitals:  
 11/18/19 1700 11/18/19 1800 11/18/19 1900 11/18/19 2000 BP: 110/76 121/67 118/78 107/67 Pulse: 61 65 80 79 Resp: 15 18 20 24 Temp:      
SpO2: 100% 100% 99% 100% Weight:      
Height:      
 
Last 3 Recorded Weights in this Encounter 11/17/19 1105 11/17/19 1111 11/17/19 1715 Weight: 57.6 kg (126 lb 14.4 oz) 57.4 kg (126 lb 9 oz) 53.3 kg (117 lb 8.1 oz) Admission weight: Weight: 57.6 kg (126 lb 14.4 oz) (11/17/19 1105) Intake/Output Summary (Last 24 hours) at 11/18/2019 2054 Last data filed at 11/18/2019 2000 Gross per 24 hour Intake 3785.95 ml Output 1155 ml Net 2630.95 ml Patient is in no apparent distress. HEENT: mmm Neck: no cervical lymphadenopathy or thyromegaly. Lungs: good air entry, clear to auscultation bilaterally. Cardiovascular system: S1, S2, regular rate and rhythm. Abdomen: soft, non tender, non distended. BS+ Extremities: no edema Integumentary: skin is grossly intact. Neurologic: Alert, oriented time three. Data Review: 
 
Labs: Results:  
   
Chemistry Recent Labs 11/18/19 
1502 11/18/19 
1030 11/18/19 
0650  11/17/19 
1101 11/17/19 
1100 * 180* 221*   < > 985*  --   
* 160* 163*   < > 157*  --   
K 4.1 4.2 4.2   < > 4.4  --   
 * 136* 138*   < > 122*  --   
CO2 16* 19* 19*   < > 22  --   
BUN 27* 27* 28*   < > 67*  --   
CREA 0.56* 0.58* 0.65   < > 1.53*  --   
CA 7.2* 7.1* 7.2*   < > 9.6  --   
AGAP 7 5 6   < > 13  --   
BUCR 48* 47* 43*   < > 44*  --   
AP  --   --   --   --  105  --   
TP  --   --   --   --  6.9  --   
ALB  --   --   --   --  3.1*  --   
GLOB  --   --   --   --  3.8  --   
AGRAT  --   --   --   --  0.8  --   
PHOS  --   --   --   --   --  3.9  
 < > = values in this interval not displayed. CBC w/Diff Recent Labs 11/18/19 
0650 11/17/19 
1101 WBC 13.9* 19.3*  
RBC 4.28* 5.60* HGB 12.5* 16.4* HCT 38.9 54.6*  
 304 GRANS  --  80* LYMPH  --  13* EOS  --  0 Iron/Ferritin No results for input(s): IRON in the last 72 hours. No lab exists for component: TIBCCALC  
PTH/VIT D No results for input(s): PTH in the last 72 hours. No lab exists for component: VITD Lady Akin MD 
11/18/2019 
8:54 PM 
 
 
November 18, 2019

## 2019-11-18 NOTE — PROGRESS NOTES
PT orders received and chart was reviewed. Attempted to see patient for evaluation however he is currently off the floor. Will continue to follow.   Norm Montiel, PT

## 2019-11-18 NOTE — PROGRESS NOTES
0141- Bedside shift change report given to Colin Luque RN (oncoming nurse) by Jonathan Villalpando RN (offgoing nurse). Report included the following information SBAR, Kardex, Intake/Output, MAR, Recent Results, Med Rec Status and Cardiac Rhythm NSR.  
 
0900- Patient appears to be resting comfortably in bed. VSS. Repositioned patient, tolerated well. Pulse oxygenation maintained stable at 99%. 1130- Patient returned from radiology for barium swallow study. Patient tolerated well. Returned patient back to bed. VSS. Patient appears comfortable and in no distress. 1330- Patient resting quietly in bed. States he is comfortable and in no pain. Patient VSS. Will continue to monitor. 1630- Patient states he is comfortable in bed. VSS. Will continue to monitor. 1915- Bedside shift change report given to Saumya Howell RN (oncoming nurse) by Colin Luque RN (offgoing nurse). Report included the following information SBAR, Kardex, Intake/Output, MAR, Recent Results, Med Rec Status and Cardiac Rhythm NSR.

## 2019-11-18 NOTE — PROGRESS NOTES
Problem: Discharge Planning Goal: *Discharge to safe environment Outcome: Progressing Towards Goal 
 Plan is to return to [facility] for [address] Per Laury Ivory / Keith Reich patient has no NOK and is his own responsible party.

## 2019-11-18 NOTE — CONSULTS
[facility] Pulmonary, Critical Care, and Sleep Medicine Name: Aniceto Nelson MRN: [id number] : 1950 Hospital: [address] [place] [address] Date: 2019  Consulted By:  TYE BLAKE  
 
Critical Care History and Physical 
 
 
Subjective/History:  
Patient is a 81 y.o. male with hx Cerebral palsy, Mental retardation, HTN, and DM, presents to the ER from [facility] with c/o SOB, Fever, and Dysphagia x 1 day. Patient awake and obeying commands but garbled speech and is not oriented; per EMS, this is patient's baseline. In the ER patient found to be in septic shock due to urosepsis with Temp 102. 6. He was also found to have nonketotic hyperglycemia with , MATTIE, and Severe hypernatremia of 157 (171 when corrected for blood glucose). Patient given 2728cc NS bolus in ER, starting in insulin gtt, and broad spectrum antibiotics. His BP remained low at 80/54 and he was started on levophed peripherally. At time of my exam, his BP cuff is too large for his arm. It is a size small but is still too small for him; he needs a pediatric size. When BP cuff transitioned to his his leg, MAP is now 81 (on 10mcg Levophed). Patient denies pain. He says his SOB is better. He is asking for his bed to be moved so he can see the tv better. The patient is critically ill and can not provide additional history due to Unable to comprehend. Past Medical History:  
Diagnosis Date  Cerebral palsy ([address]  Hypertension  Mild mental retardation  Polio Prior to Admission medications Medication Sig Start Date End Date Taking? Authorizing Provider  
cholecalciferol (VITAMIN D3) 1,000 unit cap Take 1,000 Units by mouth daily. Other, MD Mak  
meclizine (ANTIVERT) 12.5 mg tablet Take 12.5 mg by mouth three (3) times daily as needed. Mak Gannon MD  
fenofibrate micronized (LOFIBRA) 67 mg capsule Take 67 mg by mouth every morning.     Mak Gannon MD  
 polyvinyl alcohol (LIQUIFILM TEARS) 1.4 % ophthalmic solution Administer 1 Drop to both eyes as needed. Mak Gannon MD  
fenofibrate nanocrystallized (TRICOR) 48 mg tablet Take 1 Tab by mouth daily. 6/15/15   Ambrose Veliz MD  
polyvinyl alcohol (LIQUIFILM TEARS) 1.4 % ophthalmic solution Administer 1 Drop to both eyes four (4) times daily. Mak Gannon MD  
aspirin 81 mg chewable tablet Take 81 mg by mouth daily. Mak Gannon MD  
baclofen (LIORESAL) 10 mg tablet Take  by mouth three (3) times daily. Mak Gannon MD  
calcium carbonate (TUMS) 200 mg calcium (500 mg) chew Take 1 Tab by mouth three (3) times daily. Mak Gannon MD  
FENOFIBRATE MICRONIZED PO Take 200 mg by mouth daily. Mak Gannon MD  
metFORMIN (GLUCOPHAGE) 850 mg tablet Take 500 mg by mouth two (2) times daily (with meals). Mak Gannon MD  
calcium-vitamin D (OYSTER SHELL CALCIUM-VIT D3) 250-125 mg-unit tablet Take 1 Tab by mouth two (2) times a day. Mak Gannon MD  
polyethylene glycol (MIRALAX) 17 gram packet Take 17 g by mouth every other day. Mak Gannon MD  
camphor-methyl salicyl-menthol (SALONPAS) ptmd 1 Patch by Apply Externally route daily. aMk Gannon MD  
multivitamin, tx-iron-ca-min (THERAPY M) 9 mg iron-400 mcg tab tablet Take 1 Tab by mouth daily. Mak Gannon MD  
ergocalciferol (VITAMIN D2) 50,000 unit capsule Take 50,000 Units by mouth every Tuesday. Mak Gannon MD  
lisinopril (PRINIVIL, ZESTRIL) 2.5 mg tablet Take 2.5 mg by mouth daily. Mak Gannon MD  
acetaminophen (TYLENOL ARTHRITIS PAIN) 650 mg CR tablet Take 650 mg by mouth every six (6) hours as needed for Pain. Mak Gannon MD  
loperamide (IMMODIUM) 2 mg tablet Take 2 mg by mouth four (4) times daily as needed for Diarrhea. Mak Gannon MD  
promethazine (PHENERGAN) 25 mg tablet Take 25 mg by mouth every eight (8) hours as needed for Nausea. Mak Gannon MD  
 
 
Current Facility-Administered Medications Medication Dose Route Frequency  VANCOMYCIN INFORMATION NOTE   Other Rx Dosing/Monitoring  insulin regular (NOVOLIN R, HUMULIN R) 100 Units in 0.9% sodium chloride 100 mL infusion  0-50 Units/hr IntraVENous TITRATE  Please enter Height in [facility]  1 Each Other NOW  
 NOREPINephrine (LEVOPHED) 8 mg in 0.9% NS 250ml infusion  2-16 mcg/min IntraVENous TITRATE  sodium chloride (NS) flush 5-40 mL  5-40 mL IntraVENous Q8H  
 heparin (porcine) injection 5,000 Units  5,000 Units SubCUTAneous Q8H  
 0.45% sodium chloride infusion  75 mL/hr IntraVENous CONTINUOUS  
 [START ON 2019] VANCOMYCIN INFORMATION NOTE   Other ONCE  
 [START ON 2019] vancomycin (VANCOCIN) 1,000 mg in 0.9% sodium chloride (MBP/ADV) 250 mL adv  1,000 mg IntraVENous Q24H  
 [START ON 2019] levoFLOXacin (LEVAQUIN) 750 mg in D5W IVPB  750 mg IntraVENous Q48H  piperacillin-tazobactam (ZOSYN) 3.375 g in 0.9% sodium chloride (MBP/ADV) 100 mL MBP  #EXTENDED 4-HOUR INFUSION##  3.375 g IntraVENous Q8H No Known Allergies Social History Tobacco Use  Smoking status: Never Smoker Substance Use Topics  Alcohol use: No  
  
No family history on file. Review of Systems: 
Review of systems not obtained due to patient factors. Objective:  
Vital Signs:   
Visit Vitals BP 99/63 Pulse 74 Temp 98 °F (36.7 °C) Resp 23 Ht 5' 4\" (1.626 m) Wt 53.3 kg (117 lb 8.1 oz) SpO2 100% BMI 20.17 kg/m² O2 Device: Room air O2 Flow Rate (L/min): 4 l/min Temp (24hrs), Av.4 °F (37.4 °C), Min:98 °F (36.7 °C), Max:102.6 °F (39.2 °C) Intake/Output:  
Last shift:      No intake/output data recorded. Last 3 shifts:  0701 -  190 In: 3464.1 [I.V.:3464.1] Out: 1030 [Urine:1030] Intake/Output Summary (Last 24 hours) at 2019 1916 Last data filed at 2019 1736 Gross per 24 hour Intake 3464.14 ml Output 1030 ml Net 2434.14 ml Ventilator Settings: Mode Rate Tidal Volume Pressure FiO2 PEEP Peak airway pressure:     
Minute ventilation:     
 
Physical Exam:  
General:   Well nourished adult male, Awake/alert, Atrophied upper and lower extremities related to his cerebral palsy, cooperative, NAD HEENT:   NCAT, PERRL, OP clear, MM VERY VERY dry Neck: Supple, trachea midline. Lungs:   Symmetrical chest rise; good AE bilat; CTAB; no wheezes/rhonchi/rales noted. Heart:  RRR, S1, S2 normal, no m/r/g Abdomen:   Soft, non-tender. Bowel sounds normal.   
Extremities: Extremities with muscle atrophy, atraumatic, no cyanosis or edema. Pulses: 2+ and symmetric all extremities. Skin: Skin color, texture, turgor normal. No rashes or lesions Neurologic: Grossly nonfocal, moving all extremities, obeying commands, garbled/slurred speech, not oriented : Frankly purulent urine in holm catheter Data:  
 
Recent Results (from the past 24 hour(s)) LIPASE Collection Time: 11/17/19 11:00 AM  
Result Value Ref Range Lipase 811 (H) 73 - 393 U/L  
PHOSPHORUS Collection Time: 11/17/19 11:00 AM  
Result Value Ref Range Phosphorus 3.9 2.5 - 4.9 MG/DL  
HEMOGLOBIN A1C WITH EAG Collection Time: 11/17/19 11:00 AM  
Result Value Ref Range Hemoglobin A1c 9.8 (H) 4.2 - 5.6 % Est. average glucose 235 mg/dL METABOLIC PANEL, COMPREHENSIVE Collection Time: 11/17/19 11:01 AM  
Result Value Ref Range Sodium 157 (H) 136 - 145 mmol/L Potassium 4.4 3.5 - 5.5 mmol/L Chloride 122 (H) 100 - 111 mmol/L  
 CO2 22 21 - 32 mmol/L Anion gap 13 3.0 - 18 mmol/L Glucose 985 (HH) 74 - 99 mg/dL BUN 67 (H) 7.0 - 18 MG/DL Creatinine 1.53 (H) 0.6 - 1.3 MG/DL  
 BUN/Creatinine ratio 44 (H) 12 - 20 GFR est AA 55 (L) >60 ml/min/1.73m2 GFR est non-AA 46 (L) >60 ml/min/1.73m2 Calcium 9.6 8.5 - 10.1 MG/DL  Bilirubin, total 0.7 0.2 - 1.0 MG/DL  
 ALT (SGPT) 44 16 - 61 U/L  
 AST (SGOT) 19 10 - 38 U/L  
 Alk. phosphatase 105 45 - 117 U/L Protein, total 6.9 6.4 - 8.2 g/dL Albumin 3.1 (L) 3.4 - 5.0 g/dL Globulin 3.8 2.0 - 4.0 g/dL A-G Ratio 0.8 0.8 - 1.7    
CBC WITH AUTOMATED DIFF Collection Time: 11/17/19 11:01 AM  
Result Value Ref Range WBC 19.3 (H) 4.6 - 13.2 K/uL  
 RBC 5.60 (H) 4.70 - 5.50 M/uL  
 HGB 16.4 (H) 13.0 - 16.0 g/dL HCT 54.6 (H) 36.0 - 48.0 % MCV 97.5 (H) 74.0 - 97.0 FL  
 MCH 29.3 24.0 - 34.0 PG  
 MCHC 30.0 (L) 31.0 - 37.0 g/dL  
 RDW 17.8 (H) 11.6 - 14.5 % PLATELET 237 662 - 290 K/uL MPV 12.2 (H) 9.2 - 11.8 FL  
 NEUTROPHILS 80 (H) 40 - 73 % LYMPHOCYTES 13 (L) 21 - 52 % MONOCYTES 7 3 - 10 % EOSINOPHILS 0 0 - 5 % BASOPHILS 0 0 - 2 %  
 ABS. NEUTROPHILS 15.4 (H) 1.8 - 8.0 K/UL  
 ABS. LYMPHOCYTES 2.5 0.9 - 3.6 K/UL  
 ABS. MONOCYTES 1.4 (H) 0.05 - 1.2 K/UL  
 ABS. EOSINOPHILS 0.0 0.0 - 0.4 K/UL  
 ABS. BASOPHILS 0.0 0.0 - 0.1 K/UL  
 DF AUTOMATED    
NT-PRO BNP Collection Time: 11/17/19 11:01 AM  
Result Value Ref Range NT pro- 0 - 900 PG/ML  
TROPONIN I Collection Time: 11/17/19 11:01 AM  
Result Value Ref Range Troponin-I, QT 0.08 (H) 0.0 - 0.045 NG/ML  
MAGNESIUM Collection Time: 11/17/19 11:01 AM  
Result Value Ref Range Magnesium 2.3 1.6 - 2.6 mg/dL POC LACTIC ACID Collection Time: 11/17/19 11:10 AM  
Result Value Ref Range Lactic Acid (POC) 2.30 (HH) 0.40 - 2.00 mmol/L  
URINALYSIS W/ RFLX MICROSCOPIC Collection Time: 11/17/19 11:28 AM  
Result Value Ref Range Color RED Appearance TURBID Specific gravity >1.030 (H) 1.005 - 1.030  
 pH (UA) 6.5 5.0 - 8.0 Protein 300 (A) NEG mg/dL Glucose >1,000 (A) NEG mg/dL Ketone NEGATIVE  NEG mg/dL Bilirubin SMALL (A) NEG Blood LARGE (A) NEG Urobilinogen 0.2 0.2 - 1.0 EU/dL Nitrites POSITIVE (A) NEG Leukocyte Esterase SMALL (A) NEG URINE MICROSCOPIC ONLY Collection Time: 11/17/19 11:28 AM  
Result Value Ref Range WBC 4 to 10 0 - 4 /hpf  
 RBC TOO NUMEROUS TO COUNT 0 - 5 /hpf Epithelial cells NEGATIVE  0 - 5 /lpf Bacteria 1+ (A) NEG /hpf INFLUENZA A & B AG (RAPID TEST) Collection Time: 11/17/19 11:43 AM  
Result Value Ref Range Influenza A Antigen NEGATIVE  NEG Influenza B Antigen NEGATIVE  NEG    
EKG, 12 LEAD, INITIAL Collection Time: 11/17/19 11:52 AM  
Result Value Ref Range Ventricular Rate 114 BPM  
 Atrial Rate 114 BPM  
 P-R Interval 168 ms QRS Duration 68 ms Q-T Interval 318 ms QTC Calculation (Bezet) 438 ms Calculated P Axis 85 degrees Calculated R Axis 99 degrees Calculated T Axis 51 degrees Diagnosis Sinus tachycardia Rightward axis Borderline ECG When compared with ECG of 11-JUN-2015 16:12, 
Vent. rate has increased BY  47 BPM 
ST no longer elevated in Inferior leads ST no longer elevated in Lateral leads [place] Collection Time: 11/17/19 12:47 PM  
Result Value Ref Range Glucose 600 mg/dL Insulin order 10.8 units/hour Insulin adminstered 10.8 units/hour Multiplier 0.020 Low target 140 mg/dL High target 180 mg/dL D50 order 0.0 ml  
 D50 administered 0.00 ml Minutes until next BG 60 min Order initials el Administered initials el GLUCOSE, POC Collection Time: 11/17/19  1:52 PM  
Result Value Ref Range Glucose (POC) 572 (HH) 70 - 110 mg/dL [place] Collection Time: 11/17/19  1:54 PM  
Result Value Ref Range Glucose 572 mg/dL Insulin order 15.4 units/hour Insulin adminstered 15.4 units/hour Multiplier 0.030 Low target 140 mg/dL High target 180 mg/dL D50 order 0.0 ml  
 D50 administered 0.00 ml Minutes until next BG 60 min Order initials el Administered initials el URINALYSIS W/ RFLX MICROSCOPIC Collection Time: 11/17/19  2:45 PM  
Result Value Ref Range Color YELLOW Appearance CLOUDY  Specific gravity >1.030 (H) 1.005 - 1.030  
 pH (UA) 5.0 5.0 - 8.0 Protein NEGATIVE  NEG mg/dL Glucose >1,000 (A) NEG mg/dL Ketone TRACE (A) NEG mg/dL Bilirubin NEGATIVE  NEG Blood LARGE (A) NEG Urobilinogen 0.2 0.2 - 1.0 EU/dL Nitrites NEGATIVE  NEG Leukocyte Esterase MODERATE (A) NEG URINE MICROSCOPIC ONLY Collection Time: 11/17/19  2:45 PM  
Result Value Ref Range WBC 15 to 20 0 - 4 /hpf  
 RBC 25 to 30 0 - 5 /hpf Epithelial cells FEW 0 - 5 /lpf Bacteria FEW (A) NEG /hpf  
GLUCOSE, POC Collection Time: 11/17/19  3:04 PM  
Result Value Ref Range Glucose (POC) 516 (HH) 70 - 110 mg/dL Belinda Deal Collection Time: 11/17/19  3:05 PM  
Result Value Ref Range Glucose 516 mg/dL Insulin order 18.2 units/hour Insulin adminstered 18.2 units/hour Multiplier 0.040 Low target 140 mg/dL High target 180 mg/dL D50 order 0.0 ml  
 D50 administered 0.00 ml Minutes until next BG 60 min Order initials el Administered initials el GLUCOSE, POC Collection Time: 11/17/19  4:14 PM  
Result Value Ref Range Glucose (POC) 426 (HH) 70 - 110 mg/dL Belinda Deal Collection Time: 11/17/19  4:17 PM  
Result Value Ref Range Glucose 426 mg/dL Insulin order 11.0 units/hour Insulin adminstered 11.0 units/hour Multiplier 0.030 Low target 140 mg/dL High target 180 mg/dL D50 order 0.0 ml  
 D50 administered 0.00 ml Minutes until next BG 60 min Order initials el Administered initials el POC LACTIC ACID Collection Time: 11/17/19  4:44 PM  
Result Value Ref Range Lactic Acid (POC) 4.70 (HH) 0.40 - 2.00 mmol/L  
GLUCOSE, POC Collection Time: 11/17/19  5:09 PM  
Result Value Ref Range Glucose (POC) 304 (H) 70 - 110 mg/dL Belinda Deal Collection Time: 11/17/19  5:11 PM  
Result Value Ref Range Glucose 304 mg/dL Insulin order 4.9 units/hour Insulin adminstered 4.9 units/hour Multiplier 0.020 Low target 140 mg/dL High target 180 mg/dL D50 order 0.0 ml  
 D50 administered 0.00 ml Minutes until next BG 60 min Order initials vv   
 Administered initials vv   
GLUCOSE, POC Collection Time: 11/17/19  6:30 PM  
Result Value Ref Range Glucose (POC) 234 (H) 70 - 110 mg/dL [place] Collection Time: 11/17/19  6:30 PM  
Result Value Ref Range Glucose 234 mg/dL Insulin order 5.2 units/hour Insulin adminstered 5.2 units/hour Multiplier 0.030 Low target 140 mg/dL High target 180 mg/dL D50 order 0.0 ml  
 D50 administered 0.00 ml Minutes until next BG 60 min Order initials qm Administered initials qm No results for input(s): FIO2I, IFO2, HCO3I, IHCO3, HCOPOC, PCO2I, PCOPOC, IPHI, PHI, PHPOC, PO2I, PO2POC in the last 72 hours. No lab exists for component: IPOC2 Imaging: 
I have personally reviewed the patients radiographs and have reviewed the reports: 
 
CXR [11/17]: no infiltrates or other acute process CT CHEST/ABD/PELVIS [11/17]: bronchiolitis, hepatic steatosis, GB absent, bilateral hydronephrosis and hydroureter with small non-obstructing stones. IMPRESSION:  
74yoM with hx Cerebral palsy, Mental retardation, HTN, and DM, presents to the ER from [facility] with c/o SOB, Fever, and Dysphagia x 1 day, found to have septic shock due to urosepsis, MATTIE, Hypernatremia, and Nonketotic hyperosmolar hyperglycemia. PLAN:  
Respiratory: 1. Subjective SOB: 
- SOB prior to arrival, now resolved. No hypoxia. Lungs CTA b/l. CXR with no acute process Cardiovascular: 1. Shock; Hx HTN: 
- hold home antihypertensives given that patient is currently in shock 2. NSTEMI:  
- trop 0.08 with no ST changes on EKG, most likely type 2 nstemi from demand ischemia; continue to trend troponin. Renal: 1. MATTIE; Hypovolemic Hypernatremia; Urosepsis: 
- Creatinine 1.53 up from a baseline of 0.46 in 5/2018; holm catheter placed and is draining frankly purulent urine - Na 157 (corrected for glucose is 171); received 2.7L NS bolus in ER and starting 1/2NS @ 75cc/hr 
- recheck BMP now and then every 4 hours, monitoring closely to prevent correcting too quickly. - UTI addressed below Infectious Disease: 1. Septic shock due to UTI: 
- s/p 2.7L NS  Bolus in ER (more than his 30cc/kg) but BP remained low. Started on Levophed peripherally. Patient's arms are very small and even a small size adult cuff is too large for him, making his BP readings artificially too low. Changed BP cuff to leg with improved BP (MAP 81); currently on 10mcg Levophed. Will continue to wean is down. Goal MAP > 65. Will not place central line at this time as expect patient will rapidly wean off the levophed. He appears intravascularly very dry and likely needs more IVF's. - UA consistent with UTI. Blood cultures and urine culture pending; CXR clear. Flu Ag negative. Started Vanc/Zosyn/Levoflox in the ER. Will continue the Vanc and Zosyn. - lactate 2.3 worsened to 4.7 following IVF boluses. Will give additional 1L NS now. Continue trending lactate.  
- CT Abdomen shows nonobstructing renal stones and bilateral hydronephrosis and hydroureter Endocrine: 1. Nonketotic hyperosmolar hyperglycemia: 
- Blood glucose 985 with AG 13 and no ketones in urine. Started on insulin gtt. BG already improved to 200's. Continue insulin gtt. GI: 
1. Dysphagia: patient's nursing home reported he was having dysphagia prior to transfer; Will need a swallow eval prior to starting a diet. NPO; start GI prophylaxis Hematologic: 
No active issues; DVT prophylaxis Neurologic: 1. Hx Cerebral palsy and Mental Retardation: reportedly is at his neurologic baseline Best Practice: · Sepsis Bundle per Hospital Protocol · Glycemic control; avoid Hypoglycemia · IHI ICU Bundles: ·  Holm Bundle Followed · Stress ulcer prophylaxis. Famotidine · DVT prophylaxis. heparin · Need for Lines, holm assessed. · Restraints need. · Palliative care evaluation. NA 
· Code Status: FULL Total of  60  min critical care time spent at bedside during the course of care providing evaluation,management and care decisions and ordering appropriate treatment related to critical care problems exclusive of procedures. The reason for providing this level of medical care for this critically ill patient was due a critical illness that impaired one or more vital organ systems such that there was a high probability of imminent or life threatening deterioration in the patients condition. This care involved high complexity decision making to assess, manipulate, and support vital system functions, to treat this degree vital organ system failure and to prevent further life threatening deterioration of the patients condition. Emily Wilder MD 
Pulmonary & Critical Care

## 2019-11-19 NOTE — DIABETES MGMT
NUTRITIONAL ASSESSMENT GLYCEMIC CONTROL/ PLAN OF CARE Aj Barker           81 y.o.           11/17/2019 1. Septic shock ([address] 2. Urinary retention 3. Hypernatremia 4. Hyperglycemia 5. Leukocytosis, unspecified type DM INTERVENTIONS/PLAN:  
Observed pt being fed the pureed diet with Honey thick po supplements BID ([place] Instant Breakfast)-intake 100%. Encourage increased intake at meals to meet calorie and protein requirements. ASSESSMENT:  
Nutritional Status:  Pt is 100% ideal weight (BMI - 20.2 kg/m2; normal BMI) but with thin apparance and muscle atrophy noted upper and lower extremities r/t cerebral palsy. Nutrition Diagnoses:  
Difficulty swallowing due to dysphagia as evidenced by MBS results and orders for pureed diet w/ honey thick liquids. Altered nutrition related labs due to T2DM as evidenced by admission with [facility], glucose of 985 mg/dl, A1C - 9.8%. Increased nutrient needs due to wound healing as evidenced by stage 2 wound sacrum. Diabetes Management:  
 
Recent blood glucose: Within target range (non-ICU: <140; ICU<180): [] Yes   [x]  No 
 
Current Insulin regimen:  
Lantus to be increased to 18 units daily for 11/20. Home medication/insulin regimen:  
[facility]:  Metformin 500 mg BID HbA1c: 9.8% - ave BG ~ 234 mg/dL over past 3 months Adequate glycemic control PTA:  [] Yes  [x] No 
  
 
SUBJECTIVE/OBJECTIVE: Information obtained from: Pt and  RN Pt with PMHx of Cerebral palsy, Mental retardation, HTN, and DM. [facility] records show pt was receiving mechancal soft diet w/ thin liquids. Diet:   pureed w/  honey thick liquids and CIB supplement. No data found. Medications: [x]                Reviewed IMost Recent POC Glucose:  
Recent Labs 11/19/19 
0403 11/18/19 
2300 11/18/19 
1502 11/18/19 
1030 * 283* 138* 180* Labs:  
Lab Results Component Value Date/Time Hemoglobin A1c 9.8 (H) 11/17/2019 11:00 AM  
 
Lab Results Component Value Date/Time Hemoglobin A1c 9.8 (H) 11/17/2019 11:00 AM  
 Hemoglobin A1c 7.6 (H) 05/11/2018 10:51 PM  
 Hemoglobin A1c 5.7 06/11/2015 10:00 PM  
 
Lab Results Component Value Date/Time Sodium 152 (H) 11/19/2019 04:03 AM  
 Potassium 3.2 (L) 11/19/2019 04:03 AM  
 Chloride 125 (H) 11/19/2019 04:03 AM  
 CO2 19 (L) 11/19/2019 04:03 AM  
 Anion gap 8 11/19/2019 04:03 AM  
 Glucose 339 (H) 11/19/2019 04:03 AM  
 BUN 18 11/19/2019 04:03 AM  
 Creatinine 0.70 11/19/2019 04:03 AM  
 Calcium 6.6 (L) 11/19/2019 04:03 AM  
 Magnesium 1.8 11/19/2019 04:03 AM  
 Phosphorus 3.9 11/17/2019 11:00 AM  
 Albumin 3.1 (L) 11/17/2019 11:01 AM  
 
 
Anthropometrics: IBW : 53.2 kg (117 lb 4.6 oz)(Hamwi method minus 10%), % IBW (Calculated): 100.19 %, BMI (calculated): 22.5 Wt Readings from Last 1 Encounters:  
11/19/19 59.4 kg (130 lb 15.3 oz) Ht Readings from Last 1 Encounters:  
11/17/19 5' 4\" (1.626 m) Last Weight Metrics: 
Weight Loss Metrics 11/19/2019 5/17/2018 6/12/2015 Today's Wt 130 lb 15.3 oz 124 lb 100 lb BMI 22.48 kg/m2 20.63 kg/m2 19.53 kg/m2 Estimated Nutrition Needs:  1866 Kcals/day, Protein (g): 80 g Fluid (ml): 1900 ml Based on:   [x]          Actual BW    []          ABW   []            Adjusted BW   
    
Nutrition Interventions: 
Insulin recommendations Po supplements - No Added Sugar [place] Instant Breakfast BID - honey thick Goal:  
Blood glucose will be within target range of  mg/dL by 11/22/19. Pt will consume > 75% meals by 11/24/19. Weight maintenance (+/- 1-2 kg) or weight gain of 1-2 lbs/week by 11/30/19. Nutrition Monitoring and Evaluation   
 
[x]     Monitor po intake on meal rounds 
[x]     Continue inpatient monitoring and intervention 
[]     Other: 
 
 
Nutrition Risk:  []   High     [x]  Moderate    []  Minimal/Uncompromised Brad Mittal, RD

## 2019-11-19 NOTE — PROGRESS NOTES
[address] [place] [address] [facility] Pulmonary, Critical Care, and Sleep Medicine Name: Alexandru Fisher MRN: [id number] : 1950 Hospital: [facility] Date: 2019  Consulted By:  TYE BLAKE  
 
Critical Care History and Physical 
 
 
Subjective/History:  
Patient is a 81 y.o. male with hx Cerebral palsy, Mental retardation, HTN, and DM, presents to the ER from [facility] with c/o SOB, Fever, and Dysphagia x 1 day. Patient awake and obeying commands but garbled speech and is not oriented; per EMS, this is patient's baseline. In the ER patient found to be in septic shock due to urosepsis with Temp 102. 6. He was also found to have nonketotic hyperglycemia with , MATTIE, and Severe hypernatremia of 157 (171 when corrected for blood glucose). Patient given 2728cc NS bolus in ER, starting in insulin gtt, and broad spectrum antibiotics. His BP remained low at 80/54 and he was started on levophed peripherally. Weaned off levophed  @ 1:40am. 
 
Overnight: Phlebotomy and RN's unable to draw blood for labs despite multiple attempts. Will order PICC line. No other overnight events. Patient slept most of yesterday but today is eating some pudding this early AM. Patient awakens to voice on my exam, denies pain. The patient is critically ill and can not provide additional history due to Unable to comprehend. Past Medical History:  
Diagnosis Date  Cerebral palsy ([address]  Hypertension  Mild mental retardation  Polio Prior to Admission medications Medication Sig Start Date End Date Taking? Authorizing Provider  
cholecalciferol (VITAMIN D3) 1,000 unit cap Take 1,000 Units by mouth daily. Yes Jagdeep, MD Mak  
meclizine (ANTIVERT) 12.5 mg tablet Take 12.5 mg by mouth three (3) times daily as needed. Yes Jagdeep, MD Mak  
fenofibrate micronized (LOFIBRA) 67 mg capsule Take 67 mg by mouth every morning.    Yes Mak Gannon MD  
 polyvinyl alcohol (LIQUIFILM TEARS) 1.4 % ophthalmic solution Administer 1 Drop to both eyes as needed. Yes Mak Gannon MD  
fenofibrate nanocrystallized (TRICOR) 48 mg tablet Take 1 Tab by mouth daily. 6/15/15  Yes Ambrose Veliz MD  
polyvinyl alcohol (LIQUIFILM TEARS) 1.4 % ophthalmic solution Administer 1 Drop to both eyes four (4) times daily. Yes Mak Gannon MD  
aspirin 81 mg chewable tablet Take 81 mg by mouth daily. Yes Mak Gannon MD  
baclofen (LIORESAL) 10 mg tablet Take  by mouth three (3) times daily. Yes Mak Gannon MD  
calcium carbonate (TUMS) 200 mg calcium (500 mg) chew Take 1 Tab by mouth three (3) times daily. Yes Mak Gannon MD  
FENOFIBRATE MICRONIZED PO Take 200 mg by mouth daily. Yes Mak Gannon MD  
metFORMIN (GLUCOPHAGE) 850 mg tablet Take 500 mg by mouth two (2) times daily (with meals). Yes Mak Gannon MD  
calcium-vitamin D (OYSTER SHELL CALCIUM-VIT D3) 250-125 mg-unit tablet Take 1 Tab by mouth two (2) times a day. Yes Jagdeep, MD Mak  
polyethylene glycol (MIRALAX) 17 gram packet Take 17 g by mouth every other day. Yes Mak Gannon MD  
camphor-methyl salicyl-menthol (SALONPAS) ptmd 1 Patch by Apply Externally route daily. Yes Jagdeep, MD Mak  
multivitamin, tx-iron-ca-min (THERAPY M) 9 mg iron-400 mcg tab tablet Take 1 Tab by mouth daily. Yes Mak Gannon MD  
ergocalciferol (VITAMIN D2) 50,000 unit capsule Take 50,000 Units by mouth every Tuesday. Yes Jagdeep, MD Mak  
lisinopril (PRINIVIL, ZESTRIL) 2.5 mg tablet Take 2.5 mg by mouth daily. Yes Mak Gannon MD  
acetaminophen (TYLENOL ARTHRITIS PAIN) 650 mg CR tablet Take 650 mg by mouth every six (6) hours as needed for Pain. Yes Jagdeep, MD Mak  
loperamide (IMMODIUM) 2 mg tablet Take 2 mg by mouth four (4) times daily as needed for Diarrhea. Yes Mak Gannon MD  
promethazine (PHENERGAN) 25 mg tablet Take 25 mg by mouth every eight (8) hours as needed for Nausea.    Yes Other, MD Mak  
 
 Current Facility-Administered Medications Medication Dose Route Frequency  potassium chloride 10 mEq in 100 ml IVPB  10 mEq IntraVENous Q1H  
 0.45% sodium chloride infusion  75 mL/hr IntraVENous CONTINUOUS  
 magnesium sulfate 1 g/100 ml IVPB (premix or compounded)  1 g IntraVENous ONCE  
 insulin lispro (HUMALOG) injection   SubCUTAneous AC&HS  insulin glargine (LANTUS) injection 12 Units  12 Units SubCUTAneous DAILY  sodium chloride (NS) flush 5-40 mL  5-40 mL IntraVENous Q8H  
 heparin (porcine) injection 5,000 Units  5,000 Units SubCUTAneous Q8H  piperacillin-tazobactam (ZOSYN) 3.375 g in 0.9% sodium chloride (MBP/ADV) 100 mL MBP  #EXTENDED 4-HOUR INFUSION##  3.375 g IntraVENous Q8H  
 famotidine (PF) (PEPCID) 20 mg in sodium chloride 0.9% 10 mL injection  20 mg IntraVENous Q12H  
 influenza vaccine - (6 mos+)(PF) (FLUARIX/FLULAVAL/FLUZONE QUAD) injection 0.5 mL  0.5 mL IntraMUSCular PRIOR TO DISCHARGE No Known Allergies Social History Tobacco Use  Smoking status: Never Smoker Substance Use Topics  Alcohol use: No  
  
No family history on file. Review of Systems: 
Review of systems not obtained due to patient factors. Objective:  
Vital Signs:   
Visit Vitals /66 Pulse 75 Temp 97.3 °F (36.3 °C) Resp 22 Ht 5' 4\" (1.626 m) Wt 59.4 kg (130 lb 15.3 oz) SpO2 98% BMI 22.48 kg/m² O2 Device: Room air O2 Flow Rate (L/min): 4 l/min Temp (24hrs), Av.3 °F (36.3 °C), Min:96.7 °F (35.9 °C), Max:97.8 °F (36.6 °C) Intake/Output:  
Last shift:      No intake/output data recorded. Last 3 shifts:  1901 -  0700 In: 66778.7 [I.V.:65518.7] Out:  [QQRUP:5167] Intake/Output Summary (Last 24 hours) at 2019 6974 Last data filed at 2019 0600 Gross per 24 hour Intake 2674.52 ml Output 1275 ml Net 1399.52 ml Physical Exam:  
General:   Well nourished adult male, Awake/alert, Atrophied upper and lower extremities related to his cerebral palsy, cooperative, NAD HEENT:   NCAT, PERRL, OP clear, MM are MOIST (as opposed to 11/17 when they were very dry) Neck: Supple, trachea midline. Lungs:   Symmetrical chest rise; good AE bilat; CTAB; no wheezes/rhonchi/rales noted. Heart:  RRR, S1, S2 normal, no m/r/g Abdomen:   Soft, non-tender. Bowel sounds normal.   
Extremities: Extremities with muscle atrophy, atraumatic, no cyanosis or edema. Pulses: 2+ and symmetric all extremities. Skin: Skin color, texture, turgor normal. No rashes or lesions Neurologic: Grossly nonfocal, moving all extremities, obeying commands, garbled/slurred speech, not oriented :  Cloudy yellow urine in holm catheter with much sedimentation Data:  
 
Recent Results (from the past 24 hour(s)) GLUCOSE, POC Collection Time: 11/18/19  9:53 AM  
Result Value Ref Range Glucose (POC) 181 (H) 70 - 110 mg/dL Leia Sorto Collection Time: 11/18/19  9:57 AM  
Result Value Ref Range Glucose 181 mg/dL Insulin order 7.3 units/hour Insulin adminstered 7.3 units/hour Multiplier 0.060 Low target 140 mg/dL High target 180 mg/dL D50 order 0.0 ml  
 D50 administered 0.00 ml Minutes until next BG 60 min Order initials KT Administered initials KT   
MAGNESIUM Collection Time: 11/18/19 10:30 AM  
Result Value Ref Range Magnesium 2.2 1.6 - 2.6 mg/dL METABOLIC PANEL, BASIC Collection Time: 11/18/19 10:30 AM  
Result Value Ref Range Sodium 160 (H) 136 - 145 mmol/L Potassium 4.2 3.5 - 5.5 mmol/L Chloride 136 (H) 100 - 111 mmol/L  
 CO2 19 (L) 21 - 32 mmol/L Anion gap 5 3.0 - 18 mmol/L Glucose 180 (H) 74 - 99 mg/dL BUN 27 (H) 7.0 - 18 MG/DL Creatinine 0.58 (L) 0.6 - 1.3 MG/DL  
 BUN/Creatinine ratio 47 (H) 12 - 20 GFR est AA >60 >60 ml/min/1.73m2 GFR est non-AA >60 >60 ml/min/1.73m2  Calcium 7.1 (L) 8.5 - 10.1 MG/DL  
LACTIC ACID  
 Collection Time: 11/18/19 10:30 AM  
Result Value Ref Range Lactic acid 2.2 (HH) 0.4 - 2.0 MMOL/L  
GLUCOSE, POC Collection Time: 11/18/19 11:44 AM  
Result Value Ref Range Glucose (POC) 138 (H) 70 - 110 mg/dL Jone Basil Collection Time: 11/18/19 11:44 AM  
Result Value Ref Range Glucose 138 mg/dL Insulin order 3.7 units/hour Insulin adminstered 3.7 units/hour Multiplier 0.048 Low target 140 mg/dL High target 180 mg/dL D50 order 0.0 ml  
 D50 administered 0.00 ml Minutes until next BG 60 min Order initials more Administered initials more GLUCOSE, POC Collection Time: 11/18/19 12:58 PM  
Result Value Ref Range Glucose (POC) 134 (H) 70 - 110 mg/dL Jone Basil Collection Time: 11/18/19 12:59 PM  
Result Value Ref Range Glucose 134 mg/dL Insulin order 2.8 units/hour Insulin adminstered 2.8 units/hour Multiplier 0.038 Low target 140 mg/dL High target 180 mg/dL D50 order 0.0 ml  
 D50 administered 0.00 ml Minutes until next BG 60 min Order initials more Administered initials more GLUCOSE, POC Collection Time: 11/18/19  2:02 PM  
Result Value Ref Range Glucose (POC) 132 (H) 70 - 110 mg/dL Jone Basil Collection Time: 11/18/19  2:03 PM  
Result Value Ref Range Glucose 132 mg/dL Insulin order 2.0 units/hour Insulin adminstered 2.0 units/hour Multiplier 0.028 Low target 140 mg/dL High target 180 mg/dL D50 order 0.0 ml  
 D50 administered 0.00 ml Minutes until next BG 60 min Order initials KT Administered initials KT   
MRSA SCREEN - PCR (NASAL) Collection Time: 11/18/19  2:31 PM  
Result Value Ref Range Special Requests: NO SPECIAL REQUESTS Culture result: MRSA target DNA is not detected (presumptive not colonized with MRSA) MAGNESIUM Collection Time: 11/18/19  3:02 PM  
Result Value Ref Range Magnesium 2.3 1.6 - 2.6 mg/dL METABOLIC PANEL, BASIC  
 Collection Time: 11/18/19  3:02 PM  
Result Value Ref Range Sodium 163 (HH) 136 - 145 mmol/L Potassium 4.1 3.5 - 5.5 mmol/L Chloride 140 (H) 100 - 111 mmol/L  
 CO2 16 (L) 21 - 32 mmol/L Anion gap 7 3.0 - 18 mmol/L Glucose 138 (H) 74 - 99 mg/dL BUN 27 (H) 7.0 - 18 MG/DL Creatinine 0.56 (L) 0.6 - 1.3 MG/DL  
 BUN/Creatinine ratio 48 (H) 12 - 20 GFR est AA >60 >60 ml/min/1.73m2 GFR est non-AA >60 >60 ml/min/1.73m2 Calcium 7.2 (L) 8.5 - 10.1 MG/DL  
LACTIC ACID Collection Time: 11/18/19  3:02 PM  
Result Value Ref Range Lactic acid 3.2 (HH) 0.4 - 2.0 MMOL/L  
GLUCOSE, POC Collection Time: 11/18/19  3:06 PM  
Result Value Ref Range Glucose (POC) 142 (H) 70 - 110 mg/dL Jone Basil Collection Time: 11/18/19  3:06 PM  
Result Value Ref Range Glucose 142 mg/dL Insulin order 2.3 units/hour Insulin adminstered 2.3 units/hour Multiplier 0.028 Low target 140 mg/dL High target 180 mg/dL D50 order 0.0 ml  
 D50 administered 0.00 ml Minutes until next BG 60 min Order initials more Administered initials more GLUCOSE, POC Collection Time: 11/18/19  4:10 PM  
Result Value Ref Range Glucose (POC) 117 (H) 70 - 110 mg/dL Jone Basil Collection Time: 11/18/19  4:10 PM  
Result Value Ref Range Glucose 117 mg/dL Insulin order 1.0 units/hour Insulin adminstered 1.0 units/hour Multiplier 0.018 Low target 140 mg/dL High target 180 mg/dL D50 order 0.0 ml  
 D50 administered 0.00 ml Minutes until next BG 60 min Order initials KT Administered initials KT   
GLUCOSE, POC Collection Time: 11/18/19  4:38 PM  
Result Value Ref Range Glucose (POC) 108 70 - 110 mg/dL GLUCOSE, POC Collection Time: 11/18/19  9:50 PM  
Result Value Ref Range Glucose (POC) 228 (H) 70 - 110 mg/dL METABOLIC PANEL, BASIC Collection Time: 11/18/19 11:00 PM  
Result Value Ref Range  Sodium 156 (H) 136 - 145 mmol/L  
 Potassium 4.5 3.5 - 5.5 mmol/L Chloride 130 (H) 100 - 111 mmol/L  
 CO2 17 (L) 21 - 32 mmol/L Anion gap 9 3.0 - 18 mmol/L Glucose 283 (H) 74 - 99 mg/dL BUN 22 (H) 7.0 - 18 MG/DL Creatinine 0.71 0.6 - 1.3 MG/DL  
 BUN/Creatinine ratio 31 (H) 12 - 20 GFR est AA >60 >60 ml/min/1.73m2 GFR est non-AA >60 >60 ml/min/1.73m2 Calcium 7.0 (L) 8.5 - 10.1 MG/DL MAGNESIUM Collection Time: 11/18/19 11:00 PM  
Result Value Ref Range Magnesium 2.1 1.6 - 2.6 mg/dL Gwendloyn Gibson Collection Time: 11/18/19 11:00 PM  
Result Value Ref Range Vancomycin,trough 17.4 10.0 - 20.0 ug/mL MAGNESIUM Collection Time: 11/19/19  4:03 AM  
Result Value Ref Range Magnesium 1.8 1.6 - 2.6 mg/dL CBC W/O DIFF Collection Time: 11/19/19  4:03 AM  
Result Value Ref Range WBC 11.9 4.6 - 13.2 K/uL  
 RBC 4.01 (L) 4.70 - 5.50 M/uL  
 HGB 11.2 (L) 13.0 - 16.0 g/dL HCT 35.9 (L) 36.0 - 48.0 % MCV 89.5 74.0 - 97.0 FL  
 MCH 27.9 24.0 - 34.0 PG  
 MCHC 31.2 31.0 - 37.0 g/dL  
 RDW 16.6 (H) 11.6 - 14.5 % PLATELET 833 465 - 402 K/uL MPV 11.2 9.2 - 68.9 FL  
METABOLIC PANEL, BASIC Collection Time: 11/19/19  4:03 AM  
Result Value Ref Range Sodium 152 (H) 136 - 145 mmol/L Potassium 3.2 (L) 3.5 - 5.5 mmol/L Chloride 125 (H) 100 - 111 mmol/L  
 CO2 19 (L) 21 - 32 mmol/L Anion gap 8 3.0 - 18 mmol/L Glucose 339 (H) 74 - 99 mg/dL BUN 18 7.0 - 18 MG/DL Creatinine 0.70 0.6 - 1.3 MG/DL  
 BUN/Creatinine ratio 26 (H) 12 - 20 GFR est AA >60 >60 ml/min/1.73m2 GFR est non-AA >60 >60 ml/min/1.73m2 Calcium 6.6 (L) 8.5 - 10.1 MG/DL  
LACTIC ACID Collection Time: 11/19/19  4:03 AM  
Result Value Ref Range Lactic acid 3.2 (HH) 0.4 - 2.0 MMOL/L  
GLUCOSE, POC Collection Time: 11/19/19  5:15 AM  
Result Value Ref Range Glucose (POC) 311 (H) 70 - 110 mg/dL GLUCOSE, POC Collection Time: 11/19/19  8:47 AM  
Result Value Ref Range Glucose (POC) 180 (H) 70 - 110 mg/dL No results for input(s): FIO2I, IFO2, HCO3I, IHCO3, HCOPOC, PCO2I, PCOPOC, IPHI, PHI, PHPOC, PO2I, PO2POC in the last 72 hours. No lab exists for component: IPOC2 Imaging: 
I have personally reviewed the patients radiographs and have reviewed the reports: 
 
CXR [11/17]: no infiltrates or other acute process CT CHEST/ABD/PELVIS [11/17]: bronchiolitis, hepatic steatosis, GB absent, bilateral hydronephrosis and hydroureter with small non-obstructing stones. IMPRESSION:  
74yoM with hx Cerebral palsy, Mental retardation, HTN, and DM, presents to the ER from [facility] with c/o SOB, Fever, and Dysphagia x 1 day, found to have septic shock due to urosepsis, MATTIE, Hypernatremia, and Nonketotic hyperosmolar hyperglycemia. PLAN:  
Respiratory: No active issues Cardiovascular: 1. Shock (resolved); Hx HTN: 
- shock now resolved but BP soft on 11/18, now normotensive today; continue to hold home antihypertensives 2. NSTEMI:  
- trop peaked at 0.08 and since trended down; no ST changes on EKG, most likely type 2 nstemi from demand ischemia. Renal: 1. MATTIE; Hypovolemic Hypernatremia; Urosepsis; Hypokalemia; Hypomagnesemia: 
- Creatinine 1.53 up from a baseline of 0.46 in 5/2018; holm catheter placed and is draining frankly purulent urine. Now Creatinine down to 0.70 and BUN improved to 18 this AM. MATTIE has now resolved. UOP 50-100cc/hr overnight 
- Na 157 (corrected for glucose is 171) on admission >> now 152. IVF's changed to 1/2NS @ 75cc/hr 
- change BMP's to daily only 
- UTI addressed below - Low K and Mg; both being repleted Infectious Disease: 1. Septic shock (resolved) due to UTI: 
- weaned off levophed 11/18 early AM; lactate improved. - UA consistent with UTI. Blood cultures and urine culture remain no growth; CXR clear. Flu Ag negative. Started Vanc/Zosyn/Levoflox in the ER. MRSA nares PCR negative. Will d/c Vanc; continue Zosyn. - CT Abdomen shows nonobstructing renal stones and bilateral hydronephrosis and hydroureter - Renal ultrasound (to follow-up on hydronephrosis) is ordered - Order PICC due to poor access - PCCM will now sign off as shock is resolved and hypernatremia improving; patient is stable to be transferred out of ICU. Endocrine: 1. Nonketotic hyperosmolar hyperglycemia: 
- Blood glucose 985 on admission with AG 13 and no ketones in urine. Weaned off Insulin gtt on 11/18. BG in 300's this AM. Increase SSI from normal to resistant. Continue Lantus 12u daily. The changes of IVF's form a dextrose containing fluid to a non-dextrose containing fluid will also help the hyperglycemia. GI: 
1. Dysphagia: patient's nursing home reported he was having dysphagia prior to transfer;  
- speech consult following. MBS on 11/18 showed aspiration with everything but pudding-thick. Very cautiously feeding now with maximum aspiration precautions. If he aspirates despite these precautions in place or if he is not meeting his nutritional goals, will need to consider PEG. - continue GI prophylaxis (can stop once he is eating well) 
- nutrition consult following Hematologic: 
No active issues; DVT prophylaxis Neurologic: 1. Hx Cerebral palsy and Mental Retardation: reportedly is at his neurologic baseline 
- consult PT/OT Best Practice: · Sepsis Bundle per Hospital Protocol · Glycemic control; avoid Hypoglycemia · IHI ICU Bundles: ·  Holm Bundle Followed · Stress ulcer prophylaxis. Famotidine · DVT prophylaxis. heparin · Need for Lines, holm assessed. · Restraints need. · Palliative care evaluation. NA 
· Code Status: FULL Total of 35 min critical care time spent at bedside during the course of care providing evaluation,management and care decisions and ordering appropriate treatment related to critical care problems exclusive of procedures. The reason for providing this level of medical care for this critically ill patient was due a critical illness that impaired one or more vital organ systems such that there was a high probability of imminent or life threatening deterioration in the patients condition. This care involved high complexity decision making to assess, manipulate, and support vital system functions, to treat this degree vital organ system failure and to prevent further life threatening deterioration of the patients condition. Angeline Silva MD 
Pulmonary & Critical Care

## 2019-11-19 NOTE — CDMP QUERY
Pt admitted with sepsis and hypotension with shock being documented by Pulmonary. If possible, could you please document in the progress notes and d/c summary if patient was being evaluated  and / or treated for any of the following: 
 
=> hypotension; shock ruled out after study 
=> Cardiogenic Shock 
=> Septic Shock 
=> Hypovolemic Shock 
=> Other Shock, Please specify 
=> Other explanation of clinical findings, please specify 
=> Clinically unable to determine The medical record reflects the following: 
 
----> Risk Factors: 81 yr old male with hypotension 
 
----> Clinical Indicators:  
    * 11-17-19 H&P:  \". Stevenson Muck Stevenson Muck Sepsis - hypotension, elevated WBC, febrile (102.6) on arrival.... hypotension and elevated WBC could be explained by hypovolemia and hemoconcentration. ..- cont levophed and wean as tolerated. Stevenson Muck Stevenson Muck \" 
    * 11-17-19 Pulmonary Consult:  \". ..found to have septic shock due to urosepsis. Stevenson Muck Stevenson Muck Stevenson Muck Cardiovascular: 1. Shock; 2. NSTEMI:  - trop 0.08 with no ST changes on EKG, most likely type 2 nstemi from demand ischemia; continue to trend troponin.  
 
----> Treatment: fluid boluses; IV levophed Thank you for your time, 
Sarai Tsang, [facility] 
[phone]

## 2019-11-19 NOTE — PROGRESS NOTES
Problem: Dysphagia (Adult) Goal: *Acute Goals and Plan of Care (Insert Text) Description Dysphagia Present: mod oral, mod- severe pharyngeal 
Aspiration: silent with thin and nectar Recommendations: 
Diet: puree via half tsp, HONEY via TSP Meds: crushed in puree Aspiration Precautions Oral Care TID Other: HOB 55-60* for all po feeds, >/= 45* at least 30 minutes following Goals:  Patient will: 1. Tolerate PO trials with no overt s/s aspiration or distress in 4/5 trials 2. Utilize compensatory swallow strategies/maneuvers (decrease bite/sip, size/rate, alt. liq/sol) with min cues in 4/5 trials 3. Perform oral-motor/laryngeal strengthening exercises with min cues to increase oropharyngeal swallow function 4. Complete an objective swallow study (i.e., MBSS) to assess swallow integrity, r/o aspiration, and determine of safest LRD, min A-- met 11/18/19 Outcome: Progressing Towards Goal 
 
SPEECH LANGUAGE PATHOLOGY DYSPHAGIA TREATMENT Patient: Britt Rodríguez (89 y.o. male) Date: 11/19/2019 Diagnosis: Hypernatremia [E87.0] Hydronephrosis [N13.30] Sepsis ([address] [A41.9] Uncontrolled type 2 diabetes mellitus with hyperosmolar nonketotic hyperglycemia ([address] Precautions: silent aspiration PLOF:as per H&P  
 
ASSESSMENT: 
Pt seen b/s for dysphagia tx with diet tolerance. Pt aroused by voice, pleasant, talkative this morning. Pt accepted puree via half tsp, honey thick liquids via tsp. Pt continues with decreased oral initiation, slow A-P transit with trace anterior buccal residue following pudding which pt was able to clear somewhat with tongue sweep to behind teeth, liquid wash aided in completely clearing. Pt also continues with mildly delayed swallow response with decreased and weak/ slowed laryngeal elevation, 2 swallows per presentation (cleared honey thick residue on MBS, no pharyngeal residue following pudding on MBS).   Pt without overt/ soft/ silent s/sx aspiration following above presentations. Pt consumed total of 50% pudding and 25% honey thick snack. Rec: continue puree solids via half tsp/ honey thick liquids via tsp only, strict aspiration precautions, HOB 55-60* for all po and at least 30 minutes following, decreased rate of intake, HOB >/= 30* at all times. SLP will continue to follow for diet tolerance/ safety. Progression toward goals: 
?         Improving appropriately and progressing toward goals ? Improving slowly and progressing toward goals ? Not making progress toward goals and plan of care will be adjusted PLAN: 
Recommendations and Planned Interventions: 
continue puree solids via half tsp/ honey thick liquids via tsp only, strict aspiration precautions, HOB 55-60* for all po and at least 30 minutes following, decreased rate of intake, HOB >/= 30* at all times. Patient continues to benefit from skilled intervention to address the above impairments. Continue treatment per established plan of care. Discharge Recommendations:  Missael Michel and To Be Determined SUBJECTIVE:  
Patient stated There's no coffee?  OBJECTIVE:  
Cognitive and Communication Status: 
Neurologic State: Alert Orientation Level: Oriented to person, Disoriented to place Cognition: Follows commands Perception: Appears intact Perseveration: No perseveration noted Safety/Judgement: Fall prevention, Lack of insight into deficits Dysphagia Treatment: 
Oral Assessment: 
Oral Assessment Labial: Decreased rate Dentition: Natural 
Oral Hygiene: fair Lingual: Decreased rate, Decreased strength Velum: No impairment Mandible: No impairment P.O. Trials: 
 Patient Position: HOB 55* Vocal quality prior to P.O.: No impairment Consistency Presented: Puree, Honey thick liquid How Presented: SLP-fed/presented, Spoon Bolus Acceptance: No impairment Bolus Formation/Control: Impaired Type of Impairment: Delayed Propulsion: Delayed (# of seconds) Oral Residue: Less than 10% of bolus, Anterior(trace amount) Initiation of Swallow: Delayed (# of seconds) Laryngeal Elevation: Decreased, Weak Aspiration Signs/Symptoms: None Pharyngeal Phase Characteristics: Audible swallow, Double swallow, Suspected pharyngeal residue Effective Modifications: Alternate liquids/solids, Double swallow, Spoon, Small sips and bites Cues for Modifications: Moderate Oral Phase Severity: Moderate Pharyngeal Phase Severity : Moderate-severe PAIN: 
Pain level pre-treatment: 0/10 Pain level post-treatment: 0/10 After treatment:  
?              Patient left in no apparent distress sitting up in chair ? Patient left in no apparent distress in bed 
? Call bell left within reach ? Nursing notified ? Family present ? Caregiver present ? Bed alarm activated COMMUNICATION/EDUCATION:  
? Aspiration precautions; swallow safety; compensatory techniques ? Patient unable to participate in education; education ongoing with staff ? Posted safety precautions in patient's room. ? Oral-motor/laryngeal strengthening exercises Yovany Govea MS, CCC/SLP Time Calculation: 27 mins

## 2019-11-19 NOTE — PROGRESS NOTES
Problem: Self Care Deficits Care Plan (Adult) Goal: *Acute Goals and Plan of Care (Insert Text) Description Occupational Therapy Goals Initiated 11/19/2019 within 7 day(s). 1.  Patient will participate in PROM and functional bed mobility, rolling, with rehab tech 3-5 times per week for improving participation with ADLs. Prior Level of Function: Patient living in [address] [place] [address] and receiving total assistance for ADLs. Outcome: Progressing Towards Goal 
 OCCUPATIONAL THERAPY EVALUATION Patient: Alfredo Barroso (30 y.o. male) Date: 11/19/2019 Primary Diagnosis: Hypernatremia [E87.0] Hydronephrosis [N13.30] Sepsis ([facility] [address] [A41.9] Precautions: Fall, Skin, Aspiration, Seizures PLOF: Pt was living at [facility] and was receiving total assistance for ADLs. ASSESSMENT : 
Based on the objective data described below, the patient presents positioned in upright sitting posture in bed. Pt was agreeable to OT assessment and was cleared by nursing to be seen. Pt relayed that he needed to be changed and perseverated on this until activity was completed. Pt required max assist X2 for rolling side to side and required total assistance for hygiene. 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 Pt will participate in [facility] program 3-5 times a week to decrease risk of contractures, maximize skin integrity, and improve functional rolling to increase participation for ADLs. Patient will benefit from [facility] program 3-5 times a week with rehab tech  to address the above impairments. Patient's rehabilitation potential is considered to be Guarded Factors which may influence rehabilitation potential include: ? None noted ? Mental ability/status ? Medical condition ? Home/family situation and support systems ? Safety awareness ? Pain tolerance/management ? Other: PLAN : 
 Recommendations and Planned Interventions:  
?               Self Care Training                  ? Therapeutic Activities ? Functional Mobility; rolling     ? Cognitive Retraining 
? Therapeutic Exercises           ? Endurance Activities ? Balance Training                    ? Neuromuscular Re-Education ? Visual/Perceptual Training     ? Home Safety Training 
? Patient Education                   ? Family Training/Education ? Other (comment): PROM Frequency/Duration: Patient will be followed by occupational therapy 3-5 times a week to address goals. Discharge Recommendations: Missael Michel Further Equipment Recommendations for Discharge: N/A  
 
SUBJECTIVE:  
Patient stated I need my diaper changed.  OBJECTIVE DATA SUMMARY:  
 
Past Medical History:  
Diagnosis Date Cerebral palsy ([address] Hypertension Mild mental retardation Polio No past surgical history on file. Barriers to Learning/Limitations: yes;  cognitive Compensate with: visual, verbal, tactile, kinesthetic cues/model Home Situation:  
Home Situation Home Environment: Long term care Care Facility Name: [facility] # Steps to Enter: 0 One/Two Story Residence: One story Living Alone: No 
Support Systems: Skilled nursing facility Patient Expects to be Discharged to[de-identified] Skilled nursing facility Current DME Used/Available at Home: Wheelchair Tub or Shower Type: Other (comment)(living in LTC facility; accessible) Cognitive/Behavioral Status: 
Neurologic State: Alert Orientation Level: Oriented to person Cognition: Decreased command following Safety/Judgement: Fall prevention;Lack of insight into deficits Skin: Bandage on sacral area. Bruising on L hand. Edema: Mild edema in L hand Coordination: BUE Coordination: Grossly decreased, non-functional 
 Fine Motor Skills-Upper: Left Impaired;Right Impaired Gross Motor Skills-Upper: Left Impaired;Right Impaired Balance: 
Sitting: With support Strength: BUE Strength: Grossly decreased, non-functional 
 
Tone & Sensation: BUE Tone: Normal during gross assessment today No overt spasticity or hypotonia observed Range of Motion: BUE 
AROM: Grossly decreased, non-functional 
PROM: Grossly decreased, non-functional 
 
Functional Mobility and Transfers for ADLs: 
Bed Mobility: 
Rolling: Assist x2;Total assistance Scooting: Total assistance;Assist x2 ADL Assessment:  
Oral Facial Hygiene/Grooming: Total assistance Bathing: Total assistance Upper Body Dressing: Total assistance Lower Body Dressing: Total assistance ADL Intervention: 
Patient requiring max A X2 for rolling to perform toilet hygiene activities today. Patient required total assistance throughout. Cognitive Retraining Safety/Judgement: Fall prevention;Lack of insight into deficits Pain: No pain reported during assessment today. Pain Intervention(s): Medication (see MAR); Rest, Ice, Repositioning Response to intervention: Nurse notified, See doc flow Activity Tolerance:  
Fair with rolling. Noted to have flushing in face with activities. Please refer to the flowsheet for vital signs taken during this treatment. After treatment:  
? Patient left in no apparent distress sitting up in chair ? Patient left in no apparent distress in bed 
? Call bell left within reach ? Nursing present ? Caregiver present ? Bed alarm activated COMMUNICATION/EDUCATION:  
? Role of Occupational Therapy in the acute care setting 
? Home safety education was provided and the patient/caregiver indicated understanding. ? Patient/family have participated as able in goal setting and plan of care. ? Patient/family agree to work toward stated goals and plan of care.  
? Patient understands intent and goals of therapy, but is neutral about his/her participation. ? Patient is unable to participate in goal setting and plan of care. Thank you for this referral. 
Lavern Current, OTR/L Time Calculation: 28 mins Eval Complexity: History: MEDIUM Complexity : Expanded review of history including physical, cognitive and psychosocial  history ; Examination: MEDIUM Complexity : 3-5 performance deficits relating to physical, cognitive , or psychosocial skils that result in activity limitations and / or participation restrictions; Decision Making:MEDIUM Complexity : Patient may present with comorbidities that affect occupational performnce. Miniml to moderate modification of tasks or assistance (eg, physical or verbal ) with assesment(s) is necessary to enable patient to complete evaluation

## 2019-11-19 NOTE — PROGRESS NOTES
Dr. Alla Suazo was paged and updated regarding continued, profound difficulties in obtaining the prescribed serial serum lab specimens. The small amount that was obtained this evening was not suitable, and a recollect was indicated. Pt poses significant peripheral access challenges, with several staff attempts including phlebotomy staff having marginal success. Pt is cooperative but tearful and appears uncomfortable during these lab draws. No new orders received at this time.

## 2019-11-19 NOTE — PROGRESS NOTES
Progress Note Patient: Claudene Lien               Sex: male          DOA: 11/17/2019 YOB: 1950      Age:  81 y.o.        LOS:  LOS: 2 days CHIEF COMPLAINT:  Shortness of Breath; Dysphagia; and Irregular Heart Beat Assessment/Plan:  
 
Principal Problem: 
  Uncontrolled type 2 diabetes mellitus with hyperosmolar nonketotic hyperglycemia ([address] (11/17/2019) Active Problems: 
  UTI (urinary tract infection) (6/11/2015) Cerebral palsy ([address] (6/12/2015) Hydronephrosis (11/17/2019) Hypernatremia (11/17/2019) Sepsis ([address] (11/17/2019) Elevated troponin (11/17/2019) Urinary retention (11/17/2019) AMTTIE (acute kidney injury) ([facility] [address] (11/17/2019) PLAN: 
Hyperosmolar, hyperglycemia, nonketotic state 
-Off insulin drip, cont lantus Sepsis 
- hypotension, elevated WBC, febrile (102.6) on arrival 
- UTI is only source, CXR clear, no other evidence of infection 
- hypotension and elevated WBC could be explained by hypovolemia and hemoconcentration - BCx, Ucx obtained - IV fluids 
- vanc/zosyn/levaquin and de-escalate as cultures result 
- cont levophed and wean as tolerated - PCCM consulted - appreciate Hypernatremia 
- nephrology consulted - appreciate - Improving 
- monitor BMP q6 
- aim for 10-12meq decrease per 24 hours - switch to 1/2NS, has received >4 L NS in ED MATTIE 
- 2/2 dehydration, hydronephrosis - IV fluids 
- monitor BMP Hydronephrosis 2/2 urinary retention 
- urology consulted - appreciate 
- continue holm Elevated trop - trend cardiac enzymes Dysphagia - SLP consulted, MBS ordered - Cont acceptable home medications for chronic conditions  
- DVT protocol Subjective:  
 
Pt had breakfast. Non communicative today. Objective:  
 
Visit Vitals /88 Pulse 80 Temp 97.3 °F (36.3 °C) Resp 25 Ht 5' 4\" (1.626 m) Wt 59.4 kg (130 lb 15.3 oz) SpO2 100% BMI 22.48 kg/m² Physical Exam: 
Ears: hearing is intact Eyes: Icterus is not present Lungs: clear to auscultation bilaterally Heart: regular rate and rhythm, S1, S2 normal, no murmur, click, rub or gallop Gastrointestinal: soft, non-tender. Bowel sounds normal. No masses,  no organomegaly Neurological:  New Focal Deficits are not present Psychiatric:  Mood is stable Lab/Data Reviewed: 
CMP:  
Lab Results Component Value Date/Time  (H) 11/19/2019 04:03 AM  
 K 3.2 (L) 11/19/2019 04:03 AM  
  (H) 11/19/2019 04:03 AM  
 CO2 19 (L) 11/19/2019 04:03 AM  
 AGAP 8 11/19/2019 04:03 AM  
  (H) 11/19/2019 04:03 AM  
 BUN 18 11/19/2019 04:03 AM  
 CREA 0.70 11/19/2019 04:03 AM  
 GFRAA >60 11/19/2019 04:03 AM  
 GFRNA >60 11/19/2019 04:03 AM  
 CA 6.6 (L) 11/19/2019 04:03 AM  
 MG 1.8 11/19/2019 04:03 AM  
 
CBC:  
Lab Results Component Value Date/Time WBC 11.9 11/19/2019 04:03 AM  
 HGB 11.2 (L) 11/19/2019 04:03 AM  
 HCT 35.9 (L) 11/19/2019 04:03 AM  
  11/19/2019 04:03 AM  
 
 
Imaging Reviewed: 
No results found.

## 2019-11-19 NOTE — PROGRESS NOTES
1915 Bedside shift change report given to [address] (oncoming nurse) by Vandana Bonilla RN (offgoing nurse). Report included the following information SBAR, Kardex, Procedure Summary, Intake/Output, Accordion, Recent Results, Med Rec Status and Alarm Parameters Lizzeth Mclaughlin 0495 Critical result lactic acid 3.2  
 
0500 Dr Fabby Short contacted. Lactic reported. No orders. Glucose 339 orders received to cover with insulin. 0505 Dr. Hermes Fierro contacted in regards to Na level. Orders received to D/c D5 at this time. 0700 Bedside shift change report given to Carolann Philippe (oncoming nurse) by [address] (offgoing nurse). Report included the following information SBAR, Kardex, Procedure Summary, Intake/Output, MAR, Accordion, Recent Results, Med Rec Status, Alarm Parameters , Quality Measures and Dual Neuro Assessment.

## 2019-11-19 NOTE — PROGRESS NOTES
In Patient Progress note Admit Date: 11/17/2019 Impression: 1. MATTIE likely prerenal azotemia ,obst uropathy , improved 2. Hyperosmolar nonketotic hyperglycemia 3. Sepsis  D/t UTI 4. b hydronephrosis 2/2 urinary retention 5. Hypernatremia , better 
  
Plan: 
  
1) 1/2 NS @  75 cc/hrs 2) BMP , lactic acid q12hrs 3) follow urology recs 4) renal US ordered for today to f/u on hydro 5) follow sodium later today  
  
Please call with questions, 
  
Velvet Ayoub MD [facility] Cell [phone] Pager: [phone] 
  
Subjective:  
 
Awake , alert Getting swallow study this AM 
 
Current Facility-Administered Medications:  
  potassium chloride 10 mEq in 100 ml IVPB, 10 mEq, IntraVENous, Q1H, Flower Mckeon MD 
  0.45% sodium chloride infusion, 75 mL/hr, IntraVENous, CONTINUOUS, Chung Montoya MD 
  vancomycin (VANCOCIN) 750 mg in 0.9% sodium chloride 250 mL IVPB, 750 mg, IntraVENous, Q12H, Flower Mckeon MD, Last Rate: 125 mL/hr at 11/18/19 2339, 750 mg at 11/18/19 2339   VANCOMYCIN INFORMATION NOTE, , Other, ONCE, Flower Mckeon MD 
  insulin glargine (LANTUS) injection 12 Units, 12 Units, SubCUTAneous, DAILY, Yael Jo MD, 12 Units at 11/18/19 1441   insulin lispro (HUMALOG) injection, , SubCUTAneous, AC&HS, Yael Jo MD, 4 Units at 11/18/19 2159 
  glucose chewable tablet 16 g, 4 Tab, Oral, PRN, Flower Lozano MD 
  glucagon (GLUCAGEN) injection 1 mg, 1 mg, IntraMUSCular, PRN, Yael Jo MD 
  dextrose 10% infusion 125-250 mL, 125-250 mL, IntraVENous, PRN, Yael Jo MD 
  dextrose 5% infusion, 150 mL/hr, IntraVENous, CONTINUOUS, Bicbuck, Chung Adame MD, Stopped at 11/19/19 9246   VANCOMYCIN INFORMATION NOTE, , Other, Rx Dosing/Monitoring, Reprogle, RAÚL Fields 
  sodium chloride (NS) flush 5-40 mL, 5-40 mL, IntraVENous, Q8H, ReproMamie toro, PA, 10 mL at 11/18/19 2455   sodium chloride (NS) flush 5-40 mL, 5-40 mL, IntraVENous, PRN, Lilo Tucker PA 
  heparin (porcine) injection 5,000 Units, 5,000 Units, SubCUTAneous, Q8H, Mamie Tucker [place], 5,000 Units at 11/19/19 0048   acetaminophen (TYLENOL) tablet 650 mg, 650 mg, Oral, Q6H PRN **OR** acetaminophen (TYLENOL) suppository 650 mg, 650 mg, Rectal, Q6H PRN, Mamie Tucker PA 
  piperacillin-tazobactam (ZOSYN) 3.375 g in 0.9% sodium chloride (MBP/ADV) 100 mL MBP  #EXTENDED 4-HOUR INFUSION##, 3.375 g, IntraVENous, Q8H, Nicolasa Montes MD, Last Rate: 25 mL/hr at 11/19/19 0257, 3.375 g at 11/19/19 0257   famotidine (PF) (PEPCID) 20 mg in sodium chloride 0.9% 10 mL injection, 20 mg, IntraVENous, Q12H, Kishore Armendariz MD, 20 mg at 11/18/19 2152   influenza vaccine 2019-20 (6 mos+)(PF) (FLUARIX/FLULAVAL/FLUZONE QUAD) injection 0.5 mL, 0.5 mL, IntraMUSCular, PRIOR TO DISCHARGE, Loco Johnson MD 
 
 
 
Objective:  
 
Visit Vitals /66 Pulse 75 Temp 97.3 °F (36.3 °C) Resp 22 Ht 5' 4\" (1.626 m) Wt 59.4 kg (130 lb 15.3 oz) SpO2 98% BMI 22.48 kg/m² Intake/Output Summary (Last 24 hours) at 11/19/2019 2829 Last data filed at 11/19/2019 0600 Gross per 24 hour Intake 2773.27 ml Output 1325 ml Net 1448.27 ml Physical Exam:  
 
Patient is in no apparent distress. HEENT: mmm Neck: no cervical lymphadenopathy or thyromegaly. Lungs: good air entry, clear to auscultation bilaterally. Cardiovascular system: S1, S2, regular rate and rhythm. Abdomen: soft, non tender, non distended. BS+ Extremities: no edema Integumentary: skin is grossly intact. Neurologic: Alert, oriented time three. Data Review: 
 
Recent Labs 11/19/19 
0403 WBC 11.9 RBC 4.01* HCT 35.9* MCV 89.5 MCH 27.9 MCHC 31.2 RDW 16.6* Recent Labs 11/19/19 
0403 11/18/19 
2300 11/18/19 
1502 11/18/19 
1030 11/18/19 
0650 11/17/19 
2300 11/17/19 
1836 11/17/19 1101 11/17/19 
1100 BUN 18 22* 27* 27* 28* 35* 36* 67*  --   
CREA 0.70 0.71 0.56* 0.58* 0.65 0.62 0.84 1.53*  --   
CA 6.6* 7.0* 7.2* 7.1* 7.2* 7.5* 6.6* 9.6  --   
ALB  --   --   --   --   --   --   --  3.1*  --   
K 3.2* 4.5 4.1 4.2 4.2 3.1* 2.5* 4.4  --   
* 156* 163* 160* 163* 165* 165* 157*  --   
* 130* 140* 136* 138* 140* 137* 122*  --   
CO2 19* 17* 16* 19* 19* 20* 18* 22  --   
PHOS  --   --   --   --   --   --   --   --  3.9 * 283* 138* 180* 221* 121* 256* 985*  --   
 
 
Malini Chavez MD

## 2019-11-19 NOTE — PROGRESS NOTES
Problem: Diabetes Self-Management Goal: *Disease process and treatment process Description Define diabetes and identify own type of diabetes; list 3 options for treating diabetes. Outcome: Progressing Towards Goal 
Goal: *Incorporating nutritional management into lifestyle Description Describe effect of type, amount and timing of food on blood glucose; list 3 methods for planning meals. Outcome: Progressing Towards Goal 
Goal: *Incorporating physical activity into lifestyle Description State effect of exercise on blood glucose levels. Outcome: Progressing Towards Goal 
Goal: *Developing strategies to promote health/change behavior Description Define the ABC's of diabetes; identify appropriate screenings, schedule and personal plan for screenings. Outcome: Progressing Towards Goal 
Goal: *Using medications safely Description State effect of diabetes medications on diabetes; name diabetes medication taking, action and side effects. Outcome: Progressing Towards Goal 
Goal: *Monitoring blood glucose, interpreting and using results Description Identify recommended blood glucose targets  and personal targets. Outcome: Progressing Towards Goal 
Goal: *Prevention, detection, treatment of acute complications Description List symptoms of hyper- and hypoglycemia; describe how to treat low blood sugar and actions for lowering  high blood glucose level. Outcome: Progressing Towards Goal 
Goal: *Prevention, detection and treatment of chronic complications Description Define the natural course of diabetes and describe the relationship of blood glucose levels to long term complications of diabetes. Outcome: Progressing Towards Goal 
Goal: *Developing strategies to address psychosocial issues Description Describe feelings about living with diabetes; identify support needed and support network Outcome: Progressing Towards Goal 
Goal: *Sick day guidelines Outcome: Progressing Towards Goal 
  
 Problem: Patient Education: Go to Patient Education Activity Goal: Patient/Family Education Outcome: Progressing Towards Goal 
  
Problem: Discharge Planning Goal: *Discharge to safe environment Outcome: Progressing Towards Goal 
  
Problem: Pressure Injury - Risk of 
Goal: *Prevention of pressure injury Description Document Alex Scale and appropriate interventions in the flowsheet. Outcome: Progressing Towards Goal 
Note:  
Pressure Injury Interventions: 
Sensory Interventions: Assess changes in LOC, Assess need for specialty bed Moisture Interventions: Absorbent underpads, Apply protective barrier, creams and emollients Activity Interventions: Assess need for specialty bed Mobility Interventions: Assess need for specialty bed Nutrition Interventions: Document food/fluid/supplement intake, Discuss nutritional consult with provider Friction and Shear Interventions: Apply protective barrier, creams and emollients Problem: Patient Education: Go to Patient Education Activity Goal: Patient/Family Education Outcome: Progressing Towards Goal 
  
Problem: Falls - Risk of 
Goal: *Absence of Falls Description Document Jorge Voss Fall Risk and appropriate interventions in the flowsheet. Outcome: Progressing Towards Goal 
Note:  
Fall Risk Interventions: 
  
 
Mentation Interventions: Bed/chair exit alarm Medication Interventions: Bed/chair exit alarm Elimination Interventions: Bed/chair exit alarm Problem: Patient Education: Go to Patient Education Activity Goal: Patient/Family Education Outcome: Progressing Towards Goal 
  
Problem: Patient Education: Go to Patient Education Activity Goal: Patient/Family Education Outcome: Progressing Towards Goal 
  
Problem: Diabetes Maintenance:Admission Goal: *Blood glucose 80 to 180 mg/dl Outcome: Progressing Towards Goal 
Goal: *Adequate nutrition Outcome: Progressing Towards Goal 
  
 Problem: Patient Education: Go to Patient Education Activity Goal: Patient/Family Education Outcome: Progressing Towards Goal

## 2019-11-19 NOTE — ROUTINE PROCESS
1930 Bedside and Verbal shift change report given to [address] r[address] (oncoming nurse) by Rosie Wagner RN 
 (offgoing nurse). Report included the following information Kardex, MAR and Recent Results.

## 2019-11-19 NOTE — CDMP QUERY
Noted diagnosis of NSTEMI per Pulmonary only, but not in Hospitalist notes. If possible, could you please document in the progress notes and Discharge Summary if patient was evaluated / and or treated for: 
 
=> elevated troponin; NSTEMI ruled out after study 
=> NSTEMI Type 2 from demand ischemia 
=> NSTEMI, other please specify 
=> other explanation of clinical findings 
=> unable to determine The medical record reflects the following: 
 
----> Risk indicators:  81 yr old with htn and elevated troponin 
 
----> Clinical indicators:  
     * 11-17-19 H&P:  \". Lizzeth Bulls Lizzeth Bulls Elevated trop - trend enzymes. Plessis Bulls Lizzeth Bulls \" 
     * 11-17-19 Pulmonary Consult:  \". ..2. NSTEMI:  - trop 0.08 with no ST changes on EKG, most likely type 2 nstemi from demand ischemia; continue to trend troponin. Lizzeth Bulls Lizzeth Bulls \" 
 
 
----> Treatment: labs; ICU management; EKG; Thank you for your time, 
Astrid Childress, [place] 
[facility] 
[phone]

## 2019-11-19 NOTE — PROGRESS NOTES
Urology Progress Note Assessment/Plan:  
 
Assessment: 
Sepsis - no longer on pressors. WBC normalized. Afebrile ? UTI on UA- Prelim Ucx x2 NG Bilateral Hydro likely 2/2 AUR MATTIE - Resolved Urinary Retention Nonobstructing bilateral urinary tract calculi seen both within the dilated ureters and within dilated renal collecting systems Stercoral Colitis 2/2 fecal impaction Plan:  
Maintain Holm. Consider VT prior to discharge Final Ucx pending. Abx per primary currently on Vanc and Zosyn Trend labs Will check bedside renal ultrasound to see if there is any resolution of the bilateral hydronephrosis with holm in place. Needs aggressive bowel regimen- defer to primary Once off pressors and hypotension resolved would start Flomax 0.4 mg daily Will need f/up in office for further management of urinary retention, PSA and CESAR Following [facility] Pager # [phone] Available M-F 7:30- 5pm .  After 5pm and weekends please call answering service at [phone] Attestation by Dennys Davenport MD  
 I agree with the findings as documented, and participated in the development of the care plan, any changes were made to the note as needed and any additional comments are below: David Davenport MD 
[facility], [address] (pager) [phone] (cell)   
   
  
 
  
 
 
Subjective: \"Having body aches all over\" Objective:  
 
Visit Vitals /66 Pulse 75 Temp 97.3 °F (36.3 °C) Resp 22 Ht 5' 4\" (1.626 m) Wt 130 lb 15.3 oz (59.4 kg) SpO2 98% BMI 22.48 kg/m² Temp (24hrs), Av.3 °F (36.3 °C), Min:96.7 °F (35.9 °C), Max:97.8 °F (36.6 °C) Intake and Output: 
 1901 -  0700 In: 80107.7 [I.V.:07990.7] Out:  [PEMES:5871] No intake/output data recorded. PHYSICAL EXAMINATION:  
Visit Vitals /66 Pulse 75 Temp 97.3 °F (36.3 °C) Resp 22 Ht 5' 4\" (1.626 m) Wt 130 lb 15.3 oz (59.4 kg) SpO2 98% BMI 22.48 kg/m² Constitutional: Well developed, well nourished. No acute distress. HEENT: Normocephalic, Atraumatic, EOM's intact CV:  no edema Respiratory: No respiratory distress or difficulties breathing Abdomen:  Soft and non-tender  Male:   CVA tenderness: none SCROTUM:  Normal; PENIS: normal  Greenwood: Draining cloudy urine with sediment Skin: No evidence of jaundice. Normal color Neuro/Psych:  Alert and oriented. Affect appropriate. Lymphatic:  No enlarged inguinal lymph nodes. MSK: limited ROM Lab/Data Review: All lab results for the last 24 hours reviewed. Labs:  
 
Labs: Results:  
Chemistry Recent Labs 11/19/19 
0403 11/18/19 
2300 11/18/19 
1502  11/17/19 
1101 * 283* 138*   < > 985* * 156* 163*   < > 157* K 3.2* 4.5 4.1   < > 4.4 * 130* 140*   < > 122* CO2 19* 17* 16*   < > 22 BUN 18 22* 27*   < > 67* CREA 0.70 0.71 0.56*   < > 1.53* CA 6.6* 7.0* 7.2*   < > 9.6 AGAP 8 9 7   < > 13 BUCR 26* 31* 48*   < > 44* AP  --   --   --   --  105 TP  --   --   --   --  6.9 ALB  --   --   --   --  3.1*  
GLOB  --   --   --   --  3.8 AGRAT  --   --   --   --  0.8  
 < > = values in this interval not displayed. CBC w/Diff Recent Labs 11/19/19 
0403 11/18/19 
0650 11/17/19 
1101 WBC 11.9 13.9* 19.3*  
RBC 4.01* 4.28* 5.60* HGB 11.2* 12.5* 16.4* HCT 35.9* 38.9 54.6*  
 192 304 GRANS  --   --  80* LYMPH  --   --  13* EOS  --   --  0 Cultures Recent Labs 11/18/19 
1431 11/17/19 
1445 11/17/19 
1143 11/17/19 
1128 11/17/19 
1101 CULT MRSA target DNA is not detected (presumptive not colonized with MRSA) NO GROWTH AFTER 14 HOURS NO GROWTH AFTER 20 HOURS NO GROWTH AFTER 20 HOURS NO GROWTH AFTER 20 HOURS All Micro Results Procedure Component Value Units Date/Time MRSA SCREEN - PCR (NASAL) [[id number]] Collected:  11/18/19 1431 Order Status:  Completed Specimen:  Nasal from Nares Updated:  11/18/19 2114 Special Requests: NO SPECIAL REQUESTS Culture result: MRSA target DNA is not detected (presumptive not colonized with MRSA) CULTURE, URINE [[id number]] Collected:  11/17/19 1128 Order Status:  Completed Specimen:  Cath Urine Updated:  11/18/19 1119 Special Requests: NO SPECIAL REQUESTS Culture result: NO GROWTH AFTER 20 HOURS     
 CULTURE, URINE [[id number]] Collected:  11/17/19 1445 Order Status:  Completed Specimen:  Cath Urine Updated:  11/18/19 1115 Special Requests: NO SPECIAL REQUESTS Culture result: NO GROWTH AFTER 14 HOURS     
 CULTURE, BLOOD [[id number]] Collected:  11/17/19 1101 Order Status:  Completed Specimen:  Blood Updated:  11/18/19 0756 Special Requests: PERIPHERAL Culture result: NO GROWTH AFTER 20 HOURS     
 CULTURE, BLOOD [[id number]] Collected:  11/17/19 1143 Order Status:  Completed Specimen:  Blood Updated:  11/18/19 0756 Special Requests: PERIPHERAL Culture result: NO GROWTH AFTER 20 HOURS     
 RESPIRATORY VIRAL PANEL, PCR [[id number]] Collected:  11/17/19 1837 Order Status:  Completed Specimen:  Sputum Updated:  11/17/19 1926 INFLUENZA A & B AG (RAPID TEST) [[id number]] Collected:  11/17/19 1143 Order Status:  Completed Specimen:  Nasopharyngeal from Nasal washing Updated:  11/17/19 1211 Influenza A Antigen NEGATIVE Comment: A negative result does not exclude influenza virus infection, seasonal or H1N1 due to suboptimal sensitivity. If influenza is circulating in your community, a diagnosis of influenza should be considered based on a patients clinical presentation and empiric antiviral treatment should be considered, if indicated. Influenza B Antigen NEGATIVE Urinalysis Color Date Value Ref Range Status 11/17/2019 YELLOW   Final  
 
Appearance Date Value Ref Range Status 11/17/2019 CLOUDY   Final  
 
Specific gravity Date Value Ref Range Status 11/17/2019 >1.030 (H) 1.005 - 1.030 Final  
 
pH (UA) Date Value Ref Range Status 11/17/2019 5.0 5.0 - 8.0   Final  
 
Protein Date Value Ref Range Status 11/17/2019 NEGATIVE  NEG mg/dL Final  
 
Ketone Date Value Ref Range Status 11/17/2019 TRACE (A) NEG mg/dL Final  
 
Bilirubin Date Value Ref Range Status 11/17/2019 NEGATIVE  NEG   Final  
 
Blood Date Value Ref Range Status 11/17/2019 LARGE (A) NEG   Final  
 
Urobilinogen Date Value Ref Range Status 11/17/2019 0.2 0.2 - 1.0 EU/dL Final  
 
Nitrites Date Value Ref Range Status 11/17/2019 NEGATIVE  NEG   Final  
 
Leukocyte Esterase Date Value Ref Range Status 11/17/2019 MODERATE (A) NEG   Final  
 
Potassium Date Value Ref Range Status 11/19/2019 3.2 (L) 3.5 - 5.5 mmol/L Final  
 
Creatinine Date Value Ref Range Status 11/19/2019 0.70 0.6 - 1.3 MG/DL Final  
 
BUN Date Value Ref Range Status 11/19/2019 18 7.0 - 18 MG/DL Final  
  
PSA No results for input(s): PSA in the last 72 hours. Coagulation No results found for: PTP, INR, APTT, INREXT, INREXT Patient Active Problem List  
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  
 Hypertension I10  
 Mild mental retardation F70  
 Small bowel obstruction ([address] K56.609  Cognitive developmental delay F81.9  
 Uncontrolled type 2 diabetes mellitus with hyperosmolar nonketotic hyperglycemia (HCC) E11.00  Hydronephrosis N13.30  Hypernatremia E87.0  Sepsis ([address] A41.9  Elevated troponin R79.89  
 Urinary retention R33.9  MATTIE (acute kidney injury) ([address] N17.9

## 2019-11-19 NOTE — PROGRESS NOTES
Called Fernanda at [facility] to inquire again about [facility] or family etc. Viky Mackey states that patient has been with them since April of 2010 and after checking with her DON, indicated they are working on guardianship and can accept back. Updated CM, Director of Care-management and [facility] the nurse. Ameena Rai RN Outcomes Manager 
[phone]

## 2019-11-19 NOTE — PROGRESS NOTES
09368 Bedside and Verbal shift change report given to Gabbie Watkins (oncoming nurse) by Ofe Ward  (offgoing nurse). Report included the following information SBAR, Kardex, ED Summary, Intake/Output, MAR, Recent Results and Med Rec Status 0800 Speech by bedside for a follow up,.  
 
0818 Dr. Rohini Dunne by bedside to see pt. 
 
0900 Pt fed breakfast.. Tolerated the same well. Eat 100 % 1200 Reassessment done. No changes noted. 1500 TRANSFER - OUT REPORT: 
 
Verbal report given to  [employer] (name) on Alline Ache  being transferred to 2 CENTRAL (unit) for routine progression of care Report consisted of patients Situation, Background, Assessment and  
Recommendations(SBAR). Information from the following report(s) SBAR, Kardex, ED Summary, Intake/Output, MAR, Recent Results and Med Rec Status was reviewed with the receiving nurse. Lines:  
Peripheral IV 11/17/19 Right Wrist (Active) Site Assessment Clean, dry, & intact 11/19/2019 12:00 PM  
Phlebitis Assessment 0 11/19/2019 12:00 PM  
Infiltration Assessment 0 11/19/2019 12:00 PM  
Dressing Status Clean, dry, & intact 11/19/2019 12:00 PM  
Dressing Type Tape;Transparent 11/19/2019 12:00 PM  
Hub Color/Line Status Pink;Capped 11/19/2019 12:00 PM  
Action Taken Open ports on tubing capped 11/19/2019 12:00 PM  
Alcohol Cap Used Yes 11/19/2019 12:00 PM  
   
Peripheral IV 11/17/19 Right Wrist (Active) Site Assessment Clean, dry, & intact 11/19/2019 12:00 PM  
Phlebitis Assessment 0 11/19/2019 12:00 PM  
Infiltration Assessment 0 11/19/2019 12:00 PM  
Dressing Status Clean, dry, & intact 11/19/2019 12:00 PM  
Dressing Type Tape;Transparent 11/19/2019 12:00 PM  
Hub Color/Line Status Green; Infusing 11/19/2019 12:00 PM  
Action Taken Open ports on tubing capped 11/19/2019 12:00 PM  
Alcohol Cap Used Yes 11/19/2019 12:00 PM  
   
Peripheral IV 11/17/19 Right Hand (Active) Site Assessment Clean, dry, & intact 11/19/2019 12:00 PM  
 Phlebitis Assessment 0 11/19/2019 12:00 PM  
Infiltration Assessment 0 11/19/2019 12:00 PM  
Dressing Status Clean, dry, & intact 11/19/2019 12:00 PM  
Dressing Type Transparent;Tape 11/19/2019 12:00 PM  
Hub Color/Line Status Blue;Capped 11/19/2019 12:00 PM  
Action Taken Open ports on tubing capped 11/19/2019 12:00 PM  
Alcohol Cap Used Yes 11/19/2019 12:00 PM  
  
 
Opportunity for questions and clarification was provided. Patient transported with: 
 Registered Nurse

## 2019-11-20 NOTE — PROGRESS NOTES
Problem: Dysphagia (Adult) Goal: *Acute Goals and Plan of Care (Insert Text) Description Dysphagia Present: mild- mod oral, mod- severe pharyngeal 
Aspiration: silent with thin and nectar Recommendations: 
Diet: mechanical soft, ground, HONEY via TSP, strict HOB 55-60* for all po feeds, >/= 45* at least 30 minutes following Meds: crushed in puree Aspiration Precautions Oral Care TID Goals:  Patient will: 1. Tolerate PO trials with no overt s/s aspiration or distress in 4/5 trials 2. Utilize compensatory swallow strategies/maneuvers (decrease bite/sip, size/rate, alt. liq/sol) with min cues in 4/5 trials 3. Perform oral-motor/laryngeal strengthening exercises with min cues to increase oropharyngeal swallow function 4. Complete an objective swallow study (i.e., MBSS) to assess swallow integrity, r/o aspiration, and determine of safest LRD, min A-- met 11/18/19 Outcome: Progressing Towards Goal 
 
SPEECH LANGUAGE PATHOLOGY DYSPHAGIA TREATMENT Patient: Amanda Kinsey (21 y.o. male) Date: 11/20/2019 Diagnosis: Hypernatremia [E87.0] Hydronephrosis [N13.30] Sepsis ([address] [A41.9] Uncontrolled type 2 diabetes mellitus with hyperosmolar nonketotic hyperglycemia ([address] Precautions: silent aspiration PLOF:as per H&P  
 
ASSESSMENT: 
Pt seen b/s for dysphagia tx, tolerance of honey thick liquids via tsp and trials mechanical soft solids. Per notes, pt was on mechanical soft solids with thin liquids at [facility]. Pt accepted honey thick via tsp and small bites mechanical soft solids with mildly prolonged mastication, slow A-P transit, trace- moderate oral residue following soft solid which cleared with manual liquid wash. Pt continues to demo mildly delayed swallow response with decreased, weak laryngeal elevation. Pt without overt/ soft/ silent s/sx aspiration following any presentations.   Rec: continue honey thick liquids via TSP ONLY, strict aspiration precautions, strict HOB 55- 60* for all po and >/= 45* at least 30 minutes following, advance solids to mechanical soft, ground solid, small bites, and alternate solids/ liquids. Notified nurse and CNA to swallow strategies, also written on white board in pt's room. SLP will continue to follow for diet tolerance and trial swallow exercises. Progression toward goals: 
[]         Improving appropriately and progressing toward goals [x]         Improving slowly and progressing toward goals 
[]         Not making progress toward goals and plan of care will be adjusted PLAN: 
Recommendations and Planned Interventions: 
 continue honey thick liquids via TSP ONLY, strict aspiration precautions, strict HOB 55- 60* for all po and >/= 45* at least 30 minutes following, advance solids to mechanical soft, ground solid, small bites, and alternate solids/ liquids. Patient continues to benefit from skilled intervention to address the above impairments. Continue treatment per established plan of care. Discharge Recommendations:  Missael Michel SUBJECTIVE:  
Patient stated I ate all my breakfast! OBJECTIVE:  
Cognitive and Communication Status: 
Neurologic State: Alert Orientation Level: Oriented to person Cognition: Follows commands Perception: Appears intact Perseveration: Perseverates during conversation Safety/Judgement: Fall prevention Dysphagia Treatment: 
Oral Assessment: 
Oral Assessment Labial: Decreased rate Dentition: Natural 
Oral Hygiene: fair Lingual: Decreased rate, Decreased strength Velum: No impairment Mandible: No impairment P.O. Trials: 
 Patient Position: HOB 55* Vocal quality prior to P.O.: No impairment Consistency Presented: Mechanical soft, Honey thick liquid How Presented: SLP-fed/presented, Spoon Bolus Acceptance: No impairment Bolus Formation/Control: Impaired Type of Impairment: Lip closure, Mastication Propulsion: Delayed (# of seconds) Oral Residue: 10-50% of bolus, Lingual 
 Initiation of Swallow: Delayed (# of seconds) Laryngeal Elevation: Weak, Decreased Aspiration Signs/Symptoms: None Pharyngeal Phase Characteristics: Double swallow, Audible swallow, Suspected pharyngeal residue Effective Modifications: Alternate liquids/solids, Double swallow, Spoon, Small sips and bites Cues for Modifications: (manual) Oral Phase Severity: Mild-moderate Pharyngeal Phase Severity : Moderate-severe PAIN: 
Pain level pre-treatment: 0/10 Pain level post-treatment: 0/10 After treatment:  
[]              Patient left in no apparent distress sitting up in chair 
[x]              Patient left in no apparent distress in bed 
[x]              Call bell left within reach [x]              Nursing notified 
[]              Family present 
[]              Caregiver present 
[]              Bed alarm activated COMMUNICATION/EDUCATION:  
[x] Aspiration precautions; swallow safety; compensatory techniques []    Paient unable to participate in education; education ongoing with staff [x]  Posted safety precautions in patient's room. [] Oral-motor/laryngeal strengthening exercises Dat See MS, CCC/SLP Time Calculation: 36 mins

## 2019-11-20 NOTE — PROGRESS NOTES
Progress Note Patient: Geno Reza MRN: [id number]  SSN: [id number] YOB: 1950  Age: 81 y.o. Sex: male Admit Date: 11/17/2019 LOS: 3 days Assessment:  
Sepsis - no longer on pressors. WBC normalized. Afebrile ? UTI on UA- Prelim Ucx x2 NG Bilateral Hydro likely 2/2 AUR MATTIE - Resolved Urinary Retention Nonobstructing bilateral urinary tract calculi seen both within the dilated ureters and within dilated renal collecting systems Stercoral Colitis 2/2 fecal impaction Plan: A repeat renal and bladder ultrasound shows resolution of prior left hydronephrosis and minimal residual right hydronephrosis and bilateral non-obstructing renal stones. Needs aggressive bowel regimen, alpha blocker (terazosin can be crushed if can't swallow pills), would also add finasteride 5 mg daily if can swallow pills as prostate appears enlarged on imaging. Consideration of voiding trial once medically improved and having regular BMs. Signed By: Jagdeep Bolaños MD   
 November 20, 2019 PAGER:  [phone] OFFICE:  [phone] [facility] [employer] Subjective:  
 
Patient seen and examined this morning. Seems upset, not responding to my questions. Objective:  
 
Vitals:  
 11/19/19 1530 11/19/19 2211 11/20/19 8971 11/20/19 6443 BP: 125/62 94/65 97/65 100/66 Pulse: 77 91 85 88 Resp: 25 18 18 16 Temp:  97.5 °F (36.4 °C) 97.9 °F (36.6 °C) 97.6 °F (36.4 °C) SpO2: 100% 98% 96% 96% Weight:      
Height:      
  
 
Intake and Output: 
Current Shift: 11/20 0701 - 11/20 1900 In: 120 [P.O.:120] Out: - Last three shifts: 11/18 1901 - 11/20 0700 In: 4027.5 [P.O.:240; I.V.:3787.5] Out: 9388 [KSAPV:4406] Current Facility-Administered Medications Medication Dose Route Frequency  insulin lispro (HUMALOG) injection   SubCUTAneous AC&HS  insulin glargine (LANTUS) injection 18 Units  18 Units SubCUTAneous DAILY  glucose chewable tablet 16 g  4 Tab Oral PRN  
 glucagon (GLUCAGEN) injection 1 mg  1 mg IntraMUSCular PRN  
 dextrose 10% infusion 125-250 mL  125-250 mL IntraVENous PRN  
 sodium chloride (NS) flush 5-40 mL  5-40 mL IntraVENous Q8H  
 sodium chloride (NS) flush 5-40 mL  5-40 mL IntraVENous PRN  
 heparin (porcine) injection 5,000 Units  5,000 Units SubCUTAneous Q8H  
 acetaminophen (TYLENOL) tablet 650 mg  650 mg Oral Q6H PRN Or  
 acetaminophen (TYLENOL) suppository 650 mg  650 mg Rectal Q6H PRN  piperacillin-tazobactam (ZOSYN) 3.375 g in 0.9% sodium chloride (MBP/ADV) 100 mL MBP  #EXTENDED 4-HOUR INFUSION##  3.375 g IntraVENous Q8H  
 famotidine (PF) (PEPCID) 20 mg in sodium chloride 0.9% 10 mL injection  20 mg IntraVENous Q12H  
 influenza vaccine 2019-20 (6 mos+)(PF) (FLUARIX/FLULAVAL/FLUZONE QUAD) injection 0.5 mL  0.5 mL IntraMUSCular PRIOR TO DISCHARGE Physical Exam:  
GENERAL: alert, cooperative, no distress, appears stated age LUNG: unlabored breathing ABDOMEN: soft, non-tender. No masses,  no organomegaly EXTREMITIES:  extremities normal, atraumatic, no cyanosis or edema SKIN: no jaundice : Greenwood in place draining clear yellow urine with some sediment in tubing Lab/Data Review: 
BMP: No results found for: NA, K, CL, CO2, AGAP, GLU, BUN, CREA, GFRAA, GFRNA 
CBC: No results found for: WBC, HGB, HGBEXT, HCT, HCTEXT, PLT, PLTEXT, HGBEXT, HCTEXT, PLTEXT 
COAGS: No results found for: APTT, PTP, INR, INREXT, INREXT  
 
 
CT Results: 
Results from Hospital Encounter encounter on 11/17/19 CT CHEST ABD PELV W CONT Narrative EXAM: CT of the chest, abdomen and pelvis CLINICAL HISTORY/INDICATION: fever/sepsis/abdominal pain/cough/SOB 
-Additional: None COMPARISON: CT abdomen and pelvis from 05/11/18 TECHNIQUE: Axial CT imaging of the chest, abdomen and pelvis was performed after 
the uneventful administration of 100 cc of Isovue-300 intravenous contrast. 
 Multiplanar reformats were generated. One or more dose reduction techniques were 
used on this CT: automated exposure control, adjustment of the mAs and/or kVp 
according to patient size, and iterative reconstruction techniques. The 
specific techniques used on this CT exam have been documented in the patient's 
electronic medical record. Digital Imaging and Communications in Medicine (DICOM) format image data are available to nonaffiliated external healthcare 
facilities or entities on a secure, media free, reciprocally searchable basis 
with patient authorization for at least a 12-month period after this study. _______________ FINDINGS: 
 
Chest: 
 
Lungs: No suspicious nodule or mass. Bilateral dependent atelectasis is present. Pleura: Normal, with no effusion or pneumothorax. Airway: Bronchial wall thickening with opacified small airways is present at 
both lung bases compatible with bronchiolitis Mediastinum: Normal heart size. No pericardial effusion. Given the limitations 
of cardiac motion, great vessels are unremarkable. Lymph Nodes: No enlarged lymph nodes. Diffuse muscular atrophy is present 
 
=============== Abdomen And Pelvis: 
 
Liver, Biliary: The liver demonstrates diminished density compatible with 
hepatic steatosis. No focal liver masses are present. No biliary dilation. The 
gallbladder has been previously removed. Pancreas: Normal. 
 
Spleen: Normal. 
 
Adrenals: Normal. 
 
Kidneys: Bilateral renal cysts are present. Both kidneys demonstrate 
hydronephrosis. There are layering calculi present within the distended urinary 
collecting system/pelvis. Bilateral hydroureter is present. The dilated ureters 
can be followed to the bladder. There are small layering calculi present within 
the dilated ureters. Lymph Nodes: No enlarged lymph nodes. Gastrointestinal Tract: There is a large amount of fecal material noted within the rectum. The overlying the conus appears mildly thickened. The appearance is 
consistent with stercoral colitis. Vasculature: Atherosclerosis is present Pelvic Organs:  No suspicious masses Bones: No acute or aggressive osseous abnormalities identified. Chronic 
spondylosis is present Other: The bladder is distended suggesting urinary retention 
 
_______________ Impression IMPRESSION: 
 
Bilateral hydronephrosis is present. The bladder is distended with a diffusely 
mildly thickened wall. The appearance suggests urinary retention. There are 
nonobstructing bilateral urinary tract calculi seen both within the dilated 
ureters as well as within the dilated renal collecting systems Large amount of fecal material within the rectum with adjacent colonic wall 
thickening consistent with stercoral colitis Status post cholecystectomy Hepatic steatosis Mild bronchial wall thickening and opacification at the lung bases compatible 
with bronchiolitis US Results: 
Results from [place] encounter on 11/17/19 [address] [place] [address] Narrative EXAM: ULTRASOUND RETROPERITONEAL LIMITED CLINICAL HISTORY/INDICATION: bilateral hydronephrosis S/P holm catheter 
placement. 
-Additional: None COMPARISON: CT chest, abdomen and pelvis from 11/17/19 
 
_______________ FINDINGS: 
 
RIGHT KIDNEY: 12.9 cm in length. Residual mild hydronephrosis is present. No 
suspicious renal mass. At least 4 renal cysts are noted. The 2 largest measure 2.4 x 1.8 x 2.3 cm and 1.5 x 1.8 x 1.8 cm. Cortical thinning is present with 
mild increased echogenicity. There is an apparent inferior pole calculus present 
with a diameter of 1.4 cm. LEFT KIDNEY: 11.9 cm in length. No hydronephrosis or suspicious renal mass. At 
least 3 cysts are noted. The 2 largest measure 1.7 x 1.0 x 1.7 cm and 1.7 x 1.1 
x 1.4 cm. Cortical thinning is present with mild increased echogenicity. There is a central shadowing area of echogenicity measuring 1.5 cm compatible with a 
nonobstructing calculus. OTHER: Decompressed bladder with a Greenwood catheter is present 
 
_______________ Impression IMPRESSION: 
 
Decreased hydronephrosis on the right and resolved hydronephrosis on the left Bilateral nonobstructing renal calculi Bilateral renal cysts Decompressed bladder with a Greenwood catheter present Principal Problem: 
  Uncontrolled type 2 diabetes mellitus with hyperosmolar nonketotic hyperglycemia ([address] (11/17/2019) Active Problems: 
  UTI (urinary tract infection) (6/11/2015) Cerebral palsy ([address] (6/12/2015) Hydronephrosis (11/17/2019) Hypernatremia (11/17/2019) Sepsis ([address] (11/17/2019) Elevated troponin (11/17/2019) Urinary retention (11/17/2019) MATTIE (acute kidney injury) ([address] (11/17/2019)

## 2019-11-20 NOTE — PROGRESS NOTES
Progress Note Patient: Elisa Tomas               Sex: male          DOA: 11/17/2019 YOB: 1950      Age:  81 y.o.        LOS:  LOS: 3 days CHIEF COMPLAINT:  Shortness of Breath; Dysphagia; and Irregular Heart Beat Assessment/Plan:  
 
Principal Problem: 
  Uncontrolled type 2 diabetes mellitus with hyperosmolar nonketotic hyperglycemia ([facility] (11/17/2019) Active Problems: 
  UTI (urinary tract infection) (6/11/2015) Cerebral palsy ([address] (6/12/2015) Hydronephrosis (11/17/2019) Hypernatremia (11/17/2019) Sepsis ([address] (11/17/2019) Elevated troponin (11/17/2019) Urinary retention (11/17/2019) MATTIE (acute kidney injury) ([facility] (11/17/2019) 
 
 
elevated troponin; NSTEMI ruled out after study PLAN: 
Hyperosmolar, hyperglycemia, nonketotic state 
-Off insulin drip, cont lantus Septic Shock Resolved - hypotension, elevated WBC, febrile (102.6) on arrival 
- UTI is only source, CXR clear, no other evidence of infection - BCx, Ucx obtained. UCX shows Strep : ID consulted. Hypernatremia 
- nephrology consulted - appreciate - Improving 
- monitor BMP q6 
- aim for 10-12meq decrease per 24 hours - switch to 1/2NS, has received >4 L NS in ED MATTIE Resolved - 2/2 dehydration, hydronephrosis - IV fluids 
- monitor BMP Hydronephrosis 2/2 urinary retention 
- urology consulted - appreciate Repeat Renal and bladder US shows resolution of prior L hydro and minimal residual R hydro and b/l non-obstructing renal stones - Per Urology recs, start terazosin and finasteride - Voiding trial 
 
 
Dysphagia - SLP consulted, appreciate recs: Rec: continue honey thick liquids via TSP ONLY, strict aspiration precautions, strict HOB 55- 60* for all po and >/= 45* at least 30 minutes following, advance solids to mechanical soft, ground solid, small bites, and alternate solids/ liquids. - Cont acceptable home medications for chronic conditions  
- DVT protocol Subjective:  
 
Pt not interacting, similar to yesterday. Objective:  
 
Visit Vitals /66 (BP 1 Location: Left arm, BP Patient Position: At rest) Pulse 88 Temp 97.6 °F (36.4 °C) Resp 16 Ht 5' 4\" (1.626 m) Wt 59.4 kg (130 lb 15.3 oz) SpO2 96% BMI 22.48 kg/m² Physical Exam: 
Ears: hearing is intact Eyes: Icterus is not present Lungs: clear to auscultation bilaterally Heart: regular rate and rhythm, S1, S2 normal, no murmur, click, rub or gallop Gastrointestinal: soft, non-tender. Bowel sounds normal. No masses,  no organomegaly Neurological:  New Focal Deficits are not present Psychiatric:  Mood is stable Lab/Data Reviewed: 
CMP:  
No results found for: NA, K, CL, CO2, AGAP, GLU, BUN, CREA, GFRAA, GFRNA, CA, MG, PHOS, ALB, TBIL, TP, ALB, GLOB, AGRAT, SGOT, ALT, GPT 
CBC:  
No results found for: WBC, HGB, HGBEXT, HCT, HCTEXT, PLT, PLTEXT, HGBEXT, HCTEXT, PLTEXT Imaging Reviewed: 
[address] Result Date: 11/19/2019 EXAM: ULTRASOUND RETROPERITONEAL LIMITED CLINICAL HISTORY/INDICATION: bilateral hydronephrosis S/P holm catheter placement. -Additional: None COMPARISON: CT chest, abdomen and pelvis from 11/17/19 _______________ FINDINGS: RIGHT KIDNEY: 12.9 cm in length. Residual mild hydronephrosis is present. No suspicious renal mass. At least 4 renal cysts are noted. The 2 largest measure 2.4 x 1.8 x 2.3 cm and 1.5 x 1.8 x 1.8 cm. Cortical thinning is present with mild increased echogenicity. There is an apparent inferior pole calculus present with a diameter of 1.4 cm. LEFT KIDNEY: 11.9 cm in length. No hydronephrosis or suspicious renal mass. At least 3 cysts are noted. The 2 largest measure 1.7 x 1.0 x 1.7 cm and 1.7 x 1.1 x 1.4 cm. Cortical thinning is present with mild increased echogenicity.  There is a central shadowing area of echogenicity measuring 1.5 cm compatible with a nonobstructing calculus. OTHER: Decompressed bladder with a Greenwood catheter is present _______________ IMPRESSION: Decreased hydronephrosis on the right and resolved hydronephrosis on the left Bilateral nonobstructing renal calculi Bilateral renal cysts Decompressed bladder with a Greenwood catheter present

## 2019-11-20 NOTE — PROGRESS NOTES
RENAL PROGRESS NOTE [facility] Assessment/Plan: ·  MATTIE (due to volume depletion/sepsis/urinary retention). Resolved. · Hypovolemic hypernatremia. Improved but now Na is rising again. Needs to be given thickened liquids frequently, d/w nursing staff. Will try to avoid ivf. · UTI/sepsis in a setting of urinary retention. On abx per ID. Hydro is resolving with holm. Urology is on the case. Subjective: 
Patient complaints off: No complaints. No SOB/CP/N/V. Eats well when fed and tolerates thickened liquids. Thirsty. Patient Active Problem List  
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  
 Hypertension I10  
 Mild mental retardation F70  
 Small bowel obstruction ([address] K56.609  Cognitive developmental delay F81.9  
 Uncontrolled type 2 diabetes mellitus with hyperosmolar nonketotic hyperglycemia (HCC) E11.00  Hydronephrosis N13.30  Hypernatremia E87.0  Sepsis ([address] A41.9  Elevated troponin R79.89  
 Urinary retention R33.9  MATTIE (acute kidney injury) ([address] N17.9 Current Facility-Administered Medications Medication Dose Route Frequency Provider Last Rate Last Dose  aspirin chewable tablet 81 mg  81 mg Oral DAILY Yael Jo MD   81 mg at 11/20/19 1306  baclofen (LIORESAL) tablet 10 mg  10 mg Oral TID Yael Jo MD      
 terazosin (HYTRIN) capsule 5 mg  5 mg Oral DAILY Yael Jo MD   5 mg at 11/20/19 1306  finasteride (PROSCAR) tablet 5 mg  5 mg Oral DAILY Yael Jo MD   5 mg at 11/20/19 1354  [START ON 11/21/2019] insulin glargine (LANTUS) injection 20 Units  20 Units SubCUTAneous DAILY Megan Mayorga MD      
 amoxicillin (AMOXIL) capsule 500 mg  500 mg Oral Q8H Iker Sullivan MD      
 insulin lispro (HUMALOG) injection   SubCUTAneous AC&HS Hammonds, Monette Goldberg, MD   6 Units at 11/20/19 1306  
 glucose chewable tablet 16 g  4 Tab Oral PRN Megan Mayorga MD      
 glucagon (GLUCAGEN) injection 1 mg  1 mg IntraMUSCular PRN Megan Mayorga MD      
 dextrose 10% infusion 125-250 mL  125-250 mL IntraVENous PRN Megan Mayorga MD      
 sodium chloride (NS) flush 5-40 mL  5-40 mL IntraVENous Q8H ReproMamie toro PA   10 mL at 11/20/19 1307  sodium chloride (NS) flush 5-40 mL  5-40 mL IntraVENous PRN Rosemary Tucker PA      
 heparin (porcine) injection 5,000 Units  5,000 Units SubCUTAneous Q8H ReprogleMamie PA   5,000 Units at 11/20/19 1056  acetaminophen (TYLENOL) tablet 650 mg  650 mg Oral Q6H PRN Rosemary Tucker PA Or  
 acetaminophen (TYLENOL) suppository 650 mg  650 mg Rectal Q6H PRN Rosemary Tucker PA      
 famotidine (PF) (PEPCID) 20 mg in sodium chloride 0.9% 10 mL injection  20 mg IntraVENous Q12H Hammonds, Monette Goldberg, MD   20 mg at 11/20/19 0932  
 influenza vaccine 2019-20 (6 mos+)(PF) (FLUARIX/FLULAVAL/FLUZONE QUAD) injection 0.5 mL  0.5 mL IntraMUSCular PRIOR TO DISCHARGE Altagracia Knott MD      
 
 
Objective Vitals:  
 11/20/19 1055 11/20/19 1145 11/20/19 1541 11/20/19 1558 BP:  129/75 94/64 98/58 Pulse:  85 86 Resp:  16 16 Temp:  99.3 °F (37.4 °C) 98 °F (36.7 °C) SpO2:  98% 93% Weight: 59 kg (130 lb) Height:      
 
 
 
Intake/Output Summary (Last 24 hours) at 11/20/2019 1612 Last data filed at 11/20/2019 3161 Gross per 24 hour Intake 2227.5 ml Output 1725 ml Net 502.5 ml Admission weight: Weight: 57.6 kg (126 lb 14.4 oz) (11/17/19 1105) Last Weight Metrics: 
Weight Loss Metrics 11/20/2019 5/17/2018 6/12/2015 Today's Wt 130 lb 124 lb 100 lb BMI 22.31 kg/m2 20.63 kg/m2 19.53 kg/m2 Physical Assessment:  
 
General: NAD, alert and oriented. Dry oral mucosa. Neck: No jvd. LUNGS: Clear to Auscultation, No rales, rhonchi or wheezes. CVS EXM: S1, S2  RRR, no murmurs/gallops/rubs. Abdomen: soft, non tender. Lower Extremities:  no edema. Lab CBC w/Diff Recent Labs  
  11/20/19 
1110 11/19/19 
0403 11/18/19 
8052 WBC 10.9 11.9 13.9*  
RBC 4.04* 4.01* 4.28* HGB 11.7* 11.2* 12.5*  
HCT 35.6* 35.9* 38.9  171 192 GRANS 62  --   --   
LYMPH 26  --   --   
EOS 6*  --   --   
  
 
Chemistry Recent Labs  
  11/20/19 
1110 11/19/19 
0403 11/18/19 
2300 * 339* 283* * 152* 156*  
K 3.7 3.2* 4.5  
* 125* 130* CO2 23 19* 17* BUN 14 18 22* CREA 0.63 0.70 0.71  
CA 7.7* 6.6* 7.0* AGAP 3 8 9 BUCR 22* 26* 31* PHOS 1.7*  --   -- No results found for: IRON, FE, TIBC, IBCT, PSAT, FERR Lab Results Component Value Date/Time Calcium 7.7 (L) 11/20/2019 11:10 AM  
 Phosphorus 1.7 (L) 11/20/2019 11:10 AM  
  
 
Harley Bains M.D. [facility] Phone [phone] Pager [phone]

## 2019-11-20 NOTE — CONSULTS
[facility] 
                                          (A Division of [address] [place] [address]) Consultation Note Date of Admission: 11/17/2019    Date of Consultation: 11/20/2019 Referred by:  Dr. Germania Tinsley Reason for Referral: further antimicrobial management Current Antimicrobials: Prior Antimicrobials Amoxicillin 11/20 - 0 Vanc, Zosyn 11/17 - 3  Levofloxacin 11/17 - 0 Assessment: Plan:  
Fever 
- no clear source - No significant pyuria to suggest UTI, urcx only 20K NHS but has hydronephrosis from urinary retention and renal calculi). 1st dose Vanc given before urcx 
- fever may have been from mild UTI with obstruction. Doubt from Bronchiolitis seen on CT Chest.   
- Greenwood placed in ED 11/17 (after traumatic straight cath) -> dc Vancomycin and Zosyn 
-> amoxicillin 500 mg tid x 5 days Cerebral Palsy Intellectual Disability HTN Polio Microbiology: 
 
11/17 blcx NGTD x 2 
 urcx 20,000 NHS Respiratory viral panel IP 
11/18  MRSA scrn neg Lines / Catheters: 
 
piv HPI: 
 
81year-old  male with Cerebral Palsy, Intellectual Disability, HTN, Polio admitted to [facility] 11/17/2019 due to fever and shortness of breath. He is a resident of [facility] and was noted to fever one day PTA. He was taken to the ED by EMS where his temperature was 102.6,  RR 30 and BP dropped to 73/50. He was admitted to the ICU on levophed and Zosyn and Vancomycin. One dose of levofloxacin was given in the ED. Urinalysis showed only 4-10 WBC and urine culture grew only 20,000 [facility]. MRSA screen was negative and viral respiratory panel was negative. His fever promptly resolved and WBC count initially 19.3 normalized to 11.9 by 11/19. He has been transferred out of the ICU.   CTAP on 11/17 showed bilateral hydronephrosis and a distended bladder with a diffusely mildly thickened wall suggestive of urinary retention. There were bilateral urinary tract calculi within both dilated ureters and renal collecting systems. There was also mild bronchial wall thickening and opacification at the lung bases c/w bronchiolitis. Active Hospital Problems Diagnosis Date Noted  Uncontrolled type 2 diabetes mellitus with hyperosmolar nonketotic hyperglycemia ([facility] 11/17/2019  Hydronephrosis 11/17/2019  Hypernatremia 11/17/2019  Sepsis ([facility] 11/17/2019  Elevated troponin 11/17/2019  Urinary retention 11/17/2019  MATTIE (acute kidney injury) ([facility] 11/17/2019  Cerebral palsy ([facility] 06/12/2015  UTI (urinary tract infection) 06/11/2015 Past Medical History:  
Diagnosis Date  Cerebral palsy ([facility]  Hypertension  Mild mental retardation  Polio No past surgical history on file. No family history on file. Social History Socioeconomic History  Marital status: LEGALLY  Spouse name: Not on file  Number of children: Not on file  Years of education: Not on file  Highest education level: Not on file Occupational History  Not on file Social Needs  Financial resource strain: Not on file  Food insecurity:  
  Worry: Not on file Inability: Not on file  Transportation needs:  
  Medical: Not on file Non-medical: Not on file Tobacco Use  Smoking status: Never Smoker Substance and Sexual Activity  Alcohol use: No  
 Drug use: Not on file  Sexual activity: Not on file Lifestyle  Physical activity:  
  Days per week: Not on file Minutes per session: Not on file  Stress: Not on file Relationships  Social connections:  
  Talks on phone: Not on file Gets together: Not on file Attends [employer] service: Not on file Active member of club or organization: Not on file Attends meetings of clubs or organizations: Not on file Relationship status: Not on file  Intimate partner violence:  
  Fear of current or ex partner: Not on file Emotionally abused: Not on file Physically abused: Not on file Forced sexual activity: Not on file Other Topics Concern  Not on file Social History Narrative  Not on file Allergies: 
Patient has no known allergies. .  
 
Medications: 
Current Facility-Administered Medications Medication Dose Route Frequency  aspirin chewable tablet 81 mg  81 mg Oral DAILY  baclofen (LIORESAL) tablet 10 mg  10 mg Oral TID  terazosin (HYTRIN) capsule 5 mg  5 mg Oral DAILY  finasteride (PROSCAR) tablet 5 mg  5 mg Oral DAILY  [START ON 2019] insulin glargine (LANTUS) injection 20 Units  20 Units SubCUTAneous DAILY  insulin lispro (HUMALOG) injection   SubCUTAneous AC&HS  
 glucose chewable tablet 16 g  4 Tab Oral PRN  
 glucagon (GLUCAGEN) injection 1 mg  1 mg IntraMUSCular PRN  
 dextrose 10% infusion 125-250 mL  125-250 mL IntraVENous PRN  
 sodium chloride (NS) flush 5-40 mL  5-40 mL IntraVENous Q8H  
 sodium chloride (NS) flush 5-40 mL  5-40 mL IntraVENous PRN  
 heparin (porcine) injection 5,000 Units  5,000 Units SubCUTAneous Q8H  
 acetaminophen (TYLENOL) tablet 650 mg  650 mg Oral Q6H PRN Or  
 acetaminophen (TYLENOL) suppository 650 mg  650 mg Rectal Q6H PRN  piperacillin-tazobactam (ZOSYN) 3.375 g in 0.9% sodium chloride (MBP/ADV) 100 mL MBP  #EXTENDED 4-HOUR INFUSION##  3.375 g IntraVENous Q8H  
 famotidine (PF) (PEPCID) 20 mg in sodium chloride 0.9% 10 mL injection  20 mg IntraVENous Q12H  
 influenza vaccine  (6 mos+)(PF) (FLUARIX/FLULAVAL/FLUZONE QUAD) injection 0.5 mL  0.5 mL IntraMUSCular PRIOR TO DISCHARGE  
  
 
ROS: 
Review of systems not obtained due to patient factors. Physical Exam: 
Temp (24hrs), Av.1 °F (36.7 °C), Min:97.5 °F (36.4 °C), Max:99.3 °F (37.4 °C) Visit Vitals /75 (BP 1 Location: Left arm, BP Patient Position: At rest) Pulse 85 Temp 99.3 °F (37.4 °C) Resp 16 Ht 5' 4\" (1.626 m) Wt 59 kg (130 lb) SpO2 98% BMI 22.31 kg/m² General: Well developed, well nourished 81 y.o.  male in no acute distress, lying in bed awake but minimally verbal. 
ENT: ENT exam normal, no neck nodes or sinus tenderness Head: normocephalic, without obvious abnormality Mouth:  mucous membranes moist, pharynx normal without lesions Neck: supple, symmetrical, trachea midline Cardio:  regular rate and rhythm, S1, S2 normal, no murmur, click, rub or gallop Chest: inspection normal - no chest wall deformities or tenderness, respiratory effort normal 
Lungs: clear to auscultation, no wheezes or rales and unlabored breathing Abdomen: soft, non-tender. Bowel sounds normal. No masses, no organomegaly. Extremities:  no redness or tenderness in the calves or thighs, no edema, atrophic lower extremities Neuro: alert, nods to most questions, occasional answers verbally Lab results: 
 
Chemistry Recent Labs  
  11/20/19 
1110 11/19/19 
0403 11/18/19 
2300 * 339* 283* * 152* 156*  
K 3.7 3.2* 4.5  
* 125* 130* CO2 23 19* 17* BUN 14 18 22* CREA 0.63 0.70 0.71  
CA 7.7* 6.6* 7.0* AGAP 3 8 9 BUCR 22* 26* 31* CBC w/ Diff Recent Labs  
  11/20/19 
1110 11/19/19 
0403 11/18/19 
5100 WBC 10.9 11.9 13.9*  
RBC 4.04* 4.01* 4.28* HGB 11.7* 11.2* 12.5*  
HCT 35.6* 35.9* 38.9  171 192 GRANS 62  --   --   
LYMPH 26  --   --   
EOS 6*  --   --   
 
 
Microbiology All Micro Results Procedure Component Value Units Date/Time CULTURE, URINE [[id number]]  (Abnormal) Collected:  11/17/19 1445 Order Status:  Completed Specimen:  Cath Urine Updated:  11/20/19 7683 Special Requests: NO SPECIAL REQUESTS Culture result:    
  14925 COLONIES/mL STREPTOCOCCI,NON HEMOLYTIC  
     
 CULTURE, BLOOD [[id number]] Collected:  11/17/19 1142 Order Status:  Completed Specimen:  Blood Updated:  11/20/19 8333 Special Requests: PERIPHERAL Culture result: NO GROWTH 3 DAYS     
 CULTURE, BLOOD [[id number]] Collected:  11/17/19 1101 Order Status:  Completed Specimen:  Blood Updated:  11/20/19 8066 Special Requests: PERIPHERAL Culture result: NO GROWTH 3 DAYS     
 CULTURE, URINE [[id number]] Collected:  11/17/19 1128 Order Status:  Completed Specimen:  Cath Urine Updated:  11/19/19 5229 Special Requests: NO SPECIAL REQUESTS Culture result:    
  23316 COLONIES/mL MIXED GRAM POSITIVE СВЕТЛАНА, PROBABLE SKIN/GENITAL CONTAMINATION. MRSA SCREEN - PCR (NASAL) [[id number]] Collected:  11/18/19 1431 Order Status:  Completed Specimen:  Nasal from Nares Updated:  11/18/19 2114 Special Requests: NO SPECIAL REQUESTS Culture result: MRSA target DNA is not detected (presumptive not colonized with MRSA) RESPIRATORY VIRAL PANEL, PCR [[id number]] Collected:  11/17/19 1837 Order Status:  Completed Specimen:  Sputum Updated:  11/17/19 1926 INFLUENZA A & B AG (RAPID TEST) [[id number]] Collected:  11/17/19 1143 Order Status:  Completed Specimen:  Nasopharyngeal from Nasal washing Updated:  11/17/19 1211 Influenza A Antigen NEGATIVE Comment: A negative result does not exclude influenza virus infection, seasonal or H1N1 due to suboptimal sensitivity. If influenza is circulating in your community, a diagnosis of influenza should be considered based on a patients clinical presentation and empiric antiviral treatment should be considered, if indicated. Influenza B Antigen NEGATIVE Mariya Johnson MD, [facility] Infectious Diseases 
[phone] 
11/20/2019 11:57 AM

## 2019-11-20 NOTE — CONSULTS
[facility] 
                                          (A Division of [address] [place] [address]) Consultation Note Date of Admission: 11/17/2019    Date of Consultation: 11/20/2019 Referred by:  Dr. Radha Castro Reason for Referral: further antimicrobial management Current Antimicrobials: Prior Antimicrobials Amoxicillin 11/20 - 0 Vanc, Zosyn 11/17 - 3  Levofloxacin 11/17 - 0 Assessment: Plan:  
Fever 
- no clear source - No significant pyuria to suggest UTI, urcx only 20K NHS but has hydronephrosis from urinary retention and renal calculi). 1st dose Vanc given before urcx 
- fever may have been from mild UTI with obstruction. Doubt from Bronchiolitis seen on CT Chest.   
- [place] placed in ED 11/17 (after traumatic straight cath) -> dc Vancomycin and Zosyn 
-> amoxicillin 500 mg tid x 5 days Cerebral Palsy Intellectual Disability HTN Polio Microbiology: 
 
 
Lines / Catheters: HPI: 
 
81year-old  male with Cerebral Palsy, Intellectual Disability, HTN, Polio admitted to [facility] 11/17/2019 due to fever and shortness of breath. He is a resident of [facility] and was noted to fever one day PTA. He was taken to the ED by EMS where his temperature was 102.6,  RR 30 and BP dropped to 73/50. He was admitted to the ICU on levophed and Zosyn and Vancomycin. One dose of levofloxacin was given in the ED. Urinalysis showed only 4-10 WBC and urine culture grew only 20,000 NHS. MRSA screen was negative and viral respiratory panel was negative. His fever promptly resolved and WBC count initially 19.3 normalized to 11.9 by 11/19. He has been transferred out of the ICU. CTAP on 11/17 showed bilateral hydronephrosis and a distended bladder with a diffusely mildly thickened wall suggestive of urinary retention.  There were bilateral urinary tract calculi within both dilated ureters and renal collecting systems. There was also mild bronchial wall thickening and opacification at the lung bases c/w bronchiolitis. Active Hospital Problems Diagnosis Date Noted  Uncontrolled type 2 diabetes mellitus with hyperosmolar nonketotic hyperglycemia ([facility] 11/17/2019  Hydronephrosis 11/17/2019  Hypernatremia 11/17/2019  Sepsis ([facility] 11/17/2019  Elevated troponin 11/17/2019  Urinary retention 11/17/2019  MATTIE (acute kidney injury) ([facility] 11/17/2019  [facility] 06/12/2015  UTI (urinary tract infection) 06/11/2015 Past Medical History:  
Diagnosis Date  Cerebral palsy ([facility]  Hypertension  Mild mental retardation  Polio No past surgical history on file. No family history on file. Social History Socioeconomic History  Marital status: LEGALLY  Spouse name: Not on file  Number of children: Not on file  Years of education: Not on file  Highest education level: Not on file Occupational History  Not on file Social Needs  Financial resource strain: Not on file  Food insecurity:  
  Worry: Not on file Inability: Not on file  Transportation needs:  
  Medical: Not on file Non-medical: Not on file Tobacco Use  Smoking status: Never Smoker Substance and Sexual Activity  Alcohol use: No  
 Drug use: Not on file  Sexual activity: Not on file Lifestyle  Physical activity:  
  Days per week: Not on file Minutes per session: Not on file  Stress: Not on file Relationships  Social connections:  
  Talks on phone: Not on file Gets together: Not on file Attends Yarsani service: Not on file Active member of club or organization: Not on file Attends meetings of clubs or organizations: Not on file Relationship status: Not on file  Intimate partner violence:  
  Fear of current or ex partner: Not on file Emotionally abused: Not on file Physically abused: Not on file Forced sexual activity: Not on file Other Topics Concern  Not on file Social History Narrative  Not on file Allergies: 
Patient has no known allergies. .  
 
Medications: 
Current Facility-Administered Medications Medication Dose Route Frequency  aspirin chewable tablet 81 mg  81 mg Oral DAILY  baclofen (LIORESAL) tablet 10 mg  10 mg Oral TID  terazosin (HYTRIN) capsule 5 mg  5 mg Oral DAILY  finasteride (PROSCAR) tablet 5 mg  5 mg Oral DAILY  [START ON 2019] insulin glargine (LANTUS) injection 20 Units  20 Units SubCUTAneous DAILY  insulin lispro (HUMALOG) injection   SubCUTAneous AC&HS  
 glucose chewable tablet 16 g  4 Tab Oral PRN  
 glucagon (GLUCAGEN) injection 1 mg  1 mg IntraMUSCular PRN  
 dextrose 10% infusion 125-250 mL  125-250 mL IntraVENous PRN  
 sodium chloride (NS) flush 5-40 mL  5-40 mL IntraVENous Q8H  
 sodium chloride (NS) flush 5-40 mL  5-40 mL IntraVENous PRN  
 heparin (porcine) injection 5,000 Units  5,000 Units SubCUTAneous Q8H  
 acetaminophen (TYLENOL) tablet 650 mg  650 mg Oral Q6H PRN Or  
 acetaminophen (TYLENOL) suppository 650 mg  650 mg Rectal Q6H PRN  piperacillin-tazobactam (ZOSYN) 3.375 g in 0.9% sodium chloride (MBP/ADV) 100 mL MBP  #EXTENDED 4-HOUR INFUSION##  3.375 g IntraVENous Q8H  
 famotidine (PF) (PEPCID) 20 mg in sodium chloride 0.9% 10 mL injection  20 mg IntraVENous Q12H  
 influenza vaccine - (6 mos+)(PF) (FLUARIX/FLULAVAL/FLUZONE QUAD) injection 0.5 mL  0.5 mL IntraMUSCular PRIOR TO DISCHARGE  
  
 
ROS: 
Review of systems not obtained due to patient factors. Physical Exam: 
Temp (24hrs), Av °F (36.7 °C), Min:97.5 °F (36.4 °C), Max:99 °F (37.2 °C) Visit Vitals /66 (BP 1 Location: Left arm, BP Patient Position: At rest) Pulse 88 Temp 97.6 °F (36.4 °C) Resp 16 Ht 5' 4\" (1.626 m) Wt 59 kg (130 lb) SpO2 96% BMI 22.31 kg/m² General: Well developed, well nourished 81 y.o.  male in no acute distress, lying in bed awake but minimally verbal. 
ENT: ENT exam normal, no neck nodes or sinus tenderness Head: normocephalic, without obvious abnormality Mouth:  mucous membranes moist, pharynx normal without lesions Neck: supple, symmetrical, trachea midline Cardio:  regular rate and rhythm, S1, S2 normal, no murmur, click, rub or gallop Chest: inspection normal - no chest wall deformities or tenderness, respiratory effort normal 
Lungs: clear to auscultation, no wheezes or rales and unlabored breathing Abdomen: soft, non-tender. Bowel sounds normal. No masses, no organomegaly. Extremities:  no redness or tenderness in the calves or thighs, no edema, atrophic lower extremities Neuro: alert, nods to most questions, occasional answers verbally Lab results: 
 
Chemistry Recent Labs 11/19/19 
0403 11/18/19 
2300 11/18/19 
1502 * 283* 138* * 156* 163* K 3.2* 4.5 4.1 * 130* 140* CO2 19* 17* 16*  
BUN 18 22* 27* CREA 0.70 0.71 0.56* CA 6.6* 7.0* 7.2* AGAP 8 9 7 BUCR 26* 31* 48* CBC w/ Diff Recent Labs  
  11/20/19 
1110 11/19/19 
0403 11/18/19 
9116 WBC 10.9 11.9 13.9*  
RBC 4.04* 4.01* 4.28* HGB 11.7* 11.2* 12.5*  
HCT 35.6* 35.9* 38.9  171 192 GRANS 62  --   --   
LYMPH 26  --   --   
EOS 6*  --   --   
 
 
Microbiology All Micro Results Procedure Component Value Units Date/Time CULTURE, URINE [[id number]]  (Abnormal) Collected:  11/17/19 1445 Order Status:  Completed Specimen:  Cath Urine Updated:  11/20/19 0943 Special Requests: NO SPECIAL REQUESTS Culture result:    
  [id number] COLONIES/mL STREPTOCOCCI,NON HEMOLYTIC  
     
 CULTURE, BLOOD [[id number]] Collected:  11/17/19 1143 Order Status:  Completed Specimen:  Blood Updated:  11/20/19 2098 Special Requests: PERIPHERAL Culture result: NO GROWTH 3 DAYS CULTURE, BLOOD [[id number]] Collected:  11/17/19 1101 Order Status:  Completed Specimen:  Blood Updated:  11/20/19 0474 Special Requests: PERIPHERAL Culture result: NO GROWTH 3 DAYS     
 CULTURE, URINE [[id number]] Collected:  11/17/19 1128 Order Status:  Completed Specimen:  Cath Urine Updated:  11/19/19 6362 Special Requests: NO SPECIAL REQUESTS Culture result:    
  [id number] COLONIES/mL MIXED GRAM POSITIVE СВЕТЛАНА, PROBABLE SKIN/GENITAL CONTAMINATION. MRSA SCREEN - PCR (NASAL) [[id number]] Collected:  11/18/19 1431 Order Status:  Completed Specimen:  Nasal from Nares Updated:  11/18/19 2114 Special Requests: NO SPECIAL REQUESTS Culture result: MRSA target DNA is not detected (presumptive not colonized with MRSA) RESPIRATORY VIRAL PANEL, PCR [[id number]] Collected:  11/17/19 1837 Order Status:  Completed Specimen:  Sputum Updated:  11/17/19 1926 INFLUENZA A & B AG (RAPID TEST) [[id number]] Collected:  11/17/19 1143 Order Status:  Completed Specimen:  Nasopharyngeal from Nasal washing Updated:  11/17/19 1211 Influenza A Antigen NEGATIVE Comment: A negative result does not exclude influenza virus infection, seasonal or H1N1 due to suboptimal sensitivity. If influenza is circulating in your community, a diagnosis of influenza should be considered based on a patients clinical presentation and empiric antiviral treatment should be considered, if indicated. Influenza B Antigen NEGATIVE Victoriano Meneses MD, [facility] Infectious Diseases 
[phone] 
11/20/2019 11:57 AM

## 2019-11-20 NOTE — PROGRESS NOTES
1930-Bedside and Verbal shift change report given to Trudy Simmons RN (oncoming nurse) by Miguel Ewing RN (offgoing nurse). Report included the following information SBAR, Kardex, Procedure Summary, Intake/Output and MAR.

## 2019-11-20 NOTE — PROGRESS NOTES
Speech Pathology Note Dysphagia tx completed b/s with recs of mechanical soft, ground solids, continue HONEY thick liquids VIA TSP ONLY, strict aspiration precautions, strict HOB 55- 60* for all po feeds and >/= 45* for at least 30 minutes following. Full report to follow. SLP will f/u with diet tolerance/ safety and trial swallowing exercises. Thank you, 
Duglas Murguia MS, CCC/SLP Speech- Language Pathologist

## 2019-11-20 NOTE — PROGRESS NOTES
Problem: Diabetes Self-Management Goal: *Disease process and treatment process Description Define diabetes and identify own type of diabetes; list 3 options for treating diabetes. Outcome: Progressing Towards Goal 
Goal: *Incorporating nutritional management into lifestyle Description Describe effect of type, amount and timing of food on blood glucose; list 3 methods for planning meals. Outcome: Progressing Towards Goal 
Goal: *Incorporating physical activity into lifestyle Description State effect of exercise on blood glucose levels. Outcome: Progressing Towards Goal 
Goal: *Developing strategies to promote health/change behavior Description Define the ABC's of diabetes; identify appropriate screenings, schedule and personal plan for screenings. Outcome: Progressing Towards Goal 
Goal: *Using medications safely Description State effect of diabetes medications on diabetes; name diabetes medication taking, action and side effects. Outcome: Progressing Towards Goal 
Goal: *Monitoring blood glucose, interpreting and using results Description Identify recommended blood glucose targets  and personal targets. Outcome: Progressing Towards Goal 
Goal: *Prevention, detection, treatment of acute complications Description List symptoms of hyper- and hypoglycemia; describe how to treat low blood sugar and actions for lowering  high blood glucose level. Outcome: Progressing Towards Goal 
Goal: *Prevention, detection and treatment of chronic complications Description Define the natural course of diabetes and describe the relationship of blood glucose levels to long term complications of diabetes. Outcome: Progressing Towards Goal 
Goal: *Developing strategies to address psychosocial issues Description Describe feelings about living with diabetes; identify support needed and support network Outcome: Progressing Towards Goal 
Goal: *Sick day guidelines Outcome: Progressing Towards Goal 
  
 Problem: Patient Education: Go to Patient Education Activity Goal: Patient/Family Education Outcome: Progressing Towards Goal 
  
Problem: Discharge Planning Goal: *Discharge to safe environment Outcome: Progressing Towards Goal 
  
Problem: Pressure Injury - Risk of 
Goal: *Prevention of pressure injury Description Document Alex Scale and appropriate interventions in the flowsheet. Outcome: Progressing Towards Goal 
Note: Pressure Injury Interventions: 
Sensory Interventions: Assess changes in LOC Moisture Interventions: Absorbent underpads, Apply protective barrier, creams and emollients, Check for incontinence Q2 hours and as needed, Maintain skin hydration (lotion/cream), Offer toileting Q_hr Activity Interventions: Pressure redistribution bed/mattress(bed type) Mobility Interventions: HOB 30 degrees or less, Pressure redistribution bed/mattress (bed type) Nutrition Interventions: Document food/fluid/supplement intake Friction and Shear Interventions: Apply protective barrier, creams and emollients, Foam dressings/transparent film/skin sealants, HOB 30 degrees or less, Minimize layers, Transferring/repositioning devices Problem: Patient Education: Go to Patient Education Activity Goal: Patient/Family Education Outcome: Progressing Towards Goal 
  
Problem: Falls - Risk of 
Goal: *Absence of Falls Description Document [place] Fall Risk and appropriate interventions in the flowsheet. Outcome: Progressing Towards Goal 
Note: Fall Risk Interventions: 
  
 
Mentation Interventions: Door open when patient unattended Medication Interventions: Bed/chair exit alarm Elimination Interventions: Call light in reach, Bed/chair exit alarm, Toileting schedule/hourly rounds Problem: Patient Education: Go to Patient Education Activity Goal: Patient/Family Education Outcome: Progressing Towards Goal 
  
Problem: Patient Education: Go to Patient Education Activity Goal: Patient/Family Education Outcome: Progressing Towards Goal 
  
Problem: Diabetes Maintenance:Admission Goal: *Blood glucose 80 to 180 mg/dl Outcome: Progressing Towards Goal 
Goal: *Adequate nutrition Outcome: Progressing Towards Goal 
  
Problem: Patient Education: Go to Patient Education Activity Goal: Patient/Family Education Outcome: Progressing Towards Goal 
  
Problem: Patient Education: Go to Patient Education Activity Goal: Patient/Family Education Outcome: Progressing Towards Goal

## 2019-11-20 NOTE — PROGRESS NOTES
Problem: Discharge Planning Goal: *Discharge to safe environment Outcome: Progressing Towards Goal 
 Plan is to return to [facility] Spoke with Dr Radha Castro, pt will be ready to return tomorrow. Notified divina.

## 2019-11-20 NOTE — PROGRESS NOTES
Bedside and Verbal shift change report given to Yarelis Zhao RN (oncoming nurse) by Mili Melchor RN (offgoing nurse). Report included the following information SBAR, Kardex, Procedure Summary, Intake/Output and MAR.

## 2019-11-21 NOTE — PROGRESS NOTES
[facility] Phys[facility] 
                                             (A Division of [address] [place] [address]) Follow-up Note Date of Admission: 11/17/2019     Date of Note:  11/21/2019 Summary:   
 
82 y/o CM w/ CP, Intellectual Disability, HTN, Polio adm 11/17 w/ fever, SOB. Fever  at SNF 1 day PTA. Temp at .6,  RR 30 and BP 73/50. Admitted to ICU on levophed, Zosyn, Vancomycin. UA 4-10 WBC and urine culture grew only 20,000 NHS (after Vanc). MRSA scrn negative and viral respiratory panel IP. Fever resolved and WBC (19.3) normalized to 11.9 by 11/19. CTAP 11/17: bilateral hydronephrosis, distended bladder w/ diffusely mildly thickened wall s/o urinary retention, bilateral urinary tract calculi within both dilated ureters and renal collecting systems. Also mild bronchial wall thickening and opacification at the lung bases c/w bronchiolitis. Interval History: 
 
Sleeping soundly. Did not awaken when examined. Afebrile and WBC normal  
 
 
Current Antimicrobials: Prior Antimicrobials Amoxicillin 11/20 - 1 Vanc, Zosyn 11/17 - 3  Levofloxacin 11/17 - 0  
  
  
Assessment: Plan:  
Fever 
- no clear source - No significant pyuria to suggest UTI, urcx only 20K NHS but has hydronephrosis from urinary retention and renal calculi). 1st dose Vanc given before urcx 
- fever may have been from mild UTI with obstruction. Doubt from Bronchiolitis seen on CT Chest.   
- [place] placed in ED 11/17 (after traumatic straight cath) -> continue amoxicillin 500 mg tid x 4 more days Mild UTI w/ obstruction - CTAP 11/17: bilat hydronephrosis, layering calculi in collecting system, mildly thickened bladder wall s/o urinary retention 
- urcx 11/17: 20K NHS (after 1st dose vanc) -> amoxicillin as above Cerebral Palsy    
Intellectual Disability    
HTN    
Polio    
  
Microbiology: 
 
11/17    blcx NGTD x 2 
             urcx 20,000 NHS 
 Respiratory viral panel IP 
11/18    MRSA scrn neg 
  
Lines / Catheters: 
  
piv Patient Active Problem List  
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  
 Hypertension I10  
 Mild mental retardation F70  
 Small bowel obstruction ([facility] K56.609  Cognitive developmental delay F81.9  
 Uncontrolled type 2 diabetes mellitus with hyperosmolar nonketotic hyperglycemia (HCC) E11.00  Hydronephrosis N13.30  Hypernatremia E87.0  Sepsis ([facility] A41.9  Elevated troponin R79.89  
 Urinary retention R33.9  MATTIE (acute kidney injury) ([facility] N17.9 Current Facility-Administered Medications Medication Dose Route Frequency  dextrose 5% infusion  50 mL/hr IntraVENous CONTINUOUS  
 potassium chloride SR (KLOR-CON 10) tablet 40 mEq  40 mEq Oral ONCE  phosphorus (K PHOS NEUTRAL) 250 mg tablet 1 Tab  1 Tab Oral BID  famotidine (PEPCID) tablet 20 mg  20 mg Oral BID  aspirin chewable tablet 81 mg  81 mg Oral DAILY  baclofen (LIORESAL) tablet 10 mg  10 mg Oral TID  terazosin (HYTRIN) capsule 5 mg  5 mg Oral DAILY  finasteride (PROSCAR) tablet 5 mg  5 mg Oral DAILY  insulin glargine (LANTUS) injection 20 Units  20 Units SubCUTAneous DAILY  amoxicillin (AMOXIL) capsule 500 mg  500 mg Oral Q8H  
 insulin lispro (HUMALOG) injection   SubCUTAneous AC&HS  
 glucose chewable tablet 16 g  4 Tab Oral PRN  
 glucagon (GLUCAGEN) injection 1 mg  1 mg IntraMUSCular PRN  
 dextrose 10% infusion 125-250 mL  125-250 mL IntraVENous PRN  
 sodium chloride (NS) flush 5-40 mL  5-40 mL IntraVENous Q8H  
 sodium chloride (NS) flush 5-40 mL  5-40 mL IntraVENous PRN  
 heparin (porcine) injection 5,000 Units  5,000 Units SubCUTAneous Q8H  
 acetaminophen (TYLENOL) tablet 650 mg  650 mg Oral Q6H PRN  Or  
  acetaminophen (TYLENOL) suppository 650 mg  650 mg Rectal Q6H PRN  
 influenza vaccine 2019- (6 mos+)(PF) (FLUARIX/FLULAVAL/FLUZONE QUAD) injection 0.5 mL  0.5 mL IntraMUSCular PRIOR TO DISCHARGE Review of Systems - Negative except as in interval history Objective: 
Visit Vitals BP 92/65 Pulse 98 Temp 97.2 °F (36.2 °C) Resp 18 Ht 5' 4\" (1.626 m) Wt 59 kg (130 lb) SpO2 100% BMI 22.31 kg/m² Temp (24hrs), Av.9 °F (36.6 °C), Min:97.2 °F (36.2 °C), Max:98.4 °F (36.9 °C) General: Well developed, well nourished 81 y.o.  male in no acute distress, lying in bed fast asleep Head: normocephalic, without obvious abnormality Neck: supple, symmetrical, trachea midline Cardio:  regular rate and rhythm, S1, S2 normal, no murmur, click, rub or gallop Chest: no chest wall deformities or tenderness, respiratory effort normal 
Lungs: clear to auscultation, no wheezes or rales and unlabored breathing Abdomen: soft, non-tender. Bowel sounds normal. No masses, no organomegaly. Extremities:  no redness or tenderness in the calves or thighs, no edema, atrophic lower extremities Lab results: 
 
Chemistry Recent Labs  
  196 19 
1110 19 
0403 * 203* 339* * 154* 152* K 3.4* 3.7 3.2*  
* 128* 125* CO2 21 23 19* BUN 16 14 18 CREA 0.52* 0.63 0.70 CA 8.1* 7.7* 6.6* AGAP 7 3 8 BUCR 31* 22* 26* CBC w/ Diff Recent Labs  
  19 
0436 19 
1110 19 
0403 WBC 9.8 10.9 11.9 RBC 4.10* 4.04* 4.01* HGB 11.6* 11.7* 11.2* HCT 36.1 35.6* 35.9*  
 200 171 GRANS 63 62  --   
LYMPH 27 26  --   
EOS 5 6*  --   
 
 
Microbiology All Micro Results Procedure Component Value Units Date/Time CULTURE, BLOOD [[id number]] Collected:  19 1101 Order Status:  Completed Specimen:  Blood Updated:  19 5931 Special Requests: PERIPHERAL Culture result: NO GROWTH 4 DAYS CULTURE, BLOOD [[id number]] Collected:  11/17/19 1143 Order Status:  Completed Specimen:  Blood Updated:  11/21/19 8957 Special Requests: PERIPHERAL Culture result: NO GROWTH 4 DAYS     
 CULTURE, URINE [[id number]]  (Abnormal)  (Susceptibility) Collected:  11/17/19 1445 Order Status:  Completed Specimen:  Cath Urine Updated:  11/21/19 4335 Special Requests: NO SPECIAL REQUESTS Culture result:    
  [id number] COLONIES/mL ENTEROCOCCUS FAECIUM GROUP D  
     
 RESPIRATORY VIRAL PANEL, PCR [[id number]] Collected:  11/17/19 1837 Order Status:  Completed Specimen:  Sputum from Respiratory sample Updated:  11/21/19 9874 Source NARES Influenza A NEGATIVE Influenza B NEGATIVE      
  RSV A NEGATIVE      
  RSV B NEGATIVE Parainfluenza 1 NEGATIVE Parainfluenza 2 NEGATIVE Parainfluenza 3 NEGATIVE Rhinovirus NEGATIVE Metapneumovirus NEGATIVE Adenovirus NEGATIVE Comment: (NOTE) Performed At: [address] [id number] Gee Dawn MD [id number] CULTURE, URINE [[id number]] Collected:  11/17/19 1128 Order Status:  Completed Specimen:  Cath Urine Updated:  11/19/19 2331 Special Requests: NO SPECIAL REQUESTS Culture result:    
  [id number] COLONIES/mL MIXED GRAM POSITIVE СВЕТЛАНА, PROBABLE SKIN/GENITAL CONTAMINATION. MRSA SCREEN - PCR (NASAL) [[id number]] Collected:  11/18/19 1431 Order Status:  Completed Specimen:  Nasal from Nares Updated:  11/18/19 2114 Special Requests: NO SPECIAL REQUESTS Culture result: MRSA target DNA is not detected (presumptive not colonized with MRSA) INFLUENZA A & B AG (RAPID TEST) [[id number]] Collected:  11/17/19 1143 Order Status:  Completed Specimen:  Nasopharyngeal from Nasal washing Updated:  11/17/19 1211   Influenza A Antigen NEGATIVE      
 Comment: A negative result does not exclude influenza virus infection, seasonal or H1N1 due to suboptimal sensitivity. If influenza is circulating in your community, a diagnosis of influenza should be considered based on a patients clinical presentation and empiric antiviral treatment should be considered, if indicated. Influenza B Antigen NEGATIVE [place] Oleary MD, [facility] Infectious Diseases 
[phone]  
11/21/2019  
12:17 PM

## 2019-11-21 NOTE — PROGRESS NOTES
In Patient Progress note Admit Date: 11/17/2019 Impression: 1. MATTIE likely prerenal azotemia ,obst uropathy , resolved 2. Hyperosmolar nonketotic hyperglycemia 3. Sepsis  D/t UTI 4. b hydronephrosis 2/2 urinary retention, improved 5. Hypernatremia 6. sally nephrolithiasis /cysts  
  
Plan: 
  
1) d5w@ 50 cc/hrs  
2) sodium level this evening 3) follow urology recs 
  
Please call with questions, 
  
Oumou Isidro MD FASN Cell [phone] Pager: [phone] 
  
Subjective:  
 
Awake , alert Getting swallow study this AM 
 
Current Facility-Administered Medications:  
  dextrose 5% infusion, 50 mL/hr, IntraVENous, CONTINUOUS, Jose Montoya MD, Last Rate: 50 mL/hr at 11/21/19 0847, 50 mL/hr at 11/21/19 0847   potassium chloride SR (KLOR-CON 10) tablet 40 mEq, 40 mEq, Oral, ONCE, Bert Montoya MD, Stopped at 11/21/19 0900   phosphorus (K PHOS NEUTRAL) 250 mg tablet 1 Tab, 1 Tab, Oral, BID, Varsha Hu MD, 1 Tab at 11/21/19 2780   famotidine (PEPCID) tablet 20 mg, 20 mg, Oral, BID, Flower Mckeon MD 
  aspirin chewable tablet 81 mg, 81 mg, Oral, DAILY, Flower Mckeon MD, 81 mg at 11/21/19 0846 
  baclofen (LIORESAL) tablet 10 mg, 10 mg, Oral, TID, Varsha Hu MD, 10 mg at 11/21/19 7563   terazosin (HYTRIN) capsule 5 mg, 5 mg, Oral, DAILY, Flower Mckeon MD, 5 mg at 11/21/19 0846 
  finasteride (PROSCAR) tablet 5 mg, 5 mg, Oral, DAILY, Varsha Hu MD, 5 mg at 11/21/19 9642   insulin glargine (LANTUS) injection 20 Units, 20 Units, SubCUTAneous, DAILY, Varsha Hu MD, 20 Units at 11/21/19 0363   amoxicillin (AMOXIL) capsule 500 mg, 500 mg, Oral, Q8H, Chidi Sullivan MD, 500 mg at 11/21/19 1504   insulin lispro (HUMALOG) injection, , SubCUTAneous, AC&HS, Francheska Armendariz MD, 9 Units at 11/21/19 1235 
  glucose chewable tablet 16 g, 4 Tab, Oral, PRN, Varsha Hu MD 
   glucagon (GLUCAGEN) injection 1 mg, 1 mg, IntraMUSCular, PRN, Tony Harvey, MD Flower 
  dextrose 10% infusion 125-250 mL, 125-250 mL, IntraVENous, PRN, Tony Pump, MD Flower 
  sodium chloride (NS) flush 5-40 mL, 5-40 mL, IntraVENous, Q8H, Mamie Tucker PA, 10 mL at 11/21/19 1400 
  sodium chloride (NS) flush 5-40 mL, 5-40 mL, IntraVENous, PRN, Latoya Tucker PA 
  heparin (porcine) injection 5,000 Units, 5,000 Units, SubCUTAneous, Q8H, Mamie Tucker PA, 5,000 Units at 11/21/19 8427   acetaminophen (TYLENOL) tablet 650 mg, 650 mg, Oral, Q6H PRN, 650 mg at 11/21/19 1504 **OR** acetaminophen (TYLENOL) suppository 650 mg, 650 mg, Rectal, Q6H PRN, Latoya Tucker PA 
  influenza vaccine 2019-20 (6 mos+)(PF) (FLUARIX/FLULAVAL/FLUZONE QUAD) injection 0.5 mL, 0.5 mL, IntraMUSCular, PRIOR TO DISCHARGE, Kenney Painter MD 
 
 
 
Objective:  
 
Visit Vitals BP 92/65 Pulse 98 Temp 97.2 °F (36.2 °C) Resp 18 Ht 5' 4\" (1.626 m) Wt 59 kg (130 lb) SpO2 100% BMI 22.31 kg/m² Intake/Output Summary (Last 24 hours) at 11/21/2019 1514 Last data filed at 11/21/2019 1444 Gross per 24 hour Intake 830 ml Output 1550 ml Net -720 ml Physical Exam:  
 
Patient is in no apparent distress. HEENT: mmm Neck: no cervical lymphadenopathy or thyromegaly. Lungs: good air entry, clear to auscultation bilaterally. Cardiovascular system: S1, S2, regular rate and rhythm. Abdomen: soft, non tender, non distended. BS+ Extremities: no edema Integumentary: skin is grossly intact. Neurologic: Alert, oriented time three. Data Review: 
 
Recent Labs  
  11/21/19 
7467 WBC 9.8  
RBC 4.10* HCT 36.1 MCV 88.0 MCH 28.3 MCHC 32.1 RDW 16.6* Recent Labs  
  11/21/19 
0436 11/20/19 
1110 11/19/19 
0403 11/18/19 
2300 BUN 16 14 18 22* CREA 0.52* 0.63 0.70 0.71 CA 8.1* 7.7* 6.6* 7.0*  
K 3.4* 3.7 3.2* 4.5 * 154* 152* 156* * 128* 125* 130* CO2 21 23 19* 17* PHOS 2.2* 1.7*  --   --   
* 203* 339* 283* Rai Landry MD

## 2019-11-21 NOTE — PROGRESS NOTES
Spoke with Adine Goldmann, pharmacist regarding the Effer-K for potassium replacement. Per Adine Goldmann, this is okay to give in place of liquid potassium and is the preferred replacement for the pill version.

## 2019-11-21 NOTE — PROGRESS NOTES
1920-Bedside and Verbal shift change report given to Neena Whitley RN (oncoming nurse) by Yenni Yang RN (offgoing nurse). Report included the following information SBAR, Kardex, Procedure Summary, Intake/Output and MAR.  
 
0430- Complete bed bath given. Pt had small BM. NAD. Will continue to monitor.

## 2019-11-21 NOTE — PROGRESS NOTES
Problem: Diabetes Self-Management Goal: *Disease process and treatment process Description Define diabetes and identify own type of diabetes; list 3 options for treating diabetes. Outcome: Progressing Towards Goal 
Goal: *Incorporating nutritional management into lifestyle Description Describe effect of type, amount and timing of food on blood glucose; list 3 methods for planning meals. Outcome: Progressing Towards Goal 
Goal: *Incorporating physical activity into lifestyle Description State effect of exercise on blood glucose levels. Outcome: Progressing Towards Goal 
Goal: *Developing strategies to promote health/change behavior Description Define the ABC's of diabetes; identify appropriate screenings, schedule and personal plan for screenings. Outcome: Progressing Towards Goal 
Goal: *Using medications safely Description State effect of diabetes medications on diabetes; name diabetes medication taking, action and side effects. Outcome: Progressing Towards Goal 
Goal: *Monitoring blood glucose, interpreting and using results Description Identify recommended blood glucose targets  and personal targets. Outcome: Progressing Towards Goal 
Goal: *Prevention, detection, treatment of acute complications Description List symptoms of hyper- and hypoglycemia; describe how to treat low blood sugar and actions for lowering  high blood glucose level. Outcome: Progressing Towards Goal 
Goal: *Prevention, detection and treatment of chronic complications Description Define the natural course of diabetes and describe the relationship of blood glucose levels to long term complications of diabetes. Outcome: Progressing Towards Goal 
Goal: *Developing strategies to address psychosocial issues Description Describe feelings about living with diabetes; identify support needed and support network Outcome: Progressing Towards Goal 
Goal: *Sick day guidelines Outcome: Progressing Towards Goal 
  
 Problem: Patient Education: Go to Patient Education Activity Goal: Patient/Family Education Outcome: Progressing Towards Goal 
  
Problem: Discharge Planning Goal: *Discharge to safe environment Outcome: Progressing Towards Goal 
  
Problem: Pressure Injury - Risk of 
Goal: *Prevention of pressure injury Description Document Alex Scale and appropriate interventions in the flowsheet. Outcome: Progressing Towards Goal 
Note: Pressure Injury Interventions: 
Sensory Interventions: Assess changes in LOC, Assess need for specialty bed, Avoid rigorous massage over bony prominences, Float heels, Keep linens dry and wrinkle-free, Minimize linen layers, Monitor skin under medical devices, Turn and reposition approx. every two hours (pillows and wedges if needed) Moisture Interventions: Absorbent underpads, Apply protective barrier, creams and emollients, Check for incontinence Q2 hours and as needed, Maintain skin hydration (lotion/cream), Minimize layers Activity Interventions: Increase time out of bed, Pressure redistribution bed/mattress(bed type) Mobility Interventions: HOB 30 degrees or less, Pressure redistribution bed/mattress (bed type) Nutrition Interventions: Document food/fluid/supplement intake Friction and Shear Interventions: HOB 30 degrees or less, Foam dressings/transparent film/skin sealants Problem: Patient Education: Go to Patient Education Activity Goal: Patient/Family Education Outcome: Progressing Towards Goal 
  
Problem: Falls - Risk of 
Goal: *Absence of Falls Description Document Erika Galo Fall Risk and appropriate interventions in the flowsheet. Outcome: Progressing Towards Goal 
Note: Fall Risk Interventions: 
  
 
Mentation Interventions: Door open when patient unattended, Adequate sleep, hydration, pain control, Update white board Medication Interventions: Evaluate medications/consider consulting pharmacy Elimination Interventions: Call light in reach, Toileting schedule/hourly rounds Problem: Patient Education: Go to Patient Education Activity Goal: Patient/Family Education Outcome: Progressing Towards Goal 
  
Problem: Patient Education: Go to Patient Education Activity Goal: Patient/Family Education Outcome: Progressing Towards Goal 
  
Problem: Diabetes Maintenance:Admission Goal: *Blood glucose 80 to 180 mg/dl Outcome: Progressing Towards Goal 
Goal: *Adequate nutrition Outcome: Progressing Towards Goal 
  
Problem: Patient Education: Go to Patient Education Activity Goal: Patient/Family Education Outcome: Progressing Towards Goal 
  
Problem: Patient Education: Go to Patient Education Activity Goal: Patient/Family Education Outcome: Progressing Towards Goal

## 2019-11-21 NOTE — PROGRESS NOTES
Patient received in bed awake. Alert to self; reoriented to place, time and situation. Denies having pain; behavior indicators negative. No distress noted. Frequently use items within reach. Bed locked in low position. Call bell within reach. 1745- Patient's BP 95/65, . Dr. Ana Laura Brown was called made aware including previous BP & time taken of 94/ 64; 98/58, that Hytrin was given at 13:06. Also Na 154 and that Renal MD said to encourage fluid intake. Dr. Ana Laura Brown said will address. 1830- Patient resting. No distress noted. Frequency used items w/in reach. Bed locked in low position; call bell in remains reach.

## 2019-11-21 NOTE — PROGRESS NOTES
0715 .Bedside and Verbal shift change report given to this nurse Jyotsna Melgoza (oncoming nurse) by Sindi Nagel (offgoing nurse). Report included the following information SBAR, Kardex and MAR. Anne-Marie Pass Bedside and Verbal shift change report given to Clifford Tran  (oncoming nurse) by this nurse Jyotsna Melgoza (offgoing nurse). Report included the following information SBAR, Kardex and MAR.

## 2019-11-21 NOTE — PROGRESS NOTES
completed follow up visit with patient in room [phone] and a Spiritual assessment of patient and offered Pastoral car. Patient however was resting peacefully at this time. Chaplains will continue to follow and will provide pastoral care on an as needed/requested basis Benjamin 3 Board Certified [employer] Spiritual Care  
[phone]

## 2019-11-21 NOTE — PROGRESS NOTES
Bedside and Verbal shift change report given to YAS Kauffman (oncoming nurse) by Janes Puente RN (offgoing nurse). Report included the following information SBAR, Kardex, Procedure Summary, Intake/Output and MAR.

## 2019-11-21 NOTE — PROGRESS NOTES
Urology Progress Note Assessment/Plan:  
 
Patient Active Problem List  
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  
 Hypertension I10  
 Mild mental retardation F70  
 Small bowel obstruction ([address] K56.609  Cognitive developmental delay F81.9  
 Uncontrolled type 2 diabetes mellitus with hyperosmolar nonketotic hyperglycemia (HCC) E11.00  Hydronephrosis N13.30  Hypernatremia E87.0  Sepsis ([address] A41.9  Elevated troponin R79.89  
 Urinary retention R33.9  MATTIE (acute kidney injury) ([facility] [facility] N17.9 Plan:   
Agree with Dr. Janel Doyle from yesterday start Alpha Blockers and Finesteride. Voiding trial under strict observation in about 10 to 14 days after catheter inserted. Last urine culture enterococcus 20,000 cfu, ID on board for clarification. No fever and normal WBC now. Sarai Shelby MD 
 
[phone] Subjective:  
 
Daily Progress Note: 2019 4:51 PM 
 
Geno Reza is doing okay, he is afebrile and his WBC is down. Patient with depressed affect due to his intellectual disability and CP. He reports pain appears well controlled. His holm catheter is draining well and and urine is grossly clear. Objective:  
 
Visit Vitals /65 Pulse 94 Temp 97.5 °F (36.4 °C) Resp 18 Ht 5' 4\" (1.626 m) Wt 130 lb (59 kg) SpO2 100% BMI 22.31 kg/m² Temp (24hrs), Av.9 °F (36.6 °C), Min:97.2 °F (36.2 °C), Max:98.4 °F (36.9 °C) Intake and Output: 
1901 -  0700 In: 2104 [P.O.:600; I.V.:1000] Out: 2800 [Urine:2800]  0701 -  190 In: 65 [P.O.:530] Out: - PHYSICAL EXAMINATION:  
Visit Vitals /65 Pulse 94 Temp 97.5 °F (36.4 °C) Resp 18 Ht 5' 4\" (1.626 m) Wt 130 lb (59 kg) SpO2 100% BMI 22.31 kg/m² Constitutional: Well developed, well nourished. No acute distress. HEENT: Normocephalic, Atraumatic, EOM's intact CV:  no edema Respiratory: No respiratory distress or difficulties breathing Abdomen:  Soft and non-tender  Male:   CVA tenderness: none SCROTUM:  normal 
       PENIS: normal  [place]: in place and secured. Skin: No evidence of jaundice. Normal color Neuro/Psych:  Alert but not oriented. Affect depressed Lymphatic:  No enlarged inguinal lymph nodes. Lab/Data Review: All lab results for the last 24 hours reviewed. Labs:  
 
Labs: Results:  
Chemistry Recent Labs  
  11/21/19 0436 11/20/19 1110 11/19/19 
0403 * 203* 339* * 154* 152* K 3.4* 3.7 3.2*  
* 128* 125* CO2 21 23 19* BUN 16 14 18 CREA 0.52* 0.63 0.70 CA 8.1* 7.7* 6.6* AGAP 7 3 8 BUCR 31* 22* 26* CBC w/Diff Recent Labs  
  11/21/19 0436 11/20/19 1110 11/19/19 
0403 WBC 9.8 10.9 11.9 RBC 4.10* 4.04* 4.01* HGB 11.6* 11.7* 11.2* HCT 36.1 35.6* 35.9*  
 200 171 GRANS 63 62  --   
LYMPH 27 26  --   
EOS 5 6*  --   
  
Cultures No results for input(s): CULT in the last 72 hours. All Micro Results Procedure Component Value Units Date/Time CULTURE, BLOOD [[id number]] Collected:  11/17/19 1101 Order Status:  Completed Specimen:  Blood Updated:  11/21/19 2610 Special Requests: PERIPHERAL Culture result: NO GROWTH 4 DAYS     
 CULTURE, BLOOD [[id number]] Collected:  11/17/19 1143 Order Status:  Completed Specimen:  Blood Updated:  11/21/19 1662 Special Requests: PERIPHERAL Culture result: NO GROWTH 4 DAYS     
 CULTURE, URINE [[id number]]  (Abnormal)  (Susceptibility) Collected:  11/17/19 1445 Order Status:  Completed Specimen:  Cath Urine Updated:  11/21/19 3720 Special Requests: NO SPECIAL REQUESTS   Culture result:    
  [id number] COLONIES/mL ENTEROCOCCUS FAECIUM GROUP D  
     
 RESPIRATORY VIRAL PANEL, PCR [[id number]] Collected:  11/17/19 1837 Order Status:  Completed Specimen:  Sputum from Respiratory sample Updated:  11/21/19 2122 Source NARES Influenza A NEGATIVE Influenza B NEGATIVE      
  RSV A NEGATIVE      
  RSV B NEGATIVE Parainfluenza 1 NEGATIVE Parainfluenza 2 NEGATIVE Parainfluenza 3 NEGATIVE Rhinovirus NEGATIVE Metapneumovirus NEGATIVE Adenovirus NEGATIVE Comment: (NOTE) Performed At: [address] [id number] Mae Sanchez MD NO:[id number] CULTURE, URINE [[id number]] Collected:  11/17/19 1128 Order Status:  Completed Specimen:  Cath Urine Updated:  11/19/19 0090 Special Requests: NO SPECIAL REQUESTS Culture result:    
  19312 COLONIES/mL MIXED GRAM POSITIVE СВЕТЛАНА, PROBABLE SKIN/GENITAL CONTAMINATION. MRSA SCREEN - PCR (NASAL) [[id number]] Collected:  11/18/19 1431 Order Status:  Completed Specimen:  Nasal from Nares Updated:  11/18/19 2114 Special Requests: NO SPECIAL REQUESTS Culture result: MRSA target DNA is not detected (presumptive not colonized with MRSA) INFLUENZA A & B AG (RAPID TEST) [[id number]] Collected:  11/17/19 1143 Order Status:  Completed Specimen:  Nasopharyngeal from Nasal washing Updated:  11/17/19 1211 Influenza A Antigen NEGATIVE Comment: A negative result does not exclude influenza virus infection, seasonal or H1N1 due to suboptimal sensitivity. If influenza is circulating in your community, a diagnosis of influenza should be considered based on a patients clinical presentation and empiric antiviral treatment should be considered, if indicated. Influenza B Antigen NEGATIVE Urinalysis Color Date Value Ref Range Status 11/17/2019 YELLOW   Final  
 
Appearance Date Value Ref Range Status 11/17/2019 CLOUDY   Final  
 
Specific gravity Date Value Ref Range Status 11/17/2019 >1.030 (H) 1.005 - 1.030 Final  
 
pH (UA) Date Value Ref Range Status 11/17/2019 5.0 5.0 - 8.0   Final  
 
Protein Date Value Ref Range Status 11/17/2019 NEGATIVE  NEG mg/dL Final  
 
Ketone Date Value Ref Range Status 11/17/2019 TRACE (A) NEG mg/dL Final  
 
Bilirubin Date Value Ref Range Status 11/17/2019 NEGATIVE  NEG   Final  
 
Blood Date Value Ref Range Status 11/17/2019 LARGE (A) NEG   Final  
 
Urobilinogen Date Value Ref Range Status 11/17/2019 0.2 0.2 - 1.0 EU/dL Final  
 
Nitrites Date Value Ref Range Status 11/17/2019 NEGATIVE  NEG   Final  
 
Leukocyte Esterase Date Value Ref Range Status 11/17/2019 MODERATE (A) NEG   Final  
 
Potassium Date Value Ref Range Status 11/21/2019 3.4 (L) 3.5 - 5.5 mmol/L Final  
 
Creatinine Date Value Ref Range Status 11/21/2019 0.52 (L) 0.6 - 1.3 MG/DL Final  
 
BUN Date Value Ref Range Status 11/21/2019 16 7.0 - 18 MG/DL Final  
  
PSA No results for input(s): PSA in the last 72 hours.   
Coagulation No results found for: PTP, INR, APTT, INREXT

## 2019-11-21 NOTE — PROGRESS NOTES
Patient completed ROM as follows. BUE X 10 (shoulder flexion, biceps curls, int/ext rotation, ab/add) Pt participation was: 
() AROM/ AAROM 
(BUE) PROM Pain Level Before FMP: 5 Face scale Pain Level After FMP: 5 Face scale Non Verbal Pain Scale :  
 
(x) Alerted Nursing. (x) Call bell within patient reach. (x) Pt resting in no apparent distress in bed. NOTE:  
 
Pt received in bed - frequently falling asleep during  PROM exercises. Elevated BUEs with blankets. [address] [place] [address], [facility]

## 2019-11-21 NOTE — PROGRESS NOTES
Problem: Diabetes Self-Management Goal: *Disease process and treatment process Description Define diabetes and identify own type of diabetes; list 3 options for treating diabetes. Outcome: Progressing Towards Goal 
Goal: *Incorporating nutritional management into lifestyle Description Describe effect of type, amount and timing of food on blood glucose; list 3 methods for planning meals. Outcome: Progressing Towards Goal 
Goal: *Incorporating physical activity into lifestyle Description State effect of exercise on blood glucose levels. Outcome: Progressing Towards Goal 
Goal: *Developing strategies to promote health/change behavior Description Define the ABC's of diabetes; identify appropriate screenings, schedule and personal plan for screenings. Outcome: Progressing Towards Goal 
Goal: *Using medications safely Description State effect of diabetes medications on diabetes; name diabetes medication taking, action and side effects. Outcome: Progressing Towards Goal 
Goal: *Monitoring blood glucose, interpreting and using results Description Identify recommended blood glucose targets  and personal targets. Outcome: Progressing Towards Goal 
Goal: *Prevention, detection, treatment of acute complications Description List symptoms of hyper- and hypoglycemia; describe how to treat low blood sugar and actions for lowering  high blood glucose level. Outcome: Progressing Towards Goal 
Goal: *Prevention, detection and treatment of chronic complications Description Define the natural course of diabetes and describe the relationship of blood glucose levels to long term complications of diabetes. Outcome: Progressing Towards Goal 
Goal: *Developing strategies to address psychosocial issues Description Describe feelings about living with diabetes; identify support needed and support network Outcome: Progressing Towards Goal 
Goal: *Sick day guidelines Outcome: Progressing Towards Goal 
  
 Problem: Patient Education: Go to Patient Education Activity Goal: Patient/Family Education Outcome: Progressing Towards Goal 
  
Problem: Discharge Planning Goal: *Discharge to safe environment Outcome: Progressing Towards Goal 
  
Problem: Pressure Injury - Risk of 
Goal: *Prevention of pressure injury Description Document Alex Scale and appropriate interventions in the flowsheet. Outcome: Progressing Towards Goal 
Note: Pressure Injury Interventions: 
Sensory Interventions: Assess changes in LOC Moisture Interventions: Absorbent underpads Activity Interventions: Pressure redistribution bed/mattress(bed type) Mobility Interventions: HOB 30 degrees or less Nutrition Interventions: Document food/fluid/supplement intake Friction and Shear Interventions: HOB 30 degrees or less Problem: Patient Education: Go to Patient Education Activity Goal: Patient/Family Education Outcome: Progressing Towards Goal 
  
Problem: Falls - Risk of 
Goal: *Absence of Falls Description Document Sarah Ta Fall Risk and appropriate interventions in the flowsheet. Outcome: Progressing Towards Goal 
Note: Fall Risk Interventions: 
  
 
Mentation Interventions: Door open when patient unattended Medication Interventions: Evaluate medications/consider consulting pharmacy Elimination Interventions: Call light in reach Problem: Patient Education: Go to Patient Education Activity Goal: Patient/Family Education Outcome: Progressing Towards Goal 
  
Problem: Patient Education: Go to Patient Education Activity Goal: Patient/Family Education Outcome: Progressing Towards Goal 
  
Problem: Diabetes Maintenance:Admission Goal: *Blood glucose 80 to 180 mg/dl Outcome: Progressing Towards Goal 
Goal: *Adequate nutrition Outcome: Progressing Towards Goal 
  
Problem: Patient Education: Go to Patient Education Activity Goal: Patient/Family Education Outcome: Progressing Towards Goal 
  
 Problem: Patient Education: Go to Patient Education Activity Goal: Patient/Family Education Outcome: Progressing Towards Goal

## 2019-11-21 NOTE — PROGRESS NOTES
Patient completed ROM as follows. BLE X 10 (DF/PF, HAMSTRING STRETCH, AB/ADD, Heel slides) Pt participation was: 
() AROM/ AAROM 
(BLE) PROM Pain Level Before FMP: 5 Face scale Pain Level After FMP: 5 Face scale Non Verbal Pain Scale :  
 
(x) Alerted Nursing. (x) Call bell within patient reach. (x) Pt resting in no apparent distress in bed. NOTE:  
 
Pt received in bed - frequently falling asleep during  PROM exercises. Elevated BUEs with blankets. [address] [place] [address], [facility]

## 2019-11-21 NOTE — PROGRESS NOTES
Called Dr. Gilmer Dietz regarding changing potassium pill to liquid or granules. Orders received to change due to patient inability to swallow pills.

## 2019-11-21 NOTE — PROGRESS NOTES
Progress Note Patient: Aruna Phillips               Sex: male          DOA: 11/17/2019 YOB: 1950      Age:  81 y.o.        LOS:  LOS: 4 days CHIEF COMPLAINT:  Shortness of Breath; Dysphagia; and Irregular Heart Beat Assessment/Plan:  
 
Principal Problem: 
  Uncontrolled type 2 diabetes mellitus with hyperosmolar nonketotic hyperglycemia ([address] (11/17/2019) Active Problems: 
  UTI (urinary tract infection) (6/11/2015) Cerebral palsy ([address] (6/12/2015) Hydronephrosis (11/17/2019) Hypernatremia (11/17/2019) Sepsis ([address] (11/17/2019) Elevated troponin (11/17/2019) Urinary retention (11/17/2019) MATTIE (acute kidney injury) ([address] (11/17/2019) 
 
 
elevated troponin; NSTEMI ruled out after study PLAN: 
Hyperosmolar, hyperglycemia, nonketotic state RESOLVED 
-Off insulin drip, cont lantus Septic Shock Resolved - hypotension, elevated WBC, febrile (102.6) on arrival 
- UTI is only source, CXR clear, no other evidence of infection - BCx, Ucx obtained. UCX shows Strep : ID consulted. Hypernatremia 
- nephrology consulted - appreciate - Improving MATTIE Resolved - 2/2 dehydration, hydronephrosis - IV fluids 
- monitor BMP Hydronephrosis 2/2 urinary retention 
- urology consulted - appreciate Repeat Renal and bladder US shows resolution of prior L hydro and minimal residual R hydro and b/l non-obstructing renal stones - Per Urology recs, start terazosin and finasteride - Voiding trial 
 
 
Dysphagia - SLP consulted, appreciate recs: Rec: continue honey thick liquids via TSP ONLY, strict aspiration precautions, strict HOB 55- 60* for all po and >/= 45* at least 30 minutes following, advance solids to mechanical soft, ground solid, small bites, and alternate solids/ liquids. - Cont acceptable home medications for chronic conditions  
- DVT protocol Subjective:  
 
Pt is sleepy this AM, but arousable. Objective: Visit Vitals BP 92/65 Pulse 98 Temp 97.2 °F (36.2 °C) Resp 18 Ht 5' 4\" (1.626 m) Wt 59 kg (130 lb) SpO2 100% BMI 22.31 kg/m² Physical Exam: 
Ears: hearing is intact Eyes: Icterus is not present Lungs: clear to auscultation bilaterally Heart: regular rate and rhythm, S1, S2 normal, no murmur, click, rub or gallop Gastrointestinal: soft, non-tender. Bowel sounds normal. No masses,  no organomegaly Neurological:  New Focal Deficits are not present Psychiatric:  Mood is stable Lab/Data Reviewed: 
CMP:  
Lab Results Component Value Date/Time  (H) 11/21/2019 04:36 AM  
 K 3.4 (L) 11/21/2019 04:36 AM  
  (H) 11/21/2019 04:36 AM  
 CO2 21 11/21/2019 04:36 AM  
 AGAP 7 11/21/2019 04:36 AM  
  (H) 11/21/2019 04:36 AM  
 BUN 16 11/21/2019 04:36 AM  
 CREA 0.52 (L) 11/21/2019 04:36 AM  
 GFRAA >60 11/21/2019 04:36 AM  
 GFRNA >60 11/21/2019 04:36 AM  
 CA 8.1 (L) 11/21/2019 04:36 AM  
 MG 1.9 11/21/2019 04:36 AM  
 PHOS 2.2 (L) 11/21/2019 04:36 AM  
 
CBC:  
Lab Results Component Value Date/Time WBC 9.8 11/21/2019 04:36 AM  
 HGB 11.6 (L) 11/21/2019 04:36 AM  
 HCT 36.1 11/21/2019 04:36 AM  
  11/21/2019 04:36 AM  
 
 
Imaging Reviewed: 
No results found.

## 2019-11-22 NOTE — ROUTINE PROCESS
Bedside and Verbal shift change report given to Alfonzo Oliver (oncoming nurse) by Natalie Singleton (offgoing nurse). Report included the following information SBAR, Kardex, MAR and Recent Results.

## 2019-11-22 NOTE — PROGRESS NOTES
RENAL PROGRESS NOTE [facility] Assessment/Plan: · MATTIE (due to volume depletion/sepsis/urinary retention). Resolved. · Hypovolemic hypernatremia. Na is rising again, increase D5W. Needs to be given thickened liquids frequently, d/w nursing staff. If unable to take in adequate amount of free water may need tube feeding. · UTI/sepsis in a setting of urinary retention. On abx per ID. Hydro is resolving with holm. Urology is on the case. Subjective: 
Patient complaints off: No complaints. No SOB/CP/N/V. Eats well when fed and tolerates thickened liquids. Thirsty. Patient Active Problem List  
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  
 Hypertension I10  
 Mild mental retardation F70  
 Small bowel obstruction ([address] K56.609  Cognitive developmental delay F81.9  
 Uncontrolled type 2 diabetes mellitus with hyperosmolar nonketotic hyperglycemia (HCC) E11.00  Hydronephrosis N13.30  Hypernatremia E87.0  Sepsis ([address] A41.9  Elevated troponin R79.89  
 Urinary retention R33.9  MATTIE (acute kidney injury) ([address] N17.9 Current Facility-Administered Medications Medication Dose Route Frequency Provider Last Rate Last Dose  insulin lispro (HUMALOG) injection 3 Units  3 Units SubCUTAneous TIDAC Flower Mckeon MD      
 dextrose 5% infusion  50 mL/hr IntraVENous CONTINUOUS Ally Mata MD 50 mL/hr at 11/21/19 0847 50 mL/hr at 11/21/19 0670  famotidine (PEPCID) tablet 20 mg  20 mg Oral BID Yael Jo MD   20 mg at 11/22/19 0915  aspirin chewable tablet 81 mg  81 mg Oral DAILY Yael Jo MD   81 mg at 11/22/19 0915  baclofen (LIORESAL) tablet 10 mg  10 mg Oral TID Sabra Roca MD   10 mg at 11/22/19 1801  terazosin (HYTRIN) capsule 5 mg  5 mg Oral DAILY Sabra Roca MD   5 mg at 11/22/19 0915  
 finasteride (PROSCAR) tablet 5 mg  5 mg Oral DAILY Sabra Roca MD   5 mg at 11/21/19 4734  insulin glargine (LANTUS) injection 20 Units  20 Units SubCUTAneous DAILY Sabra Roca MD   20 Units at 11/22/19 1135  
 amoxicillin (AMOXIL) capsule 500 mg  500 mg Oral Q8H Chidi Sullivan MD   500 mg at 11/22/19 0500  
 insulin lispro (HUMALOG) injection   SubCUTAneous AC&HS Milagro Armendariz MD   2 Units at 11/22/19 0936  
 glucose chewable tablet 16 g  4 Tab Oral PRN Sabra Roca MD      
 glucagon (GLUCAGEN) injection 1 mg  1 mg IntraMUSCular PRN Sabra Roca MD      
 dextrose 10% infusion 125-250 mL  125-250 mL IntraVENous PRN Sabra Roca MD      
 sodium chloride (NS) flush 5-40 mL  5-40 mL IntraVENous Q8H Mamie Tucker PA   10 mL at 11/21/19 2200  
 sodium chloride (NS) flush 5-40 mL  5-40 mL IntraVENous PRN Bhavna Tucker PA      
 heparin (porcine) injection 5,000 Units  5,000 Units SubCUTAneous Q8H ReprogleBhavna PA   5,000 Units at 11/22/19 0940  acetaminophen (TYLENOL) tablet 650 mg  650 mg Oral Q6H PRN Bhavna Tucker PA   650 mg at 11/21/19 1504 Or  acetaminophen (TYLENOL) suppository 650 mg  650 mg Rectal Q6H PRN Bhavna Tucker PA      
 influenza vaccine 2019-20 (6 mos+)(PF) (FLUARIX/FLULAVAL/FLUZONE QUAD) injection 0.5 mL  0.5 mL IntraMUSCular PRIOR TO DISCHARGE Pam Chauhan MD      
 
 
Objective Vitals:  
 11/21/19 1940 11/22/19 0143 11/22/19 0507 11/22/19 9194 BP: 110/73 111/78 94/87 111/82 Pulse: 60 89 82 80 Resp: 18 18 18 20 Temp: 97.9 °F (36.6 °C) 98.3 °F (36.8 °C) 97.3 °F (36.3 °C) 97 °F (36.1 °C) SpO2: 99% 93% 99% 99% Weight:      
Height: Intake/Output Summary (Last 24 hours) at 11/22/2019 1200 Last data filed at 11/22/2019 0600 Gross per 24 hour Intake 1590 ml Output 2601 ml Net -1011 ml Admission weight: Weight: 57.6 kg (126 lb 14.4 oz) (11/17/19 1105) Last Weight Metrics: 
Weight Loss Metrics 11/20/2019 5/17/2018 6/12/2015 Today's Wt 130 lb 124 lb 100 lb BMI 22.31 kg/m2 20.63 kg/m2 19.53 kg/m2 Physical Assessment:  
 
General: NAD, alert and oriented. Dry oral mucosa. Neck: No jvd. LUNGS: Clear to Auscultation, No rales, rhonchi or wheezes. CVS EXM: S1, S2  RRR, no murmurs/gallops/rubs. Abdomen: soft, non tender. Lower Extremities:  no edema. Lab CBC w/Diff Recent Labs  
  11/22/19 
0329 11/21/19 
0436 11/20/19 
1110 WBC 10.3 9.8 10.9 RBC 3.88* 4.10* 4.04* HGB 10.8* 11.6* 11.7* HCT 34.4* 36.1 35.6*  189 200 GRANS 69 63 62 LYMPH 20* 27 26 EOS 5 5 6* Chemistry Recent Labs  
  11/22/19 
0329 11/21/19 
1845 11/21/19 
0436 * 310* 151* * 150* 154* K 3.9 4.1 3.4*  
* 122* 126* CO2 25 24 21 BUN 12 14 16 CREA 0.46* 0.66 0.52* CA 8.2* 8.3* 8.1* AGAP 2* 4 7 BUCR 26* 21* 31* PHOS 2.7  --  2.2* No results found for: IRON, FE, TIBC, IBCT, PSAT, FERR Lab Results Component Value Date/Time Calcium 8.2 (L) 11/22/2019 03:29 AM  
 Phosphorus 2.7 11/22/2019 03:29 AM  
  
 
Keith Cristobal M.D. [facility] Phone [phone] Pager [phone]

## 2019-11-22 NOTE — PROGRESS NOTES
Problem: Dysphagia (Adult) Goal: *Acute Goals and Plan of Care (Insert Text) Description Dysphagia Present: mild- mod oral, mod- severe pharyngeal 
Aspiration: silent with thin and nectar Recommendations: 
Diet: mechanical soft, ground, HONEY via TSP, strict HOB 55-60* for all po feeds, >/= 45* at least 30 minutes following Meds: crushed in puree Aspiration Precautions Oral Care TID Goals:  Patient will: 1. Tolerate PO trials with no overt s/s aspiration or distress in 4/5 trials 2. Utilize compensatory swallow strategies/maneuvers (decrease bite/sip, size/rate, alt. liq/sol) with min cues in 4/5 trials 3. Perform oral-motor/laryngeal strengthening exercises with min cues to increase oropharyngeal swallow function 4. Complete an objective swallow study (i.e., MBSS) to assess swallow integrity, r/o aspiration, and determine of safest LRD, min A-- met 11/18/19 Outcome: Progressing Towards Goal 
 
SPEECH LANGUAGE PATHOLOGY DYSPHAGIA TREATMENT Patient: Lang Swann (43 y.o. male) Date: 11/22/2019 Diagnosis: Hypernatremia [E87.0] Hydronephrosis [N13.30] Sepsis ([address] [A41.9] Uncontrolled type 2 diabetes mellitus with hyperosmolar nonketotic hyperglycemia ([address] Precautions: aspiration ASSESSMENT: 
Patient was seen for swallowing therapy this day. The patient had honey thick liquid at bedside, straw in cup. Patient with honey thick liquids via tsp with no s/sx of aspiration in 4/5 trials, x1 cough at end of session. ? aspiration on residuals? Patient with mech soft with disorganized mastication, however functional swallow. Posted precautions above bedside. Patient educated on importance of safe swallowing guidelines (small bites/sips, decreased rate, alt solids/liquids, HOB >60 for all PO intake, NO STRAWS); verbalized comprehension but requires reinforcement Progression toward goals: 
[]         Improving appropriately and progressing toward goals [x]         Improving slowly and progressing toward goals 
[]         Not making progress toward goals and plan of care will be adjusted PLAN: 
Recommendations and Planned Interventions: 
Mech soft, honey thick liquids via tsp Patient continues to benefit from skilled intervention to address the above impairments. Continue treatment per established plan of care. Discharge Recommendations:  Home Health SUBJECTIVE:  
Patient stated No straws. OBJECTIVE:  
Cognitive and Communication Status: 
Neurologic State: Drowsy Orientation Level: Disoriented to place, Disoriented to situation, Disoriented to time, Oriented to person Cognition: Follows commands Perception: Appears intact Perseveration: Perseverates during conversation Safety/Judgement: Fall prevention Dysphagia Treatment: 
Oral Assessment: 
Oral Assessment Labial: Decreased rate, Decreased seal 
Dentition: Limited Oral Hygiene: fair Lingual: Decreased rate, Decreased strength Velum: No impairment Mandible: No impairment P.O. Trials: 
 Patient Position: [id number] Vocal quality prior to P.O.: No impairment Consistency Presented: Honey thick liquid, Mechanical soft How Presented: Spoon, SLP-fed/presented How Much: 6 Bolus Acceptance: No impairment Bolus Formation/Control: Impaired Type of Impairment: Delayed Propulsion: Delayed (# of seconds) Oral Residue: None Initiation of Swallow: Delayed (# of seconds) Laryngeal Elevation: Decreased Aspiration Signs/Symptoms: Weak cough Pharyngeal Phase Characteristics: No impairment, issues, or problems Effective Modifications: Spoon Cues for Modifications: Maximal 
   
 
 Oral Phase Severity: Moderate Pharyngeal Phase Severity : Moderate-severe Oral Motor Exercises: PAIN: 
Pain level pre-treatment: 0/10 Pain level post-treatment: 0/10 Pain Intervention(s): Medication (see MAR); Rest, Ice, Repositioning Response to intervention: Nurse notified, See doc flow After treatment:  
[]              Patient left in no apparent distress sitting up in chair 
[x]              Patient left in no apparent distress in bed 
[x]              Call bell left within reach 
[]              Nursing notified 
[]              Family present 
[]              Caregiver present 
[]              Bed alarm activated COMMUNICATION/EDUCATION:  
[x] Aspiration precautions; swallow safety; compensatory techniques []        Patient unable to participate in education; education ongoing with staff [x]  Posted safety precautions in patient's room. [] Oral-motor/laryngeal strengthening exercises Aruna Marinelli [facility] SLP Time Calculation: 10 mins

## 2019-11-22 NOTE — PROGRESS NOTES
NUTRITION FOLLOW-UP/PLAN OF CARE 
 
RECOMMENDATIONS:  
1. Consistent CHO Mech Soft (NDD2) with HTL (3) (ground meats) (Feeder; HOB 55-60*) added 1.5 g Na and spoke with dietary to send extra HTL water on all trays instead of juices 2. Staff to assist with increasing intake of water HTL during and between meals 3. SF CIB BID for additional kcal/protein 4. Monitor labs, weight and PO intake 5. RD to follow GOALS:  
1. Ongoing: PO intake meets >75% of protein/calorie needs by 11/27 ASSESSMENT:  
Wt status is classified as normal per Body mass index is 22.31 kg/m². However Pt at nutrition risk d/t BMI <23 and age >65 years. Adequate PO Intake per vitals (%) with most meals >75%. Labs noted. BG range (143-328) over the past 24 hours; A1c (9.8%). Glycemic control team following. Pt w/ hypernatremia. Nutrition recommendations listed. RD to follow. SUBJECTIVE/OBJECTIVE:  
(11/22): SLP following: recommended continue mechanical soft, ground, HONEY via TSP, strict HOB 55-60* for all po feeds, >/= 45* at least 30 minutes following on (11/22). Noted stage 2 pressure injury to sacral/coccyx documented by nursing. Appetite/PO intake is adequate when fed with most meals >75% per  vitals. Last BM on (11/22) per I/Os. Pt seen in room this afternoon. Stated his eyes were burning so wiped them with wet rag and informed RN, Reggie George, who was getting eye wash. Observed >75% of meal consumed but noted CIB on bedside table still from AM tray. Staff encouraged to assist with increasing PO intake of water HTL instead of juices during and between meals d/t hypernatremia and hyperglycemia. Spoke with dietary to inform them to send water instead of juice and to send extra on trays. Will continue to monitor. Below in bold per previous RD note: 
(11/18):  Pt with PMHx of Cerebral palsy, Mental retardation, HTN, and DM, presented to the ER from [facility] with c/o SOB, Fever, dysphagia, UTI, urinary retention, hypernatremia, sepsis, MATTIE. [facility] records show pt was receiving mechancal soft diet w/ thin liquids. Information Obtained From:  
[x] Chart Review [x] Patient 
[] Family/Caregiver [x] Nurse/Physician  
[] Patient Rounds/Interdisciplinary Meeting Diet: Consistent CHO Mech Soft (NDD2) with HTL (3) Patient Vitals for the past 100 hrs: 
 % Diet Eaten 11/21/19 1846 100 % 11/21/19 1444 90 % 11/21/19 1009 70 % 11/20/19 1735 50 % 11/20/19 1306 100 % 11/20/19 0821 100 % 11/19/19 1746 100 % Medications: [x] Reviewed IVF: D5W  @75 mL/hr (306 kcal/day) Pepcid, Lantus, Humalog, Hytrin Encounter Diagnoses ICD-10-CM ICD-9-CM 1. Septic shock (HCC) A41.9 038.9  
 R65.21 785.52  
  995.92  
2. Urinary retention R33.9 788.20  
3. Hypernatremia E87.0 276.0 4. Hyperglycemia R73.9 790.29 5. Leukocytosis, unspecified type D72.829 288.60 Past Medical History:  
Diagnosis Date  Cerebral palsy ([facility]  Hypertension  Mild mental retardation  Polio Labs:   
Lab Results Component Value Date/Time Sodium 152 (H) 11/22/2019 03:29 AM  
 Potassium 3.9 11/22/2019 03:29 AM  
 Chloride 125 (H) 11/22/2019 03:29 AM  
 CO2 25 11/22/2019 03:29 AM  
 Anion gap 2 (L) 11/22/2019 03:29 AM  
 Glucose 132 (H) 11/22/2019 03:29 AM  
 BUN 12 11/22/2019 03:29 AM  
 Creatinine 0.46 (L) 11/22/2019 03:29 AM  
 Calcium 8.2 (L) 11/22/2019 03:29 AM  
 Magnesium 1.9 11/22/2019 03:29 AM  
 Phosphorus 2.7 11/22/2019 03:29 AM  
 Albumin 3.1 (L) 11/17/2019 11:01 AM  
 
Anthropometrics: BMI (calculated): 22.3 Last 3 Recorded Weights in this Encounter 11/17/19 1715 11/19/19 0500 11/20/19 1055 Weight: 53.3 kg (117 lb 8.1 oz) 59.4 kg (130 lb 15.3 oz) 59 kg (130 lb) Ht Readings from Last 1 Encounters:  
11/17/19 5' 4\" (1.626 m) Documented Weight History: 
Weight Metrics 11/20/2019 5/17/2018 6/12/2015 Weight 130 lb 124 lb 100 lb BMI 22.31 kg/m2 20.63 kg/m2 19.53 kg/m2 []  Weight Loss 
[]  Weight Gain 
[]  Weight Stable  
[x]  Variability with weights on record Estimated Nutrition Needs:  
1866 Kcals/day Protein (g): 80 g Nutrition Problems Identified:  
[] Suboptimal PO intake  
[] Food Allergies [x] Difficulty chewing/swallowing/poor dentition 
[] Constipation/Diarrhea (Last BM on 11/22 per I/Os) [] Nausea/Vomiting  
[] None 
[x] Other: Wound (Stage 2 pressure injury to sacral/coccyx) Plan:  
[x] Therapeutic Diet 
[]  Obtained/adjusted food preferences/tolerances and/or snacks options [x]  Continue supplements added  
[x] SLP following for feeding techniques []  HS snack added  
[x]  Modify diet texture  
[]  Modify diet for food allergies []  Educate patient  
[]  Assist with menu selection  
[x]  Monitor PO intake on meal rounds  
[x]  Continue inpatient monitoring and intervention  
[x]  Participated in discharge planning/Interdisciplinary rounds/Team meetings  
[]  Other:  
 
Education Needs: 
 [x] Not appropriate for teaching  
 [] Identified and addressed Nutrition Monitoring and Evaluation: 
 [x] Continue inpatient monitoring and interventions [] Other:  
 
Shannan Faust

## 2019-11-22 NOTE — PROGRESS NOTES
[facility] 
                                             (A Division of [address] [place] [address]) Follow-up Note Date of Admission: 11/17/2019     Date of Note:  11/22/2019 Summary:   
 
80 y/o CM w/ CP, Intellectual Disability, HTN, Polio adm 11/17 w/ fever, SOB. Fever  at SNF 1 day PTA. Temp at .6,  RR 30 and BP 73/50. Admitted to ICU on levophed, Zosyn, Vancomycin. UA 4-10 WBC and urine culture grew only 20,000 NHS (after Vanc). MRSA scrn negative and viral respiratory panel IP. Fever resolved and WBC (19.3) normalized to 11.9 by 11/19. CTAP 11/17: bilateral hydronephrosis, distended bladder w/ diffusely mildly thickened wall s/o urinary retention, bilateral urinary tract calculi within both dilated ureters and renal collecting systems. Also mild bronchial wall thickening and opacification at the lung bases c/w bronchiolitis. Interval History: More awake and alert today. Says he feels like he's back to his usual self. No fever Current Antimicrobials: Prior Antimicrobials Amoxicillin 11/20 - 2 Vanc, Zosyn 11/17 - 3  Levofloxacin 11/17 - 0  
  
  
Assessment: Plan:  
Fever 
- no clear source - No significant pyuria to suggest UTI, urcx only 20K NHS but has hydronephrosis from urinary retention and renal calculi). 1st dose Vanc given before urcx 
- fever may have been from mild UTI with obstruction. Doubt from Bronchiolitis seen on CT Chest.   
- [place] placed in ED 11/17 (after traumatic straight cath) -> continue amoxicillin 500 mg tid x 3 more days Mild UTI w/ obstruction - CTAP 11/17: bilat hydronephrosis, layering calculi in collecting system, mildly thickened bladder wall s/o urinary retention 
- urcx 11/17: 20K NHS (after 1st dose vanc) -> amoxicillin as above 
-> Urology following Cerebral Palsy    
Intellectual Disability    
HTN    
Polio    
  
Microbiology: 
 
11/17    blcx NGTD x 2 
 urcx 20,000 [facility] Respiratory viral panel IP 
11/18    MRSA scrn neg 
  
Lines / Catheters: 
  
piv Patient Active Problem List  
Diagnosis Code  UTI (urinary tract infection) N39.0  Hypotension I95.9  Tachycardia R00.0  Lactic acidosis E87.2  Cerebral palsy ([facility] G80.9  History of post-polio syndrome Z86.12  
 Hypertension I10  
 Mild mental retardation F70  
 Small bowel obstruction ([facility] K56.609  Cognitive developmental delay F81.9  
 Uncontrolled type 2 diabetes mellitus with hyperosmolar nonketotic hyperglycemia (HCC) E11.00  Hydronephrosis N13.30  Hypernatremia E87.0  Sepsis ([facility] [facility] A41.9  Elevated troponin R79.89  
 Urinary retention R33.9  MATTIE (acute kidney injury) ([facility] [address] [facility] N17.9 Current Facility-Administered Medications Medication Dose Route Frequency  insulin lispro (HUMALOG) injection 3 Units  3 Units SubCUTAneous TIDAC  dextrose 5% infusion  75 mL/hr IntraVENous CONTINUOUS  
 famotidine (PEPCID) tablet 20 mg  20 mg Oral BID  aspirin chewable tablet 81 mg  81 mg Oral DAILY  baclofen (LIORESAL) tablet 10 mg  10 mg Oral TID  terazosin (HYTRIN) capsule 5 mg  5 mg Oral DAILY  finasteride (PROSCAR) tablet 5 mg  5 mg Oral DAILY  insulin glargine (LANTUS) injection 20 Units  20 Units SubCUTAneous DAILY  amoxicillin (AMOXIL) capsule 500 mg  500 mg Oral Q8H  
 insulin lispro (HUMALOG) injection   SubCUTAneous AC&HS  
 glucose chewable tablet 16 g  4 Tab Oral PRN  
 glucagon (GLUCAGEN) injection 1 mg  1 mg IntraMUSCular PRN  
 dextrose 10% infusion 125-250 mL  125-250 mL IntraVENous PRN  
 sodium chloride (NS) flush 5-40 mL  5-40 mL IntraVENous Q8H  
 sodium chloride (NS) flush 5-40 mL  5-40 mL IntraVENous PRN  
 heparin (porcine) injection 5,000 Units  5,000 Units SubCUTAneous Q8H  
 acetaminophen (TYLENOL) tablet 650 mg  650 mg Oral Q6H PRN  Or  
  acetaminophen (TYLENOL) suppository 650 mg  650 mg Rectal Q6H PRN  
 influenza vaccine 2019- (6 mos+)(PF) (FLUARIX/FLULAVAL/FLUZONE QUAD) injection 0.5 mL  0.5 mL IntraMUSCular PRIOR TO DISCHARGE Review of Systems - Negative except as in interval history Objective: 
Visit Vitals BP 97/67 (BP 1 Location: Left arm, BP Patient Position: At rest) Pulse 81 Temp 97 °F (36.1 °C) Resp 20 Ht 5' 4\" (1.626 m) Wt 59 kg (130 lb) SpO2 97% BMI 22.31 kg/m² Temp (24hrs), Av.7 °F (36.5 °C), Min:97 °F (36.1 °C), Max:98.4 °F (36.9 °C) General: Well developed, well nourished 81 y.o.  male in no acute distress, in no distress Head: normocephalic, without obvious abnormality Neck: supple, symmetrical, trachea midline Cardio:  regular rate and rhythm, S1, S2 normal, no murmur, click, rub or gallop Lungs: no crackles or wheezes Abdomen: soft, non-tender. Bowel sounds normal. No masses, no organomegaly. Extremities:  no redness or tenderness in the calves or thighs, no edema, atrophic lower extremities Lab results: 
 
Chemistry Recent Labs  
  19 
1845 19 
0436 * 310* 151* * 150* 154* K 3.9 4.1 3.4*  
* 122* 126* CO2 25 24 21 BUN 12 14 16 CREA 0.46* 0.66 0.52* CA 8.2* 8.3* 8.1* AGAP 2* 4 7 BUCR 26* 21* 31* CBC w/ Diff Recent Labs  
  19 
0436 19 
1110 WBC 10.3 9.8 10.9 RBC 3.88* 4.10* 4.04* HGB 10.8* 11.6* 11.7* HCT 34.4* 36.1 35.6*  189 200 GRANS 69 63 62 LYMPH 20* 27 26 EOS 5 5 6* Microbiology All Micro Results Procedure Component Value Units Date/Time CULTURE, BLOOD [[id number]] Collected:  19 1101 Order Status:  Completed Specimen:  Blood Updated:  19 0349 Special Requests: PERIPHERAL Culture result: NO GROWTH 5 DAYS     
 CULTURE, BLOOD [[id number]] Collected:  19 1149 Order Status:  Completed Specimen:  Blood Updated:  11/22/19 0348 Special Requests: PERIPHERAL Culture result: NO GROWTH 5 DAYS     
 CULTURE, URINE [[id number]]  (Abnormal)  (Susceptibility) Collected:  11/17/19 1445 Order Status:  Completed Specimen:  Cath Urine Updated:  11/21/19 9622 Special Requests: NO SPECIAL REQUESTS Culture result:    
  [id number] COLONIES/mL ENTEROCOCCUS FAECIUM GROUP D  
     
 RESPIRATORY VIRAL PANEL, PCR [[id number]] Collected:  11/17/19 1837 Order Status:  Completed Specimen:  Sputum from Respiratory sample Updated:  11/21/19 1549 Source NARES Influenza A NEGATIVE Influenza B NEGATIVE      
  RSV A NEGATIVE      
  RSV B NEGATIVE Parainfluenza 1 NEGATIVE Parainfluenza 2 NEGATIVE Parainfluenza 3 NEGATIVE Rhinovirus NEGATIVE Metapneumovirus NEGATIVE Adenovirus NEGATIVE Comment: (NOTE) Performed At: [address] [id number] Duy Nevarez MD JA:[id number] CULTURE, URINE [[id number]] Collected:  11/17/19 1128 Order Status:  Completed Specimen:  Cath Urine Updated:  11/19/19 3676 Special Requests: NO SPECIAL REQUESTS Culture result:    
  [id number] COLONIES/mL MIXED GRAM POSITIVE СВЕТЛАНА, PROBABLE SKIN/GENITAL CONTAMINATION. MRSA SCREEN - PCR (NASAL) [[id number]] Collected:  11/18/19 1431 Order Status:  Completed Specimen:  Nasal from Nares Updated:  11/18/19 2114 Special Requests: NO SPECIAL REQUESTS Culture result: MRSA target DNA is not detected (presumptive not colonized with MRSA) INFLUENZA A & B AG (RAPID TEST) [[id number]] Collected:  11/17/19 1143 Order Status:  Completed Specimen:  Nasopharyngeal from Nasal washing Updated:  11/17/19 1211 Influenza A Antigen NEGATIVE   Comment: A negative result does not exclude influenza virus infection, seasonal or H1N1 due to suboptimal sensitivity. If influenza is circulating in your community, a diagnosis of influenza should be considered based on a patients clinical presentation and empiric antiviral treatment should be considered, if indicated. Influenza B Antigen NEGATIVE Devendra Boles MD, [facility] 
[phone]  
11/22/2019  
12:17 PM

## 2019-11-22 NOTE — PROGRESS NOTES
1930: Patient in bed awake,alert and oriented x 1 person only. No signs of distress. Bed low and locked. Call bell within reach. Will monitor. 0211: Patient defecated large amount of stools,pt was cleaned,changed,foam dressing was change also,resting now. 0600: In bed resting quietly,no other concern at this time,call bell within reach. Will continue monitoring. Patient Vitals for the past 12 hrs: 
 Temp Pulse Resp BP SpO2  
11/22/19 0507 97.3 °F (36.3 °C) 82 18 94/87 99 % 11/22/19 0143 98.3 °F (36.8 °C) 89 18 111/78 93 %

## 2019-11-22 NOTE — PROGRESS NOTES
Progress Note Patient: Vickie Connelly               Sex: male          DOA: 11/17/2019 YOB: 1950      Age:  81 y.o.        LOS:  LOS: 5 days CHIEF COMPLAINT:  Shortness of Breath; Dysphagia; and Irregular Heart Beat Assessment/Plan:  
 
Principal Problem: 
  Uncontrolled type 2 diabetes mellitus with hyperosmolar nonketotic hyperglycemia ([facility] [address] (11/17/2019) Active Problems: 
  UTI (urinary tract infection) (6/11/2015) Cerebral palsy ([address] (6/12/2015) Hydronephrosis (11/17/2019) Hypernatremia (11/17/2019) Sepsis ([facility] [address] (11/17/2019) Elevated troponin (11/17/2019) Urinary retention (11/17/2019) MATTIE (acute kidney injury) ([facility] [address] (11/17/2019) 
 
 
elevated troponin; NSTEMI ruled out after study PLAN: 
Hypernatremia 
- nephrology consulted - appreciate - worsening. Discussed with nephrology and RN, need to increase water/liquid intake. Hyperosmolar, hyperglycemia, nonketotic state RESOLVED 
-Off insulin drip, cont lantus Septic Shock Resolved - hypotension, elevated WBC, febrile (102.6) on arrival 
- UTI is only source, CXR clear, no other evidence of infection - BCx, Ucx obtained. UCX shows Strep : ID consulted. MATTIE Resolved - 2/2 dehydration, hydronephrosis - IV fluids 
- monitor BMP Hydronephrosis 2/2 urinary retention 
- urology consulted - appreciate Repeat Renal and bladder US shows resolution of prior L hydro and minimal residual R hydro and b/l non-obstructing renal stones - Per Urology recs, start terazosin and finasteride - Voiding trial 
 
 
Dysphagia - SLP consulted, appreciate recs: Rec: continue honey thick liquids via TSP ONLY, strict aspiration precautions, strict HOB 55- 60* for all po and >/= 45* at least 30 minutes following, advance solids to mechanical soft, ground solid, small bites, and alternate solids/ liquids. - Cont acceptable home medications for chronic conditions  
- DVT protocol Subjective:  
 
Pt is sleepy this AM, but arousable. Objective:  
 
Visit Vitals BP 95/69 (BP 1 Location: Left arm, BP Patient Position: At rest) Pulse 80 Temp 98.4 °F (36.9 °C) Resp 20 Ht 5' 4\" (1.626 m) Wt 59 kg (130 lb) SpO2 100% BMI 22.31 kg/m² Physical Exam: 
Ears: hearing is intact Eyes: Icterus is not present Lungs: clear to auscultation bilaterally Heart: regular rate and rhythm, S1, S2 normal, no murmur, click, rub or gallop Gastrointestinal: soft, non-tender. Bowel sounds normal. No masses,  no organomegaly Neurological:  New Focal Deficits are not present Psychiatric:  Mood is stable Lab/Data Reviewed: 
CMP:  
Lab Results Component Value Date/Time  (H) 11/22/2019 03:29 AM  
 K 3.9 11/22/2019 03:29 AM  
  (H) 11/22/2019 03:29 AM  
 CO2 25 11/22/2019 03:29 AM  
 AGAP 2 (L) 11/22/2019 03:29 AM  
  (H) 11/22/2019 03:29 AM  
 BUN 12 11/22/2019 03:29 AM  
 CREA 0.46 (L) 11/22/2019 03:29 AM  
 GFRAA >60 11/22/2019 03:29 AM  
 GFRNA >60 11/22/2019 03:29 AM  
 CA 8.2 (L) 11/22/2019 03:29 AM  
 MG 1.9 11/22/2019 03:29 AM  
 PHOS 2.7 11/22/2019 03:29 AM  
 
CBC:  
Lab Results Component Value Date/Time WBC 10.3 11/22/2019 03:29 AM  
 HGB 10.8 (L) 11/22/2019 03:29 AM  
 HCT 34.4 (L) 11/22/2019 03:29 AM  
  11/22/2019 03:29 AM  
 
 
Imaging Reviewed: 
No results found.

## 2019-11-22 NOTE — PROGRESS NOTES
Plan return to [facility] when ready. Spoke with Dr Darya Pizano, pts na rising , he spoke with renal, no dc yet.

## 2019-11-23 NOTE — PROGRESS NOTES
1924: Patient in bed awake,quiet,talks sometimes, oriented x1-2,no signs of distress. Bed low and locked. Call bell within reach. Will monitor. 0124: Patient defecated,large amount of stools,pt was cleaned,changed,mepilex changed also, conversant resting now. 0688: In bed resting quietly,no other concern at this time,call bell within reach. Will continue monitoring. Patient Vitals for the past 12 hrs: 
 Temp Pulse Resp BP SpO2  
11/23/19 0623 97.5 °F (36.4 °C) 100 16 100/65 99 % 11/23/19 0103 97.4 °F (36.3 °C) 95 16 95/61 98 % 11/22/19 2046 98.5 °F (36.9 °C) 98 18 92/61 100 %

## 2019-11-23 NOTE — PROGRESS NOTES
0730 Received pt, sleepy, arousable, smiling. Per report pt only oriented x 1-2. Pt says \"feed, now\" Mepilex on the sarcum new,intact. IV no sign of infiltration. With holm draining to clear yellow uo. On aspiration prec, will keep HOB elevated as ordered. 0800 BP low, will recheck manually. 0840 Will hold Hytrin for now, will inform MD of low BP. 
 
0930 BP better now. 1000 Spoke to MD, no new order given. Informed MD of the med not given, per MD hold the med. 1100 IV infiltrated, will start a new one.  
 
1300 Turning pt Q2H, 2 person-assist. Pt alert and Oriented to self and situation, pt can say what he wants and what he does not want, pt able to say if he is in pain, what's making him uncomfortable.

## 2019-11-23 NOTE — PROGRESS NOTES
0800  Non  Verbal, but follows command with his eyes, looks comfortable. 1100  Coughing, feed during meal time  With HOB elevated. 1500  Reposition, talking  Good and follow commands. 1800  Had BM,  Reposition, ate well.

## 2019-11-23 NOTE — PROGRESS NOTES
Progress Note Patient: Aj Barker               Sex: male          DOA: 11/17/2019 YOB: 1950      Age:  81 y.o.        LOS:  LOS: 6 days CHIEF COMPLAINT:  Shortness of Breath; Dysphagia; and Irregular Heart Beat Assessment/Plan:  
 
Principal Problem: 
  Uncontrolled type 2 diabetes mellitus with hyperosmolar nonketotic hyperglycemia ([facility] [address] (11/17/2019) Active Problems: 
  UTI (urinary tract infection) (6/11/2015) Cerebral palsy ([address] (6/12/2015) Hydronephrosis (11/17/2019) Hypernatremia (11/17/2019) Sepsis ([address] (11/17/2019) Elevated troponin (11/17/2019) Urinary retention (11/17/2019) MATTIE (acute kidney injury) ([facility] [address] (11/17/2019) 
 
 
elevated troponin; NSTEMI ruled out after study PLAN: 
Hypernatremia 
- nephrology consulted - appreciate. - Discussed with nephrology and RN, need to increase water/liquid intake. - Improved today Hyperosmolar, hyperglycemia, nonketotic state RESOLVED 
-Off insulin drip, cont lantus Septic Shock Resolved - hypotension, elevated WBC, febrile (102.6) on arrival 
- UTI is only source, CXR clear, no other evidence of infection - BCx, Ucx obtained. UCX shows Strep : ID consulted. MATTIE Resolved - 2/2 dehydration, hydronephrosis - IV fluids 
- monitor BMP Hydronephrosis 2/2 urinary retention 
- urology consulted - appreciate Repeat Renal and bladder US shows resolution of prior L hydro and minimal residual R hydro and b/l non-obstructing renal stones - Per Urology recs, start terazosin and finasteride - Voiding trial 
 
 
Dysphagia - SLP consulted, appreciate recs: Rec: continue honey thick liquids via TSP ONLY, strict aspiration precautions, strict HOB 55- 60* for all po and >/= 45* at least 30 minutes following, advance solids to mechanical soft, ground solid, small bites, and alternate solids/ liquids. - Cont acceptable home medications for chronic conditions - DVT protocol DISPO - D/c to [facility] tomorrow Subjective: Pt was getting fed this AM with help of staff. He appeared comfortable. Objective:  
 
Visit Vitals BP 96/65 Pulse 89 Temp 97.7 °F (36.5 °C) Resp 18 Ht 5' 4\" (1.626 m) Wt 59 kg (130 lb) SpO2 99% BMI 22.31 kg/m² Physical Exam: 
Ears: hearing is intact Eyes: Icterus is not present Lungs: clear to auscultation bilaterally Heart: regular rate and rhythm, S1, S2 normal, no murmur, click, rub or gallop Gastrointestinal: soft, non-tender. Bowel sounds normal. No masses,  no organomegaly Neurological:  New Focal Deficits are not present Psychiatric:  Mood is stable Lab/Data Reviewed: 
CMP:  
Lab Results Component Value Date/Time  11/23/2019 04:10 AM  
 K 4.2 11/23/2019 04:10 AM  
  (H) 11/23/2019 04:10 AM  
 CO2 22 11/23/2019 04:10 AM  
 AGAP 5 11/23/2019 04:10 AM  
  (H) 11/23/2019 04:10 AM  
 BUN 12 11/23/2019 04:10 AM  
 CREA 0.62 11/23/2019 04:10 AM  
 GFRAA >60 11/23/2019 04:10 AM  
 GFRNA >60 11/23/2019 04:10 AM  
 CA 8.0 (L) 11/23/2019 04:10 AM  
 MG 1.7 11/23/2019 04:10 AM  
 PHOS 2.0 (L) 11/23/2019 04:10 AM  
 
CBC:  
No results found for: WBC, HGB, HGBEXT, HCT, HCTEXT, PLT, PLTEXT, HGBEXT, HCTEXT, PLTEXT Imaging Reviewed: 
No results found.

## 2019-11-23 NOTE — PROGRESS NOTES
[facility] 
                                             (A Division of [address] [place] [address]) Follow-up Note Date of Admission: 11/17/2019     Date of Note:  11/23/2019 Summary:   
 
80 y/o CM w/ CP, Intellectual Disability, HTN, Polio adm 11/17 w/ fever, SOB. Fever  at SNF 1 day PTA. Temp at .6,  RR 30 and BP 73/50. Admitted to ICU on levophed, Zosyn, Vancomycin. UA 4-10 WBC and urine culture grew only 20,000 NHS (after Vanc). MRSA scrn negative and viral respiratory panel IP. Fever resolved and WBC (19.3) normalized to 11.9 by 11/19. CTAP 11/17: bilateral hydronephrosis, distended bladder w/ diffusely mildly thickened wall s/o urinary retention, bilateral urinary tract calculi within both dilated ureters and renal collecting systems. Also mild bronchial wall thickening and opacification at the lung bases c/w bronchiolitis. Interval History: No complaints but right forearm IV site is erythematous and tender. Still afebrile. Current Antimicrobials: Prior Antimicrobials Amoxicillin 11/20 - 3   abx 11/17 - 6 Vanc, Zosyn 11/17 - 3  Levofloxacin 11/17 - 0  
  
  
Assessment: Plan:  
Fever 
- no clear source - No significant pyuria to suggest UTI, urcx only 20K NHS but has hydronephrosis from urinary retention and renal calculi). 1st dose Vanc given before urcx 
- fever may have been from mild UTI with obstruction. Doubt from Bronchiolitis seen on CT Chest.   
- Greenwood placed in ED 11/17 (after traumatic straight cath) -> continue amoxicillin 500 mg tid x 1 more day (7 days total) 
-> remove right forearm IV - d/w RN  
Mild UTI w/ obstruction - CTAP 11/17: bilat hydronephrosis, layering calculi in collecting system, mildly thickened bladder wall s/o urinary retention 
- urcx 11/17: 20K NHS (after 1st dose vanc) -> amoxicillin as above - dc tomorrow 
-> Urology following Cerebral Palsy    
 Intellectual Disability    
HTN    
Polio    
  
Microbiology: 
 
11/17    blcx NGTD x 2 
             urcx 20,000 [facility] Respiratory viral panel IP 
11/18    MRSA scrn neg 
  
Lines / Catheters: 
  
piv Patient Active Problem List  
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  
 Hypertension I10  
 Mild mental retardation F70  
 Small bowel obstruction ([facility] K56.609  Cognitive developmental delay F81.9  
 Uncontrolled type 2 diabetes mellitus with hyperosmolar nonketotic hyperglycemia (HCC) E11.00  Hydronephrosis N13.30  Hypernatremia E87.0  Sepsis ([facility] A41.9  Elevated troponin R79.89  
 Urinary retention R33.9  MATTIE (acute kidney injury) ([facility] N17.9 Current Facility-Administered Medications Medication Dose Route Frequency  potassium, sodium phosphates (NEUTRA-PHOS) packet 1 Packet  1 Packet Oral QID  insulin lispro (HUMALOG) injection 3 Units  3 Units SubCUTAneous TIDAC  dextrose 5% infusion  75 mL/hr IntraVENous CONTINUOUS  
 famotidine (PEPCID) tablet 20 mg  20 mg Oral BID  aspirin chewable tablet 81 mg  81 mg Oral DAILY  baclofen (LIORESAL) tablet 10 mg  10 mg Oral TID  terazosin (HYTRIN) capsule 5 mg  5 mg Oral DAILY  finasteride (PROSCAR) tablet 5 mg  5 mg Oral DAILY  insulin glargine (LANTUS) injection 20 Units  20 Units SubCUTAneous DAILY  amoxicillin (AMOXIL) capsule 500 mg  500 mg Oral Q8H  
 insulin lispro (HUMALOG) injection   SubCUTAneous AC&HS  
 glucose chewable tablet 16 g  4 Tab Oral PRN  
 glucagon (GLUCAGEN) injection 1 mg  1 mg IntraMUSCular PRN  
 dextrose 10% infusion 125-250 mL  125-250 mL IntraVENous PRN  
 sodium chloride (NS) flush 5-40 mL  5-40 mL IntraVENous Q8H  
 sodium chloride (NS) flush 5-40 mL  5-40 mL IntraVENous PRN  
  heparin (porcine) injection 5,000 Units  5,000 Units SubCUTAneous Q8H  
 acetaminophen (TYLENOL) tablet 650 mg  650 mg Oral Q6H PRN Or  
 acetaminophen (TYLENOL) suppository 650 mg  650 mg Rectal Q6H PRN  
 influenza vaccine 2019- (6 mos+)(PF) (FLUARIX/FLULAVAL/FLUZONE QUAD) injection 0.5 mL  0.5 mL IntraMUSCular PRIOR TO DISCHARGE Review of Systems - Negative except as in interval history Objective: 
Visit Vitals BP 96/65 Pulse 89 Temp 97.7 °F (36.5 °C) Resp 18 Ht 5' 4\" (1.626 m) Wt 59 kg (130 lb) SpO2 99% BMI 22.31 kg/m² Temp (24hrs), Av.7 °F (36.5 °C), Min:97 °F (36.1 °C), Max:98.5 °F (36.9 °C) General: Well developed, well nourished 81 y.o.  male in no acute distress, in no distress Head: normocephalic, without obvious abnormality Neck: supple, symmetrical, trachea midline Cardio:  regular rate and rhythm, S1, S2 normal, no murmur, click, rub or gallop Lungs: no crackles or wheezes Abdomen: soft, non-tender. Bowel sounds normal. No masses, no organomegaly. Extremities:  no redness or tenderness in the calves or thighs, no edema, atrophic lower extremities Lab results: 
 
Chemistry Recent Labs  
  19 
0410 19 
0329 19 
1845 * 132* 310*  152* 150*  
K 4.2 3.9 4.1 * 125* 122* CO2 22 25 24 BUN 12 12 14 CREA 0.62 0.46* 0.66  
CA 8.0* 8.2* 8.3* AGAP 5 2* 4 BUCR 19 26* 21* CBC w/ Diff Recent Labs  
  19 
0329 19 
0436 WBC 10.3 9.8  
RBC 3.88* 4.10* HGB 10.8* 11.6* HCT 34.4* 36.1  189 GRANS 69 63 LYMPH 20* 27 EOS 5 5 Microbiology All Micro Results Procedure Component Value Units Date/Time CULTURE, BLOOD [[id number]] Collected:  19 1143 Order Status:  Completed Specimen:  Blood Updated:  19 0756 Special Requests: PERIPHERAL Culture result: NO GROWTH 6 DAYS     
 CULTURE, BLOOD [[id number]] Collected:  19 1101 Order Status:  Completed Specimen:  Blood Updated:  11/23/19 0756 Special Requests: PERIPHERAL Culture result: NO GROWTH 6 DAYS     
 CULTURE, URINE [[id number]]  (Abnormal)  (Susceptibility) Collected:  11/17/19 1445 Order Status:  Completed Specimen:  Cath Urine Updated:  11/21/19 5027 Special Requests: NO SPECIAL REQUESTS Culture result:    
  [id number] COLONIES/mL ENTEROCOCCUS FAECIUM GROUP D  
     
 RESPIRATORY VIRAL PANEL, PCR [[id number]] Collected:  11/17/19 1837 Order Status:  Completed Specimen:  Sputum from Respiratory sample Updated:  11/21/19 8082 Source NARES Influenza A NEGATIVE Influenza B NEGATIVE      
  RSV A NEGATIVE      
  RSV B NEGATIVE Parainfluenza 1 NEGATIVE Parainfluenza 2 NEGATIVE Parainfluenza 3 NEGATIVE Rhinovirus NEGATIVE Metapneumovirus NEGATIVE Adenovirus NEGATIVE Comment: (NOTE) Performed At: [address] [id number] Vishnu Francois MD UB:[id number] CULTURE, URINE [[id number]] Collected:  11/17/19 1128 Order Status:  Completed Specimen:  Cath Urine Updated:  11/19/19 2657 Special Requests: NO SPECIAL REQUESTS Culture result:    
  40621 COLONIES/mL MIXED GRAM POSITIVE СВЕТЛАНА, PROBABLE SKIN/GENITAL CONTAMINATION. MRSA SCREEN - PCR (NASAL) [[id number]] Collected:  11/18/19 1431 Order Status:  Completed Specimen:  Nasal from Nares Updated:  11/18/19 2114 Special Requests: NO SPECIAL REQUESTS Culture result: MRSA target DNA is not detected (presumptive not colonized with MRSA) INFLUENZA A & B AG (RAPID TEST) [[id number]] Collected:  11/17/19 1143 Order Status:  Completed Specimen:  Nasopharyngeal from Nasal washing Updated:  11/17/19 1211 Influenza A Antigen NEGATIVE   Comment: A negative result does not exclude influenza virus infection, seasonal or H1N1 due to suboptimal sensitivity. If influenza is circulating in your community, a diagnosis of influenza should be considered based on a patients clinical presentation and empiric antiviral treatment should be considered, if indicated. Influenza B Antigen NEGATIVE Aleyda Groves MD, [facility] Infect[facility] Diseases 
[phone]  
11/23/2019  
12:17 PM

## 2019-11-23 NOTE — ROUTINE PROCESS
Bedside and Verbal shift change report given to Everlena Buerger (oncoming nurse) by Yvan Henry (offgoing nurse). Report included the following information SBAR, Kardex, MAR and Recent Results.

## 2019-11-24 NOTE — PROGRESS NOTES
[facility] 
                                             (A Division of [address] [place] [address]) Follow-up Note Date of Admission: 11/17/2019     Date of Note:  11/24/2019 Summary:   
 
80 y/o CM w/ CP, Intellectual Disability, HTN, Polio adm 11/17 w/ fever, SOB. Fever  at SNF 1 day PTA. Temp at .6,  RR 30 and BP 73/50. Admitted to ICU on levophed, Zosyn, Vancomycin. UA 4-10 WBC and urine culture grew only 20,000 NHS (after Vanc). MRSA scrn negative and viral respiratory panel IP. Fever resolved and WBC (19.3) normalized to 11.9 by 11/19. CTAP 11/17: bilateral hydronephrosis, distended bladder w/ diffusely mildly thickened wall s/o urinary retention, bilateral urinary tract calculi within both dilated ureters and renal collecting systems. Also mild bronchial wall thickening and opacification at the lung bases c/w bronchiolitis. Interval History: Afebrile. Says his bottom hurts. Greenwood draining clear yellow urine. Denies hypogastric pain Current Antimicrobials: Prior Antimicrobials Amoxicillin 11/20 - 4   abx 11/17 - 6 Vanc, Zosyn 11/17 - 3  Levofloxacin 11/17 - 0  
  
  
Assessment: Plan:  
Fever 
- no clear source - No significant pyuria to suggest UTI, urcx only 20K VS E faecium (res amp) but had hydronephrosis from urinary retention and renal calculi). 1st dose Vanc given before urcx 
- fever may have been from mild UTI with obstruction. Doubt from Bronchiolitis seen on CT Chest.   
- Greenwood placed in ED 11/17 (after traumatic straight cath) 
- received 3 days vancomycin for VS E faecium -> dc amoxicillin Mild UTI w/ obstruction - CTAP 11/17: bilat hydronephrosis, layering calculi in collecting system, mildly thickened bladder wall s/o urinary retention 
- urcx 11/17: 20K VS E faecium (after 1st dose vanc) -> dc amoxicillin 
-> Urology following Cerebral Palsy    
Intellectual Disability    
HTN    
 Polio    
  
Microbiology: 
 
11/17    blcx NGTD x 2 
             urcx 20,000 [facility] Respiratory viral panel IP 
11/18    MRSA scrn neg 
  
Lines / Catheters: 
  
piv Patient Active Problem List  
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  
 Hypertension I10  
 Mild mental retardation F70  
 Small bowel obstruction ([facility] K56.609  Cognitive developmental delay F81.9  
 Uncontrolled type 2 diabetes mellitus with hyperosmolar nonketotic hyperglycemia (HCC) E11.00  Hydronephrosis N13.30  Hypernatremia E87.0  Sepsis ([facility] A41.9  Elevated troponin R79.89  
 Urinary retention R33.9  MATTIE (acute kidney injury) ([facility] N17.9 Current Facility-Administered Medications Medication Dose Route Frequency  insulin lispro (HUMALOG) injection 3 Units  3 Units SubCUTAneous TIDAC  dextrose 5% infusion  75 mL/hr IntraVENous CONTINUOUS  
 famotidine (PEPCID) tablet 20 mg  20 mg Oral BID  aspirin chewable tablet 81 mg  81 mg Oral DAILY  baclofen (LIORESAL) tablet 10 mg  10 mg Oral TID  terazosin (HYTRIN) capsule 5 mg  5 mg Oral DAILY  finasteride (PROSCAR) tablet 5 mg  5 mg Oral DAILY  insulin glargine (LANTUS) injection 20 Units  20 Units SubCUTAneous DAILY  amoxicillin (AMOXIL) capsule 500 mg  500 mg Oral Q8H  
 insulin lispro (HUMALOG) injection   SubCUTAneous AC&HS  
 glucose chewable tablet 16 g  4 Tab Oral PRN  
 glucagon (GLUCAGEN) injection 1 mg  1 mg IntraMUSCular PRN  
 dextrose 10% infusion 125-250 mL  125-250 mL IntraVENous PRN  
 sodium chloride (NS) flush 5-40 mL  5-40 mL IntraVENous Q8H  
 sodium chloride (NS) flush 5-40 mL  5-40 mL IntraVENous PRN  
 heparin (porcine) injection 5,000 Units  5,000 Units SubCUTAneous Q8H  
 acetaminophen (TYLENOL) tablet 650 mg  650 mg Oral Q6H PRN  Or  
  acetaminophen (TYLENOL) suppository 650 mg  650 mg Rectal Q6H PRN  
 influenza vaccine 2019- (6 mos+)(PF) (FLUARIX/FLULAVAL/FLUZONE QUAD) injection 0.5 mL  0.5 mL IntraMUSCular PRIOR TO DISCHARGE Review of Systems - Negative except as in interval history Objective: 
Visit Vitals /47 (BP 1 Location: Left arm, BP Patient Position: At rest) Pulse (!) 115 Temp 97.7 °F (36.5 °C) Resp 19 Ht 5' 4\" (1.626 m) Wt 61.2 kg (135 lb) SpO2 100% BMI 23.17 kg/m² Temp (24hrs), Av.7 °F (36.5 °C), Min:97.1 °F (36.2 °C), Max:98.6 °F (37 °C) General: Well developed, well nourished 81 y.o.  male in no acute distress, in no distress Head: normocephalic, without obvious abnormality Neck: supple, symmetrical, trachea midline Cardio:  regular rate and rhythm, S1, S2 normal, no murmur, click, rub or gallop Lungs: no crackles or wheezes Abdomen: soft, non-tender. Bowel sounds normal. No masses, no organomegaly. Extremities:  no redness or tenderness in the calves or thighs, no edema, atrophic lower extremities Lab results: 
 
Chemistry Recent Labs  
  19 
0452 19 
0410 19 
0329 * 250* 132*  142 152* K 3.8 4.2 3.9 * 115* 125* CO2 26 22 25 BUN 10 12 12 CREA 0.42* 0.62 0.46* CA 8.2* 8.0* 8.2* AGAP 4 5 2*  
BUCR 24* 19 26* CBC w/ Diff Recent Labs  
  19 
0329 WBC 10.3 RBC 3.88* HGB 10.8* HCT 34.4*  
 GRANS 69 LYMPH 20* EOS 5 Microbiology All Micro Results Procedure Component Value Units Date/Time CULTURE, BLOOD [[id number]] Collected:  19 1143 Order Status:  Completed Specimen:  Blood Updated:  19 2827 Special Requests: PERIPHERAL Culture result: NO GROWTH 6 DAYS     
 CULTURE, BLOOD [[id number]] Collected:  19 1101 Order Status:  Completed Specimen:  Blood Updated:  19 8076   Special Requests: PERIPHERAL     
 Culture result: NO GROWTH 6 DAYS     
 CULTURE, URINE [[id number]]  (Abnormal)  (Susceptibility) Collected:  11/17/19 1445 Order Status:  Completed Specimen:  Cath Urine Updated:  11/21/19 3057 Special Requests: NO SPECIAL REQUESTS Culture result:    
  67139 COLONIES/mL ENTEROCOCCUS FAECIUM GROUP D  
     
 RESPIRATORY VIRAL PANEL, PCR [[id number]] Collected:  11/17/19 1837 Order Status:  Completed Specimen:  Sputum from Respiratory sample Updated:  11/21/19 0847 Source NARES Influenza A NEGATIVE Influenza B NEGATIVE      
  RSV A NEGATIVE      
  RSV B NEGATIVE Parainfluenza 1 NEGATIVE Parainfluenza 2 NEGATIVE Parainfluenza 3 NEGATIVE Rhinovirus NEGATIVE Metapneumovirus NEGATIVE Adenovirus NEGATIVE Comment: (NOTE) Performed At: [address] [id number] Mu Narayan MD [id number] CULTURE, URINE [[id number]] Collected:  11/17/19 1128 Order Status:  Completed Specimen:  Cath Urine Updated:  11/19/19 1274 Special Requests: NO SPECIAL REQUESTS Culture result:    
  [id number] COLONIES/mL MIXED GRAM POSITIVE СВЕТЛАНА, PROBABLE SKIN/GENITAL CONTAMINATION. MRSA SCREEN - PCR (NASAL) [[id number]] Collected:  11/18/19 1431 Order Status:  Completed Specimen:  Nasal from Nares Updated:  11/18/19 2114 Special Requests: NO SPECIAL REQUESTS Culture result: MRSA target DNA is not detected (presumptive not colonized with MRSA) INFLUENZA A & B AG (RAPID TEST) [[id number]] Collected:  11/17/19 1143 Order Status:  Completed Specimen:  Nasopharyngeal from Nasal washing Updated:  11/17/19 1211 Influenza A Antigen NEGATIVE Comment: A negative result does not exclude influenza virus infection, seasonal or H1N1 due to suboptimal sensitivity.  If influenza is circulating in your community, a diagnosis of influenza should be considered based on a patients clinical presentation and empiric antiviral treatment should be considered, if indicated. Influenza B Antigen NEGATIVE Mariya Johnson MD, [facility] Infectious Diseases 
[phone]  
11/24/2019  
12:17 PM

## 2019-11-24 NOTE — PROGRESS NOTES
Problem: Diabetes Self-Management Goal: *Disease process and treatment process Description Define diabetes and identify own type of diabetes; list 3 options for treating diabetes. Outcome: Progressing Towards Goal 
Goal: *Incorporating nutritional management into lifestyle Description Describe effect of type, amount and timing of food on blood glucose; list 3 methods for planning meals. Outcome: Progressing Towards Goal 
Goal: *Incorporating physical activity into lifestyle Description State effect of exercise on blood glucose levels. Outcome: Progressing Towards Goal 
Goal: *Developing strategies to promote health/change behavior Description Define the ABC's of diabetes; identify appropriate screenings, schedule and personal plan for screenings. Outcome: Progressing Towards Goal 
Goal: *Using medications safely Description State effect of diabetes medications on diabetes; name diabetes medication taking, action and side effects. Outcome: Progressing Towards Goal 
Goal: *Monitoring blood glucose, interpreting and using results Description Identify recommended blood glucose targets  and personal targets. Outcome: Progressing Towards Goal 
Goal: *Prevention, detection, treatment of acute complications Description List symptoms of hyper- and hypoglycemia; describe how to treat low blood sugar and actions for lowering  high blood glucose level. Outcome: Progressing Towards Goal 
Goal: *Prevention, detection and treatment of chronic complications Description Define the natural course of diabetes and describe the relationship of blood glucose levels to long term complications of diabetes. Outcome: Progressing Towards Goal 
Goal: *Developing strategies to address psychosocial issues Description Describe feelings about living with diabetes; identify support needed and support network Outcome: Progressing Towards Goal 
Goal: *Sick day guidelines Outcome: Progressing Towards Goal 
  
 Problem: Patient Education: Go to Patient Education Activity Goal: Patient/Family Education Outcome: Progressing Towards Goal 
  
Problem: Discharge Planning Goal: *Discharge to safe environment Outcome: Progressing Towards Goal 
  
Problem: Pressure Injury - Risk of 
Goal: *Prevention of pressure injury Description Document Alex Scale and appropriate interventions in the flowsheet. Outcome: Progressing Towards Goal 
Note: Pressure Injury Interventions: 
Sensory Interventions: Assess changes in LOC, Avoid rigorous massage over bony prominences, Float heels Moisture Interventions: Absorbent underpads, Apply protective barrier, creams and emollients Activity Interventions: Pressure redistribution bed/mattress(bed type) Mobility Interventions: HOB 30 degrees or less, Pressure redistribution bed/mattress (bed type) Nutrition Interventions: Document food/fluid/supplement intake Friction and Shear Interventions: HOB 30 degrees or less Problem: Patient Education: Go to Patient Education Activity Goal: Patient/Family Education Outcome: Progressing Towards Goal 
  
Problem: Falls - Risk of 
Goal: *Absence of Falls Description Document Erika Clover Fall Risk and appropriate interventions in the flowsheet. Outcome: Progressing Towards Goal 
Note: Fall Risk Interventions: 
  
 
Mentation Interventions: Adequate sleep, hydration, pain control, Door open when patient unattended Medication Interventions: Bed/chair exit alarm Elimination Interventions: Call light in reach, Patient to call for help with toileting needs, Toileting schedule/hourly rounds Problem: Patient Education: Go to Patient Education Activity Goal: Patient/Family Education Outcome: Progressing Towards Goal 
  
Problem: Patient Education: Go to Patient Education Activity Goal: Patient/Family Education Outcome: Progressing Towards Goal 
  
Problem: Diabetes Maintenance:Admission Goal: *Blood glucose 80 to 180 mg/dl Outcome: Progressing Towards Goal 
Goal: *Adequate nutrition Outcome: Progressing Towards Goal 
Goal: *Optimize nutritional status Outcome: Progressing Towards Goal 
  
Problem: Patient Education: Go to Patient Education Activity Goal: Patient/Family Education Outcome: Progressing Towards Goal 
  
Problem: Patient Education: Go to Patient Education Activity Goal: Patient/Family Education Outcome: Progressing Towards Goal

## 2019-11-24 NOTE — PROGRESS NOTES
Bedside shift change report given to [address] [place] [address] [place] (oncoming nurse) by Veronica Fine (offgoing nurse). Report included the following information SBAR, Kardex, Intake/Output and MAR.

## 2019-11-24 NOTE — PROGRESS NOTES
0750 Bedside and Verbal shift change report given to Kaushal Bangura (oncoming nurse) by Ciera (offgoing nurse). Report included the following information SBAR, Kardex, Intake/Output and MAR.  
 
0835 Pt had moderate bowel movement. Pt cleaned up and gown/linens changed. New mepilex applied to sacrum area. Greenwood patent and draining yellow, clear urine. Pt turned and repositioned with pillows 1514 Pt repositioned for comfort 
 
1700 Humalog not in pyxis, pharmacy notified. Pt repositioned for comfort 
 
1900 Bedside and Verbal shift change report given to Karely (oncoming nurse) by Kaushal Bangura (offgoing nurse). Report included the following information SBAR, Kardex, Intake/Output and MAR.

## 2019-11-24 NOTE — PROGRESS NOTES
Progress Note Patient: Madiha Evangelista               Sex: male          DOA: 11/17/2019 YOB: 1950      Age:  81 y.o.        LOS:  LOS: 7 days CHIEF COMPLAINT:  Shortness of Breath; Dysphagia; and Irregular Heart Beat Assessment/Plan:  
 
Principal Problem: 
  Uncontrolled type 2 diabetes mellitus with hyperosmolar nonketotic hyperglycemia ([facility] [address] (11/17/2019) Active Problems: 
  UTI (urinary tract infection) (6/11/2015) Cerebral palsy ([address] (6/12/2015) Hydronephrosis (11/17/2019) Hypernatremia (11/17/2019) Sepsis ([address] (11/17/2019) Elevated troponin (11/17/2019) Urinary retention (11/17/2019) MATTIE (acute kidney injury) ([facility] [address] (11/17/2019) 
 
 
elevated troponin; NSTEMI ruled out after study PLAN: 
Hypernatremia 
- nephrology consulted - appreciate. - Discussed with nephrology and RN, need to increase water/liquid intake. - Improved today Hyperosmolar, hyperglycemia, nonketotic state RESOLVED 
-Off insulin drip, cont lantus Septic Shock Resolved - hypotension, elevated WBC, febrile (102.6) on arrival 
- UTI is only source, CXR clear, no other evidence of infection - BCx, Ucx obtained. UCX shows Strep : ID consulted. MATTIE Resolved - 2/2 dehydration, hydronephrosis - IV fluids 
- monitor BMP Hydronephrosis 2/2 urinary retention 
- urology consulted - appreciate Repeat Renal and bladder US shows resolution of prior L hydro and minimal residual R hydro and b/l non-obstructing renal stones - Per Urology recs, start terazosin and finasteride - Voiding trial 
 
 
Dysphagia - SLP consulted, appreciate recs: Rec: continue honey thick liquids via TSP ONLY, strict aspiration precautions, strict HOB 55- 60* for all po and >/= 45* at least 30 minutes following, advance solids to mechanical soft, ground solid, small bites, and alternate solids/ liquids. - Cont acceptable home medications for chronic conditions - DVT protocol DISPO - D/c to [facility] tomorrow. D/w CM tried to contact [employer] but they have not responded over the weekend, likely d/c Monday. Subjective: Pt was awake alert and interactive today. He said he is doing \"alright\". He appeared comfortable. Objective:  
 
Visit Vitals BP 99/69 (BP 1 Location: Left arm, BP Patient Position: At rest) Pulse 88 Temp 98.6 °F (37 °C) Resp 18 Ht 5' 4\" (1.626 m) Wt 61.2 kg (135 lb) SpO2 99% BMI 23.17 kg/m² Physical Exam: 
Ears: hearing is intact Eyes: Icterus is not present Lungs: clear to auscultation bilaterally Heart: regular rate and rhythm, S1, S2 normal, no murmur, click, rub or gallop Gastrointestinal: soft, non-tender. Bowel sounds normal. No masses,  no organomegaly Neurological:  New Focal Deficits are not present Psychiatric:  Mood is stable Lab/Data Reviewed: 
CMP:  
Lab Results Component Value Date/Time  11/24/2019 04:52 AM  
 K 3.8 11/24/2019 04:52 AM  
  (H) 11/24/2019 04:52 AM  
 CO2 26 11/24/2019 04:52 AM  
 AGAP 4 11/24/2019 04:52 AM  
  (H) 11/24/2019 04:52 AM  
 BUN 10 11/24/2019 04:52 AM  
 CREA 0.42 (L) 11/24/2019 04:52 AM  
 GFRAA >60 11/24/2019 04:52 AM  
 GFRNA >60 11/24/2019 04:52 AM  
 CA 8.2 (L) 11/24/2019 04:52 AM  
 MG 1.7 11/24/2019 04:52 AM  
 PHOS 3.5 11/24/2019 04:52 AM  
 
CBC:  
No results found for: WBC, HGB, HGBEXT, HCT, HCTEXT, PLT, PLTEXT, HGBEXT, HCTEXT, PLTEXT Imaging Reviewed: 
No results found.

## 2019-11-24 NOTE — PROGRESS NOTES
1930  Received bedside report from Juan M Krishnan. Patient resting comfortably, no complaints of pain. Oriented to self only. 0630  Patient had uneventful night,  Vitals stable throughout night. No complaints of pain. Slept well. No further concerns at this time.

## 2019-11-25 NOTE — DISCHARGE INSTRUCTIONS
Patient Education     DISCHARGE SUMMARY from Nurse    PATIENT INSTRUCTIONS:    After general anesthesia or intravenous sedation, for 24 hours or while taking prescription Narcotics:  · Limit your activities  · Do not drive and operate hazardous machinery  · Do not make important personal or business decisions  · Do  not drink alcoholic beverages  · If you have not urinated within 8 hours after discharge, please contact your surgeon on call. Report the following to your surgeon:  · Excessive pain, swelling, redness or odor of or around the surgical area  · Temperature over 100.5  · Nausea and vomiting lasting longer than 4 hours or if unable to take medications  · Any signs of decreased circulation or nerve impairment to extremity: change in color, persistent  numbness, tingling, coldness or increase pain  · Any questions    What to do at Home:  Recommended activity: Activity as tolerated. If you experience any of the following symptoms listed in your discharge instructions, please follow up with your primary care provider. *  Please give a list of your current medications to your Primary Care Provider. *  Please update this list whenever your medications are discontinued, doses are      changed, or new medications (including over-the-counter products) are added. *  Please carry medication information at all times in case of emergency situations. These are general instructions for a healthy lifestyle:    No smoking/ No tobacco products/ Avoid exposure to second hand smoke  Surgeon General's Warning:  Quitting smoking now greatly reduces serious risk to your health.     Obesity, smoking, and sedentary lifestyle greatly increases your risk for illness    A healthy diet, regular physical exercise & weight monitoring are important for maintaining a healthy lifestyle    You may be retaining fluid if you have a history of heart failure or if you experience any of the following symptoms:  Weight gain of 3 pounds or more overnight or 5 pounds in a week, increased swelling in our hands or feet or shortness of breath while lying flat in bed. Please call your doctor as soon as you notice any of these symptoms; do not wait until your next office visit. The discharge information has been reviewed with the patient. The patient verbalized understanding. Discharge medications reviewed with the patient and appropriate educational materials and side effects teaching were provided. ___________________________________________________________________________________________________________________________________     Cerebral Palsy in Children: Care Instructions  Your Care Instructions    Cerebral palsy is the name for a group of nerve problems that make it hard for a child to control movement. Cerebral palsy is caused by damage to the brain. In most cases, this damage happens before birth. The way your child is affected may be different than how other children are affected. For some children, cerebral palsy causes only a slight limp. Others have little or no control over their arms and legs or other parts of the body. To find out more about how the disease affects your child, your doctor may do more tests. You and your doctor can work together to create a treatment plan for your child. This plan can help manage symptoms. It can also help your child be as independent as possible. The plan will probably include physical therapy. It may also include medicines and other therapies. Learning that your child has cerebral palsy isn't easy. And raising a child who has it can be hard. It may help to join a support group or talk with other parents who have a child with special needs, so you don't feel alone. You may also want to try counseling. It could help you understand and deal with all the emotions you may feel. Follow-up care is a key part of your child's treatment and safety.  Be sure to make and go to all appointments, and call your doctor if your child is having problems. It's also a good idea to know your child's test results and keep a list of the medicines your child takes. How can you care for your child at home? · Learn about cerebral palsy. The more you know, the better you can care for and provide for your child. · Take care of yourself. Try to get enough rest, eat well, and exercise. · Help each other. The whole family is affected when a child has cerebral palsy. · Consider joining a support group for families of children with cerebral palsy. These groups can be a good source of information and tips. Your doctor can tell you how to find a support group. · Be safe with medicines. Have your child take medicines exactly as prescribed. Call your doctor if you think your child is having a problem with his or her medicine. You will get more details on the specific medicines your doctor prescribes. Help with daily routines  · Discuss physical therapy with your doctor. This therapy can include special exercises. It can also include special devices such as braces, casts, or splints. These can help keep your child moving the best he or she can. · Children with cerebral palsy have different needs. Talk to your doctor about your child's needs, such as bowel or skin care needs. · Make your home as safe as possible. Talk to your doctor about the best ways to keep your child safe. When should you call for help? Call 911 anytime you think your child may need emergency care. For example, call if:    · Your child stops breathing, turns blue, or becomes unconscious. Follow instructions given by emergency services while you wait for help.     · Your child has severe trouble breathing.  Signs may include the chest sinking in, using belly muscles to breathe, or nostrils flaring while your child is struggling to breathe.     · Your child chokes during feeding and you are not able to get the food loose.    Call your doctor now or seek immediate medical care if:    · Your child has a seizure for the first time.    Watch closely for changes in your child's health, and be sure to contact your doctor if:    · Your child has any new symptoms. These may include constipation or skin problems.     · Your child often coughs and chokes when he or she eats.     · You are having trouble caring for your child. Where can you learn more? Go to http://ozzy-shawn.info/. Enter P478 in the search box to learn more about \"Cerebral Palsy in Children: Care Instructions. \"  Current as of: December 12, 2018  Content Version: 12.2  © 4766-7955 [employer], [employer]. Care instructions adapted under license by [employer] (which disclaims liability or warranty for this information). If you have questions about a medical condition or this instruction, always ask your healthcare professional. [address] any warranty or liability for your use of this information.

## 2019-11-25 NOTE — PROGRESS NOTES
RENAL PROGRESS NOTE Claudene Lien Assessment/Plan: · MATTIE (due to volume depletion/sepsis/urinary retention). Resolved. · Hypovolemic hypernatremia. Minimal at this time. Needs to be given thickened liquids frequently. Monitor off D5W, time will tell if able to take in enough fluids. · UTI/sepsis in a setting of urinary retention. Finished abx per ID. Hydro is resolving with holm. Urology is on the case. Subjective: 
Patient complaints off: No complaints. No SOB/CP/N/V. Appetite is good. Tolerates thickened liquids. Thirsty. Patient Active Problem List  
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  
 Hypertension I10  
 Mild mental retardation F70  
 Small bowel obstruction ([address] K56.609  Cognitive developmental delay F81.9  
 Uncontrolled type 2 diabetes mellitus with hyperosmolar nonketotic hyperglycemia (HCC) E11.00  Hydronephrosis N13.30  Hypernatremia E87.0  Sepsis ([address] A41.9  Elevated troponin R79.89  
 Urinary retention R33.9  MATTIE (acute kidney injury) ([address] N17.9 Current Facility-Administered Medications Medication Dose Route Frequency Provider Last Rate Last Dose  insulin lispro (HUMALOG) injection 3 Units  3 Units SubCUTAneous Radnolph Alda Solomon MD   3 Units at 11/25/19 0837  
 dextrose 5% infusion  75 mL/hr IntraVENous CONTINUOUS Hansel Triplett MD 75 mL/hr at 11/25/19 0524 75 mL/hr at 11/25/19 0524  famotidine (PEPCID) tablet 20 mg  20 mg Oral BID Denise Solomon MD   20 mg at 11/25/19 0639  aspirin chewable tablet 81 mg  81 mg Oral DAILY Denise Solomon MD   81 mg at 11/25/19 8339  baclofen (LIORESAL) tablet 10 mg  10 mg Oral TID Padmini Muhammad MD   10 mg at 11/25/19 8745  terazosin (HYTRIN) capsule 5 mg  5 mg Oral DAILY Padmini Muhammad MD   5 mg at 11/25/19 0837  
 finasteride (PROSCAR) tablet 5 mg  5 mg Oral DAILY Padmini Muhammad MD   5 mg at 11/25/19 0850  
 insulin glargine (LANTUS) injection 20 Units  20 Units SubCUTAneous DAILY Padmini Muhammad MD   20 Units at 11/25/19 4499  
 insulin lispro (HUMALOG) injection   SubCUTAneous AC&HS Oc Armendariz MD   3 Units at 11/25/19 9939  
 glucose chewable tablet 16 g  4 Tab Oral PRN Padmini Muhammad MD      
 glucagon (GLUCAGEN) injection 1 mg  1 mg IntraMUSCular PRN Padmini Muhammad MD      
 dextrose 10% infusion 125-250 mL  125-250 mL IntraVENous PRN Padmini Muhammad MD      
 sodium chloride (NS) flush 5-40 mL  5-40 mL IntraVENous Q8H Mamie Tucker PA   10 mL at 11/24/19 1420  
 sodium chloride (NS) flush 5-40 mL  5-40 mL IntraVENous PRN Mamie Tucker PA   10 mL at 11/22/19 1724  heparin (porcine) injection 5,000 Units  5,000 Units SubCUTAneous Q8H Joycelyn Tucker PA   5,000 Units at 11/25/19 7829  acetaminophen (TYLENOL) tablet 650 mg  650 mg Oral Q6H PRN RAÚL Lopez   650 mg at 11/25/19 9701 Or  acetaminophen (TYLENOL) suppository 650 mg  650 mg Rectal Q6H PRN Joycelyn Tucker PA      
 influenza vaccine 2019-20 (6 mos+)(PF) (FLUARIX/FLULAVAL/FLUZONE QUAD) injection 0.5 mL  0.5 mL IntraMUSCular PRIOR TO DISCHARGE Yaya Fink MD      
 
 
Objective Vitals:  
 11/24/19 2039 11/25/19 0032 11/25/19 0500 11/25/19 6317 BP: 112/64 112/72 116/78 119/78 Pulse: (!) 107 100 100 100 Resp: 16 16 18 18 Temp: 98.4 °F (36.9 °C) 97.8 °F (36.6 °C) 97.3 °F (36.3 °C) 97.3 °F (36.3 °C) SpO2: 100% 96% 96% 98% Weight:      
Height:      
 
 
 
Intake/Output Summary (Last 24 hours) at 11/25/2019 1059 Last data filed at 11/25/2019 3551 Gross per 24 hour Intake 600 ml Output 2250 ml Net -1650 ml Admission weight: Weight: 57.6 kg (126 lb 14.4 oz) (11/17/19 1105) Last Weight Metrics: 
Weight Loss Metrics 11/24/2019 5/17/2018 6/12/2015 Today's Wt 135 lb 124 lb 100 lb BMI 23.17 kg/m2 20.63 kg/m2 19.53 kg/m2 Physical Assessment:  
 
General: NAD, alert and oriented. Dry oral mucosa. Neck: No jvd. LUNGS: Clear to Auscultation, No rales, rhonchi or wheezes. CVS EXM: S1, S2  RRR, no murmurs/gallops/rubs. Abdomen: soft, non tender. Lower Extremities:  no edema. Lab CBC w/Diff No results for input(s): WBC, RBC, HGB, HCT, PLT, GRANS, LYMPH, EOS, HGBEXT, HCTEXT, PLTEXT, HGBEXT, HCTEXT, PLTEXT in the last 72 hours. Chemistry Recent Labs  
  11/25/19 
0440 11/24/19 
0452 11/23/19 
0410 * 161* 250* * 145 142  
K 3.9 3.8 4.2 * 115* 115* CO2 25 26 22 BUN 12 10 12 CREA 0.48* 0.42* 0.62  
CA 8.8 8.2* 8.0* AGAP 5 4 5 BUCR 25* 24* 19 PHOS 3.5 3.5 2.0* No results found for: IRON, FE, TIBC, IBCT, PSAT, FERR Lab Results Component Value Date/Time Calcium 8.8 11/25/2019 04:40 AM  
 Phosphorus 3.5 11/25/2019 04:40 AM  
  
 
Ifeanyi Schultz M.D. [facility] Phone [phone] Pager [phone]

## 2019-11-25 NOTE — PROGRESS NOTES
1930  Received report from Juan M Krishnan, patient resting in bed. 2030  Patient crying and grimacing and answering yes when asked if he is in pain. Gave tylenol. Heart rate also elevated 2130  Patient sleeping comfortably. 0030  Patent sleeping 
 
0445  Patient stating he is in pain. Gave tylenol. 0630  Patient had uneventful night,  Pain well controlled with tylenol and repositioning. Patient resting in bed. No further concerns at this time.

## 2019-11-25 NOTE — PROGRESS NOTES
Urology Progress Note Assessment/Plan:  
 
Patient Active Problem List  
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  
 Hypertension I10  
 Mild mental retardation F70  
 Small bowel obstruction ([address] K56.609  Cognitive developmental delay F81.9  
 Uncontrolled type 2 diabetes mellitus with hyperosmolar nonketotic hyperglycemia (HCC) E11.00  Hydronephrosis N13.30  Hypernatremia E87.0  Sepsis ([address] A41.9  Elevated troponin R79.89  
 Urinary retention R33.9  MATTIE (acute kidney injury) ([address] N17.9 Gross hematuria Plan:  Doing fair and continue with treatment. Urine now clear recomend FU as outpatient with cystoscopy evaluation for his recent history Gross hematuria and recurrent UTI. Plan voiding trial while in hospital in bout 1 week due to extensive retention that was present at admission. Rahat Triplett MD 
 
[phone] Subjective:  
 
Daily Progress Note: 2019 1:18 PM 
 
Aruna Phillips is doing good. He reports pain is absent. He has no new complaints. He is tolerating a solid diet and requiring help to get out of bed. Indwelling catheter is draining well. Urine has now remained clear over the several days. Objective:  
 
Visit Vitals /78 (BP 1 Location: Right arm, BP Patient Position: At rest) Pulse 100 Temp 97.3 °F (36.3 °C) Resp 18 Ht 5' 4\" (1.626 m) Wt 135 lb (61.2 kg) SpO2 98% BMI 23.17 kg/m² Temp (24hrs), Av.7 °F (36.5 °C), Min:97.3 °F (36.3 °C), Max:98.4 °F (36.9 °C) Intake and Output: 
1901 -  0700 In: 600 [P.O.:600] Out: 4252 [[id number]]  07 -  190 In: 240 [P.O.:240] Out: - PHYSICAL EXAMINATION:  
Visit Vitals /78 (BP 1 Location: Right arm, BP Patient Position: At rest) Pulse 100 Temp 97.3 °F (36.3 °C) Resp 18 Ht 5' 4\" (1.626 m) Wt 135 lb (61.2 kg) SpO2 98% BMI 23.17 kg/m² Constitutional: Well developed, well nourished. No acute distress. HEENT: Normocephalic, Atraumatic, EOM's intact CV:  no edema Respiratory: No respiratory distress or difficulties breathing Abdomen:  Soft and non-tender  Male:   CVA tenderness: none SCROTUM:  normal 
       PENIS: normal  [place]: secured and urine is clear. Skin: No evidence of jaundice. Normal color Neuro/Psych:  Alert and oriented. Affect appropriate. Lymphatic:   No enlarged inguinal lymph nodes. Lab/Data Review: All lab results for the last 24 hours reviewed. Labs:  
 
Labs: Results:  
Chemistry Recent Labs  
  11/25/19 
0440 11/24/19 
0452 11/23/19 
0410 * 161* 250* * 145 142  
K 3.9 3.8 4.2 * 115* 115* CO2 25 26 22 BUN 12 10 12 CREA 0.48* 0.42* 0.62  
CA 8.8 8.2* 8.0* AGAP 5 4 5 BUCR 25* 24* 19  
  
CBC w/Diff No results for input(s): WBC, RBC, HGB, HCT, PLT, GRANS, LYMPH, EOS, HGBEXT, HCTEXT, PLTEXT in the last 72 hours. Cultures No results for input(s): CULT in the last 72 hours. All Micro Results Procedure Component Value Units Date/Time CULTURE, BLOOD [[id number]] Collected:  11/17/19 1143 Order Status:  Completed Specimen:  Blood Updated:  11/23/19 0756 Special Requests: PERIPHERAL Culture result: NO GROWTH 6 DAYS     
 CULTURE, BLOOD [[id number]] Collected:  11/17/19 1101 Order Status:  Completed Specimen:  Blood Updated:  11/23/19 0756 Special Requests: PERIPHERAL Culture result: NO GROWTH 6 DAYS     
 CULTURE, URINE [[id number]]  (Abnormal)  (Susceptibility) Collected:  11/17/19 1445 Order Status:  Completed Specimen:  Cath Urine Updated:  11/21/19 1281 Special Requests: NO SPECIAL REQUESTS   Culture result:    
  [id number] COLONIES/mL ENTEROCOCCUS FAECIUM GROUP D  
     
 RESPIRATORY VIRAL PANEL, PCR [[id number]] Collected:  11/17/19 1837 Order Status:  Completed Specimen:  Sputum from Respiratory sample Updated:  11/21/19 4625 Source NARES Influenza A NEGATIVE Influenza B NEGATIVE      
  RSV A NEGATIVE      
  RSV B NEGATIVE Parainfluenza 1 NEGATIVE Parainfluenza 2 NEGATIVE Parainfluenza 3 NEGATIVE Rhinovirus NEGATIVE Metapneumovirus NEGATIVE Adenovirus NEGATIVE Comment: (NOTE) Performed At: [address] [id number] Brian Gonzales MD [id number] CULTURE, URINE [[id number]] Collected:  11/17/19 1128 Order Status:  Completed Specimen:  Cath Urine Updated:  11/19/19 0297 Special Requests: NO SPECIAL REQUESTS Culture result:    
  [id number] COLONIES/mL MIXED GRAM POSITIVE СВЕТЛАНА, PROBABLE SKIN/GENITAL CONTAMINATION. MRSA SCREEN - PCR (NASAL) [[id number]] Collected:  11/18/19 1431 Order Status:  Completed Specimen:  Nasal from Nares Updated:  11/18/19 2114 Special Requests: NO SPECIAL REQUESTS Culture result: MRSA target DNA is not detected (presumptive not colonized with MRSA) INFLUENZA A & B AG (RAPID TEST) [[id number]] Collected:  11/17/19 1143 Order Status:  Completed Specimen:  Nasopharyngeal from Nasal washing Updated:  11/17/19 1211 Influenza A Antigen NEGATIVE Comment: A negative result does not exclude influenza virus infection, seasonal or H1N1 due to suboptimal sensitivity. If influenza is circulating in your community, a diagnosis of influenza should be considered based on a patients clinical presentation and empiric antiviral treatment should be considered, if indicated. Influenza B Antigen NEGATIVE Urinalysis Color Date Value Ref Range Status 11/17/2019 YELLOW   Final  
 
Appearance Date Value Ref Range Status 11/17/2019 CLOUDY   Final  
 
Specific gravity Date Value Ref Range Status 11/17/2019 >1.030 (H) 1.005 - 1.030 Final  
 
pH (UA) Date Value Ref Range Status 11/17/2019 5.0 5.0 - 8.0   Final  
 
Protein Date Value Ref Range Status 11/17/2019 NEGATIVE  NEG mg/dL Final  
 
Ketone Date Value Ref Range Status 11/17/2019 TRACE (A) NEG mg/dL Final  
 
Bilirubin Date Value Ref Range Status 11/17/2019 NEGATIVE  NEG   Final  
 
Blood Date Value Ref Range Status 11/17/2019 LARGE (A) NEG   Final  
 
Urobilinogen Date Value Ref Range Status 11/17/2019 0.2 0.2 - 1.0 EU/dL Final  
 
Nitrites Date Value Ref Range Status 11/17/2019 NEGATIVE  NEG   Final  
 
Leukocyte Esterase Date Value Ref Range Status 11/17/2019 MODERATE (A) NEG   Final  
 
Potassium Date Value Ref Range Status 11/25/2019 3.9 3.5 - 5.5 mmol/L Final  
 
Creatinine Date Value Ref Range Status 11/25/2019 0.48 (L) 0.6 - 1.3 MG/DL Final  
 
BUN Date Value Ref Range Status 11/25/2019 12 7.0 - 18 MG/DL Final  
  
PSA No results for input(s): PSA in the last 72 hours.   
Coagulation No results found for: PTP, INR, APTT, INREXT

## 2019-11-25 NOTE — PROGRESS NOTES
Transportation at Discharge:  11/25/19 Transport Company/Representative:  Linda Kumari / [employer] Phone number: [phone] Method of Transport:  Sunita Castillo / Yesi Bro Estimated pick-up time: 1:00p Destination:  [employer] Insurance Info: Medicare / [facility] Authorization: [id number] Per Meeks [place] at [employer] Requesting Outcomes Manager:  Baldomero Pérez, Care- ext [phone]

## 2019-11-25 NOTE — PROGRESS NOTES
[facility] 
                                             (A Division of [address] [place] [address]) Follow-up Note Date of Admission: 11/17/2019     Date of Note:  11/25/2019 Summary:   
 
80 y/o CM w/ CP, Intellectual Disability, HTN, Polio adm 11/17 w/ fever, SOB. Fever  at SNF 1 day PTA. Temp at .6,  RR 30 and BP 73/50. Admitted to ICU on levophed, Zosyn, Vancomycin. UA 4-10 WBC and urine culture grew only 20,000 [facility] (after Vanc). MRSA scrn negative and viral respiratory panel IP. Fever resolved and WBC (19.3) normalized to 11.9 by 11/19. CTAP 11/17: bilateral hydronephrosis, distended bladder w/ diffusely mildly thickened wall s/o urinary retention, bilateral urinary tract calculi within both dilated ureters and renal collecting systems. Also mild bronchial wall thickening and opacification at the lung bases c/w bronchiolitis. Interval History: 
 
Remains afebrile. No new complaints. Off abx now x 1 day Current Antimicrobials: Prior Antimicrobials None 11/24 -1 Vanc, Zosyn 11/17 - 3  Levofloxacin 11/17 - 0 Amoxicillin 11/20 - 4 abx 11/17 - 7  
  
  
Assessment: Plan:  
Fever 
- no clear source - No significant pyuria to suggest UTI, urcx only 20K VS E faecium (res amp) but had hydronephrosis from urinary retention and renal calculi). 1st dose Vanc given before urcx 
- fever may have been from mild UTI with obstruction. Doubt from Bronchiolitis seen on CT Chest.   
- Greenwood placed in ED 11/17 (after traumatic straight cath) 
- received 3 days vancomycin for VS E faecium -> maintain off abx as above 
-> will be available as needed Mild UTI w/ obstruction - CTAP 11/17: bilat hydronephrosis, layering calculi in collecting system, mildly thickened bladder wall s/o urinary retention 
- urcx 11/17: 20K VS E faecium (after 1st dose vanc) -> Urology following Cerebral Palsy    
Intellectual Disability    
HTN    
 Polio    
  
Microbiology: 
 
11/17    blcx NGTD x 2 
             urcx 20,000 [facility] Respiratory viral panel IP 
11/18    MRSA scrn neg 
  
Lines / Catheters: 
   
piv Patient Active Problem List  
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  
 Hypertension I10  
 Mild mental retardation F70  
 Small bowel obstruction ([facility] K56.609  Cognitive developmental delay F81.9  
 Uncontrolled type 2 diabetes mellitus with hyperosmolar nonketotic hyperglycemia (HCC) E11.00  Hydronephrosis N13.30  Hypernatremia E87.0  Sepsis ([facility] A41.9  Elevated troponin R79.89  
 Urinary retention R33.9  MATTIE (acute kidney injury) ([facility] N17.9 Current Facility-Administered Medications Medication Dose Route Frequency  insulin lispro (HUMALOG) injection 3 Units  3 Units SubCUTAneous TIDAC  famotidine (PEPCID) tablet 20 mg  20 mg Oral BID  aspirin chewable tablet 81 mg  81 mg Oral DAILY  baclofen (LIORESAL) tablet 10 mg  10 mg Oral TID  terazosin (HYTRIN) capsule 5 mg  5 mg Oral DAILY  finasteride (PROSCAR) tablet 5 mg  5 mg Oral DAILY  insulin glargine (LANTUS) injection 20 Units  20 Units SubCUTAneous DAILY  insulin lispro (HUMALOG) injection   SubCUTAneous AC&HS  
 glucose chewable tablet 16 g  4 Tab Oral PRN  
 glucagon (GLUCAGEN) injection 1 mg  1 mg IntraMUSCular PRN  
 dextrose 10% infusion 125-250 mL  125-250 mL IntraVENous PRN  
 sodium chloride (NS) flush 5-40 mL  5-40 mL IntraVENous Q8H  
 sodium chloride (NS) flush 5-40 mL  5-40 mL IntraVENous PRN  
 heparin (porcine) injection 5,000 Units  5,000 Units SubCUTAneous Q8H  
 acetaminophen (TYLENOL) tablet 650 mg  650 mg Oral Q6H PRN  Or  
 acetaminophen (TYLENOL) suppository 650 mg  650 mg Rectal Q6H PRN  
  influenza vaccine 2019-20 (6 mos+)(PF) (FLUARIX/FLULAVAL/FLUZONE QUAD) injection 0.5 mL  0.5 mL IntraMUSCular PRIOR TO DISCHARGE Review of Systems - Negative except as in interval history Objective: 
Visit Vitals /78 (BP 1 Location: Right arm, BP Patient Position: At rest) Pulse 100 Temp 97.3 °F (36.3 °C) Resp 18 Ht 5' 4\" (1.626 m) Wt 61.2 kg (135 lb) SpO2 98% BMI 23.17 kg/m² Temp (24hrs), Av.7 °F (36.5 °C), Min:97.3 °F (36.3 °C), Max:98.4 °F (36.9 °C) General: Well developed, well nourished 81 y.o.  male in no acute distress, in no distress Head: normocephalic, without obvious abnormality Neck: supple, symmetrical, trachea midline Cardio:  regular rate and rhythm, S1, S2 normal, no murmur, click, rub or gallop Lungs: no crackles or wheezes Abdomen: soft, non-tender. Bowel sounds normal. No masses, no organomegaly. Extremities:  no redness or tenderness in the calves or thighs, no edema, atrophic LUE, lower extremities Lab results: 
 
Chemistry Recent Labs  
  19 
0440 19 
0452 19 
0410 * 161* 250* * 145 142  
K 3.9 3.8 4.2 * 115* 115* CO2 25 26 22 BUN 12 10 12 CREA 0.48* 0.42* 0.62  
CA 8.8 8.2* 8.0* AGAP 5 4 5 BUCR 25* 24* 19  
 
 
CBC w/ Diff No results for input(s): WBC, RBC, HGB, HCT, PLT, GRANS, LYMPH, EOS, HGBEXT, HCTEXT, PLTEXT, HGBEXT, HCTEXT, PLTEXT in the last 72 hours. Microbiology All Micro Results Procedure Component Value Units Date/Time CULTURE, BLOOD [[id number]] Collected:  19 8307 Order Status:  Completed Specimen:  Blood Updated:  19 0303 Special Requests: PERIPHERAL Culture result: NO GROWTH 6 DAYS     
 CULTURE, BLOOD [[id number]] Collected:  19 1101 Order Status:  Completed Specimen:  Blood Updated:  19 0756 Special Requests: PERIPHERAL Culture result: NO GROWTH 6 DAYS CULTURE, URINE [[id number]]  (Abnormal)  (Susceptibility) Collected:  11/17/19 1445 Order Status:  Completed Specimen:  Cath Urine Updated:  11/21/19 1004 Special Requests: NO SPECIAL REQUESTS Culture result:    
  88953 COLONIES/mL ENTEROCOCCUS FAECIUM GROUP D  
     
 RESPIRATORY VIRAL PANEL, PCR [[id number]] Collected:  11/17/19 1837 Order Status:  Completed Specimen:  Sputum from Respiratory sample Updated:  11/21/19 7910 Source NARES Influenza A NEGATIVE Influenza B NEGATIVE      
  RSV A NEGATIVE      
  RSV B NEGATIVE Parainfluenza 1 NEGATIVE Parainfluenza 2 NEGATIVE Parainfluenza 3 NEGATIVE Rhinovirus NEGATIVE Metapneumovirus NEGATIVE Adenovirus NEGATIVE Comment: (NOTE) Performed At: [address] [id number] Duy Nevarez MD [id number] CULTURE, URINE [[id number]] Collected:  11/17/19 1128 Order Status:  Completed Specimen:  Cath Urine Updated:  11/19/19 7376 Special Requests: NO SPECIAL REQUESTS Culture result:    
  20068 COLONIES/mL MIXED GRAM POSITIVE СВЕТЛАНА, PROBABLE SKIN/GENITAL CONTAMINATION. MRSA SCREEN - PCR (NASAL) [[id number]] Collected:  11/18/19 1431 Order Status:  Completed Specimen:  Nasal from Nares Updated:  11/18/19 2114 Special Requests: NO SPECIAL REQUESTS Culture result: MRSA target DNA is not detected (presumptive not colonized with MRSA) INFLUENZA A & B AG (RAPID TEST) [[id number]] Collected:  11/17/19 1143 Order Status:  Completed Specimen:  Nasopharyngeal from Nasal washing Updated:  11/17/19 1211 Influenza A Antigen NEGATIVE Comment: A negative result does not exclude influenza virus infection, seasonal or H1N1 due to suboptimal sensitivity.  If influenza is circulating in your community, a diagnosis of influenza should be considered based on a patients clinical presentation and empiric antiviral treatment should be considered, if indicated. Influenza B Antigen NEGATIVE [place] Oleary MD, [facility] 
[phone]  
11/25/2019  
12:17 PM

## 2019-11-25 NOTE — PROGRESS NOTES
Problem: Discharge Planning Goal: *Discharge to safe environment Outcome: resolved/met 
 Plan is to return to snf/LTc at [facility] Called divina at [facility], pt to return, asked key to set up for 1pm transport. No one to notify. Missael Anand (SNF) Provider list has been given to the patient and/or patient representative. Patient and/or patient representative has signed the [place] of Choice selecting ____n/a_____________________ as their preference facility and a copy given. Both SNF Provider list and Freedom of Choice have been placed on the chart. Pt not able to sign Care Management Interventions PCP Verified by CM: No(pt unable to communicate) Palliative Care Criteria Met (RRAT>21 & CHF Dx)?: No 
Mode of Transport at Discharge: BLS Transition of Care Consult (CM Consult): Discharge Planning Physical Therapy Consult: No 
Occupational Therapy Consult: No 
Speech Therapy Consult: No 
Current Support Network: [address] [place] [address] Confirm Follow Up Transport: Other (see comment)(resident at [facility]) Plan discussed with Pt/Family/Caregiver: Yes(told pt he's returning to [facility] but not sure if pt comprehend) Discharge Location Discharge Placement: Long Term Care/snf Communication to Patient/Family: Met with patient and family and they are agreeable to the transition plan. The Plan for Transition of Care is related to the following treatment goals: [facility] The Patient and/or patient representative na was provided with a choice of provider and agrees  
with the discharge plan. Yes [x] No [] Freedom of choice list was provided with basic dialogue that supports the patient's individualized plan of care/goals and shares the quality data associated with the providers. Yes [x] No [] pt not able to sign or understand SNF/Rehab Transition: 
Patient has been accepted to [facility] or SNF/Rehab and meets criteria for admission. Patient will transported by [facility] and expected to leave at 1pm. 
 
Communication to SNF/Rehab: 
Bedside RN, selin, has been notified to update the transition plan to the facility and call report [phone] unit 1B Discharge information has been updated on the AVS. And communicated to facility via OpenVPN/All Scripts, or CC link. Discharge instructions to be fax'd to facility at [phone] Nursing Please include all hard scripts for controlled substances, med rec and dc summary, and AVS in packet. Reviewed and confirmed with facility, [facility], can manage the patient care needs for the following:  
 
Sree Levine with (X) only those applicable: 
Medication: 
[x]Medications are available at the facility []IV Antibiotics []Controlled Substance  hard copies available sent. []Weekly Labs Equipment: 
[]CPAP/BiPAP []Wound Vacuum [][place] or Urinary Device []PICC/Central Line []Nebulizer []Ventilator Treatment: 
[]Isolation (for MRSA, VRE, etc.) []Surgical Drain Management []Tracheostomy Care 
[]Dressing Changes []Dialysis with transportation []PEG Care []Oxygen []Daily Weights for Heart Failure Dietary: 
[]Any diet limitations []Tube Feedings []Total Parenteral Management (TPN) Financial Resources: 
[]Medicaid Application Completed []UAI Completed and copy given to pt/family [x]A screening has previously been completed. []Level II Completed 
 
[] Private pay individual who will not become financially eligible for Medicaid within 6 months from admission to a [address] [place] [address]. [] Individual refused to have screening conducted. []Medicaid Application Completed []The screening denied because it was determined individual did not need/did not qualify for nursing facility level of care. [] Out of state residents seeking direct admission to a [address] facility. [] Individuals who are inpatients of an out of state hospital, or in state or out of state veterans/ hospital and seek direct admission to a [address] facility [] Individuals who are pateints or residents of a state owned/operated facility that is licensed by Department of [employer] ([employer]) and seek direct admission to [address] facility [] A screening not required for enrollment in [facility] services as set out in 12 VAC 30- [] [facility]) staff shall perform screenings of the [facility] clients. Advanced Care Plan: 
[]Surrogate Decision Maker of Care 
[]POA []Communicated Code Status and copy sent.    
Other:  
   
 
 j

## 2019-11-25 NOTE — PROGRESS NOTES
attempted to complete a follow up visit with patient in room 2222 this morning and a Spiritual assessment of patient was done. Patient however is not very responsive this monring and seems very tired. Chaplains will continue to follow and will provide pastoral care on an as needed/requested basis Benjamin 3 Board Certified [employer] Spiritual Care  
[phone]

## 2019-11-25 NOTE — PROGRESS NOTES
0715.Bedside and Verbal shift change report given to this nurse Kellie Araya (oncoming nurse) by Alba Gupta (offgoing nurse). Report included the following information SBAR, Kardex and MAR.  
 
7387  This nurse called [facility] gave report to Mellissa Browne. Patient scheduled for transport around 1pm today. [address] verifited that patient had influenza vaccine on 11/1/2019. 
 
1315 Patient d/c off unit transported via stretcher to SNF at [facility].

## 2019-11-25 NOTE — PROGRESS NOTES
Problem: Diabetes Self-Management Goal: *Incorporating nutritional management into lifestyle Description Describe effect of type, amount and timing of food on blood glucose; list 3 methods for planning meals. Outcome: Progressing Towards Goal 
Goal: *Incorporating physical activity into lifestyle Description State effect of exercise on blood glucose levels. Outcome: Progressing Towards Goal 
Goal: *Developing strategies to promote health/change behavior Description Define the ABC's of diabetes; identify appropriate screenings, schedule and personal plan for screenings. Outcome: Progressing Towards Goal 
Goal: *Using medications safely Description State effect of diabetes medications on diabetes; name diabetes medication taking, action and side effects. Outcome: Progressing Towards Goal 
Goal: *Monitoring blood glucose, interpreting and using results Description Identify recommended blood glucose targets  and personal targets. Outcome: Progressing Towards Goal 
Goal: *Prevention, detection, treatment of acute complications Description List symptoms of hyper- and hypoglycemia; describe how to treat low blood sugar and actions for lowering  high blood glucose level. Outcome: Progressing Towards Goal 
Goal: *Prevention, detection and treatment of chronic complications Description Define the natural course of diabetes and describe the relationship of blood glucose levels to long term complications of diabetes. Outcome: Progressing Towards Goal 
Goal: *Developing strategies to address psychosocial issues Description Describe feelings about living with diabetes; identify support needed and support network Outcome: Progressing Towards Goal 
Goal: *Sick day guidelines Outcome: Progressing Towards Goal 
  
Problem: Patient Education: Go to Patient Education Activity Goal: Patient/Family Education Outcome: Progressing Towards Goal 
  
Problem: Discharge Planning Goal: *Discharge to safe environment Outcome: Progressing Towards Goal 
  
Problem: Pressure Injury - Risk of 
Goal: *Prevention of pressure injury Description Document Alex Scale and appropriate interventions in the flowsheet. Outcome: Progressing Towards Goal 
Note: Pressure Injury Interventions: 
Sensory Interventions: Assess changes in LOC, Avoid rigorous massage over bony prominences, Float heels, Minimize linen layers, Pressure redistribution bed/mattress (bed type), Turn and reposition approx. every two hours (pillows and wedges if needed) Moisture Interventions: Absorbent underpads, Apply protective barrier, creams and emollients, Internal/External urinary devices Activity Interventions: Pressure redistribution bed/mattress(bed type) Mobility Interventions: HOB 30 degrees or less, Pressure redistribution bed/mattress (bed type), Turn and reposition approx. every two hours(pillow and wedges) Nutrition Interventions: Document food/fluid/supplement intake Friction and Shear Interventions: Foam dressings/transparent film/skin sealants, HOB 30 degrees or less, Minimize layers, Transferring/repositioning devices Problem: Patient Education: Go to Patient Education Activity Goal: Patient/Family Education Outcome: Progressing Towards Goal 
  
Problem: Falls - Risk of 
Goal: *Absence of Falls Description Document Milan Menendezce Fall Risk and appropriate interventions in the flowsheet. Outcome: Progressing Towards Goal 
Note: Fall Risk Interventions: 
  
 
Mentation Interventions: Adequate sleep, hydration, pain control Medication Interventions: Bed/chair exit alarm Elimination Interventions: Toileting schedule/hourly rounds Problem: Patient Education: Go to Patient Education Activity Goal: Patient/Family Education Outcome: Progressing Towards Goal 
  
Problem: Patient Education: Go to Patient Education Activity Goal: Patient/Family Education Outcome: Progressing Towards Goal 
  
Problem: Diabetes Maintenance:Admission Goal: *Blood glucose 80 to 180 mg/dl Outcome: Progressing Towards Goal 
Goal: *Adequate nutrition Outcome: Progressing Towards Goal 
Goal: *Optimize nutritional status Outcome: Progressing Towards Goal 
  
Problem: Patient Education: Go to Patient Education Activity Goal: Patient/Family Education Outcome: Progressing Towards Goal 
  
Problem: Patient Education: Go to Patient Education Activity Goal: Patient/Family Education Outcome: Progressing Towards Goal

## 2019-11-25 NOTE — PROGRESS NOTES
Bedside shift change report given to  Daly RN (oncoming nurse) by Dion Tripp RN (offgoing nurse). Report included the following information SBAR, Kardex, Intake/Output and MAR.

## 2019-11-25 NOTE — DISCHARGE SUMMARY
Discharge Summary Patient: Shy Samuel               Sex: male          DOA: 11/17/2019 YOB: 1950      Age:  81 y.o.        LOS:  LOS: 8 days Admit Date: 11/17/2019 Discharge Date: 11/25/2019 Admission Diagnoses: Hypernatremia [E87.0] Hydronephrosis [N13.30] Sepsis ([facility] [A41.9] Discharge Diagnoses:   
Hospital Problems  Never Reviewed Codes Class Noted POA * (Principal) Uncontrolled type 2 diabetes mellitus with hyperosmolar nonketotic hyperglycemia (HCC) ICD-10-CM: E11.00 ICD-9-CM: 250.22  11/17/2019 Unknown Hydronephrosis ICD-10-CM: N13.30 ICD-9-CM: 258  11/17/2019 Unknown Hypernatremia ICD-10-CM: E87.0 ICD-9-CM: 276.0  11/17/2019 Unknown Sepsis ([facility] ICD-10-CM: A41.9 ICD-9-CM: 038.9, 995.91  11/17/2019 Unknown Elevated troponin ICD-10-CM: R79.89 ICD-9-CM: 790.6  11/17/2019 Unknown Urinary retention ICD-10-CM: R33.9 ICD-9-CM: 788.20  11/17/2019 Unknown MATTIE (acute kidney injury) ([facility] ICD-10-CM: N17.9 ICD-9-CM: 584.9  11/17/2019 Unknown Cerebral palsy (HCC) (Chronic) ICD-10-CM: G80.9 ICD-9-CM: 343.9  6/12/2015 Yes  
   
 UTI (urinary tract infection) ICD-10-CM: N39.0 ICD-9-CM: 599.0  6/11/2015 Unknown Discharge Disposition: [facility] Discharge Condition:  Improved Hospital Course: 
71M w/ h/o cerebral palsy and developmental disability, HTN, polio, who was admitted 11/17 with fever (102.6) and SOB. He presented in septic shock and was admitted to ICU on vasopressors and broad spectrum IV abx. Labs notable for hypernatremia (165) and MATTIE. Imaging showed bilateral hydronephrosis, non-obstructing calculi w/in both dilated ureters and distended bladder, suggestive of urinary retention; Urology was consulted and recommended Holm, finasteride and flomax, and trial of void in 1 week. Hydronephrosis resolved with holm placement. Nephrology managed hypernatremia, they suspected hypovolemic hypernatremia and treated with D5 and recommended thickened liquids frequently. ID was consulted for sepsis and recommended broad spectrum IV abx initially and then de-escalated to amoxicillin which he completed. Source of sepsis likely UTI with obstruction (culture grew Enterococcus faecium group D), all other cultures negative. SLP was consulted for PO recommendations, pt underwent MBS and they recommended: continue honey thick liquids via TSP ONLY, strict aspiration precautions, strict HOB 55- 60* for all po and >/= 45* at least 30 minutes following, advance solids to mechanical soft, ground solid, small bites, and alternate solids/ liquids.    
 
Pt sodium normalized and MATTIE resolved. Pt was discharged back to [facility] in stable condition. Consults:  
 Infectious Disease, Nephrology and Urology Labs: 
Labs: Results:  
   
Chemistry Recent Labs  
  11/25/19 
0440 11/24/19 
0452 11/23/19 
0410 * 161* 250* * 145 142  
K 3.9 3.8 4.2 * 115* 115* CO2 25 26 22 BUN 12 10 12 CREA 0.48* 0.42* 0.62  
CA 8.8 8.2* 8.0* AGAP 5 4 5 BUCR 25* 24* 19  
  
CBC w/Diff No results for input(s): WBC, RBC, HGB, HCT, PLT, GRANS, LYMPH, EOS, HGBEXT, HCTEXT, PLTEXT in the last 72 hours. Cardiac Enzymes No results for input(s): CPK, CKND1, JOHN in the last 72 hours. No lab exists for component: [facility] Coagulation No results for input(s): PTP, INR, APTT, INREXT in the last 72 hours. Lipid Panel Lab Results Component Value Date/Time Cholesterol, total 159 06/11/2015 10:00 PM  
 HDL Cholesterol 19 (L) 06/11/2015 10:00 PM  
 LDL, calculated 107 (H) 06/11/2015 10:00 PM  
 VLDL, calculated 33 06/11/2015 10:00 PM  
 Triglyceride 165 (H) 06/11/2015 10:00 PM  
 CHOL/HDL Ratio 8.4 (H) 06/11/2015 10:00 PM  
  
BNP No results for input(s): BNPP in the last 72 hours. Liver Enzymes No results for input(s): TP, ALB, TBIL, AP, SGOT, GPT in the last 72 hours. No lab exists for component: DBIL Thyroid Studies No results found for: T4, T3U, TSH, TSHEXT Significant Diagnostic Studies: Xr Swallow Func Video Result Date: 11/18/2019 EXAM:  MODIFIED BARIUM SWALLOW INDICATION:  Feeding difficulties. Dysphagia. TECHNIQUE:  Modified barium swallow was performed in the presence of the speech pathologist.  The patient is in a right lateral sitting position in a chair. Consistencies of thin, nectar, honey and pudding were tested. _________________________ FINDINGS: Thin: Using teaspoon, there was evidence for oral and swallowing delay with penetration to below the level of the cords. No cough reflex. Nectar: Using teaspoon, oral and swallowing delay with moderate penetration. Honey: Using teaspoon, there was oral swallowing delay with premature spillage. Residual noted within the vallecula and piriform sinuses. However, using a straw, there was evidence for moderate penetration without cough reflux. Pudding: Oral and swallowing delay with premature spillage. No airway penetration. Please see speech pathologist's recommendations. Fluoroscopic dose (reference air kerma):  9.826 mGy. _________________________ IMPRESSION: As above. Ct Chest Abd Pelv W Cont Result Date: 11/17/2019 EXAM: CT of the chest, abdomen and pelvis CLINICAL HISTORY/INDICATION: fever/sepsis/abdominal pain/cough/SOB -Additional: None COMPARISON: CT abdomen and pelvis from 05/11/18 TECHNIQUE: Axial CT imaging of the chest, abdomen and pelvis was performed after the uneventful administration of 100 cc of Isovue-300 intravenous contrast. Multiplanar reformats were generated. One or more dose reduction techniques were used on this CT: automated exposure control, adjustment of the mAs and/or kVp according to patient size, and iterative reconstruction techniques.   The specific techniques used on this CT exam have been documented in the patient's electronic medical record. Digital Imaging and Communications in Medicine (DICOM) format image data are available to nonaffiliated external healthcare facilities or entities on a secure, media free, reciprocally searchable basis with patient authorization for at least a 12-month period after this study. _______________ FINDINGS: Chest: Lungs: No suspicious nodule or mass. Bilateral dependent atelectasis is present. Pleura: Normal, with no effusion or pneumothorax. Airway: Bronchial wall thickening with opacified small airways is present at both lung bases compatible with bronchiolitis Mediastinum: Normal heart size. No pericardial effusion. Given the limitations of cardiac motion, great vessels are unremarkable. Lymph Nodes: No enlarged lymph nodes. Diffuse muscular atrophy is present =============== Abdomen And Pelvis: Liver, Biliary: The liver demonstrates diminished density compatible with hepatic steatosis. No focal liver masses are present. No biliary dilation. The gallbladder has been previously removed. Pancreas: Normal. Spleen: Normal. Adrenals: Normal. Kidneys: Bilateral renal cysts are present. Both kidneys demonstrate hydronephrosis. There are layering calculi present within the distended urinary collecting system/pelvis. Bilateral hydroureter is present. The dilated ureters can be followed to the bladder. There are small layering calculi present within the dilated ureters. Lymph Nodes: No enlarged lymph nodes. Gastrointestinal Tract: There is a large amount of fecal material noted within the rectum. The overlying the conus appears mildly thickened. The appearance is consistent with stercoral colitis. Vasculature: Atherosclerosis is present Pelvic Organs:  No suspicious masses Bones: No acute or aggressive osseous abnormalities identified. Chronic spondylosis is present Other:  The bladder is distended suggesting urinary retention _______________ IMPRESSION: Bilateral hydronephrosis is present. The bladder is distended with a diffusely mildly thickened wall. The appearance suggests urinary retention. There are nonobstructing bilateral urinary tract calculi seen both within the dilated ureters as well as within the dilated renal collecting systems Large amount of fecal material within the rectum with adjacent colonic wall thickening consistent with stercoral colitis Status post cholecystectomy Hepatic steatosis Mild bronchial wall thickening and opacification at the lung bases compatible with bronchiolitis [address] Result Date: 11/19/2019 EXAM: ULTRASOUND RETROPERITONEAL LIMITED CLINICAL HISTORY/INDICATION: bilateral hydronephrosis S/P holm catheter placement. -Additional: None COMPARISON: CT chest, abdomen and pelvis from 11/17/19 _______________ FINDINGS: RIGHT KIDNEY: 12.9 cm in length. Residual mild hydronephrosis is present. No suspicious renal mass. At least 4 renal cysts are noted. The 2 largest measure 2.4 x 1.8 x 2.3 cm and 1.5 x 1.8 x 1.8 cm. Cortical thinning is present with mild increased echogenicity. There is an apparent inferior pole calculus present with a diameter of 1.4 cm. LEFT KIDNEY: 11.9 cm in length. No hydronephrosis or suspicious renal mass. At least 3 cysts are noted. The 2 largest measure 1.7 x 1.0 x 1.7 cm and 1.7 x 1.1 x 1.4 cm. Cortical thinning is present with mild increased echogenicity. There is a central shadowing area of echogenicity measuring 1.5 cm compatible with a nonobstructing calculus. OTHER: Decompressed bladder with a Holm catheter is present _______________ IMPRESSION: Decreased hydronephrosis on the right and resolved hydronephrosis on the left Bilateral nonobstructing renal calculi Bilateral renal cysts Decompressed bladder with a Holm catheter present Xr Chest [facility] Result Date: 11/17/2019 EXAM: Portable Chest CLINICAL INDICATION: meets SIRS criteria -Additional: Shortness of breath, difficulty swallowing COMPARISON: 05/12/18 TECHNIQUE: AP portable view at 1048 ______________ FINDINGS: HEART AND MEDIASTINUM: The heart size is normal. LUNGS AND AIRWAYS: The lungs are clear. PLEURA: No pleural effusion or pneumothorax. BONES: Chronic deformity of the left shoulder is present OTHER: Shunt catheter appears present overlying the left chest. Cardiac monitor leads are present overlying the chest ______________ IMPRESSION: No active cardiopulmonary disease. Discharge Medications:    
Current Discharge Medication List  
  
START taking these medications Details  
terazosin (HYTRIN) 5 mg capsule Take 1 Cap by mouth daily. Qty: 30 Cap, Refills: 0  
  
finasteride (PROSCAR) 5 mg tablet Take 1 Tab by mouth daily. Qty: 30 Tab, Refills: 0 CONTINUE these medications which have NOT CHANGED Details  
cholecalciferol (VITAMIN D3) 1,000 unit cap Take 1,000 Units by mouth daily. meclizine (ANTIVERT) 12.5 mg tablet Take 12.5 mg by mouth three (3) times daily as needed. !! fenofibrate micronized (LOFIBRA) 67 mg capsule Take 67 mg by mouth every morning. !! polyvinyl alcohol (LIQUIFILM TEARS) 1.4 % ophthalmic solution Administer 1 Drop to both eyes as needed. fenofibrate nanocrystallized (TRICOR) 48 mg tablet Take 1 Tab by mouth daily. Qty: 30 Tab, Refills: 0  
  
!! polyvinyl alcohol (LIQUIFILM TEARS) 1.4 % ophthalmic solution Administer 1 Drop to both eyes four (4) times daily. aspirin 81 mg chewable tablet Take 81 mg by mouth daily. baclofen (LIORESAL) 10 mg tablet Take  by mouth three (3) times daily. calcium carbonate (TUMS) 200 mg calcium (500 mg) chew Take 1 Tab by mouth three (3) times daily. !! FENOFIBRATE MICRONIZED PO Take 200 mg by mouth daily. metFORMIN (GLUCOPHAGE) 850 mg tablet Take 500 mg by mouth two (2) times daily (with meals). calcium-vitamin D (OYSTER SHELL CALCIUM-VIT D3) 250-125 mg-unit tablet Take 1 Tab by mouth two (2) times a day. polyethylene glycol (MIRALAX) 17 gram packet Take 17 g by mouth every other day. camphor-methyl salicyl-menthol (SALONPAS) ptmd 1 Patch by Apply Externally route daily. multivitamin, tx-iron-ca-min (THERAPY M) 9 mg iron-400 mcg tab tablet Take 1 Tab by mouth daily. ergocalciferol (VITAMIN D2) 50,000 unit capsule Take 50,000 Units by mouth every Tuesday. lisinopril (PRINIVIL, ZESTRIL) 2.5 mg tablet Take 2.5 mg by mouth daily. acetaminophen (TYLENOL ARTHRITIS PAIN) 650 mg CR tablet Take 650 mg by mouth every six (6) hours as needed for Pain. loperamide (IMMODIUM) 2 mg tablet Take 2 mg by mouth four (4) times daily as needed for Diarrhea. promethazine (PHENERGAN) 25 mg tablet Take 25 mg by mouth every eight (8) hours as needed for Nausea. !! - Potential duplicate medications found. Please discuss with provider. Activity: Activity as tolerated Diet:  
Per SLP: continue honey thick liquids via TSP ONLY, strict aspiration precautions, strict HOB 55- 60* for all po and >/= 45* at least 30 minutes following, advance solids to mechanical soft, ground solid, small bites, and alternate solids/ liquids.     
In addition: Nephrology recommended thickened liquids and water frequently due to hypernatremia. Follow-up: PCP in 1 week. Total time spent including time spent on final examination and discharge discussion, discharge documentation and records reviewed and medication reconciliation: > 30 minutes Taryn Luque MD 
[facility]

## 2019-12-02 NOTE — PROGRESS NOTES
[facility]                                               (A Division of [address] [place] [address])                           Follow-up Note      Date of Admission: 12/3/2019     Date of Note:  12/3/2019    Summary:       80 y/o CM w/ CP, Intellectual Disability, HTN, Polio adm 11/17 w/ fever, SOB. Fever  at SNF 1 day PTA. Temp at .6,  RR 30 and BP 73/50. Admitted to ICU on levophed, Zosyn, Vancomycin. UA 4-10 WBC and urine culture grew only 20,000 [facility] (after Vanc). MRSA scrn negative and viral respiratory panel IP. Fever resolved and WBC (19.3) normalized to 11.9 by 11/19. CTAP 11/17: bilateral hydronephrosis, distended bladder w/ diffusely mildly thickened wall s/o urinary retention, bilateral urinary tract calculi within both dilated ureters and renal collecting systems. Also mild bronchial wall thickening and opacification at the lung bases c/w bronchiolitis. dc'd abx 11/24 and discharged 11/25 off abx      Interval History:     Comes in stretcher. Reports he has pain in his bottom. Says no more fever since discharge from hospital.           Current Antimicrobials: Prior Antimicrobials   None 11/24 -9 Vanc, Zosyn 11/17 - 3  Levofloxacin 11/17 - 0 Amoxicillin 11/20 - 4 abx 11/17 - 7         Assessment: Plan:   Recent Fever  - may have been from mild UTI with obstruction.  Doubt from Bronchiolitis seen on CT Chest.    - Holm placed in ED 11/17 (after traumatic straight cath)  - received 3 days vancomycin for VS E faecium then amoxicillin x 4 days -> no further abx therapy indicated   New sacral decubitus ulcer, stage II  - not infected-appearing -> continue local wound care   Mild UTI w/ obstruction  - CTAP 11/17: bilat hydronephrosis, layering calculi in collecting system, mildly thickened bladder wall s/o urinary retention  - urcx 11/17: 20K VS E faecium (after 1st dose vanc)  - resolved  - holm still in place -> follow up with Urology.  ? Dc holm   Cerebral Palsy     Intellectual Disability     HTN     Polio        Microbiology:    11/17    blcx NGTD x 2               urcx 20,000 [facility]               Respiratory viral panel IP  11/18    MRSA scrn neg     Lines / Catheters:      piv       Patient Active Problem List   Diagnosis Code    UTI (urinary tract infection) N39.0    Hypotension I95.9    Tachycardia R00.0    Lactic acidosis E87.2    Cerebral palsy (HCC) G80.9    History of post-polio syndrome Z86.12    Hypertension I10    Mild mental retardation F70    Small bowel obstruction ([address] K56.609    Cognitive developmental delay F81.9    Uncontrolled type 2 diabetes mellitus with hyperosmolar nonketotic hyperglycemia ([facility]) E11.00    Hydronephrosis N13.30    Hypernatremia E87.0    Sepsis ([facility]) A41.9    Elevated troponin R79.89    Urinary retention R33.9    MATTIE (acute kidney injury) ([facility]) N17.9       Current Outpatient Medications   Medication Sig Dispense    terazosin (HYTRIN) 5 mg capsule Take 1 Cap by mouth daily. 30 Cap    meclizine (ANTIVERT) 12.5 mg tablet Take 12.5 mg by mouth three (3) times daily as needed.  fenofibrate micronized (LOFIBRA) 67 mg capsule Take 67 mg by mouth every morning.  polyvinyl alcohol (LIQUIFILM TEARS) 1.4 % ophthalmic solution Administer 1 Drop to both eyes as needed.  fenofibrate nanocrystallized (TRICOR) 48 mg tablet Take 1 Tab by mouth daily. 30 Tab    polyvinyl alcohol (LIQUIFILM TEARS) 1.4 % ophthalmic solution Administer 1 Drop to both eyes four (4) times daily.  aspirin 81 mg chewable tablet Take 81 mg by mouth daily.  baclofen (LIORESAL) 10 mg tablet Take  by mouth three (3) times daily.  metFORMIN (GLUCOPHAGE) 850 mg tablet Take 500 mg by mouth two (2) times daily (with meals).  polyethylene glycol (MIRALAX) 17 gram packet Take 17 g by mouth every other day.  multivitamin, tx-iron-ca-min (THERAPY M) 9 mg iron-400 mcg tab tablet Take 1 Tab by mouth daily.      lisinopril (PRINIVIL, ZESTRIL) 2.5 mg tablet Take 2.5 mg by mouth daily.  acetaminophen (TYLENOL ARTHRITIS PAIN) 650 mg CR tablet Take 650 mg by mouth every six (6) hours as needed for Pain.  loperamide (IMMODIUM) 2 mg tablet Take 2 mg by mouth four (4) times daily as needed for Diarrhea.  promethazine (PHENERGAN) 25 mg tablet Take 25 mg by mouth every eight (8) hours as needed for Nausea.  finasteride (PROSCAR) 5 mg tablet Take 1 Tab by mouth daily. 30 Tab    cholecalciferol (VITAMIN D3) 1,000 unit cap Take 1,000 Units by mouth daily.  calcium carbonate (TUMS) 200 mg calcium (500 mg) chew Take 1 Tab by mouth three (3) times daily.  FENOFIBRATE MICRONIZED PO Take 200 mg by mouth daily.  calcium-vitamin D (OYSTER SHELL CALCIUM-VIT D3) 250-125 mg-unit tablet Take 1 Tab by mouth two (2) times a day.  camphor-methyl salicyl-menthol (SALONPAS) ptmd 1 Patch by Apply Externally route daily.  ergocalciferol (VITAMIN D2) 50,000 unit capsule Take 50,000 Units by mouth every Tuesday. No current facility-administered medications for this encounter. Review of Systems - Negative except as in interval history       Objective:  Visit Vitals  BP 97/62   Pulse (!) 106   Temp 97 °F (36.1 °C)   SpO2 97%       Temp (24hrs), Av °F (36.1 °C), Min:97 °F (36.1 °C), Max:97 °F (36.1 °C)      General:  69 y.o.  male with limb atrophy from polio, lying in stretcher, in no acute distress, in no distress  Head: normocephalic, without obvious abnormality  Neck: supple, symmetrical, trachea midline   Cardio:  regular rate and rhythm, S1, S2 normal, no murmur, click, rub or gallop  Lungs: no crackles or wheezes  Abdomen: soft, non-tender. Bowel sounds normal. No masses, no organomegaly.   Back: small stage II sacral decubitus ulcer  Extremities:  no redness or tenderness in the calves or thighs, no edema, atrophic LUE, lower extremities       Lab results:    Chemistry  No results for input(s): GLU, NA, K, CL, CO2, BUN, CREA, CA, AGAP, BUCR, TBIL, GPT, AP, TP, ALB, GLOB, AGRAT in the last 72 hours. CBC w/ Diff  No results for input(s): WBC, RBC, HGB, HCT, PLT, GRANS, LYMPH, EOS, HGBEXT, HCTEXT, PLTEXT, HGBEXT, HCTEXT, PLTEXT in the last 72 hours.     Microbiology  All Micro Results     None           Sushant Hess MD, [facility]  Infectious Diseases  [phone]  12/3/2019   6:01 PM

## 2020-01-01 ENCOUNTER — APPOINTMENT (OUTPATIENT)
Dept: CT IMAGING | Age: 70
DRG: 871 | End: 2020-01-01
Attending: EMERGENCY MEDICINE
Payer: MEDICARE

## 2020-01-01 ENCOUNTER — HOSPITAL ENCOUNTER (OUTPATIENT)
Dept: LAB | Age: 70
Discharge: HOME | End: 2020-04-27

## 2020-01-01 ENCOUNTER — HOSPITAL ENCOUNTER (INPATIENT)
Age: 70
LOS: 3 days | DRG: 871 | End: 2020-10-18
Attending: EMERGENCY MEDICINE | Admitting: INTERNAL MEDICINE
Payer: MEDICARE

## 2020-01-01 ENCOUNTER — APPOINTMENT (OUTPATIENT)
Dept: GENERAL RADIOLOGY | Age: 70
DRG: 871 | End: 2020-01-01
Attending: EMERGENCY MEDICINE
Payer: MEDICARE

## 2020-01-01 ENCOUNTER — APPOINTMENT (OUTPATIENT)
Dept: GENERAL RADIOLOGY | Age: 70
DRG: 871 | End: 2020-01-01
Attending: HOSPITALIST
Payer: MEDICARE

## 2020-01-01 ENCOUNTER — APPOINTMENT (OUTPATIENT)
Dept: GENERAL RADIOLOGY | Age: 70
DRG: 871 | End: 2020-01-01
Attending: INTERNAL MEDICINE
Payer: MEDICARE

## 2020-01-01 ENCOUNTER — HOSPITAL ENCOUNTER (INPATIENT)
Age: 70
LOS: 5 days | Discharge: NURSING, REHAB OR LONG-TERM CARE | DRG: 698 | End: 2020-01-23
Attending: EMERGENCY MEDICINE | Admitting: HOSPITALIST
Payer: MEDICARE

## 2020-01-01 ENCOUNTER — HOSPITAL ENCOUNTER (INPATIENT)
Age: 70
LOS: 10 days | Discharge: NURSING, REHAB OR LONG-TERM CARE | DRG: 871 | End: 2020-05-08
Attending: EMERGENCY MEDICINE | Admitting: HOSPITALIST
Payer: MEDICARE

## 2020-01-01 ENCOUNTER — APPOINTMENT (OUTPATIENT)
Dept: GENERAL RADIOLOGY | Age: 70
DRG: 698 | End: 2020-01-01
Attending: EMERGENCY MEDICINE
Payer: MEDICARE

## 2020-01-01 VITALS
RESPIRATION RATE: 18 BRPM | DIASTOLIC BLOOD PRESSURE: 65 MMHG | OXYGEN SATURATION: 100 % | HEIGHT: 64 IN | BODY MASS INDEX: 24.53 KG/M2 | WEIGHT: 143.7 LBS | TEMPERATURE: 97.7 F | SYSTOLIC BLOOD PRESSURE: 98 MMHG | HEART RATE: 102 BPM

## 2020-01-01 VITALS
RESPIRATION RATE: 18 BRPM | SYSTOLIC BLOOD PRESSURE: 36 MMHG | HEART RATE: 122 BPM | WEIGHT: 122.1 LBS | DIASTOLIC BLOOD PRESSURE: 24 MMHG | HEIGHT: 64 IN | TEMPERATURE: 97.6 F | BODY MASS INDEX: 20.84 KG/M2 | OXYGEN SATURATION: 55 %

## 2020-01-01 VITALS
RESPIRATION RATE: 16 BRPM | HEART RATE: 95 BPM | SYSTOLIC BLOOD PRESSURE: 135 MMHG | HEIGHT: 64 IN | BODY MASS INDEX: 23.64 KG/M2 | TEMPERATURE: 97.9 F | WEIGHT: 138.45 LBS | DIASTOLIC BLOOD PRESSURE: 80 MMHG | OXYGEN SATURATION: 96 %

## 2020-01-01 DIAGNOSIS — R73.9 ACUTE HYPERGLYCEMIA: ICD-10-CM

## 2020-01-01 DIAGNOSIS — A41.9 SEPSIS DUE TO URINARY TRACT INFECTION (HCC): Primary | ICD-10-CM

## 2020-01-01 DIAGNOSIS — N19 UREMIA: ICD-10-CM

## 2020-01-01 DIAGNOSIS — A41.9 SEPTIC SHOCK (HCC): Primary | ICD-10-CM

## 2020-01-01 DIAGNOSIS — N39.0 ACUTE UTI: ICD-10-CM

## 2020-01-01 DIAGNOSIS — A41.9 SEPTIC SHOCK (HCC): ICD-10-CM

## 2020-01-01 DIAGNOSIS — N39.0 SEPSIS DUE TO URINARY TRACT INFECTION (HCC): Primary | ICD-10-CM

## 2020-01-01 DIAGNOSIS — N39.0 URINARY TRACT INFECTION ASSOCIATED WITH INDWELLING URETHRAL CATHETER, INITIAL ENCOUNTER (HCC): ICD-10-CM

## 2020-01-01 DIAGNOSIS — R65.21 SEPTIC SHOCK (HCC): Primary | ICD-10-CM

## 2020-01-01 DIAGNOSIS — T83.511A URINARY TRACT INFECTION ASSOCIATED WITH INDWELLING URETHRAL CATHETER, INITIAL ENCOUNTER (HCC): ICD-10-CM

## 2020-01-01 DIAGNOSIS — J96.02 ACUTE RESPIRATORY FAILURE WITH HYPOXIA AND HYPERCAPNIA (HCC): ICD-10-CM

## 2020-01-01 DIAGNOSIS — R65.21 SEPTIC SHOCK (HCC): ICD-10-CM

## 2020-01-01 DIAGNOSIS — E87.5 ACUTE HYPERKALEMIA: Primary | ICD-10-CM

## 2020-01-01 DIAGNOSIS — J96.01 ACUTE RESPIRATORY FAILURE WITH HYPOXIA AND HYPERCAPNIA (HCC): ICD-10-CM

## 2020-01-01 DIAGNOSIS — J18.9 COMMUNITY ACQUIRED PNEUMONIA, UNSPECIFIED LATERALITY: ICD-10-CM

## 2020-01-01 LAB
ABO + RH BLD: NORMAL
ADMINISTERED INITIALS, ADMINIT: NORMAL
ALBUMIN SERPL-MCNC: 1.3 G/DL (ref 3.4–5)
ALBUMIN SERPL-MCNC: 1.5 G/DL (ref 3.4–5)
ALBUMIN SERPL-MCNC: 1.5 G/DL (ref 3.4–5)
ALBUMIN SERPL-MCNC: 1.6 G/DL (ref 3.4–5)
ALBUMIN SERPL-MCNC: 1.7 G/DL (ref 3.4–5)
ALBUMIN SERPL-MCNC: 1.8 G/DL (ref 3.4–5)
ALBUMIN SERPL-MCNC: 2.1 G/DL (ref 3.4–5)
ALBUMIN SERPL-MCNC: 2.4 G/DL (ref 3.4–5)
ALBUMIN SERPL-MCNC: 2.5 G/DL (ref 3.4–5)
ALBUMIN SERPL-MCNC: 2.5 G/DL (ref 3.4–5)
ALBUMIN/GLOB SERPL: 0.3 {RATIO} (ref 0.8–1.7)
ALBUMIN/GLOB SERPL: 0.4 {RATIO} (ref 0.8–1.7)
ALBUMIN/GLOB SERPL: 0.5 {RATIO} (ref 0.8–1.7)
ALBUMIN/GLOB SERPL: 0.6 {RATIO} (ref 0.8–1.7)
ALP SERPL-CCNC: 100 U/L (ref 45–117)
ALP SERPL-CCNC: 108 U/L (ref 45–117)
ALP SERPL-CCNC: 123 U/L (ref 45–117)
ALP SERPL-CCNC: 127 U/L (ref 45–117)
ALP SERPL-CCNC: 152 U/L (ref 45–117)
ALP SERPL-CCNC: 99 U/L (ref 45–117)
ALT SERPL-CCNC: 32 U/L (ref 16–61)
ALT SERPL-CCNC: 37 U/L (ref 16–61)
ALT SERPL-CCNC: 45 U/L (ref 16–61)
ALT SERPL-CCNC: 56 U/L (ref 16–61)
ALT SERPL-CCNC: 62 U/L (ref 16–61)
ALT SERPL-CCNC: 64 U/L (ref 16–61)
AMMONIA PLAS-SCNC: 19 UMOL/L (ref 11–32)
ANION GAP SERPL CALC-SCNC: 10 MMOL/L (ref 3–18)
ANION GAP SERPL CALC-SCNC: 4 MMOL/L (ref 3–18)
ANION GAP SERPL CALC-SCNC: 5 MMOL/L (ref 3–18)
ANION GAP SERPL CALC-SCNC: 5 MMOL/L (ref 3–18)
ANION GAP SERPL CALC-SCNC: 6 MMOL/L (ref 3–18)
ANION GAP SERPL CALC-SCNC: 7 MMOL/L (ref 3–18)
ANION GAP SERPL CALC-SCNC: 8 MMOL/L (ref 3–18)
ANION GAP SERPL CALC-SCNC: 9 MMOL/L (ref 3–18)
APPEARANCE UR: ABNORMAL
APTT PPP: 30 SEC (ref 23–36.4)
APTT PPP: 72.4 SEC (ref 23–36.4)
APTT PPP: >180 SEC (ref 23–36.4)
ARTERIAL PATENCY WRIST A: ABNORMAL
AST SERPL-CCNC: 19 U/L (ref 10–38)
AST SERPL-CCNC: 44 U/L (ref 10–38)
AST SERPL-CCNC: 47 U/L (ref 10–38)
AST SERPL-CCNC: 49 U/L (ref 10–38)
AST SERPL-CCNC: 50 U/L (ref 10–38)
AST SERPL-CCNC: 51 U/L (ref 10–38)
ATRIAL RATE: 111 BPM
ATRIAL RATE: 96 BPM
BACTERIA SPEC CULT: ABNORMAL
BACTERIA SPEC CULT: ABNORMAL
BACTERIA SPEC CULT: NORMAL
BACTERIA URNS QL MICRO: ABNORMAL /HPF
BACTERIA URNS QL MICRO: NEGATIVE /HPF
BASE DEFICIT BLD-SCNC: 11 MMOL/L
BASE DEFICIT BLD-SCNC: 8 MMOL/L
BASE DEFICIT BLDV-SCNC: 13 MMOL/L
BASOPHILS # BLD: 0 K/UL (ref 0–0.1)
BASOPHILS NFR BLD: 0 % (ref 0–2)
BDY SITE: ABNORMAL
BILIRUB DIRECT SERPL-MCNC: 0.3 MG/DL (ref 0–0.2)
BILIRUB SERPL-MCNC: 0.2 MG/DL (ref 0.2–1)
BILIRUB SERPL-MCNC: 0.4 MG/DL (ref 0.2–1)
BILIRUB SERPL-MCNC: 0.4 MG/DL (ref 0.2–1)
BILIRUB SERPL-MCNC: 0.5 MG/DL (ref 0.2–1)
BILIRUB UR QL: NEGATIVE
BLOOD GROUP ANTIBODIES SERPL: NORMAL
BNP SERPL-MCNC: 790 PG/ML (ref 0–900)
BODY TEMPERATURE: 100.5
BODY TEMPERATURE: 98.6
BODY TEMPERATURE: 99
BUN SERPL-MCNC: 10 MG/DL (ref 7–18)
BUN SERPL-MCNC: 10 MG/DL (ref 7–18)
BUN SERPL-MCNC: 11 MG/DL (ref 7–18)
BUN SERPL-MCNC: 11 MG/DL (ref 7–18)
BUN SERPL-MCNC: 12 MG/DL (ref 7–18)
BUN SERPL-MCNC: 13 MG/DL (ref 7–18)
BUN SERPL-MCNC: 133 MG/DL (ref 7–18)
BUN SERPL-MCNC: 136 MG/DL (ref 7–18)
BUN SERPL-MCNC: 18 MG/DL (ref 7–18)
BUN SERPL-MCNC: 19 MG/DL (ref 7–18)
BUN SERPL-MCNC: 27 MG/DL (ref 7–18)
BUN SERPL-MCNC: 29 MG/DL (ref 7–18)
BUN SERPL-MCNC: 30 MG/DL (ref 7–18)
BUN SERPL-MCNC: 30 MG/DL (ref 7–18)
BUN SERPL-MCNC: 36 MG/DL (ref 7–18)
BUN SERPL-MCNC: 56 MG/DL (ref 7–18)
BUN SERPL-MCNC: 7 MG/DL (ref 7–18)
BUN SERPL-MCNC: 84 MG/DL (ref 7–18)
BUN SERPL-MCNC: 9 MG/DL (ref 7–18)
BUN SERPL-MCNC: 9 MG/DL (ref 7–18)
BUN SERPL-MCNC: 97 MG/DL (ref 7–18)
BUN/CREAT SERPL: 112 (ref 12–20)
BUN/CREAT SERPL: 128 (ref 12–20)
BUN/CREAT SERPL: 128 (ref 12–20)
BUN/CREAT SERPL: 138 (ref 12–20)
BUN/CREAT SERPL: 141 (ref 12–20)
BUN/CREAT SERPL: 16 (ref 12–20)
BUN/CREAT SERPL: 21 (ref 12–20)
BUN/CREAT SERPL: 23 (ref 12–20)
BUN/CREAT SERPL: 23 (ref 12–20)
BUN/CREAT SERPL: 24 (ref 12–20)
BUN/CREAT SERPL: 28 (ref 12–20)
BUN/CREAT SERPL: 28 (ref 12–20)
BUN/CREAT SERPL: 29 (ref 12–20)
BUN/CREAT SERPL: 32 (ref 12–20)
BUN/CREAT SERPL: 36 (ref 12–20)
BUN/CREAT SERPL: 37 (ref 12–20)
BUN/CREAT SERPL: 38 (ref 12–20)
BUN/CREAT SERPL: 40 (ref 12–20)
BUN/CREAT SERPL: 43 (ref 12–20)
BUN/CREAT SERPL: 55 (ref 12–20)
BUN/CREAT SERPL: 56 (ref 12–20)
BUN/CREAT SERPL: 59 (ref 12–20)
BUN/CREAT SERPL: 86 (ref 12–20)
CALCIUM SERPL-MCNC: 10.3 MG/DL (ref 8.5–10.1)
CALCIUM SERPL-MCNC: 10.3 MG/DL (ref 8.5–10.1)
CALCIUM SERPL-MCNC: 6.9 MG/DL (ref 8.5–10.1)
CALCIUM SERPL-MCNC: 7.3 MG/DL (ref 8.5–10.1)
CALCIUM SERPL-MCNC: 7.4 MG/DL (ref 8.5–10.1)
CALCIUM SERPL-MCNC: 7.6 MG/DL (ref 8.5–10.1)
CALCIUM SERPL-MCNC: 7.7 MG/DL (ref 8.5–10.1)
CALCIUM SERPL-MCNC: 7.8 MG/DL (ref 8.5–10.1)
CALCIUM SERPL-MCNC: 7.9 MG/DL (ref 8.5–10.1)
CALCIUM SERPL-MCNC: 8 MG/DL (ref 8.5–10.1)
CALCIUM SERPL-MCNC: 8 MG/DL (ref 8.5–10.1)
CALCIUM SERPL-MCNC: 8.1 MG/DL (ref 8.5–10.1)
CALCIUM SERPL-MCNC: 8.3 MG/DL (ref 8.5–10.1)
CALCIUM SERPL-MCNC: 8.5 MG/DL (ref 8.5–10.1)
CALCIUM SERPL-MCNC: 8.6 MG/DL (ref 8.5–10.1)
CALCIUM SERPL-MCNC: 8.8 MG/DL (ref 8.5–10.1)
CALCIUM SERPL-MCNC: 8.9 MG/DL (ref 8.5–10.1)
CALCIUM SERPL-MCNC: 9.3 MG/DL (ref 8.5–10.1)
CALCULATED P AXIS, ECG09: 70 DEGREES
CALCULATED P AXIS, ECG09: 86 DEGREES
CALCULATED R AXIS, ECG10: 112 DEGREES
CALCULATED R AXIS, ECG10: 82 DEGREES
CALCULATED R AXIS, ECG10: 93 DEGREES
CALCULATED T AXIS, ECG11: 72 DEGREES
CALCULATED T AXIS, ECG11: 74 DEGREES
CALCULATED T AXIS, ECG11: 75 DEGREES
CC UR VC: ABNORMAL
CHLORIDE SERPL-SCNC: 102 MMOL/L (ref 100–111)
CHLORIDE SERPL-SCNC: 102 MMOL/L (ref 100–111)
CHLORIDE SERPL-SCNC: 103 MMOL/L (ref 100–111)
CHLORIDE SERPL-SCNC: 110 MMOL/L (ref 100–111)
CHLORIDE SERPL-SCNC: 111 MMOL/L (ref 100–111)
CHLORIDE SERPL-SCNC: 111 MMOL/L (ref 100–111)
CHLORIDE SERPL-SCNC: 112 MMOL/L (ref 100–111)
CHLORIDE SERPL-SCNC: 113 MMOL/L (ref 100–111)
CHLORIDE SERPL-SCNC: 113 MMOL/L (ref 100–111)
CHLORIDE SERPL-SCNC: 114 MMOL/L (ref 100–111)
CHLORIDE SERPL-SCNC: 115 MMOL/L (ref 100–111)
CHLORIDE SERPL-SCNC: 116 MMOL/L (ref 100–111)
CHLORIDE SERPL-SCNC: 117 MMOL/L (ref 100–111)
CHLORIDE SERPL-SCNC: 118 MMOL/L (ref 100–111)
CHLORIDE SERPL-SCNC: 121 MMOL/L (ref 100–111)
CHLORIDE SERPL-SCNC: 133 MMOL/L (ref 100–111)
CHLORIDE SERPL-SCNC: 135 MMOL/L (ref 100–111)
CHLORIDE SERPL-SCNC: 135 MMOL/L (ref 100–111)
CK MB CFR SERPL CALC: 2.1 % (ref 0–4)
CK MB SERPL-MCNC: 6.3 NG/ML (ref 5–25)
CK SERPL-CCNC: 300 U/L (ref 39–308)
CO2 SERPL-SCNC: 14 MMOL/L (ref 21–32)
CO2 SERPL-SCNC: 15 MMOL/L (ref 21–32)
CO2 SERPL-SCNC: 15 MMOL/L (ref 21–32)
CO2 SERPL-SCNC: 16 MMOL/L (ref 21–32)
CO2 SERPL-SCNC: 18 MMOL/L (ref 21–32)
CO2 SERPL-SCNC: 19 MMOL/L (ref 21–32)
CO2 SERPL-SCNC: 20 MMOL/L (ref 21–32)
CO2 SERPL-SCNC: 21 MMOL/L (ref 21–32)
CO2 SERPL-SCNC: 22 MMOL/L (ref 21–32)
CO2 SERPL-SCNC: 23 MMOL/L (ref 21–32)
CO2 SERPL-SCNC: 24 MMOL/L (ref 21–32)
CO2 SERPL-SCNC: 25 MMOL/L (ref 21–32)
CO2 SERPL-SCNC: 28 MMOL/L (ref 21–32)
COLOR UR: ABNORMAL
COLOR UR: YELLOW
CORTIS SERPL-MCNC: 26.1 UG/DL
COVID-19 RAPID TEST, COVR: NOT DETECTED
CREAT SERPL-MCNC: 0.26 MG/DL (ref 0.6–1.3)
CREAT SERPL-MCNC: 0.32 MG/DL (ref 0.6–1.3)
CREAT SERPL-MCNC: 0.33 MG/DL (ref 0.6–1.3)
CREAT SERPL-MCNC: 0.34 MG/DL (ref 0.6–1.3)
CREAT SERPL-MCNC: 0.35 MG/DL (ref 0.6–1.3)
CREAT SERPL-MCNC: 0.35 MG/DL (ref 0.6–1.3)
CREAT SERPL-MCNC: 0.4 MG/DL (ref 0.6–1.3)
CREAT SERPL-MCNC: 0.4 MG/DL (ref 0.6–1.3)
CREAT SERPL-MCNC: 0.41 MG/DL (ref 0.6–1.3)
CREAT SERPL-MCNC: 0.41 MG/DL (ref 0.6–1.3)
CREAT SERPL-MCNC: 0.42 MG/DL (ref 0.6–1.3)
CREAT SERPL-MCNC: 0.44 MG/DL (ref 0.6–1.3)
CREAT SERPL-MCNC: 0.44 MG/DL (ref 0.6–1.3)
CREAT SERPL-MCNC: 0.45 MG/DL (ref 0.6–1.3)
CREAT SERPL-MCNC: 0.46 MG/DL (ref 0.6–1.3)
CREAT SERPL-MCNC: 0.49 MG/DL (ref 0.6–1.3)
CREAT SERPL-MCNC: 0.5 MG/DL (ref 0.6–1.3)
CREAT SERPL-MCNC: 0.5 MG/DL (ref 0.6–1.3)
CREAT SERPL-MCNC: 0.52 MG/DL (ref 0.6–1.3)
CREAT SERPL-MCNC: 0.61 MG/DL (ref 0.6–1.3)
CREAT SERPL-MCNC: 0.69 MG/DL (ref 0.6–1.3)
CREAT SERPL-MCNC: 0.7 MG/DL (ref 0.6–1.3)
CREAT SERPL-MCNC: 0.91 MG/DL (ref 0.6–1.3)
CREAT SERPL-MCNC: 1.04 MG/DL (ref 0.6–1.3)
CREAT SERPL-MCNC: 1.06 MG/DL (ref 0.6–1.3)
D DIMER PPP FEU-MCNC: 8.89 UG/ML(FEU)
D50 ADMINISTERED, D50ADM: 0 ML
D50 ORDER, D50ORD: 0 ML
DATE LAST DOSE: ABNORMAL
DATE LAST DOSE: NORMAL
DIAGNOSIS, 93000: NORMAL
DIFFERENTIAL METHOD BLD: ABNORMAL
EOSINOPHIL # BLD: 0 K/UL (ref 0–0.4)
EOSINOPHIL # BLD: 0.1 K/UL (ref 0–0.4)
EOSINOPHIL # BLD: 0.1 K/UL (ref 0–0.4)
EOSINOPHIL # BLD: 0.2 K/UL (ref 0–0.4)
EOSINOPHIL # BLD: 0.3 K/UL (ref 0–0.4)
EOSINOPHIL # BLD: 0.4 K/UL (ref 0–0.4)
EOSINOPHIL # BLD: 0.5 K/UL (ref 0–0.4)
EOSINOPHIL # BLD: 0.6 K/UL (ref 0–0.4)
EOSINOPHIL # BLD: 0.8 K/UL (ref 0–0.4)
EOSINOPHIL NFR BLD: 0 % (ref 0–5)
EOSINOPHIL NFR BLD: 1 % (ref 0–5)
EOSINOPHIL NFR BLD: 2 % (ref 0–5)
EOSINOPHIL NFR BLD: 3 % (ref 0–5)
EOSINOPHIL NFR BLD: 4 % (ref 0–5)
EOSINOPHIL NFR BLD: 4 % (ref 0–5)
EOSINOPHIL NFR BLD: 5 % (ref 0–5)
EOSINOPHIL NFR BLD: 6 % (ref 0–5)
EOSINOPHIL NFR BLD: 6 % (ref 0–5)
EPITH CASTS URNS QL MICRO: ABNORMAL /LPF (ref 0–5)
EPITH CASTS URNS QL MICRO: NEGATIVE /LPF (ref 0–5)
ERYTHROCYTE [DISTWIDTH] IN BLOOD BY AUTOMATED COUNT: 14.1 % (ref 11.6–14.5)
ERYTHROCYTE [DISTWIDTH] IN BLOOD BY AUTOMATED COUNT: 14.1 % (ref 11.6–14.5)
ERYTHROCYTE [DISTWIDTH] IN BLOOD BY AUTOMATED COUNT: 14.2 % (ref 11.6–14.5)
ERYTHROCYTE [DISTWIDTH] IN BLOOD BY AUTOMATED COUNT: 17.1 % (ref 11.6–14.5)
ERYTHROCYTE [DISTWIDTH] IN BLOOD BY AUTOMATED COUNT: 17.2 % (ref 11.6–14.5)
ERYTHROCYTE [DISTWIDTH] IN BLOOD BY AUTOMATED COUNT: 18.7 % (ref 11.6–14.5)
ERYTHROCYTE [DISTWIDTH] IN BLOOD BY AUTOMATED COUNT: 18.8 % (ref 11.6–14.5)
ERYTHROCYTE [DISTWIDTH] IN BLOOD BY AUTOMATED COUNT: 18.9 % (ref 11.6–14.5)
ERYTHROCYTE [DISTWIDTH] IN BLOOD BY AUTOMATED COUNT: 19.3 % (ref 11.6–14.5)
ERYTHROCYTE [DISTWIDTH] IN BLOOD BY AUTOMATED COUNT: 19.4 % (ref 11.6–14.5)
ERYTHROCYTE [DISTWIDTH] IN BLOOD BY AUTOMATED COUNT: 19.5 % (ref 11.6–14.5)
ERYTHROCYTE [DISTWIDTH] IN BLOOD BY AUTOMATED COUNT: 19.7 % (ref 11.6–14.5)
ERYTHROCYTE [DISTWIDTH] IN BLOOD BY AUTOMATED COUNT: 20 % (ref 11.6–14.5)
ERYTHROCYTE [DISTWIDTH] IN BLOOD BY AUTOMATED COUNT: 21 % (ref 11.6–14.5)
ERYTHROCYTE [DISTWIDTH] IN BLOOD BY AUTOMATED COUNT: 21.3 % (ref 11.6–14.5)
ERYTHROCYTE [DISTWIDTH] IN BLOOD BY AUTOMATED COUNT: 21.8 % (ref 11.6–14.5)
EST. AVERAGE GLUCOSE BLD GHB EST-MCNC: 186 MG/DL
EST. AVERAGE GLUCOSE BLD GHB EST-MCNC: 189 MG/DL
EST. AVERAGE GLUCOSE BLD GHB EST-MCNC: 240 MG/DL
FLUAV AG NPH QL IA: NEGATIVE
FLUBV AG NOSE QL IA: NEGATIVE
GAS FLOW.O2 O2 DELIVERY SYS: ABNORMAL L/MIN
GAS FLOW.O2 SETTING OXYMISER: 22 BPM
GAS FLOW.O2 SETTING OXYMISER: 22 BPM
GLOBULIN SER CALC-MCNC: 3.9 G/DL (ref 2–4)
GLOBULIN SER CALC-MCNC: 4 G/DL (ref 2–4)
GLOBULIN SER CALC-MCNC: 4.2 G/DL (ref 2–4)
GLOBULIN SER CALC-MCNC: 4.2 G/DL (ref 2–4)
GLOBULIN SER CALC-MCNC: 4.4 G/DL (ref 2–4)
GLOBULIN SER CALC-MCNC: 4.5 G/DL (ref 2–4)
GLUCOSE BLD STRIP.AUTO-MCNC: 101 MG/DL (ref 70–110)
GLUCOSE BLD STRIP.AUTO-MCNC: 103 MG/DL (ref 70–110)
GLUCOSE BLD STRIP.AUTO-MCNC: 106 MG/DL (ref 70–110)
GLUCOSE BLD STRIP.AUTO-MCNC: 111 MG/DL (ref 70–110)
GLUCOSE BLD STRIP.AUTO-MCNC: 114 MG/DL (ref 70–110)
GLUCOSE BLD STRIP.AUTO-MCNC: 120 MG/DL (ref 70–110)
GLUCOSE BLD STRIP.AUTO-MCNC: 123 MG/DL (ref 70–110)
GLUCOSE BLD STRIP.AUTO-MCNC: 127 MG/DL (ref 70–110)
GLUCOSE BLD STRIP.AUTO-MCNC: 127 MG/DL (ref 70–110)
GLUCOSE BLD STRIP.AUTO-MCNC: 128 MG/DL (ref 70–110)
GLUCOSE BLD STRIP.AUTO-MCNC: 136 MG/DL (ref 70–110)
GLUCOSE BLD STRIP.AUTO-MCNC: 137 MG/DL (ref 70–110)
GLUCOSE BLD STRIP.AUTO-MCNC: 139 MG/DL (ref 70–110)
GLUCOSE BLD STRIP.AUTO-MCNC: 140 MG/DL (ref 70–110)
GLUCOSE BLD STRIP.AUTO-MCNC: 140 MG/DL (ref 70–110)
GLUCOSE BLD STRIP.AUTO-MCNC: 141 MG/DL (ref 70–110)
GLUCOSE BLD STRIP.AUTO-MCNC: 142 MG/DL (ref 70–110)
GLUCOSE BLD STRIP.AUTO-MCNC: 144 MG/DL (ref 70–110)
GLUCOSE BLD STRIP.AUTO-MCNC: 145 MG/DL (ref 70–110)
GLUCOSE BLD STRIP.AUTO-MCNC: 146 MG/DL (ref 70–110)
GLUCOSE BLD STRIP.AUTO-MCNC: 147 MG/DL (ref 70–110)
GLUCOSE BLD STRIP.AUTO-MCNC: 157 MG/DL (ref 70–110)
GLUCOSE BLD STRIP.AUTO-MCNC: 167 MG/DL (ref 70–110)
GLUCOSE BLD STRIP.AUTO-MCNC: 169 MG/DL (ref 70–110)
GLUCOSE BLD STRIP.AUTO-MCNC: 169 MG/DL (ref 70–110)
GLUCOSE BLD STRIP.AUTO-MCNC: 171 MG/DL (ref 70–110)
GLUCOSE BLD STRIP.AUTO-MCNC: 171 MG/DL (ref 70–110)
GLUCOSE BLD STRIP.AUTO-MCNC: 173 MG/DL (ref 70–110)
GLUCOSE BLD STRIP.AUTO-MCNC: 177 MG/DL (ref 70–110)
GLUCOSE BLD STRIP.AUTO-MCNC: 179 MG/DL (ref 70–110)
GLUCOSE BLD STRIP.AUTO-MCNC: 183 MG/DL (ref 70–110)
GLUCOSE BLD STRIP.AUTO-MCNC: 184 MG/DL (ref 70–110)
GLUCOSE BLD STRIP.AUTO-MCNC: 185 MG/DL (ref 70–110)
GLUCOSE BLD STRIP.AUTO-MCNC: 189 MG/DL (ref 70–110)
GLUCOSE BLD STRIP.AUTO-MCNC: 190 MG/DL (ref 70–110)
GLUCOSE BLD STRIP.AUTO-MCNC: 193 MG/DL (ref 70–110)
GLUCOSE BLD STRIP.AUTO-MCNC: 194 MG/DL (ref 70–110)
GLUCOSE BLD STRIP.AUTO-MCNC: 197 MG/DL (ref 70–110)
GLUCOSE BLD STRIP.AUTO-MCNC: 202 MG/DL (ref 70–110)
GLUCOSE BLD STRIP.AUTO-MCNC: 216 MG/DL (ref 70–110)
GLUCOSE BLD STRIP.AUTO-MCNC: 217 MG/DL (ref 70–110)
GLUCOSE BLD STRIP.AUTO-MCNC: 221 MG/DL (ref 70–110)
GLUCOSE BLD STRIP.AUTO-MCNC: 228 MG/DL (ref 70–110)
GLUCOSE BLD STRIP.AUTO-MCNC: 229 MG/DL (ref 70–110)
GLUCOSE BLD STRIP.AUTO-MCNC: 231 MG/DL (ref 70–110)
GLUCOSE BLD STRIP.AUTO-MCNC: 233 MG/DL (ref 70–110)
GLUCOSE BLD STRIP.AUTO-MCNC: 235 MG/DL (ref 70–110)
GLUCOSE BLD STRIP.AUTO-MCNC: 236 MG/DL (ref 70–110)
GLUCOSE BLD STRIP.AUTO-MCNC: 239 MG/DL (ref 70–110)
GLUCOSE BLD STRIP.AUTO-MCNC: 244 MG/DL (ref 70–110)
GLUCOSE BLD STRIP.AUTO-MCNC: 248 MG/DL (ref 70–110)
GLUCOSE BLD STRIP.AUTO-MCNC: 249 MG/DL (ref 70–110)
GLUCOSE BLD STRIP.AUTO-MCNC: 251 MG/DL (ref 70–110)
GLUCOSE BLD STRIP.AUTO-MCNC: 257 MG/DL (ref 70–110)
GLUCOSE BLD STRIP.AUTO-MCNC: 259 MG/DL (ref 70–110)
GLUCOSE BLD STRIP.AUTO-MCNC: 261 MG/DL (ref 70–110)
GLUCOSE BLD STRIP.AUTO-MCNC: 274 MG/DL (ref 70–110)
GLUCOSE BLD STRIP.AUTO-MCNC: 275 MG/DL (ref 70–110)
GLUCOSE BLD STRIP.AUTO-MCNC: 280 MG/DL (ref 70–110)
GLUCOSE BLD STRIP.AUTO-MCNC: 291 MG/DL (ref 70–110)
GLUCOSE BLD STRIP.AUTO-MCNC: 310 MG/DL (ref 70–110)
GLUCOSE BLD STRIP.AUTO-MCNC: 316 MG/DL (ref 70–110)
GLUCOSE BLD STRIP.AUTO-MCNC: 318 MG/DL (ref 70–110)
GLUCOSE BLD STRIP.AUTO-MCNC: 329 MG/DL (ref 70–110)
GLUCOSE BLD STRIP.AUTO-MCNC: 341 MG/DL (ref 70–110)
GLUCOSE BLD STRIP.AUTO-MCNC: 380 MG/DL (ref 70–110)
GLUCOSE BLD STRIP.AUTO-MCNC: 408 MG/DL (ref 70–110)
GLUCOSE BLD STRIP.AUTO-MCNC: 500 MG/DL (ref 70–110)
GLUCOSE BLD STRIP.AUTO-MCNC: 73 MG/DL (ref 70–110)
GLUCOSE BLD STRIP.AUTO-MCNC: 76 MG/DL (ref 70–110)
GLUCOSE BLD STRIP.AUTO-MCNC: 83 MG/DL (ref 70–110)
GLUCOSE BLD STRIP.AUTO-MCNC: 83 MG/DL (ref 70–110)
GLUCOSE BLD STRIP.AUTO-MCNC: 93 MG/DL (ref 70–110)
GLUCOSE BLD STRIP.AUTO-MCNC: 95 MG/DL (ref 70–110)
GLUCOSE BLD STRIP.AUTO-MCNC: 95 MG/DL (ref 70–110)
GLUCOSE BLD STRIP.AUTO-MCNC: 96 MG/DL (ref 70–110)
GLUCOSE BLD STRIP.AUTO-MCNC: 96 MG/DL (ref 70–110)
GLUCOSE BLD STRIP.AUTO-MCNC: 98 MG/DL (ref 70–110)
GLUCOSE BLD STRIP.AUTO-MCNC: 98 MG/DL (ref 70–110)
GLUCOSE BLD STRIP.AUTO-MCNC: 99 MG/DL (ref 70–110)
GLUCOSE BLD STRIP.AUTO-MCNC: 99 MG/DL (ref 70–110)
GLUCOSE BLD STRIP.AUTO-MCNC: >600 MG/DL (ref 70–110)
GLUCOSE BLD STRIP.AUTO-MCNC: >600 MG/DL (ref 70–110)
GLUCOSE SERPL-MCNC: 111 MG/DL (ref 74–99)
GLUCOSE SERPL-MCNC: 131 MG/DL (ref 74–99)
GLUCOSE SERPL-MCNC: 136 MG/DL (ref 74–99)
GLUCOSE SERPL-MCNC: 136 MG/DL (ref 74–99)
GLUCOSE SERPL-MCNC: 147 MG/DL (ref 74–99)
GLUCOSE SERPL-MCNC: 152 MG/DL (ref 74–99)
GLUCOSE SERPL-MCNC: 153 MG/DL (ref 74–99)
GLUCOSE SERPL-MCNC: 156 MG/DL (ref 74–99)
GLUCOSE SERPL-MCNC: 164 MG/DL (ref 74–99)
GLUCOSE SERPL-MCNC: 166 MG/DL (ref 74–99)
GLUCOSE SERPL-MCNC: 196 MG/DL (ref 74–99)
GLUCOSE SERPL-MCNC: 201 MG/DL (ref 74–99)
GLUCOSE SERPL-MCNC: 257 MG/DL (ref 74–99)
GLUCOSE SERPL-MCNC: 282 MG/DL (ref 74–99)
GLUCOSE SERPL-MCNC: 332 MG/DL (ref 74–99)
GLUCOSE SERPL-MCNC: 339 MG/DL (ref 74–99)
GLUCOSE SERPL-MCNC: 573 MG/DL (ref 74–99)
GLUCOSE SERPL-MCNC: 77 MG/DL (ref 74–99)
GLUCOSE SERPL-MCNC: 84 MG/DL (ref 74–99)
GLUCOSE SERPL-MCNC: 87 MG/DL (ref 74–99)
GLUCOSE SERPL-MCNC: 88 MG/DL (ref 74–99)
GLUCOSE SERPL-MCNC: 91 MG/DL (ref 74–99)
GLUCOSE SERPL-MCNC: 93 MG/DL (ref 74–99)
GLUCOSE SERPL-MCNC: 93 MG/DL (ref 74–99)
GLUCOSE SERPL-MCNC: 95 MG/DL (ref 74–99)
GLUCOSE UR STRIP.AUTO-MCNC: 250 MG/DL
GLUCOSE UR STRIP.AUTO-MCNC: 250 MG/DL
GLUCOSE UR STRIP.AUTO-MCNC: >1000 MG/DL
GLUCOSE UR STRIP.AUTO-MCNC: NEGATIVE MG/DL
GLUCOSE UR STRIP.AUTO-MCNC: NEGATIVE MG/DL
GLUCOSE, GLC: 114 MG/DL
GLUCOSE, GLC: 123 MG/DL
GLUCOSE, GLC: 147 MG/DL
GLUCOSE, GLC: 171 MG/DL
GLUCOSE, GLC: 194 MG/DL
GLUCOSE, GLC: 228 MG/DL
GLUCOSE, GLC: 229 MG/DL
GLUCOSE, GLC: 274 MG/DL
GLUCOSE, GLC: 329 MG/DL
GLUCOSE, GLC: 341 MG/DL
GLUCOSE, GLC: 380 MG/DL
GLUCOSE, GLC: 408 MG/DL
GLUCOSE, GLC: 500 MG/DL
GLUCOSE, GLC: 83 MG/DL
GLUCOSE, GLC: 98 MG/DL
GLUCOSE, GLC: 99 MG/DL
HBA1C MFR BLD: 10 % (ref 4.2–5.6)
HBA1C MFR BLD: 8.1 % (ref 4.2–5.6)
HBA1C MFR BLD: 8.2 % (ref 4.2–5.6)
HCO3 BLD-SCNC: 16.3 MMOL/L (ref 22–26)
HCO3 BLD-SCNC: 19.1 MMOL/L (ref 22–26)
HCO3 BLDV-SCNC: 13.5 MMOL/L (ref 23–28)
HCT VFR BLD AUTO: 24.1 % (ref 36–48)
HCT VFR BLD AUTO: 24.9 % (ref 36–48)
HCT VFR BLD AUTO: 26 % (ref 36–48)
HCT VFR BLD AUTO: 26.7 % (ref 36–48)
HCT VFR BLD AUTO: 26.8 % (ref 36–48)
HCT VFR BLD AUTO: 27.1 % (ref 36–48)
HCT VFR BLD AUTO: 27.4 % (ref 36–48)
HCT VFR BLD AUTO: 27.8 % (ref 36–48)
HCT VFR BLD AUTO: 28.2 % (ref 36–48)
HCT VFR BLD AUTO: 28.2 % (ref 36–48)
HCT VFR BLD AUTO: 28.5 % (ref 36–48)
HCT VFR BLD AUTO: 29.7 % (ref 36–48)
HCT VFR BLD AUTO: 31.6 % (ref 36–48)
HCT VFR BLD AUTO: 32.1 % (ref 36–48)
HCT VFR BLD AUTO: 32.2 % (ref 36–48)
HCT VFR BLD AUTO: 32.2 % (ref 36–48)
HCT VFR BLD AUTO: 34.4 % (ref 36–48)
HCT VFR BLD AUTO: 36 % (ref 36–48)
HEALTH STATUS, XMCV2T: NORMAL
HGB BLD-MCNC: 10.2 G/DL (ref 13–16)
HGB BLD-MCNC: 10.3 G/DL (ref 13–16)
HGB BLD-MCNC: 11 G/DL (ref 13–16)
HGB BLD-MCNC: 11.3 G/DL (ref 13–16)
HGB BLD-MCNC: 7.8 G/DL (ref 13–16)
HGB BLD-MCNC: 8.1 G/DL (ref 13–16)
HGB BLD-MCNC: 8.1 G/DL (ref 13–16)
HGB BLD-MCNC: 8.5 G/DL (ref 13–16)
HGB BLD-MCNC: 8.6 G/DL (ref 13–16)
HGB BLD-MCNC: 8.7 G/DL (ref 13–16)
HGB BLD-MCNC: 8.9 G/DL (ref 13–16)
HGB BLD-MCNC: 9 G/DL (ref 13–16)
HGB BLD-MCNC: 9.1 G/DL (ref 13–16)
HGB BLD-MCNC: 9.2 G/DL (ref 13–16)
HGB BLD-MCNC: 9.2 G/DL (ref 13–16)
HGB BLD-MCNC: 9.6 G/DL (ref 13–16)
HGB BLD-MCNC: 9.6 G/DL (ref 13–16)
HGB BLD-MCNC: 9.7 G/DL (ref 13–16)
HGB UR QL STRIP: ABNORMAL
HGB UR QL STRIP: NEGATIVE
HIGH TARGET, HITG: 180 MG/DL
INR PPP: 1.4 (ref 0.8–1.2)
INR PPP: 1.4 (ref 0.8–1.2)
INSPIRATION.DURATION SETTING TIME VENT: 0.9 SEC
INSPIRATION.DURATION SETTING TIME VENT: 0.9 SEC
INSULIN ADMINSTERED, INSADM: 1.2 UNITS/HOUR
INSULIN ADMINSTERED, INSADM: 1.3 UNITS/HOUR
INSULIN ADMINSTERED, INSADM: 1.6 UNITS/HOUR
INSULIN ADMINSTERED, INSADM: 1.7 UNITS/HOUR
INSULIN ADMINSTERED, INSADM: 10.1 UNITS/HOUR
INSULIN ADMINSTERED, INSADM: 10.7 UNITS/HOUR
INSULIN ADMINSTERED, INSADM: 10.7 UNITS/HOUR
INSULIN ADMINSTERED, INSADM: 10.8 UNITS/HOUR
INSULIN ADMINSTERED, INSADM: 11.8 UNITS/HOUR
INSULIN ADMINSTERED, INSADM: 2.5 UNITS/HOUR
INSULIN ADMINSTERED, INSADM: 3.5 UNITS/HOUR
INSULIN ADMINSTERED, INSADM: 6.4 UNITS/HOUR
INSULIN ADMINSTERED, INSADM: 7 UNITS/HOUR
INSULIN ADMINSTERED, INSADM: 8.4 UNITS/HOUR
INSULIN ADMINSTERED, INSADM: 8.8 UNITS/HOUR
INSULIN ADMINSTERED, INSADM: 8.9 UNITS/HOUR
INSULIN ORDER, INSORD: 1.2 UNITS/HOUR
INSULIN ORDER, INSORD: 1.3 UNITS/HOUR
INSULIN ORDER, INSORD: 1.6 UNITS/HOUR
INSULIN ORDER, INSORD: 1.7 UNITS/HOUR
INSULIN ORDER, INSORD: 10.1 UNITS/HOUR
INSULIN ORDER, INSORD: 10.7 UNITS/HOUR
INSULIN ORDER, INSORD: 10.7 UNITS/HOUR
INSULIN ORDER, INSORD: 10.8 UNITS/HOUR
INSULIN ORDER, INSORD: 11.8 UNITS/HOUR
INSULIN ORDER, INSORD: 2.5 UNITS/HOUR
INSULIN ORDER, INSORD: 3.5 UNITS/HOUR
INSULIN ORDER, INSORD: 6.4 UNITS/HOUR
INSULIN ORDER, INSORD: 7 UNITS/HOUR
INSULIN ORDER, INSORD: 8.4 UNITS/HOUR
INSULIN ORDER, INSORD: 8.8 UNITS/HOUR
INSULIN ORDER, INSORD: 8.9 UNITS/HOUR
KETONES UR QL STRIP.AUTO: NEGATIVE MG/DL
LACTATE BLD-SCNC: 1.77 MMOL/L (ref 0.4–2)
LACTATE BLD-SCNC: 1.87 MMOL/L (ref 0.4–2)
LACTATE BLD-SCNC: 2.68 MMOL/L (ref 0.4–2)
LACTATE SERPL-SCNC: 1.3 MMOL/L (ref 0.4–2)
LACTATE SERPL-SCNC: 2.4 MMOL/L (ref 0.4–2)
LACTATE SERPL-SCNC: 2.6 MMOL/L (ref 0.4–2)
LEUKOCYTE ESTERASE UR QL STRIP.AUTO: ABNORMAL
LIPASE SERPL-CCNC: 143 U/L (ref 73–393)
LOW TARGET, LOT: 140 MG/DL
LYMPHOCYTES # BLD: 1.6 K/UL (ref 0.9–3.6)
LYMPHOCYTES # BLD: 1.6 K/UL (ref 0.9–3.6)
LYMPHOCYTES # BLD: 2.6 K/UL (ref 0.9–3.6)
LYMPHOCYTES # BLD: 2.6 K/UL (ref 0.9–3.6)
LYMPHOCYTES # BLD: 2.8 K/UL (ref 0.9–3.6)
LYMPHOCYTES # BLD: 2.9 K/UL (ref 0.9–3.6)
LYMPHOCYTES # BLD: 3 K/UL (ref 0.9–3.6)
LYMPHOCYTES # BLD: 3 K/UL (ref 0.9–3.6)
LYMPHOCYTES # BLD: 3.1 K/UL (ref 0.9–3.6)
LYMPHOCYTES # BLD: 3.1 K/UL (ref 0.9–3.6)
LYMPHOCYTES # BLD: 3.2 K/UL (ref 0.9–3.6)
LYMPHOCYTES # BLD: 3.4 K/UL (ref 0.9–3.6)
LYMPHOCYTES # BLD: 3.8 K/UL (ref 0.9–3.6)
LYMPHOCYTES # BLD: 3.8 K/UL (ref 0.9–3.6)
LYMPHOCYTES NFR BLD: 12 % (ref 21–52)
LYMPHOCYTES NFR BLD: 16 % (ref 21–52)
LYMPHOCYTES NFR BLD: 20 % (ref 21–52)
LYMPHOCYTES NFR BLD: 21 % (ref 21–52)
LYMPHOCYTES NFR BLD: 22 % (ref 21–52)
LYMPHOCYTES NFR BLD: 22 % (ref 21–52)
LYMPHOCYTES NFR BLD: 24 % (ref 21–52)
LYMPHOCYTES NFR BLD: 25 % (ref 21–52)
LYMPHOCYTES NFR BLD: 26 % (ref 21–52)
LYMPHOCYTES NFR BLD: 27 % (ref 21–52)
LYMPHOCYTES NFR BLD: 28 % (ref 21–52)
LYMPHOCYTES NFR BLD: 29 % (ref 21–52)
LYMPHOCYTES NFR BLD: 29 % (ref 21–52)
LYMPHOCYTES NFR BLD: 32 % (ref 21–52)
LYMPHOCYTES NFR BLD: 39 % (ref 21–52)
LYMPHOCYTES NFR BLD: 9 % (ref 21–52)
MAGNESIUM SERPL-MCNC: 1.5 MG/DL (ref 1.6–2.6)
MAGNESIUM SERPL-MCNC: 1.7 MG/DL (ref 1.6–2.6)
MAGNESIUM SERPL-MCNC: 1.8 MG/DL (ref 1.6–2.6)
MAGNESIUM SERPL-MCNC: 2 MG/DL (ref 1.6–2.6)
MAGNESIUM SERPL-MCNC: 2.1 MG/DL (ref 1.6–2.6)
MAGNESIUM SERPL-MCNC: 2.2 MG/DL (ref 1.6–2.6)
MAGNESIUM SERPL-MCNC: 2.5 MG/DL (ref 1.6–2.6)
MCH RBC QN AUTO: 24.1 PG (ref 24–34)
MCH RBC QN AUTO: 24.2 PG (ref 24–34)
MCH RBC QN AUTO: 24.3 PG (ref 24–34)
MCH RBC QN AUTO: 24.3 PG (ref 24–34)
MCH RBC QN AUTO: 24.4 PG (ref 24–34)
MCH RBC QN AUTO: 24.5 PG (ref 24–34)
MCH RBC QN AUTO: 24.8 PG (ref 24–34)
MCH RBC QN AUTO: 24.8 PG (ref 24–34)
MCH RBC QN AUTO: 24.9 PG (ref 24–34)
MCH RBC QN AUTO: 25 PG (ref 24–34)
MCH RBC QN AUTO: 26.3 PG (ref 24–34)
MCH RBC QN AUTO: 26.5 PG (ref 24–34)
MCH RBC QN AUTO: 27.6 PG (ref 24–34)
MCH RBC QN AUTO: 27.8 PG (ref 24–34)
MCH RBC QN AUTO: 27.9 PG (ref 24–34)
MCHC RBC AUTO-ENTMCNC: 30.1 G/DL (ref 31–37)
MCHC RBC AUTO-ENTMCNC: 30.4 G/DL (ref 31–37)
MCHC RBC AUTO-ENTMCNC: 31.2 G/DL (ref 31–37)
MCHC RBC AUTO-ENTMCNC: 31.4 G/DL (ref 31–37)
MCHC RBC AUTO-ENTMCNC: 31.8 G/DL (ref 31–37)
MCHC RBC AUTO-ENTMCNC: 31.8 G/DL (ref 31–37)
MCHC RBC AUTO-ENTMCNC: 31.9 G/DL (ref 31–37)
MCHC RBC AUTO-ENTMCNC: 32 G/DL (ref 31–37)
MCHC RBC AUTO-ENTMCNC: 32 G/DL (ref 31–37)
MCHC RBC AUTO-ENTMCNC: 32.1 G/DL (ref 31–37)
MCHC RBC AUTO-ENTMCNC: 32.1 G/DL (ref 31–37)
MCHC RBC AUTO-ENTMCNC: 32.3 G/DL (ref 31–37)
MCHC RBC AUTO-ENTMCNC: 32.3 G/DL (ref 31–37)
MCHC RBC AUTO-ENTMCNC: 32.4 G/DL (ref 31–37)
MCHC RBC AUTO-ENTMCNC: 32.5 G/DL (ref 31–37)
MCHC RBC AUTO-ENTMCNC: 32.5 G/DL (ref 31–37)
MCHC RBC AUTO-ENTMCNC: 32.6 G/DL (ref 31–37)
MCHC RBC AUTO-ENTMCNC: 32.7 G/DL (ref 31–37)
MCV RBC AUTO: 74.1 FL (ref 74–97)
MCV RBC AUTO: 75.3 FL (ref 74–97)
MCV RBC AUTO: 75.3 FL (ref 74–97)
MCV RBC AUTO: 75.6 FL (ref 74–97)
MCV RBC AUTO: 75.6 FL (ref 74–97)
MCV RBC AUTO: 75.7 FL (ref 74–97)
MCV RBC AUTO: 76 FL (ref 74–97)
MCV RBC AUTO: 76.1 FL (ref 74–97)
MCV RBC AUTO: 76.1 FL (ref 74–97)
MCV RBC AUTO: 76.3 FL (ref 74–97)
MCV RBC AUTO: 77.7 FL (ref 74–97)
MCV RBC AUTO: 77.8 FL (ref 74–97)
MCV RBC AUTO: 77.9 FL (ref 74–97)
MCV RBC AUTO: 86.8 FL (ref 74–97)
MCV RBC AUTO: 87.3 FL (ref 74–97)
MCV RBC AUTO: 88.7 FL (ref 74–97)
MINUTES UNTIL NEXT BG, NBG: 60 MIN
MONOCYTES # BLD: 0.1 K/UL (ref 0.05–1.2)
MONOCYTES # BLD: 0.3 K/UL (ref 0.05–1.2)
MONOCYTES # BLD: 0.7 K/UL (ref 0.05–1.2)
MONOCYTES # BLD: 0.8 K/UL (ref 0.05–1.2)
MONOCYTES # BLD: 0.9 K/UL (ref 0.05–1.2)
MONOCYTES # BLD: 1.1 K/UL (ref 0.05–1.2)
MONOCYTES # BLD: 1.2 K/UL (ref 0.05–1.2)
MONOCYTES # BLD: 1.3 K/UL (ref 0.05–1.2)
MONOCYTES # BLD: 1.5 K/UL (ref 0.05–1.2)
MONOCYTES NFR BLD: 1 % (ref 3–10)
MONOCYTES NFR BLD: 10 % (ref 3–10)
MONOCYTES NFR BLD: 11 % (ref 3–10)
MONOCYTES NFR BLD: 2 % (ref 3–10)
MONOCYTES NFR BLD: 6 % (ref 3–10)
MONOCYTES NFR BLD: 7 % (ref 3–10)
MONOCYTES NFR BLD: 8 % (ref 3–10)
MONOCYTES NFR BLD: 9 % (ref 3–10)
MULTIPLIER, MUL: 0.01
MULTIPLIER, MUL: 0.02
MULTIPLIER, MUL: 0.03
MULTIPLIER, MUL: 0.03
MULTIPLIER, MUL: 0.04
MULTIPLIER, MUL: 0.04
MULTIPLIER, MUL: 0.05
MULTIPLIER, MUL: 0.05
MULTIPLIER, MUL: 0.06
MULTIPLIER, MUL: 0.06
MULTIPLIER, MUL: 0.07
MULTIPLIER, MUL: 0.08
NEUTS BAND NFR BLD MANUAL: 13 %
NEUTS SEG # BLD: 10.5 K/UL (ref 1.8–8)
NEUTS SEG # BLD: 11.3 K/UL (ref 1.8–8)
NEUTS SEG # BLD: 12.2 K/UL (ref 1.8–8)
NEUTS SEG # BLD: 15.4 K/UL (ref 1.8–8)
NEUTS SEG # BLD: 4.5 K/UL (ref 1.8–8)
NEUTS SEG # BLD: 5.4 K/UL (ref 1.8–8)
NEUTS SEG # BLD: 5.7 K/UL (ref 1.8–8)
NEUTS SEG # BLD: 5.7 K/UL (ref 1.8–8)
NEUTS SEG # BLD: 6.9 K/UL (ref 1.8–8)
NEUTS SEG # BLD: 7.2 K/UL (ref 1.8–8)
NEUTS SEG # BLD: 7.9 K/UL (ref 1.8–8)
NEUTS SEG # BLD: 8.2 K/UL (ref 1.8–8)
NEUTS SEG # BLD: 8.2 K/UL (ref 1.8–8)
NEUTS SEG # BLD: 8.3 K/UL (ref 1.8–8)
NEUTS SEG # BLD: 8.3 K/UL (ref 1.8–8)
NEUTS SEG # BLD: 8.7 K/UL (ref 1.8–8)
NEUTS SEG # BLD: 9.5 K/UL (ref 1.8–8)
NEUTS SEG # BLD: 9.6 K/UL (ref 1.8–8)
NEUTS SEG NFR BLD: 46 % (ref 40–73)
NEUTS SEG NFR BLD: 52 % (ref 40–73)
NEUTS SEG NFR BLD: 61 % (ref 40–73)
NEUTS SEG NFR BLD: 62 % (ref 40–73)
NEUTS SEG NFR BLD: 62 % (ref 40–73)
NEUTS SEG NFR BLD: 63 % (ref 40–73)
NEUTS SEG NFR BLD: 64 % (ref 40–73)
NEUTS SEG NFR BLD: 65 % (ref 40–73)
NEUTS SEG NFR BLD: 66 % (ref 40–73)
NEUTS SEG NFR BLD: 67 % (ref 40–73)
NEUTS SEG NFR BLD: 67 % (ref 40–73)
NEUTS SEG NFR BLD: 68 % (ref 40–73)
NEUTS SEG NFR BLD: 72 % (ref 40–73)
NEUTS SEG NFR BLD: 73 % (ref 40–73)
NEUTS SEG NFR BLD: 73 % (ref 40–73)
NEUTS SEG NFR BLD: 89 % (ref 40–73)
NITRITE UR QL STRIP.AUTO: NEGATIVE
NITRITE UR QL STRIP.AUTO: POSITIVE
O2/TOTAL GAS SETTING VFR VENT: 100 %
O2/TOTAL GAS SETTING VFR VENT: 21 %
O2/TOTAL GAS SETTING VFR VENT: 80 %
ORDER INITIALS, ORDINIT: NORMAL
P-R INTERVAL, ECG05: 176 MS
P-R INTERVAL, ECG05: 206 MS
PCO2 BLD: 36.2 MMHG (ref 35–45)
PCO2 BLD: 42.5 MMHG (ref 35–45)
PCO2 BLDV: 26.8 MMHG (ref 41–51)
PEEP RESPIRATORY: 5 CMH2O
PEEP RESPIRATORY: 5 CMH2O
PH BLD: 7.26 [PH] (ref 7.35–7.45)
PH BLD: 7.27 [PH] (ref 7.35–7.45)
PH BLDV: 7.31 [PH] (ref 7.32–7.42)
PH UR STRIP: 5 [PH] (ref 5–8)
PH UR STRIP: 5 [PH] (ref 5–8)
PH UR STRIP: 5.5 [PH] (ref 5–8)
PH UR STRIP: 6.5 [PH] (ref 5–8)
PH UR STRIP: 7 [PH] (ref 5–8)
PHOSPHATE SERPL-MCNC: 1.6 MG/DL (ref 2.5–4.9)
PHOSPHATE SERPL-MCNC: 1.7 MG/DL (ref 2.5–4.9)
PHOSPHATE SERPL-MCNC: 1.8 MG/DL (ref 2.5–4.9)
PHOSPHATE SERPL-MCNC: 2 MG/DL (ref 2.5–4.9)
PHOSPHATE SERPL-MCNC: 2.1 MG/DL (ref 2.5–4.9)
PHOSPHATE SERPL-MCNC: 2.4 MG/DL (ref 2.5–4.9)
PHOSPHATE SERPL-MCNC: 2.7 MG/DL (ref 2.5–4.9)
PHOSPHATE SERPL-MCNC: 2.8 MG/DL (ref 2.5–4.9)
PHOSPHATE SERPL-MCNC: 2.8 MG/DL (ref 2.5–4.9)
PHOSPHATE SERPL-MCNC: 3 MG/DL (ref 2.5–4.9)
PHOSPHATE SERPL-MCNC: 3.1 MG/DL (ref 2.5–4.9)
PIP ISTAT,IPIP: 15
PLATELET # BLD AUTO: 372 K/UL (ref 135–420)
PLATELET # BLD AUTO: 375 K/UL (ref 135–420)
PLATELET # BLD AUTO: 392 K/UL (ref 135–420)
PLATELET # BLD AUTO: 427 K/UL (ref 135–420)
PLATELET # BLD AUTO: 444 K/UL (ref 135–420)
PLATELET # BLD AUTO: 446 K/UL (ref 135–420)
PLATELET # BLD AUTO: 471 K/UL (ref 135–420)
PLATELET # BLD AUTO: 483 K/UL (ref 135–420)
PLATELET # BLD AUTO: 487 K/UL (ref 135–420)
PLATELET # BLD AUTO: 490 K/UL (ref 135–420)
PLATELET # BLD AUTO: 495 K/UL (ref 135–420)
PLATELET # BLD AUTO: 499 K/UL (ref 135–420)
PLATELET # BLD AUTO: 508 K/UL (ref 135–420)
PLATELET # BLD AUTO: 518 K/UL (ref 135–420)
PLATELET # BLD AUTO: 519 K/UL (ref 135–420)
PLATELET # BLD AUTO: 585 K/UL (ref 135–420)
PLATELET # BLD AUTO: 609 K/UL (ref 135–420)
PLATELET # BLD AUTO: 651 K/UL (ref 135–420)
PLATELET COMMENTS,PCOM: ABNORMAL
PMV BLD AUTO: 10.1 FL (ref 9.2–11.8)
PMV BLD AUTO: 10.3 FL (ref 9.2–11.8)
PMV BLD AUTO: 11 FL (ref 9.2–11.8)
PMV BLD AUTO: 11.5 FL (ref 9.2–11.8)
PMV BLD AUTO: 8.6 FL (ref 9.2–11.8)
PMV BLD AUTO: 9 FL (ref 9.2–11.8)
PMV BLD AUTO: 9.4 FL (ref 9.2–11.8)
PMV BLD AUTO: 9.5 FL (ref 9.2–11.8)
PMV BLD AUTO: 9.6 FL (ref 9.2–11.8)
PMV BLD AUTO: 9.6 FL (ref 9.2–11.8)
PMV BLD AUTO: 9.7 FL (ref 9.2–11.8)
PMV BLD AUTO: 9.7 FL (ref 9.2–11.8)
PMV BLD AUTO: 9.9 FL (ref 9.2–11.8)
PO2 BLD: 105 MMHG (ref 80–100)
PO2 BLD: 186 MMHG (ref 80–100)
PO2 BLDV: 53 MMHG (ref 25–40)
POTASSIUM SERPL-SCNC: 2.7 MMOL/L (ref 3.5–5.5)
POTASSIUM SERPL-SCNC: 3.2 MMOL/L (ref 3.5–5.5)
POTASSIUM SERPL-SCNC: 3.3 MMOL/L (ref 3.5–5.5)
POTASSIUM SERPL-SCNC: 3.5 MMOL/L (ref 3.5–5.5)
POTASSIUM SERPL-SCNC: 3.5 MMOL/L (ref 3.5–5.5)
POTASSIUM SERPL-SCNC: 3.6 MMOL/L (ref 3.5–5.5)
POTASSIUM SERPL-SCNC: 3.7 MMOL/L (ref 3.5–5.5)
POTASSIUM SERPL-SCNC: 3.9 MMOL/L (ref 3.5–5.5)
POTASSIUM SERPL-SCNC: 4 MMOL/L (ref 3.5–5.5)
POTASSIUM SERPL-SCNC: 4.1 MMOL/L (ref 3.5–5.5)
POTASSIUM SERPL-SCNC: 4.2 MMOL/L (ref 3.5–5.5)
POTASSIUM SERPL-SCNC: 4.2 MMOL/L (ref 3.5–5.5)
POTASSIUM SERPL-SCNC: 4.4 MMOL/L (ref 3.5–5.5)
POTASSIUM SERPL-SCNC: 4.4 MMOL/L (ref 3.5–5.5)
POTASSIUM SERPL-SCNC: 4.5 MMOL/L (ref 3.5–5.5)
POTASSIUM SERPL-SCNC: 4.8 MMOL/L (ref 3.5–5.5)
POTASSIUM SERPL-SCNC: 4.8 MMOL/L (ref 3.5–5.5)
POTASSIUM SERPL-SCNC: 5.5 MMOL/L (ref 3.5–5.5)
POTASSIUM SERPL-SCNC: 5.5 MMOL/L (ref 3.5–5.5)
POTASSIUM SERPL-SCNC: 7 MMOL/L (ref 3.5–5.5)
POTASSIUM SERPL-SCNC: 7.2 MMOL/L (ref 3.5–5.5)
PROCALCITONIN SERPL-MCNC: 0.15 NG/ML
PROT SERPL-MCNC: 5.5 G/DL (ref 6.4–8.2)
PROT SERPL-MCNC: 5.8 G/DL (ref 6.4–8.2)
PROT SERPL-MCNC: 6 G/DL (ref 6.4–8.2)
PROT SERPL-MCNC: 6.6 G/DL (ref 6.4–8.2)
PROT SERPL-MCNC: 6.7 G/DL (ref 6.4–8.2)
PROT SERPL-MCNC: 6.9 G/DL (ref 6.4–8.2)
PROT UR STRIP-MCNC: 30 MG/DL
PROT UR STRIP-MCNC: 30 MG/DL
PROT UR STRIP-MCNC: ABNORMAL MG/DL
PROT UR STRIP-MCNC: NEGATIVE MG/DL
PROT UR STRIP-MCNC: NEGATIVE MG/DL
PROTHROMBIN TIME: 16.6 SEC (ref 11.5–15.2)
PROTHROMBIN TIME: 16.8 SEC (ref 11.5–15.2)
Q-T INTERVAL, ECG07: 278 MS
Q-T INTERVAL, ECG07: 320 MS
Q-T INTERVAL, ECG07: 340 MS
QRS DURATION, ECG06: 64 MS
QRS DURATION, ECG06: 66 MS
QRS DURATION, ECG06: 70 MS
QTC CALCULATION (BEZET), ECG08: 378 MS
QTC CALCULATION (BEZET), ECG08: 404 MS
QTC CALCULATION (BEZET), ECG08: 535 MS
RBC # BLD AUTO: 3.2 M/UL (ref 4.7–5.5)
RBC # BLD AUTO: 3.23 M/UL (ref 4.7–5.5)
RBC # BLD AUTO: 3.34 M/UL (ref 4.7–5.5)
RBC # BLD AUTO: 3.36 M/UL (ref 4.7–5.5)
RBC # BLD AUTO: 3.44 M/UL (ref 4.7–5.5)
RBC # BLD AUTO: 3.49 M/UL (ref 4.7–5.5)
RBC # BLD AUTO: 3.51 M/UL (ref 4.7–5.5)
RBC # BLD AUTO: 3.62 M/UL (ref 4.7–5.5)
RBC # BLD AUTO: 3.64 M/UL (ref 4.7–5.5)
RBC # BLD AUTO: 3.67 M/UL (ref 4.7–5.5)
RBC # BLD AUTO: 3.69 M/UL (ref 4.7–5.5)
RBC # BLD AUTO: 3.7 M/UL (ref 4.7–5.5)
RBC # BLD AUTO: 3.71 M/UL (ref 4.7–5.5)
RBC # BLD AUTO: 3.77 M/UL (ref 4.7–5.5)
RBC # BLD AUTO: 3.93 M/UL (ref 4.7–5.5)
RBC # BLD AUTO: 4.06 M/UL (ref 4.7–5.5)
RBC # BLD AUTO: 4.23 M/UL (ref 4.7–5.5)
RBC # BLD AUTO: 4.51 M/UL (ref 4.7–5.5)
RBC #/AREA URNS HPF: ABNORMAL /HPF (ref 0–5)
RBC #/AREA URNS HPF: NEGATIVE /HPF (ref 0–5)
RBC #/AREA URNS HPF: NEGATIVE /HPF (ref 0–5)
RBC MORPH BLD: ABNORMAL
REPORTED DOSE,DOSE: ABNORMAL UNITS
REPORTED DOSE,DOSE: NORMAL UNITS
REPORTED DOSE/TIME,TMG: 1000
REPORTED DOSE/TIME,TMG: 200
REPORTED DOSE/TIME,TMG: 2000
REPORTED DOSE/TIME,TMG: 2042
REPORTED DOSE/TIME,TMG: 2200
SAO2 % BLD: 97 % (ref 92–97)
SAO2 % BLD: 99 % (ref 92–97)
SAO2 % BLDV: 85 % (ref 65–88)
SERVICE CMNT-IMP: ABNORMAL
SERVICE CMNT-IMP: NORMAL
SODIUM SERPL-SCNC: 130 MMOL/L (ref 136–145)
SODIUM SERPL-SCNC: 131 MMOL/L (ref 136–145)
SODIUM SERPL-SCNC: 136 MMOL/L (ref 136–145)
SODIUM SERPL-SCNC: 138 MMOL/L (ref 136–145)
SODIUM SERPL-SCNC: 139 MMOL/L (ref 136–145)
SODIUM SERPL-SCNC: 140 MMOL/L (ref 136–145)
SODIUM SERPL-SCNC: 141 MMOL/L (ref 136–145)
SODIUM SERPL-SCNC: 142 MMOL/L (ref 136–145)
SODIUM SERPL-SCNC: 143 MMOL/L (ref 136–145)
SODIUM SERPL-SCNC: 144 MMOL/L (ref 136–145)
SODIUM SERPL-SCNC: 144 MMOL/L (ref 136–145)
SODIUM SERPL-SCNC: 145 MMOL/L (ref 136–145)
SODIUM SERPL-SCNC: 145 MMOL/L (ref 136–145)
SODIUM SERPL-SCNC: 146 MMOL/L (ref 136–145)
SODIUM SERPL-SCNC: 161 MMOL/L (ref 136–145)
SODIUM SERPL-SCNC: 162 MMOL/L (ref 136–145)
SODIUM SERPL-SCNC: 163 MMOL/L (ref 136–145)
SOURCE, COVRS: NORMAL
SP GR UR REFRACTOMETRY: 1.01 (ref 1–1.03)
SP GR UR REFRACTOMETRY: 1.02 (ref 1–1.03)
SP GR UR REFRACTOMETRY: >1.03 (ref 1–1.03)
SPECIMEN EXP DATE BLD: NORMAL
SPECIMEN TYPE, XMCV1T: NORMAL
SPECIMEN TYPE: ABNORMAL
T3FREE SERPL-MCNC: 1.1 PG/ML (ref 2.18–3.98)
T4 FREE SERPL-MCNC: 1.5 NG/DL (ref 0.7–1.5)
TOTAL RESP. RATE, ITRR: 18
TOTAL RESP. RATE, ITRR: 24
TROPONIN I SERPL-MCNC: <0.02 NG/ML (ref 0–0.04)
TROPONIN I SERPL-MCNC: <0.02 NG/ML (ref 0–0.04)
TSH SERPL DL<=0.05 MIU/L-ACNC: 0.55 UIU/ML (ref 0.36–3.74)
TSH SERPL DL<=0.05 MIU/L-ACNC: 1.76 UIU/ML (ref 0.36–3.74)
UROBILINOGEN UR QL STRIP.AUTO: 0.2 EU/DL (ref 0.2–1)
VANCOMYCIN TROUGH SERPL-MCNC: 17.7 UG/ML (ref 10–20)
VANCOMYCIN TROUGH SERPL-MCNC: 22.7 UG/ML (ref 10–20)
VANCOMYCIN TROUGH SERPL-MCNC: 24.9 UG/ML (ref 10–20)
VANCOMYCIN TROUGH SERPL-MCNC: 32.5 UG/ML (ref 10–20)
VANCOMYCIN TROUGH SERPL-MCNC: 48.7 UG/ML (ref 10–20)
VENTILATION MODE VENT: ABNORMAL
VENTILATION MODE VENT: ABNORMAL
VENTRICULAR RATE, ECG03: 111 BPM
VENTRICULAR RATE, ECG03: 149 BPM
VENTRICULAR RATE, ECG03: 96 BPM
VOLUME CONTROL PLUS IVLCP: YES
VT SETTING VENT: 365 ML
VT SETTING VENT: 365 ML
WBC # BLD AUTO: 10.4 K/UL (ref 4.6–13.2)
WBC # BLD AUTO: 10.8 K/UL (ref 4.6–13.2)
WBC # BLD AUTO: 11.4 K/UL (ref 4.6–13.2)
WBC # BLD AUTO: 12.2 K/UL (ref 4.6–13.2)
WBC # BLD AUTO: 12.3 K/UL (ref 4.6–13.2)
WBC # BLD AUTO: 12.9 K/UL (ref 4.6–13.2)
WBC # BLD AUTO: 13.1 K/UL (ref 4.6–13.2)
WBC # BLD AUTO: 13.2 K/UL (ref 4.6–13.2)
WBC # BLD AUTO: 13.3 K/UL (ref 4.6–13.2)
WBC # BLD AUTO: 13.4 K/UL (ref 4.6–13.2)
WBC # BLD AUTO: 14.1 K/UL (ref 4.6–13.2)
WBC # BLD AUTO: 15.4 K/UL (ref 4.6–13.2)
WBC # BLD AUTO: 15.8 K/UL (ref 4.6–13.2)
WBC # BLD AUTO: 16.5 K/UL (ref 4.6–13.2)
WBC # BLD AUTO: 17.3 K/UL (ref 4.6–13.2)
WBC # BLD AUTO: 9.1 K/UL (ref 4.6–13.2)
WBC # BLD AUTO: 9.6 K/UL (ref 4.6–13.2)
WBC # BLD AUTO: 9.8 K/UL (ref 4.6–13.2)
WBC URNS QL MICRO: ABNORMAL /HPF (ref 0–4)
YEAST URNS QL MICRO: ABNORMAL

## 2020-01-01 PROCEDURE — 83735 ASSAY OF MAGNESIUM: CPT

## 2020-01-01 PROCEDURE — 74011250637 HC RX REV CODE- 250/637: Performed by: INTERNAL MEDICINE

## 2020-01-01 PROCEDURE — 85610 PROTHROMBIN TIME: CPT

## 2020-01-01 PROCEDURE — 74011000258 HC RX REV CODE- 258

## 2020-01-01 PROCEDURE — 74011250637 HC RX REV CODE- 250/637: Performed by: HOSPITALIST

## 2020-01-01 PROCEDURE — 65610000006 HC RM INTENSIVE CARE

## 2020-01-01 PROCEDURE — 36415 COLL VENOUS BLD VENIPUNCTURE: CPT

## 2020-01-01 PROCEDURE — 83605 ASSAY OF LACTIC ACID: CPT

## 2020-01-01 PROCEDURE — 74011250636 HC RX REV CODE- 250/636: Performed by: INTERNAL MEDICINE

## 2020-01-01 PROCEDURE — 70450 CT HEAD/BRAIN W/O DYE: CPT

## 2020-01-01 PROCEDURE — 82962 GLUCOSE BLOOD TEST: CPT

## 2020-01-01 PROCEDURE — 99291 CRITICAL CARE FIRST HOUR: CPT

## 2020-01-01 PROCEDURE — 77030037878 HC DRSG MEPILEX >48IN BORD MOLN -B

## 2020-01-01 PROCEDURE — 74011636637 HC RX REV CODE- 636/637: Performed by: INTERNAL MEDICINE

## 2020-01-01 PROCEDURE — 74011250636 HC RX REV CODE- 250/636: Performed by: HOSPITALIST

## 2020-01-01 PROCEDURE — 85730 THROMBOPLASTIN TIME PARTIAL: CPT

## 2020-01-01 PROCEDURE — 74011250636 HC RX REV CODE- 250/636: Performed by: EMERGENCY MEDICINE

## 2020-01-01 PROCEDURE — 85025 COMPLETE CBC W/AUTO DIFF WBC: CPT

## 2020-01-01 PROCEDURE — 80048 BASIC METABOLIC PNL TOTAL CA: CPT

## 2020-01-01 PROCEDURE — 87040 BLOOD CULTURE FOR BACTERIA: CPT

## 2020-01-01 PROCEDURE — 36600 WITHDRAWAL OF ARTERIAL BLOOD: CPT

## 2020-01-01 PROCEDURE — 74011000258 HC RX REV CODE- 258: Performed by: HOSPITALIST

## 2020-01-01 PROCEDURE — 74011000250 HC RX REV CODE- 250: Performed by: HOSPITALIST

## 2020-01-01 PROCEDURE — 74230 X-RAY XM SWLNG FUNCJ C+: CPT

## 2020-01-01 PROCEDURE — 74011636637 HC RX REV CODE- 636/637: Performed by: HOSPITALIST

## 2020-01-01 PROCEDURE — 83690 ASSAY OF LIPASE: CPT

## 2020-01-01 PROCEDURE — 92526 ORAL FUNCTION THERAPY: CPT

## 2020-01-01 PROCEDURE — 84439 ASSAY OF FREE THYROXINE: CPT

## 2020-01-01 PROCEDURE — 80069 RENAL FUNCTION PANEL: CPT

## 2020-01-01 PROCEDURE — 74011000258 HC RX REV CODE- 258: Performed by: INTERNAL MEDICINE

## 2020-01-01 PROCEDURE — 80053 COMPREHEN METABOLIC PANEL: CPT

## 2020-01-01 PROCEDURE — 81001 URINALYSIS AUTO W/SCOPE: CPT

## 2020-01-01 PROCEDURE — 0BH17EZ INSERTION OF ENDOTRACHEAL AIRWAY INTO TRACHEA, VIA NATURAL OR ARTIFICIAL OPENING: ICD-10-PCS | Performed by: EMERGENCY MEDICINE

## 2020-01-01 PROCEDURE — 87804 INFLUENZA ASSAY W/OPTIC: CPT

## 2020-01-01 PROCEDURE — 93005 ELECTROCARDIOGRAM TRACING: CPT

## 2020-01-01 PROCEDURE — 87086 URINE CULTURE/COLONY COUNT: CPT

## 2020-01-01 PROCEDURE — 77030011255 HC DSG AQUACEL AG BMS -A

## 2020-01-01 PROCEDURE — 87635 SARS-COV-2 COVID-19 AMP PRB: CPT

## 2020-01-01 PROCEDURE — 74011000250 HC RX REV CODE- 250: Performed by: EMERGENCY MEDICINE

## 2020-01-01 PROCEDURE — 84484 ASSAY OF TROPONIN QUANT: CPT

## 2020-01-01 PROCEDURE — 82140 ASSAY OF AMMONIA: CPT

## 2020-01-01 PROCEDURE — 71045 X-RAY EXAM CHEST 1 VIEW: CPT

## 2020-01-01 PROCEDURE — 74011250636 HC RX REV CODE- 250/636

## 2020-01-01 PROCEDURE — 96365 THER/PROPH/DIAG IV INF INIT: CPT

## 2020-01-01 PROCEDURE — 77030005402 HC CATH RAD ART LN KT TELE -B

## 2020-01-01 PROCEDURE — 80202 ASSAY OF VANCOMYCIN: CPT

## 2020-01-01 PROCEDURE — 74011000258 HC RX REV CODE- 258: Performed by: EMERGENCY MEDICINE

## 2020-01-01 PROCEDURE — 65660000000 HC RM CCU STEPDOWN

## 2020-01-01 PROCEDURE — 36591 DRAW BLOOD OFF VENOUS DEVICE: CPT

## 2020-01-01 PROCEDURE — 96375 TX/PRO/DX INJ NEW DRUG ADDON: CPT

## 2020-01-01 PROCEDURE — 2709999900 HC NON-CHARGEABLE SUPPLY

## 2020-01-01 PROCEDURE — 83036 HEMOGLOBIN GLYCOSYLATED A1C: CPT

## 2020-01-01 PROCEDURE — 31500 INSERT EMERGENCY AIRWAY: CPT

## 2020-01-01 PROCEDURE — C1751 CATH, INF, PER/CENT/MIDLINE: HCPCS

## 2020-01-01 PROCEDURE — 74011000250 HC RX REV CODE- 250: Performed by: INTERNAL MEDICINE

## 2020-01-01 PROCEDURE — 06HY33Z INSERTION OF INFUSION DEVICE INTO LOWER VEIN, PERCUTANEOUS APPROACH: ICD-10-PCS | Performed by: EMERGENCY MEDICINE

## 2020-01-01 PROCEDURE — 84145 PROCALCITONIN (PCT): CPT

## 2020-01-01 PROCEDURE — 87186 SC STD MICRODIL/AGAR DIL: CPT

## 2020-01-01 PROCEDURE — 84100 ASSAY OF PHOSPHORUS: CPT

## 2020-01-01 PROCEDURE — 74018 RADEX ABDOMEN 1 VIEW: CPT

## 2020-01-01 PROCEDURE — 94002 VENT MGMT INPAT INIT DAY: CPT

## 2020-01-01 PROCEDURE — 77030011256 HC DRSG MEPILEX <16IN NO BORD MOLN -A

## 2020-01-01 PROCEDURE — 99285 EMERGENCY DEPT VISIT HI MDM: CPT

## 2020-01-01 PROCEDURE — 77030031197 HC NDL INFUS INTOSSE TELE -C

## 2020-01-01 PROCEDURE — 51702 INSERT TEMP BLADDER CATH: CPT

## 2020-01-01 PROCEDURE — C9113 INJ PANTOPRAZOLE SODIUM, VIA: HCPCS | Performed by: INTERNAL MEDICINE

## 2020-01-01 PROCEDURE — 74011000636 HC RX REV CODE- 636: Performed by: EMERGENCY MEDICINE

## 2020-01-01 PROCEDURE — 96368 THER/DIAG CONCURRENT INF: CPT

## 2020-01-01 PROCEDURE — 74011636637 HC RX REV CODE- 636/637: Performed by: EMERGENCY MEDICINE

## 2020-01-01 PROCEDURE — 82550 ASSAY OF CK (CPK): CPT

## 2020-01-01 PROCEDURE — 65270000029 HC RM PRIVATE

## 2020-01-01 PROCEDURE — 96367 TX/PROPH/DG ADDL SEQ IV INF: CPT

## 2020-01-01 PROCEDURE — 77030040392 HC DRSG OPTIFOAM MDII -A

## 2020-01-01 PROCEDURE — 96366 THER/PROPH/DIAG IV INF ADDON: CPT

## 2020-01-01 PROCEDURE — 74011636637 HC RX REV CODE- 636/637

## 2020-01-01 PROCEDURE — 86900 BLOOD TYPING SEROLOGIC ABO: CPT

## 2020-01-01 PROCEDURE — 84481 FREE ASSAY (FT-3): CPT

## 2020-01-01 PROCEDURE — 82803 BLOOD GASES ANY COMBINATION: CPT

## 2020-01-01 PROCEDURE — 94640 AIRWAY INHALATION TREATMENT: CPT

## 2020-01-01 PROCEDURE — 5A1935Z RESPIRATORY VENTILATION, LESS THAN 24 CONSECUTIVE HOURS: ICD-10-PCS | Performed by: EMERGENCY MEDICINE

## 2020-01-01 PROCEDURE — 96361 HYDRATE IV INFUSION ADD-ON: CPT

## 2020-01-01 PROCEDURE — 92610 EVALUATE SWALLOWING FUNCTION: CPT

## 2020-01-01 PROCEDURE — 83880 ASSAY OF NATRIURETIC PEPTIDE: CPT

## 2020-01-01 PROCEDURE — 75810000455 HC PLCMT CENT VENOUS CATH LVL 2 5182

## 2020-01-01 PROCEDURE — 87077 CULTURE AEROBIC IDENTIFY: CPT

## 2020-01-01 PROCEDURE — 74177 CT ABD & PELVIS W/CONTRAST: CPT

## 2020-01-01 PROCEDURE — 71275 CT ANGIOGRAPHY CHEST: CPT

## 2020-01-01 PROCEDURE — 71250 CT THORAX DX C-: CPT

## 2020-01-01 PROCEDURE — 80076 HEPATIC FUNCTION PANEL: CPT

## 2020-01-01 PROCEDURE — 74011000250 HC RX REV CODE- 250

## 2020-01-01 PROCEDURE — 75810000304 HC PLACE NEED INTRAOSSEOUS INFUS

## 2020-01-01 PROCEDURE — 84443 ASSAY THYROID STIM HORMONE: CPT

## 2020-01-01 PROCEDURE — 92611 MOTION FLUOROSCOPY/SWALLOW: CPT

## 2020-01-01 PROCEDURE — 77030041247 HC PROTECTOR HEEL HEELMEDIX MDII -B

## 2020-01-01 PROCEDURE — 85379 FIBRIN DEGRADATION QUANT: CPT

## 2020-01-01 PROCEDURE — C1729 CATH, DRAINAGE: HCPCS

## 2020-01-01 PROCEDURE — 74011000255 HC RX REV CODE- 255: Performed by: HOSPITALIST

## 2020-01-01 PROCEDURE — 36620 INSERTION CATHETER ARTERY: CPT

## 2020-01-01 PROCEDURE — 82533 TOTAL CORTISOL: CPT

## 2020-01-01 PROCEDURE — 02HV33Z INSERTION OF INFUSION DEVICE INTO SUPERIOR VENA CAVA, PERCUTANEOUS APPROACH: ICD-10-PCS | Performed by: EMERGENCY MEDICINE

## 2020-01-01 PROCEDURE — 94003 VENT MGMT INPAT SUBQ DAY: CPT

## 2020-01-01 PROCEDURE — 94762 N-INVAS EAR/PLS OXIMTRY CONT: CPT

## 2020-01-01 RX ORDER — SENNOSIDES 8.6 MG/1
1 TABLET ORAL 2 TIMES DAILY
Status: DISCONTINUED | OUTPATIENT
Start: 2020-01-01 | End: 2020-01-01 | Stop reason: HOSPADM

## 2020-01-01 RX ORDER — HEPARIN SODIUM 5000 [USP'U]/ML
5000 INJECTION, SOLUTION INTRAVENOUS; SUBCUTANEOUS EVERY 8 HOURS
Status: DISCONTINUED | OUTPATIENT
Start: 2020-01-01 | End: 2020-01-01 | Stop reason: HOSPADM

## 2020-01-01 RX ORDER — MECLIZINE HCL 12.5 MG 12.5 MG/1
12.5 TABLET ORAL 3 TIMES DAILY
Status: DISCONTINUED | OUTPATIENT
Start: 2020-01-01 | End: 2020-01-01

## 2020-01-01 RX ORDER — NOREPINEPHRINE BIT/0.9 % NACL 8 MG/250ML
.5-3 INFUSION BOTTLE (ML) INTRAVENOUS
Status: DISCONTINUED | OUTPATIENT
Start: 2020-01-01 | End: 2020-01-01

## 2020-01-01 RX ORDER — ETOMIDATE 2 MG/ML
20 INJECTION INTRAVENOUS
Status: COMPLETED | OUTPATIENT
Start: 2020-01-01 | End: 2020-01-01

## 2020-01-01 RX ORDER — MIDAZOLAM HYDROCHLORIDE 1 MG/ML
INJECTION, SOLUTION INTRAMUSCULAR; INTRAVENOUS
Status: COMPLETED
Start: 2020-01-01 | End: 2020-01-01

## 2020-01-01 RX ORDER — PROMETHAZINE HYDROCHLORIDE 12.5 MG/1
SUPPOSITORY RECTAL
COMMUNITY

## 2020-01-01 RX ORDER — SODIUM CHLORIDE 9 MG/ML
100 INJECTION, SOLUTION INTRAVENOUS
Status: COMPLETED | OUTPATIENT
Start: 2020-01-01 | End: 2020-01-01

## 2020-01-01 RX ORDER — MORPHINE SULFATE 2 MG/ML
1 INJECTION, SOLUTION INTRAMUSCULAR; INTRAVENOUS ONCE
Status: COMPLETED | OUTPATIENT
Start: 2020-01-01 | End: 2020-01-01

## 2020-01-01 RX ORDER — TRAMADOL HYDROCHLORIDE 50 MG/1
50 TABLET ORAL
Status: DISCONTINUED | OUTPATIENT
Start: 2020-01-01 | End: 2020-01-01 | Stop reason: HOSPADM

## 2020-01-01 RX ORDER — MAGNESIUM SULFATE 1 G/100ML
1 INJECTION INTRAVENOUS
Status: DISCONTINUED | OUTPATIENT
Start: 2020-01-01 | End: 2020-01-01

## 2020-01-01 RX ORDER — POLYETHYLENE GLYCOL 3350 17 G/17G
17 POWDER, FOR SOLUTION ORAL EVERY OTHER DAY
Status: DISCONTINUED | OUTPATIENT
Start: 2020-01-01 | End: 2020-01-01

## 2020-01-01 RX ORDER — SODIUM CHLORIDE 0.9 % (FLUSH) 0.9 %
5-40 SYRINGE (ML) INJECTION AS NEEDED
Status: DISCONTINUED | OUTPATIENT
Start: 2020-01-01 | End: 2020-01-01 | Stop reason: HOSPADM

## 2020-01-01 RX ORDER — BACLOFEN 10 MG/1
10 TABLET ORAL 3 TIMES DAILY
Status: DISCONTINUED | OUTPATIENT
Start: 2020-01-01 | End: 2020-01-01 | Stop reason: HOSPADM

## 2020-01-01 RX ORDER — THERA TABS 400 MCG
1 TAB ORAL DAILY
Status: DISCONTINUED | OUTPATIENT
Start: 2020-01-01 | End: 2020-01-01 | Stop reason: HOSPADM

## 2020-01-01 RX ORDER — POTASSIUM CHLORIDE 29.8 MG/ML
20 INJECTION INTRAVENOUS
Status: COMPLETED | OUTPATIENT
Start: 2020-01-01 | End: 2020-01-01

## 2020-01-01 RX ORDER — PANTOPRAZOLE SODIUM 40 MG/10ML
40 INJECTION, POWDER, LYOPHILIZED, FOR SOLUTION INTRAVENOUS
Status: ACTIVE | OUTPATIENT
Start: 2020-01-01 | End: 2020-01-01

## 2020-01-01 RX ORDER — ASPIRIN 81 MG
1 TABLET, DELAYED RELEASE (ENTERIC COATED) ORAL DAILY
COMMUNITY

## 2020-01-01 RX ORDER — ONDANSETRON 2 MG/ML
4 INJECTION INTRAMUSCULAR; INTRAVENOUS
Status: DISCONTINUED | OUTPATIENT
Start: 2020-01-01 | End: 2020-01-01 | Stop reason: HOSPADM

## 2020-01-01 RX ORDER — ALBUTEROL SULFATE 0.83 MG/ML
2.5 SOLUTION RESPIRATORY (INHALATION)
Status: COMPLETED | OUTPATIENT
Start: 2020-01-01 | End: 2020-01-01

## 2020-01-01 RX ORDER — SODIUM CHLORIDE 9 MG/ML
75 INJECTION, SOLUTION INTRAVENOUS CONTINUOUS
Status: DISCONTINUED | OUTPATIENT
Start: 2020-01-01 | End: 2020-01-01

## 2020-01-01 RX ORDER — DEXTROSE MONOHYDRATE 25 G/50ML
25-50 INJECTION, SOLUTION INTRAVENOUS AS NEEDED
Status: DISCONTINUED | OUTPATIENT
Start: 2020-01-01 | End: 2020-01-01 | Stop reason: SDUPTHER

## 2020-01-01 RX ORDER — SODIUM CHLORIDE 0.9 % (FLUSH) 0.9 %
5-10 SYRINGE (ML) INJECTION AS NEEDED
Status: DISCONTINUED | OUTPATIENT
Start: 2020-01-01 | End: 2020-01-01 | Stop reason: SDUPTHER

## 2020-01-01 RX ORDER — SODIUM CHLORIDE 0.9 % (FLUSH) 0.9 %
5-40 SYRINGE (ML) INJECTION EVERY 8 HOURS
Status: DISCONTINUED | OUTPATIENT
Start: 2020-01-01 | End: 2020-01-01 | Stop reason: HOSPADM

## 2020-01-01 RX ORDER — MAGNESIUM SULFATE 100 %
4 CRYSTALS MISCELLANEOUS AS NEEDED
Status: DISCONTINUED | OUTPATIENT
Start: 2020-01-01 | End: 2020-01-01 | Stop reason: HOSPADM

## 2020-01-01 RX ORDER — LEVOFLOXACIN 5 MG/ML
750 INJECTION, SOLUTION INTRAVENOUS EVERY 24 HOURS
Status: DISCONTINUED | OUTPATIENT
Start: 2020-01-01 | End: 2020-01-01

## 2020-01-01 RX ORDER — DEXTROSE MONOHYDRATE 50 MG/ML
75 INJECTION, SOLUTION INTRAVENOUS CONTINUOUS
Status: DISCONTINUED | OUTPATIENT
Start: 2020-01-01 | End: 2020-01-01

## 2020-01-01 RX ORDER — SODIUM CHLORIDE 0.9 % (FLUSH) 0.9 %
5-40 SYRINGE (ML) INJECTION EVERY 8 HOURS
Status: DISCONTINUED | OUTPATIENT
Start: 2020-01-01 | End: 2020-01-01

## 2020-01-01 RX ORDER — DEXTROMETHORPHAN HYDROBROMIDE, GUAIFENESIN 5; 100 MG/5ML; MG/5ML
650 LIQUID ORAL
Status: DISCONTINUED | OUTPATIENT
Start: 2020-01-01 | End: 2020-01-01 | Stop reason: HOSPADM

## 2020-01-01 RX ORDER — MORPHINE SULFATE 10 MG/ML
10 INJECTION, SOLUTION INTRAMUSCULAR; INTRAVENOUS
Status: DISCONTINUED | OUTPATIENT
Start: 2020-01-01 | End: 2020-01-01 | Stop reason: HOSPADM

## 2020-01-01 RX ORDER — POLYVINYL ALCOHOL 14 MG/ML
1 SOLUTION/ DROPS OPHTHALMIC 4 TIMES DAILY
Status: DISCONTINUED | OUTPATIENT
Start: 2020-01-01 | End: 2020-01-01 | Stop reason: CLARIF

## 2020-01-01 RX ORDER — HEPARIN SODIUM 1000 [USP'U]/ML
80 INJECTION, SOLUTION INTRAVENOUS; SUBCUTANEOUS ONCE
Status: COMPLETED | OUTPATIENT
Start: 2020-01-01 | End: 2020-01-01

## 2020-01-01 RX ORDER — NALOXONE HYDROCHLORIDE 0.4 MG/ML
0.4 INJECTION, SOLUTION INTRAMUSCULAR; INTRAVENOUS; SUBCUTANEOUS AS NEEDED
Status: DISCONTINUED | OUTPATIENT
Start: 2020-01-01 | End: 2020-01-01 | Stop reason: HOSPADM

## 2020-01-01 RX ORDER — POLYVINYL ALCOHOL 14 MG/ML
1 SOLUTION/ DROPS OPHTHALMIC 4 TIMES DAILY
Status: DISCONTINUED | OUTPATIENT
Start: 2020-01-01 | End: 2020-01-01

## 2020-01-01 RX ORDER — SODIUM CHLORIDE 0.9 % (FLUSH) 0.9 %
5-10 SYRINGE (ML) INJECTION AS NEEDED
Status: DISCONTINUED | OUTPATIENT
Start: 2020-01-01 | End: 2020-01-01 | Stop reason: HOSPADM

## 2020-01-01 RX ORDER — METFORMIN HYDROCHLORIDE 500 MG/1
500 TABLET ORAL
Qty: 30 TAB | Refills: 0 | Status: SHIPPED
Start: 2020-01-01

## 2020-01-01 RX ORDER — ROCURONIUM BROMIDE 10 MG/ML
INJECTION, SOLUTION INTRAVENOUS
Status: COMPLETED
Start: 2020-01-01 | End: 2020-01-01

## 2020-01-01 RX ORDER — LANOLIN ALCOHOL/MO/W.PET/CERES
12 CREAM (GRAM) TOPICAL
Status: DISCONTINUED | OUTPATIENT
Start: 2020-01-01 | End: 2020-01-01

## 2020-01-01 RX ORDER — ACETAMINOPHEN 650 MG/1
650 SUPPOSITORY RECTAL
Status: DISCONTINUED | OUTPATIENT
Start: 2020-01-01 | End: 2020-01-01 | Stop reason: HOSPADM

## 2020-01-01 RX ORDER — VASOPRESSIN 20 U/ML
INJECTION PARENTERAL
Status: DISPENSED
Start: 2020-01-01 | End: 2020-01-01

## 2020-01-01 RX ORDER — INSULIN GLARGINE 100 [IU]/ML
6 INJECTION, SOLUTION SUBCUTANEOUS DAILY
Status: DISCONTINUED | OUTPATIENT
Start: 2020-01-01 | End: 2020-01-01 | Stop reason: HOSPADM

## 2020-01-01 RX ORDER — MIDAZOLAM HYDROCHLORIDE 1 MG/ML
4 INJECTION, SOLUTION INTRAMUSCULAR; INTRAVENOUS ONCE
Status: DISPENSED | OUTPATIENT
Start: 2020-01-01 | End: 2020-01-01

## 2020-01-01 RX ORDER — SODIUM HYPOCHLORITE 2.5 MG/ML
SOLUTION TOPICAL
COMMUNITY
End: 2020-01-01

## 2020-01-01 RX ORDER — DEXTROSE MONOHYDRATE AND SODIUM CHLORIDE 5; .45 G/100ML; G/100ML
75 INJECTION, SOLUTION INTRAVENOUS CONTINUOUS
Status: DISCONTINUED | OUTPATIENT
Start: 2020-01-01 | End: 2020-01-01

## 2020-01-01 RX ORDER — TRAMADOL HYDROCHLORIDE 50 MG/1
50 TABLET ORAL
COMMUNITY

## 2020-01-01 RX ORDER — DOCUSATE SODIUM 100 MG/1
100 CAPSULE, LIQUID FILLED ORAL
Status: DISCONTINUED | OUTPATIENT
Start: 2020-01-01 | End: 2020-01-01

## 2020-01-01 RX ORDER — IPRATROPIUM BROMIDE AND ALBUTEROL SULFATE 2.5; .5 MG/3ML; MG/3ML
3 SOLUTION RESPIRATORY (INHALATION)
Status: DISCONTINUED | OUTPATIENT
Start: 2020-01-01 | End: 2020-01-01 | Stop reason: HOSPADM

## 2020-01-01 RX ORDER — SODIUM CHLORIDE, SODIUM LACTATE, POTASSIUM CHLORIDE, CALCIUM CHLORIDE 600; 310; 30; 20 MG/100ML; MG/100ML; MG/100ML; MG/100ML
125 INJECTION, SOLUTION INTRAVENOUS CONTINUOUS
Status: DISCONTINUED | OUTPATIENT
Start: 2020-01-01 | End: 2020-01-01

## 2020-01-01 RX ORDER — POTASSIUM CHLORIDE AND SODIUM CHLORIDE 900; 300 MG/100ML; MG/100ML
INJECTION, SOLUTION INTRAVENOUS CONTINUOUS
Status: DISCONTINUED | OUTPATIENT
Start: 2020-01-01 | End: 2020-01-01

## 2020-01-01 RX ORDER — SODIUM CHLORIDE 9 MG/ML
1000 INJECTION, SOLUTION INTRAVENOUS ONCE
Status: COMPLETED | OUTPATIENT
Start: 2020-01-01 | End: 2020-01-01

## 2020-01-01 RX ORDER — FLUCONAZOLE 2 MG/ML
INJECTION, SOLUTION INTRAVENOUS
Status: COMPLETED
Start: 2020-01-01 | End: 2020-01-01

## 2020-01-01 RX ORDER — SODIUM CHLORIDE 9 MG/ML
200 INJECTION, SOLUTION INTRAVENOUS CONTINUOUS
Status: DISCONTINUED | OUTPATIENT
Start: 2020-01-01 | End: 2020-01-01

## 2020-01-01 RX ORDER — POTASSIUM CHLORIDE 7.45 MG/ML
10 INJECTION INTRAVENOUS
Status: DISPENSED | OUTPATIENT
Start: 2020-01-01 | End: 2020-01-01

## 2020-01-01 RX ORDER — INSULIN GLARGINE 100 [IU]/ML
4 INJECTION, SOLUTION SUBCUTANEOUS DAILY
Status: DISCONTINUED | OUTPATIENT
Start: 2020-01-01 | End: 2020-01-01 | Stop reason: HOSPADM

## 2020-01-01 RX ORDER — SODIUM CHLORIDE 9 MG/ML
60 INJECTION, SOLUTION INTRAVENOUS CONTINUOUS
Status: DISCONTINUED | OUTPATIENT
Start: 2020-01-01 | End: 2020-01-01 | Stop reason: HOSPADM

## 2020-01-01 RX ORDER — LEVOFLOXACIN 5 MG/ML
750 INJECTION, SOLUTION INTRAVENOUS
Status: COMPLETED | OUTPATIENT
Start: 2020-01-01 | End: 2020-01-01

## 2020-01-01 RX ORDER — GUAIFENESIN 100 MG/5ML
81 LIQUID (ML) ORAL DAILY
Status: DISCONTINUED | OUTPATIENT
Start: 2020-01-01 | End: 2020-01-01 | Stop reason: HOSPADM

## 2020-01-01 RX ORDER — HEPARIN SODIUM 10000 [USP'U]/100ML
18-36 INJECTION, SOLUTION INTRAVENOUS
Status: DISCONTINUED | OUTPATIENT
Start: 2020-01-01 | End: 2020-01-01

## 2020-01-01 RX ORDER — DOCUSATE SODIUM 50 MG/5ML
100 LIQUID ORAL 2 TIMES DAILY
Status: DISCONTINUED | OUTPATIENT
Start: 2020-01-01 | End: 2020-01-01 | Stop reason: HOSPADM

## 2020-01-01 RX ORDER — SODIUM POLYSTYRENE SULFONATE 15 G/60ML
30 SUSPENSION ORAL; RECTAL
Status: DISCONTINUED | OUTPATIENT
Start: 2020-01-01 | End: 2020-01-01

## 2020-01-01 RX ORDER — DEXTROSE MONOHYDRATE 100 MG/ML
125-250 INJECTION, SOLUTION INTRAVENOUS AS NEEDED
Status: DISCONTINUED | OUTPATIENT
Start: 2020-01-01 | End: 2020-01-01 | Stop reason: HOSPADM

## 2020-01-01 RX ORDER — FLUCONAZOLE 2 MG/ML
200 INJECTION, SOLUTION INTRAVENOUS
Status: COMPLETED | OUTPATIENT
Start: 2020-01-01 | End: 2020-01-01

## 2020-01-01 RX ORDER — LANOLIN ALCOHOL/MO/W.PET/CERES
12 CREAM (GRAM) TOPICAL
Status: DISCONTINUED | OUTPATIENT
Start: 2020-01-01 | End: 2020-01-01 | Stop reason: HOSPADM

## 2020-01-01 RX ORDER — ROCURONIUM BROMIDE 10 MG/ML
100 INJECTION, SOLUTION INTRAVENOUS
Status: COMPLETED | OUTPATIENT
Start: 2020-01-01 | End: 2020-01-01

## 2020-01-01 RX ORDER — MAGNESIUM SULFATE HEPTAHYDRATE 40 MG/ML
2 INJECTION, SOLUTION INTRAVENOUS ONCE
Status: COMPLETED | OUTPATIENT
Start: 2020-01-01 | End: 2020-01-01

## 2020-01-01 RX ORDER — INSULIN LISPRO 100 [IU]/ML
3 INJECTION, SOLUTION INTRAVENOUS; SUBCUTANEOUS
Status: DISCONTINUED | OUTPATIENT
Start: 2020-01-01 | End: 2020-01-01 | Stop reason: HOSPADM

## 2020-01-01 RX ORDER — VANCOMYCIN/0.9 % SOD CHLORIDE 1.5G/250ML
1500 PLASTIC BAG, INJECTION (ML) INTRAVENOUS
Status: COMPLETED | OUTPATIENT
Start: 2020-01-01 | End: 2020-01-01

## 2020-01-01 RX ORDER — INSULIN LISPRO 100 [IU]/ML
INJECTION, SOLUTION INTRAVENOUS; SUBCUTANEOUS 4 TIMES DAILY
Status: DISCONTINUED | OUTPATIENT
Start: 2020-01-01 | End: 2020-01-01 | Stop reason: HOSPADM

## 2020-01-01 RX ORDER — INSULIN GLARGINE 100 [IU]/ML
5 INJECTION, SOLUTION SUBCUTANEOUS DAILY
Status: DISCONTINUED | OUTPATIENT
Start: 2020-01-01 | End: 2020-01-01

## 2020-01-01 RX ORDER — TERAZOSIN 5 MG/1
5 CAPSULE ORAL DAILY
Status: DISCONTINUED | OUTPATIENT
Start: 2020-01-01 | End: 2020-01-01

## 2020-01-01 RX ORDER — POLYETHYLENE GLYCOL 3350 17 G/17G
17 POWDER, FOR SOLUTION ORAL EVERY OTHER DAY
Status: DISCONTINUED | OUTPATIENT
Start: 2020-01-01 | End: 2020-01-01 | Stop reason: HOSPADM

## 2020-01-01 RX ORDER — MIDAZOLAM HYDROCHLORIDE 1 MG/ML
2 INJECTION, SOLUTION INTRAMUSCULAR; INTRAVENOUS
Status: COMPLETED | OUTPATIENT
Start: 2020-01-01 | End: 2020-01-01

## 2020-01-01 RX ORDER — PROMETHAZINE HYDROCHLORIDE 25 MG/1
25 TABLET ORAL
Status: DISCONTINUED | OUTPATIENT
Start: 2020-01-01 | End: 2020-01-01 | Stop reason: HOSPADM

## 2020-01-01 RX ORDER — LORAZEPAM 2 MG/ML
1 INJECTION INTRAMUSCULAR ONCE
Status: COMPLETED | OUTPATIENT
Start: 2020-01-01 | End: 2020-01-01

## 2020-01-01 RX ORDER — MAGNESIUM SULFATE 100 %
4 CRYSTALS MISCELLANEOUS AS NEEDED
Status: DISCONTINUED | OUTPATIENT
Start: 2020-01-01 | End: 2020-01-01

## 2020-01-01 RX ORDER — TERAZOSIN 5 MG/1
5 CAPSULE ORAL DAILY
Status: DISCONTINUED | OUTPATIENT
Start: 2020-01-01 | End: 2020-01-01 | Stop reason: HOSPADM

## 2020-01-01 RX ORDER — ACETAMINOPHEN 325 MG/1
650 TABLET ORAL
Status: DISCONTINUED | OUTPATIENT
Start: 2020-01-01 | End: 2020-01-01 | Stop reason: HOSPADM

## 2020-01-01 RX ORDER — INSULIN LISPRO 100 [IU]/ML
INJECTION, SOLUTION INTRAVENOUS; SUBCUTANEOUS
Status: DISCONTINUED | OUTPATIENT
Start: 2020-01-01 | End: 2020-01-01 | Stop reason: HOSPADM

## 2020-01-01 RX ORDER — LORAZEPAM 2 MG/ML
1 INJECTION INTRAMUSCULAR
Status: DISCONTINUED | OUTPATIENT
Start: 2020-01-01 | End: 2020-01-01 | Stop reason: HOSPADM

## 2020-01-01 RX ORDER — INSULIN LISPRO 100 [IU]/ML
INJECTION, SOLUTION INTRAVENOUS; SUBCUTANEOUS
Status: DISCONTINUED | OUTPATIENT
Start: 2020-01-01 | End: 2020-01-01

## 2020-01-01 RX ORDER — FENOFIBRATE 200 MG/1
CAPSULE ORAL
COMMUNITY

## 2020-01-01 RX ORDER — MAGNESIUM SULFATE HEPTAHYDRATE 40 MG/ML
2 INJECTION, SOLUTION INTRAVENOUS
Status: COMPLETED | OUTPATIENT
Start: 2020-01-01 | End: 2020-01-01

## 2020-01-01 RX ORDER — SCOLOPAMINE TRANSDERMAL SYSTEM 1 MG/1
1 PATCH, EXTENDED RELEASE TRANSDERMAL
Status: DISCONTINUED | OUTPATIENT
Start: 2020-01-01 | End: 2020-01-01 | Stop reason: HOSPADM

## 2020-01-01 RX ORDER — PANTOPRAZOLE SODIUM 40 MG/10ML
40 INJECTION, POWDER, LYOPHILIZED, FOR SOLUTION INTRAVENOUS DAILY PRN
Status: DISCONTINUED | OUTPATIENT
Start: 2020-01-01 | End: 2020-01-01

## 2020-01-01 RX ORDER — FINASTERIDE 5 MG/1
5 TABLET, FILM COATED ORAL DAILY
Status: DISCONTINUED | OUTPATIENT
Start: 2020-01-01 | End: 2020-01-01

## 2020-01-01 RX ORDER — ETOMIDATE 2 MG/ML
INJECTION INTRAVENOUS
Status: COMPLETED
Start: 2020-01-01 | End: 2020-01-01

## 2020-01-01 RX ORDER — FINASTERIDE 5 MG/1
5 TABLET, FILM COATED ORAL DAILY
Status: DISCONTINUED | OUTPATIENT
Start: 2020-01-01 | End: 2020-01-01 | Stop reason: HOSPADM

## 2020-01-01 RX ORDER — LOPERAMIDE HYDROCHLORIDE 2 MG/1
2 CAPSULE ORAL
Status: DISCONTINUED | OUTPATIENT
Start: 2020-01-01 | End: 2020-01-01 | Stop reason: HOSPADM

## 2020-01-01 RX ORDER — DOCUSATE SODIUM 100 MG/1
100 CAPSULE, LIQUID FILLED ORAL 2 TIMES DAILY
Status: DISCONTINUED | OUTPATIENT
Start: 2020-01-01 | End: 2020-01-01

## 2020-01-01 RX ORDER — POTASSIUM CHLORIDE 20 MEQ/1
40 TABLET, EXTENDED RELEASE ORAL
Status: COMPLETED | OUTPATIENT
Start: 2020-01-01 | End: 2020-01-01

## 2020-01-01 RX ORDER — DEXTROSE MONOHYDRATE 25 G/50ML
25-50 INJECTION, SOLUTION INTRAVENOUS AS NEEDED
Status: DISCONTINUED | OUTPATIENT
Start: 2020-01-01 | End: 2020-01-01 | Stop reason: HOSPADM

## 2020-01-01 RX ORDER — NOREPINEPHRINE BIT/0.9 % NACL 8 MG/250ML
.5-16 INFUSION BOTTLE (ML) INTRAVENOUS
Status: DISCONTINUED | OUTPATIENT
Start: 2020-01-01 | End: 2020-01-01

## 2020-01-01 RX ORDER — LISINOPRIL 5 MG/1
2.5 TABLET ORAL DAILY
Status: DISCONTINUED | OUTPATIENT
Start: 2020-01-01 | End: 2020-01-01

## 2020-01-01 RX ADMIN — DOCUSATE SODIUM 100 MG: 100 CAPSULE, LIQUID FILLED ORAL at 09:55

## 2020-01-01 RX ADMIN — Medication 10 ML: at 17:42

## 2020-01-01 RX ADMIN — VANCOMYCIN HYDROCHLORIDE 1500 MG: 10 INJECTION, POWDER, LYOPHILIZED, FOR SOLUTION INTRAVENOUS at 22:23

## 2020-01-01 RX ADMIN — SODIUM CHLORIDE 10 ML: 9 INJECTION, SOLUTION INTRAMUSCULAR; INTRAVENOUS; SUBCUTANEOUS at 17:42

## 2020-01-01 RX ADMIN — SENNOSIDES 8.6 MG: 8.6 TABLET, FILM COATED ORAL at 08:57

## 2020-01-01 RX ADMIN — BARIUM SULFATE 15 ML: 400 SUSPENSION ORAL at 11:15

## 2020-01-01 RX ADMIN — SODIUM CHLORIDE 10 ML: 9 INJECTION, SOLUTION INTRAMUSCULAR; INTRAVENOUS; SUBCUTANEOUS at 13:44

## 2020-01-01 RX ADMIN — SODIUM CHLORIDE 40 MG: 9 INJECTION, SOLUTION INTRAMUSCULAR; INTRAVENOUS; SUBCUTANEOUS at 09:51

## 2020-01-01 RX ADMIN — THERA TABS 1 TABLET: TAB at 09:55

## 2020-01-01 RX ADMIN — LEVOFLOXACIN 750 MG: 5 INJECTION, SOLUTION INTRAVENOUS at 21:35

## 2020-01-01 RX ADMIN — SODIUM CHLORIDE 10 ML: 9 INJECTION, SOLUTION INTRAMUSCULAR; INTRAVENOUS; SUBCUTANEOUS at 05:47

## 2020-01-01 RX ADMIN — HYPROMELLOSE 2910 1 DROP: 5 SOLUTION OPHTHALMIC at 13:58

## 2020-01-01 RX ADMIN — POTASSIUM CHLORIDE 40 MEQ: 1500 TABLET, EXTENDED RELEASE ORAL at 12:05

## 2020-01-01 RX ADMIN — HYPROMELLOSE 2910 1 DROP: 5 SOLUTION OPHTHALMIC at 17:12

## 2020-01-01 RX ADMIN — PIPERACILLIN SODIUM AND TAZOBACTAM SODIUM 4.5 G: 4; .5 INJECTION, POWDER, LYOPHILIZED, FOR SOLUTION INTRAVENOUS at 09:58

## 2020-01-01 RX ADMIN — HYPROMELLOSE 2910 1 DROP: 5 SOLUTION OPHTHALMIC at 09:50

## 2020-01-01 RX ADMIN — POTASSIUM CHLORIDE 10 MEQ: 7.46 INJECTION, SOLUTION INTRAVENOUS at 17:50

## 2020-01-01 RX ADMIN — HEPARIN SODIUM 5000 UNITS: 5000 INJECTION INTRAVENOUS; SUBCUTANEOUS at 05:46

## 2020-01-01 RX ADMIN — SODIUM CHLORIDE 8.8 UNITS: 900 INJECTION, SOLUTION INTRAVENOUS at 01:35

## 2020-01-01 RX ADMIN — Medication 10 ML: at 01:18

## 2020-01-01 RX ADMIN — IOPAMIDOL 90 ML: 755 INJECTION, SOLUTION INTRAVENOUS at 22:55

## 2020-01-01 RX ADMIN — PIPERACILLIN AND TAZOBACTAM 3.38 G: 3; .375 INJECTION, POWDER, LYOPHILIZED, FOR SOLUTION INTRAVENOUS at 01:44

## 2020-01-01 RX ADMIN — HYPROMELLOSE 2910 1 DROP: 5 SOLUTION OPHTHALMIC at 15:14

## 2020-01-01 RX ADMIN — HYPROMELLOSE 2910 1 DROP: 5 SOLUTION OPHTHALMIC at 14:05

## 2020-01-01 RX ADMIN — BACLOFEN 10 MG: 10 TABLET ORAL at 18:36

## 2020-01-01 RX ADMIN — ASPIRIN 81 MG 81 MG: 81 TABLET ORAL at 08:57

## 2020-01-01 RX ADMIN — SODIUM CHLORIDE 10.7 UNITS/HR: 900 INJECTION, SOLUTION INTRAVENOUS at 10:01

## 2020-01-01 RX ADMIN — HEPARIN SODIUM 5000 UNITS: 5000 INJECTION INTRAVENOUS; SUBCUTANEOUS at 18:06

## 2020-01-01 RX ADMIN — INSULIN LISPRO 6 UNITS: 100 INJECTION, SOLUTION INTRAVENOUS; SUBCUTANEOUS at 23:02

## 2020-01-01 RX ADMIN — SODIUM CHLORIDE 10 ML: 9 INJECTION, SOLUTION INTRAMUSCULAR; INTRAVENOUS; SUBCUTANEOUS at 23:15

## 2020-01-01 RX ADMIN — BACLOFEN 10 MG: 10 TABLET ORAL at 10:00

## 2020-01-01 RX ADMIN — ACETAMINOPHEN 650 MG: 650 TABLET, FILM COATED, EXTENDED RELEASE ORAL at 10:41

## 2020-01-01 RX ADMIN — INSULIN LISPRO 2 UNITS: 100 INJECTION, SOLUTION INTRAVENOUS; SUBCUTANEOUS at 09:12

## 2020-01-01 RX ADMIN — HEPARIN SODIUM 5000 UNITS: 5000 INJECTION INTRAVENOUS; SUBCUTANEOUS at 06:13

## 2020-01-01 RX ADMIN — HYPROMELLOSE 2910 1 DROP: 5 SOLUTION OPHTHALMIC at 12:58

## 2020-01-01 RX ADMIN — INSULIN LISPRO 2 UNITS: 100 INJECTION, SOLUTION INTRAVENOUS; SUBCUTANEOUS at 10:38

## 2020-01-01 RX ADMIN — POLYETHYLENE GLYCOL 3350 17 G: 17 POWDER, FOR SOLUTION ORAL at 09:17

## 2020-01-01 RX ADMIN — HYPROMELLOSE 2910 1 DROP: 5 SOLUTION OPHTHALMIC at 22:24

## 2020-01-01 RX ADMIN — ASPIRIN 81 MG 81 MG: 81 TABLET ORAL at 09:13

## 2020-01-01 RX ADMIN — ROCURONIUM BROMIDE 100 MG: 50 INJECTION INTRAVENOUS at 18:36

## 2020-01-01 RX ADMIN — INSULIN LISPRO 9 UNITS: 100 INJECTION, SOLUTION INTRAVENOUS; SUBCUTANEOUS at 12:50

## 2020-01-01 RX ADMIN — CEFTRIAXONE 1 G: 1 INJECTION, POWDER, FOR SOLUTION INTRAMUSCULAR; INTRAVENOUS at 20:38

## 2020-01-01 RX ADMIN — HEPARIN SODIUM 5000 UNITS: 5000 INJECTION INTRAVENOUS; SUBCUTANEOUS at 13:43

## 2020-01-01 RX ADMIN — DOCUSATE SODIUM 100 MG: 50 LIQUID ORAL at 09:00

## 2020-01-01 RX ADMIN — SODIUM CHLORIDE 200 ML/HR: 900 INJECTION, SOLUTION INTRAVENOUS at 01:27

## 2020-01-01 RX ADMIN — SODIUM CHLORIDE 10 ML: 9 INJECTION, SOLUTION INTRAMUSCULAR; INTRAVENOUS; SUBCUTANEOUS at 15:23

## 2020-01-01 RX ADMIN — LEVOFLOXACIN 750 MG: 5 INJECTION, SOLUTION INTRAVENOUS at 21:03

## 2020-01-01 RX ADMIN — SODIUM CHLORIDE 10 ML: 9 INJECTION, SOLUTION INTRAMUSCULAR; INTRAVENOUS; SUBCUTANEOUS at 22:36

## 2020-01-01 RX ADMIN — HEPARIN SODIUM 5000 UNITS: 5000 INJECTION INTRAVENOUS; SUBCUTANEOUS at 13:30

## 2020-01-01 RX ADMIN — SODIUM CHLORIDE 1000 MG: 900 INJECTION, SOLUTION INTRAVENOUS at 22:33

## 2020-01-01 RX ADMIN — HEPARIN SODIUM 5000 UNITS: 5000 INJECTION INTRAVENOUS; SUBCUTANEOUS at 08:58

## 2020-01-01 RX ADMIN — ETOMIDATE 20 MG: 2 INJECTION, SOLUTION INTRAVENOUS at 18:35

## 2020-01-01 RX ADMIN — HEPARIN SODIUM AND DEXTROSE 15 UNITS/KG/HR: 10000; 5 INJECTION INTRAVENOUS at 11:08

## 2020-01-01 RX ADMIN — DOCUSATE SODIUM 100 MG: 50 LIQUID ORAL at 17:12

## 2020-01-01 RX ADMIN — HEPARIN SODIUM 5000 UNITS: 5000 INJECTION INTRAVENOUS; SUBCUTANEOUS at 05:06

## 2020-01-01 RX ADMIN — SENNOSIDES 8.6 MG: 8.6 TABLET, FILM COATED ORAL at 09:22

## 2020-01-01 RX ADMIN — POLYETHYLENE GLYCOL 3350 17 G: 17 POWDER, FOR SOLUTION ORAL at 09:08

## 2020-01-01 RX ADMIN — HYPROMELLOSE 2910 1 DROP: 5 SOLUTION OPHTHALMIC at 13:44

## 2020-01-01 RX ADMIN — SODIUM CHLORIDE 10 ML: 9 INJECTION, SOLUTION INTRAMUSCULAR; INTRAVENOUS; SUBCUTANEOUS at 22:15

## 2020-01-01 RX ADMIN — INSULIN LISPRO 6 UNITS: 100 INJECTION, SOLUTION INTRAVENOUS; SUBCUTANEOUS at 12:43

## 2020-01-01 RX ADMIN — HYPROMELLOSE 2910 1 DROP: 5 SOLUTION OPHTHALMIC at 22:01

## 2020-01-01 RX ADMIN — SENNOSIDES 8.6 MG: 8.6 TABLET, FILM COATED ORAL at 17:12

## 2020-01-01 RX ADMIN — ROCURONIUM BROMIDE: 10 SOLUTION INTRAVENOUS at 18:37

## 2020-01-01 RX ADMIN — THERA TABS 1 TABLET: TAB at 09:00

## 2020-01-01 RX ADMIN — THERA TABS 1 TABLET: TAB at 09:13

## 2020-01-01 RX ADMIN — HYPROMELLOSE 2910 1 DROP: 5 SOLUTION OPHTHALMIC at 13:00

## 2020-01-01 RX ADMIN — SENNOSIDES 8.6 MG: 8.6 TABLET, FILM COATED ORAL at 18:24

## 2020-01-01 RX ADMIN — HYPROMELLOSE 2910 1 DROP: 5 SOLUTION OPHTHALMIC at 17:44

## 2020-01-01 RX ADMIN — DOCUSATE SODIUM 100 MG: 50 LIQUID ORAL at 17:44

## 2020-01-01 RX ADMIN — MAGNESIUM SULFATE HEPTAHYDRATE 2 G: 40 INJECTION, SOLUTION INTRAVENOUS at 12:42

## 2020-01-01 RX ADMIN — HEPARIN SODIUM 5000 UNITS: 5000 INJECTION INTRAVENOUS; SUBCUTANEOUS at 07:25

## 2020-01-01 RX ADMIN — SODIUM CHLORIDE 1000 MG: 900 INJECTION, SOLUTION INTRAVENOUS at 22:36

## 2020-01-01 RX ADMIN — THERA TABS 1 TABLET: TAB at 08:58

## 2020-01-01 RX ADMIN — LORAZEPAM 1 MG: 2 INJECTION, SOLUTION INTRAMUSCULAR; INTRAVENOUS at 17:59

## 2020-01-01 RX ADMIN — HEPARIN SODIUM 5000 UNITS: 5000 INJECTION INTRAVENOUS; SUBCUTANEOUS at 14:04

## 2020-01-01 RX ADMIN — DOCUSATE SODIUM 100 MG: 50 LIQUID ORAL at 08:57

## 2020-01-01 RX ADMIN — PIPERACILLIN AND TAZOBACTAM 3.38 G: 3; .375 INJECTION, POWDER, LYOPHILIZED, FOR SOLUTION INTRAVENOUS at 09:18

## 2020-01-01 RX ADMIN — INSULIN LISPRO 6 UNITS: 100 INJECTION, SOLUTION INTRAVENOUS; SUBCUTANEOUS at 13:35

## 2020-01-01 RX ADMIN — BACLOFEN 10 MG: 10 TABLET ORAL at 17:17

## 2020-01-01 RX ADMIN — INSULIN LISPRO 4 UNITS: 100 INJECTION, SOLUTION INTRAVENOUS; SUBCUTANEOUS at 17:20

## 2020-01-01 RX ADMIN — SODIUM CHLORIDE 75 ML/HR: 900 INJECTION, SOLUTION INTRAVENOUS at 04:50

## 2020-01-01 RX ADMIN — PIPERACILLIN SODIUM AND TAZOBACTAM SODIUM 4.5 G: 4; .5 INJECTION, POWDER, LYOPHILIZED, FOR SOLUTION INTRAVENOUS at 14:00

## 2020-01-01 RX ADMIN — BACLOFEN 10 MG: 10 TABLET ORAL at 22:33

## 2020-01-01 RX ADMIN — SODIUM CHLORIDE 1000 MG: 900 INJECTION, SOLUTION INTRAVENOUS at 20:42

## 2020-01-01 RX ADMIN — SODIUM CHLORIDE 10 ML: 9 INJECTION, SOLUTION INTRAMUSCULAR; INTRAVENOUS; SUBCUTANEOUS at 05:06

## 2020-01-01 RX ADMIN — HEPARIN SODIUM 5000 UNITS: 5000 INJECTION INTRAVENOUS; SUBCUTANEOUS at 22:41

## 2020-01-01 RX ADMIN — CEFTRIAXONE 1 G: 1 INJECTION, POWDER, FOR SOLUTION INTRAMUSCULAR; INTRAVENOUS at 21:00

## 2020-01-01 RX ADMIN — SENNOSIDES 8.6 MG: 8.6 TABLET, FILM COATED ORAL at 09:47

## 2020-01-01 RX ADMIN — HEPARIN SODIUM 5000 UNITS: 5000 INJECTION INTRAVENOUS; SUBCUTANEOUS at 22:33

## 2020-01-01 RX ADMIN — BACLOFEN 10 MG: 10 TABLET ORAL at 09:17

## 2020-01-01 RX ADMIN — FINASTERIDE 5 MG: 5 TABLET, FILM COATED ORAL at 10:38

## 2020-01-01 RX ADMIN — MAGNESIUM SULFATE HEPTAHYDRATE 2 G: 40 INJECTION, SOLUTION INTRAVENOUS at 12:52

## 2020-01-01 RX ADMIN — SENNOSIDES 8.6 MG: 8.6 TABLET, FILM COATED ORAL at 08:10

## 2020-01-01 RX ADMIN — DEXTROSE MONOHYDRATE: 5 INJECTION, SOLUTION INTRAVENOUS at 01:26

## 2020-01-01 RX ADMIN — HYPROMELLOSE 2910 1 DROP: 5 SOLUTION OPHTHALMIC at 09:44

## 2020-01-01 RX ADMIN — HEPARIN SODIUM 5000 UNITS: 5000 INJECTION INTRAVENOUS; SUBCUTANEOUS at 17:18

## 2020-01-01 RX ADMIN — INSULIN LISPRO 2 UNITS: 100 INJECTION, SOLUTION INTRAVENOUS; SUBCUTANEOUS at 09:14

## 2020-01-01 RX ADMIN — INSULIN LISPRO 2 UNITS: 100 INJECTION, SOLUTION INTRAVENOUS; SUBCUTANEOUS at 18:17

## 2020-01-01 RX ADMIN — SODIUM CHLORIDE 10 ML: 9 INJECTION, SOLUTION INTRAMUSCULAR; INTRAVENOUS; SUBCUTANEOUS at 14:00

## 2020-01-01 RX ADMIN — HYPROMELLOSE 2910 1 DROP: 5 SOLUTION OPHTHALMIC at 11:24

## 2020-01-01 RX ADMIN — SODIUM CHLORIDE 750 MG: 900 INJECTION, SOLUTION INTRAVENOUS at 17:59

## 2020-01-01 RX ADMIN — SENNOSIDES 8.6 MG: 8.6 TABLET, FILM COATED ORAL at 09:55

## 2020-01-01 RX ADMIN — HYPROMELLOSE 2910 1 DROP: 5 SOLUTION OPHTHALMIC at 17:46

## 2020-01-01 RX ADMIN — PIPERACILLIN AND TAZOBACTAM 3.38 G: 3; .375 INJECTION, POWDER, LYOPHILIZED, FOR SOLUTION INTRAVENOUS at 06:13

## 2020-01-01 RX ADMIN — BACLOFEN 10 MG: 10 TABLET ORAL at 08:47

## 2020-01-01 RX ADMIN — SODIUM CHLORIDE 10 ML: 9 INJECTION, SOLUTION INTRAMUSCULAR; INTRAVENOUS; SUBCUTANEOUS at 21:33

## 2020-01-01 RX ADMIN — HEPARIN SODIUM 5000 UNITS: 5000 INJECTION INTRAVENOUS; SUBCUTANEOUS at 05:53

## 2020-01-01 RX ADMIN — PHENYLEPHRINE HYDROCHLORIDE 30 MCG/MIN: 10 INJECTION INTRAVENOUS at 12:30

## 2020-01-01 RX ADMIN — HYPROMELLOSE 2910 1 DROP: 5 SOLUTION OPHTHALMIC at 20:44

## 2020-01-01 RX ADMIN — SODIUM CHLORIDE 10 ML: 9 INJECTION, SOLUTION INTRAMUSCULAR; INTRAVENOUS; SUBCUTANEOUS at 22:00

## 2020-01-01 RX ADMIN — SENNOSIDES 8.6 MG: 8.6 TABLET, FILM COATED ORAL at 09:00

## 2020-01-01 RX ADMIN — HUMAN INSULIN 10 UNITS: 100 INJECTION, SOLUTION SUBCUTANEOUS at 22:54

## 2020-01-01 RX ADMIN — SODIUM CHLORIDE 10 ML: 9 INJECTION, SOLUTION INTRAMUSCULAR; INTRAVENOUS; SUBCUTANEOUS at 05:59

## 2020-01-01 RX ADMIN — HEPARIN SODIUM 5000 UNITS: 5000 INJECTION INTRAVENOUS; SUBCUTANEOUS at 06:57

## 2020-01-01 RX ADMIN — INSULIN LISPRO 2 UNITS: 100 INJECTION, SOLUTION INTRAVENOUS; SUBCUTANEOUS at 12:43

## 2020-01-01 RX ADMIN — CEFTRIAXONE 1 G: 1 INJECTION, POWDER, FOR SOLUTION INTRAMUSCULAR; INTRAVENOUS at 21:12

## 2020-01-01 RX ADMIN — HEPARIN SODIUM 5000 UNITS: 5000 INJECTION INTRAVENOUS; SUBCUTANEOUS at 09:14

## 2020-01-01 RX ADMIN — PIPERACILLIN SODIUM AND TAZOBACTAM SODIUM 4.5 G: 4; .5 INJECTION, POWDER, LYOPHILIZED, FOR SOLUTION INTRAVENOUS at 04:30

## 2020-01-01 RX ADMIN — INSULIN LISPRO 4 UNITS: 100 INJECTION, SOLUTION INTRAVENOUS; SUBCUTANEOUS at 12:17

## 2020-01-01 RX ADMIN — HEPARIN SODIUM 5000 UNITS: 5000 INJECTION INTRAVENOUS; SUBCUTANEOUS at 21:12

## 2020-01-01 RX ADMIN — HEPARIN SODIUM 5000 UNITS: 5000 INJECTION INTRAVENOUS; SUBCUTANEOUS at 14:47

## 2020-01-01 RX ADMIN — SODIUM CHLORIDE: 450 INJECTION, SOLUTION INTRAVENOUS at 11:16

## 2020-01-01 RX ADMIN — HEPARIN SODIUM 5000 UNITS: 5000 INJECTION INTRAVENOUS; SUBCUTANEOUS at 15:13

## 2020-01-01 RX ADMIN — Medication 10 ML: at 13:42

## 2020-01-01 RX ADMIN — DOCUSATE SODIUM 100 MG: 50 LIQUID ORAL at 09:46

## 2020-01-01 RX ADMIN — THERA TABS 1 TABLET: TAB at 08:10

## 2020-01-01 RX ADMIN — BACLOFEN 10 MG: 10 TABLET ORAL at 23:50

## 2020-01-01 RX ADMIN — POLYETHYLENE GLYCOL 3350 17 G: 17 POWDER, FOR SOLUTION ORAL at 09:22

## 2020-01-01 RX ADMIN — NOREPINEPHRINE BITARTRATE 0.5 MCG/MIN: 1 INJECTION INTRAVENOUS at 22:46

## 2020-01-01 RX ADMIN — ASPIRIN 81 MG 81 MG: 81 TABLET ORAL at 09:23

## 2020-01-01 RX ADMIN — BARIUM SULFATE 15 ML: 400 PASTE ORAL at 11:15

## 2020-01-01 RX ADMIN — HYPROMELLOSE 2910 1 DROP: 5 SOLUTION OPHTHALMIC at 22:00

## 2020-01-01 RX ADMIN — HEPARIN SODIUM 5000 UNITS: 5000 INJECTION INTRAVENOUS; SUBCUTANEOUS at 23:16

## 2020-01-01 RX ADMIN — SODIUM CHLORIDE 30 ML: 9 INJECTION, SOLUTION INTRAMUSCULAR; INTRAVENOUS; SUBCUTANEOUS at 14:00

## 2020-01-01 RX ADMIN — SODIUM CHLORIDE 1000 ML: 900 INJECTION, SOLUTION INTRAVENOUS at 18:08

## 2020-01-01 RX ADMIN — MORPHINE SULFATE 10 MG: 10 INJECTION INTRAVENOUS at 00:59

## 2020-01-01 RX ADMIN — ASPIRIN 81 MG 81 MG: 81 TABLET ORAL at 09:46

## 2020-01-01 RX ADMIN — SODIUM CHLORIDE: 450 INJECTION, SOLUTION INTRAVENOUS at 00:00

## 2020-01-01 RX ADMIN — SODIUM CHLORIDE 5 ML: 9 INJECTION, SOLUTION INTRAMUSCULAR; INTRAVENOUS; SUBCUTANEOUS at 14:00

## 2020-01-01 RX ADMIN — POLYETHYLENE GLYCOL 3350 17 G: 17 POWDER, FOR SOLUTION ORAL at 01:44

## 2020-01-01 RX ADMIN — SODIUM CHLORIDE 10 ML: 9 INJECTION, SOLUTION INTRAMUSCULAR; INTRAVENOUS; SUBCUTANEOUS at 05:19

## 2020-01-01 RX ADMIN — HYPROMELLOSE 2910 1 DROP: 5 SOLUTION OPHTHALMIC at 13:28

## 2020-01-01 RX ADMIN — INSULIN GLARGINE 5 UNITS: 100 INJECTION, SOLUTION SUBCUTANEOUS at 09:31

## 2020-01-01 RX ADMIN — BARIUM SULFATE 200 ML: 0.81 POWDER, FOR SUSPENSION ORAL at 11:14

## 2020-01-01 RX ADMIN — INSULIN LISPRO 4 UNITS: 100 INJECTION, SOLUTION INTRAVENOUS; SUBCUTANEOUS at 22:33

## 2020-01-01 RX ADMIN — SODIUM CHLORIDE 10 ML: 9 INJECTION, SOLUTION INTRAMUSCULAR; INTRAVENOUS; SUBCUTANEOUS at 23:00

## 2020-01-01 RX ADMIN — POLYETHYLENE GLYCOL 3350 17 G: 17 POWDER, FOR SOLUTION ORAL at 09:13

## 2020-01-01 RX ADMIN — HEPARIN SODIUM 5000 UNITS: 5000 INJECTION INTRAVENOUS; SUBCUTANEOUS at 14:36

## 2020-01-01 RX ADMIN — HEPARIN SODIUM 5000 UNITS: 5000 INJECTION INTRAVENOUS; SUBCUTANEOUS at 21:57

## 2020-01-01 RX ADMIN — INSULIN LISPRO 4 UNITS: 100 INJECTION, SOLUTION INTRAVENOUS; SUBCUTANEOUS at 21:56

## 2020-01-01 RX ADMIN — SODIUM CHLORIDE AND POTASSIUM CHLORIDE: 9; 2.98 INJECTION, SOLUTION INTRAVENOUS at 08:08

## 2020-01-01 RX ADMIN — INSULIN GLARGINE 4 UNITS: 100 INJECTION, SOLUTION SUBCUTANEOUS at 09:23

## 2020-01-01 RX ADMIN — TERAZOSIN HYDROCHLORIDE ANHYDROUS 5 MG: 5 CAPSULE ORAL at 09:23

## 2020-01-01 RX ADMIN — SODIUM CHLORIDE 60 ML/HR: 900 INJECTION, SOLUTION INTRAVENOUS at 20:42

## 2020-01-01 RX ADMIN — SODIUM CHLORIDE 750 MG: 900 INJECTION, SOLUTION INTRAVENOUS at 00:37

## 2020-01-01 RX ADMIN — SODIUM CHLORIDE: 450 INJECTION, SOLUTION INTRAVENOUS at 15:58

## 2020-01-01 RX ADMIN — PIPERACILLIN AND TAZOBACTAM 3.38 G: 3; .375 INJECTION, POWDER, LYOPHILIZED, FOR SOLUTION INTRAVENOUS at 08:47

## 2020-01-01 RX ADMIN — FINASTERIDE 5 MG: 5 TABLET, FILM COATED ORAL at 11:23

## 2020-01-01 RX ADMIN — TERAZOSIN HYDROCHLORIDE ANHYDROUS 5 MG: 5 CAPSULE ORAL at 09:15

## 2020-01-01 RX ADMIN — SODIUM CHLORIDE 2 MCG/MIN: 900 INJECTION, SOLUTION INTRAVENOUS at 20:29

## 2020-01-01 RX ADMIN — SODIUM CHLORIDE 750 MG: 900 INJECTION, SOLUTION INTRAVENOUS at 10:05

## 2020-01-01 RX ADMIN — ASPIRIN 81 MG 81 MG: 81 TABLET ORAL at 08:47

## 2020-01-01 RX ADMIN — PIPERACILLIN AND TAZOBACTAM 3.38 G: 3; .375 INJECTION, POWDER, LYOPHILIZED, FOR SOLUTION INTRAVENOUS at 04:32

## 2020-01-01 RX ADMIN — HEPARIN SODIUM 5190 UNITS: 1000 INJECTION INTRAVENOUS; SUBCUTANEOUS at 02:07

## 2020-01-01 RX ADMIN — FINASTERIDE 5 MG: 5 TABLET, FILM COATED ORAL at 09:15

## 2020-01-01 RX ADMIN — THERA TABS 1 TABLET: TAB at 09:11

## 2020-01-01 RX ADMIN — PIPERACILLIN AND TAZOBACTAM 3.38 G: 3; .375 INJECTION, POWDER, LYOPHILIZED, FOR SOLUTION INTRAVENOUS at 20:43

## 2020-01-01 RX ADMIN — HYPROMELLOSE 2910 1 DROP: 5 SOLUTION OPHTHALMIC at 09:15

## 2020-01-01 RX ADMIN — ROCURONIUM BROMIDE: 10 SOLUTION INTRAVENOUS at 18:40

## 2020-01-01 RX ADMIN — INSULIN GLARGINE 5 UNITS: 100 INJECTION, SOLUTION SUBCUTANEOUS at 12:43

## 2020-01-01 RX ADMIN — MORPHINE SULFATE 10 MG: 10 INJECTION INTRAVENOUS at 02:58

## 2020-01-01 RX ADMIN — ASPIRIN 81 MG 81 MG: 81 TABLET ORAL at 09:00

## 2020-01-01 RX ADMIN — PIPERACILLIN AND TAZOBACTAM 3.38 G: 3; .375 INJECTION, POWDER, LYOPHILIZED, FOR SOLUTION INTRAVENOUS at 23:15

## 2020-01-01 RX ADMIN — SODIUM CHLORIDE 750 MG: 900 INJECTION, SOLUTION INTRAVENOUS at 08:04

## 2020-01-01 RX ADMIN — HYPROMELLOSE 2910 1 DROP: 5 SOLUTION OPHTHALMIC at 12:45

## 2020-01-01 RX ADMIN — HYPROMELLOSE 2910 1 DROP: 5 SOLUTION OPHTHALMIC at 14:14

## 2020-01-01 RX ADMIN — LORAZEPAM 1 MG: 2 INJECTION, SOLUTION INTRAMUSCULAR; INTRAVENOUS at 17:37

## 2020-01-01 RX ADMIN — POTASSIUM CHLORIDE 10 MEQ: 7.46 INJECTION, SOLUTION INTRAVENOUS at 19:04

## 2020-01-01 RX ADMIN — TERAZOSIN HYDROCHLORIDE ANHYDROUS 5 MG: 5 CAPSULE ORAL at 10:08

## 2020-01-01 RX ADMIN — INSULIN LISPRO 4 UNITS: 100 INJECTION, SOLUTION INTRAVENOUS; SUBCUTANEOUS at 21:59

## 2020-01-01 RX ADMIN — SODIUM CHLORIDE 10 ML: 9 INJECTION, SOLUTION INTRAMUSCULAR; INTRAVENOUS; SUBCUTANEOUS at 14:05

## 2020-01-01 RX ADMIN — BACLOFEN 10 MG: 10 TABLET ORAL at 16:37

## 2020-01-01 RX ADMIN — SODIUM CHLORIDE 1000 MG: 900 INJECTION, SOLUTION INTRAVENOUS at 20:27

## 2020-01-01 RX ADMIN — DOCUSATE SODIUM 100 MG: 100 CAPSULE, LIQUID FILLED ORAL at 17:45

## 2020-01-01 RX ADMIN — DEXTROSE MONOHYDRATE 250 ML: 10 INJECTION, SOLUTION INTRAVENOUS at 22:54

## 2020-01-01 RX ADMIN — HEPARIN SODIUM 5000 UNITS: 5000 INJECTION INTRAVENOUS; SUBCUTANEOUS at 05:27

## 2020-01-01 RX ADMIN — SODIUM CHLORIDE 8.9 UNITS/HR: 900 INJECTION, SOLUTION INTRAVENOUS at 11:51

## 2020-01-01 RX ADMIN — INSULIN LISPRO 4 UNITS: 100 INJECTION, SOLUTION INTRAVENOUS; SUBCUTANEOUS at 13:52

## 2020-01-01 RX ADMIN — SENNOSIDES 8.6 MG: 8.6 TABLET, FILM COATED ORAL at 18:49

## 2020-01-01 RX ADMIN — ALBUTEROL SULFATE 2.5 MG: 2.5 SOLUTION RESPIRATORY (INHALATION) at 23:48

## 2020-01-01 RX ADMIN — THERA TABS 1 TABLET: TAB at 09:47

## 2020-01-01 RX ADMIN — ACETAMINOPHEN 650 MG: 650 TABLET, FILM COATED, EXTENDED RELEASE ORAL at 10:15

## 2020-01-01 RX ADMIN — HYPROMELLOSE 2910 1 DROP: 5 SOLUTION OPHTHALMIC at 14:32

## 2020-01-01 RX ADMIN — SODIUM CHLORIDE 20 MMOL: 900 INJECTION, SOLUTION INTRAVENOUS at 09:59

## 2020-01-01 RX ADMIN — INSULIN LISPRO 2 UNITS: 100 INJECTION, SOLUTION INTRAVENOUS; SUBCUTANEOUS at 12:29

## 2020-01-01 RX ADMIN — DOCUSATE SODIUM 100 MG: 100 CAPSULE, LIQUID FILLED ORAL at 08:10

## 2020-01-01 RX ADMIN — GENTAMICIN SULFATE 120 MG: 40 INJECTION, SOLUTION INTRAMUSCULAR; INTRAVENOUS at 22:23

## 2020-01-01 RX ADMIN — POLYETHYLENE GLYCOL 3350 17 G: 17 POWDER, FOR SOLUTION ORAL at 08:55

## 2020-01-01 RX ADMIN — ASPIRIN 81 MG 81 MG: 81 TABLET ORAL at 08:58

## 2020-01-01 RX ADMIN — INSULIN LISPRO 12 UNITS: 100 INJECTION, SOLUTION INTRAVENOUS; SUBCUTANEOUS at 12:37

## 2020-01-01 RX ADMIN — INSULIN GLARGINE 6 UNITS: 100 INJECTION, SOLUTION SUBCUTANEOUS at 08:59

## 2020-01-01 RX ADMIN — HYPROMELLOSE 2910 1 DROP: 5 SOLUTION OPHTHALMIC at 21:35

## 2020-01-01 RX ADMIN — HYPROMELLOSE 2910 1 DROP: 5 SOLUTION OPHTHALMIC at 22:36

## 2020-01-01 RX ADMIN — HYPROMELLOSE 2910 1 DROP: 5 SOLUTION OPHTHALMIC at 08:10

## 2020-01-01 RX ADMIN — SODIUM CHLORIDE 750 MG: 900 INJECTION, SOLUTION INTRAVENOUS at 09:08

## 2020-01-01 RX ADMIN — BACLOFEN 10 MG: 10 TABLET ORAL at 18:01

## 2020-01-01 RX ADMIN — HEPARIN SODIUM 5000 UNITS: 5000 INJECTION INTRAVENOUS; SUBCUTANEOUS at 00:06

## 2020-01-01 RX ADMIN — SODIUM CHLORIDE 10 ML: 9 INJECTION, SOLUTION INTRAMUSCULAR; INTRAVENOUS; SUBCUTANEOUS at 22:37

## 2020-01-01 RX ADMIN — VASOPRESSIN 0.03 UNITS/MIN: 20 INJECTION INTRAVENOUS at 08:43

## 2020-01-01 RX ADMIN — HYPROMELLOSE 2910 1 DROP: 5 SOLUTION OPHTHALMIC at 23:17

## 2020-01-01 RX ADMIN — PHENYLEPHRINE HYDROCHLORIDE 125 MCG/MIN: 10 INJECTION INTRAVENOUS at 17:06

## 2020-01-01 RX ADMIN — POLYETHYLENE GLYCOL 3350 17 G: 17 POWDER, FOR SOLUTION ORAL at 09:47

## 2020-01-01 RX ADMIN — DOCUSATE SODIUM 100 MG: 100 CAPSULE, LIQUID FILLED ORAL at 09:46

## 2020-01-01 RX ADMIN — SODIUM CHLORIDE AND POTASSIUM CHLORIDE: 9; 2.98 INJECTION, SOLUTION INTRAVENOUS at 14:26

## 2020-01-01 RX ADMIN — Medication 10 ML: at 05:56

## 2020-01-01 RX ADMIN — SODIUM CHLORIDE 10 ML: 9 INJECTION, SOLUTION INTRAMUSCULAR; INTRAVENOUS; SUBCUTANEOUS at 06:24

## 2020-01-01 RX ADMIN — SODIUM CHLORIDE 1000 ML: 900 INJECTION, SOLUTION INTRAVENOUS at 20:50

## 2020-01-01 RX ADMIN — FLUCONAZOLE 200 MG: 2 INJECTION, SOLUTION INTRAVENOUS at 00:15

## 2020-01-01 RX ADMIN — HEPARIN SODIUM 5000 UNITS: 5000 INJECTION INTRAVENOUS; SUBCUTANEOUS at 13:58

## 2020-01-01 RX ADMIN — MAGNESIUM SULFATE HEPTAHYDRATE 2 G: 40 INJECTION, SOLUTION INTRAVENOUS at 01:12

## 2020-01-01 RX ADMIN — HEPARIN SODIUM 5000 UNITS: 5000 INJECTION INTRAVENOUS; SUBCUTANEOUS at 23:17

## 2020-01-01 RX ADMIN — PIPERACILLIN SODIUM AND TAZOBACTAM SODIUM 4.5 G: 4; .5 INJECTION, POWDER, LYOPHILIZED, FOR SOLUTION INTRAVENOUS at 20:45

## 2020-01-01 RX ADMIN — ASPIRIN 81 MG 81 MG: 81 TABLET ORAL at 09:47

## 2020-01-01 RX ADMIN — POTASSIUM CHLORIDE 20 MEQ: 29.8 INJECTION, SOLUTION INTRAVENOUS at 12:42

## 2020-01-01 RX ADMIN — SODIUM CHLORIDE 60 ML/HR: 900 INJECTION, SOLUTION INTRAVENOUS at 03:45

## 2020-01-01 RX ADMIN — SODIUM CHLORIDE 60 ML/HR: 900 INJECTION, SOLUTION INTRAVENOUS at 05:50

## 2020-01-01 RX ADMIN — HEPARIN SODIUM 5000 UNITS: 5000 INJECTION INTRAVENOUS; SUBCUTANEOUS at 13:25

## 2020-01-01 RX ADMIN — MIDAZOLAM 2 MG: 1 INJECTION INTRAMUSCULAR; INTRAVENOUS at 18:41

## 2020-01-01 RX ADMIN — ASPIRIN 81 MG 81 MG: 81 TABLET ORAL at 09:16

## 2020-01-01 RX ADMIN — SODIUM CHLORIDE 2 MG/HR: 900 INJECTION, SOLUTION INTRAVENOUS at 09:21

## 2020-01-01 RX ADMIN — HYPROMELLOSE 2910 1 DROP: 5 SOLUTION OPHTHALMIC at 23:06

## 2020-01-01 RX ADMIN — NOREPINEPHRINE BITARTRATE 16 MCG/MIN: 1 INJECTION INTRAVENOUS at 14:07

## 2020-01-01 RX ADMIN — SENNOSIDES 8.6 MG: 8.6 TABLET, FILM COATED ORAL at 17:48

## 2020-01-01 RX ADMIN — HEPARIN SODIUM 5000 UNITS: 5000 INJECTION INTRAVENOUS; SUBCUTANEOUS at 20:44

## 2020-01-01 RX ADMIN — THERA TABS 1 TABLET: TAB at 09:46

## 2020-01-01 RX ADMIN — BACLOFEN 10 MG: 10 TABLET ORAL at 18:48

## 2020-01-01 RX ADMIN — HYPROMELLOSE 2910 1 DROP: 5 SOLUTION OPHTHALMIC at 17:34

## 2020-01-01 RX ADMIN — SENNOSIDES 8.6 MG: 8.6 TABLET, FILM COATED ORAL at 09:11

## 2020-01-01 RX ADMIN — FENTANYL CITRATE 50 MCG/HR: 50 INJECTION, SOLUTION INTRAMUSCULAR; INTRAVENOUS at 19:23

## 2020-01-01 RX ADMIN — HEPARIN SODIUM 5000 UNITS: 5000 INJECTION INTRAVENOUS; SUBCUTANEOUS at 13:29

## 2020-01-01 RX ADMIN — SODIUM CHLORIDE 10 ML: 9 INJECTION, SOLUTION INTRAMUSCULAR; INTRAVENOUS; SUBCUTANEOUS at 06:00

## 2020-01-01 RX ADMIN — HYPROMELLOSE 2910 1 DROP: 5 SOLUTION OPHTHALMIC at 09:00

## 2020-01-01 RX ADMIN — THERA TABS 1 TABLET: TAB at 08:56

## 2020-01-01 RX ADMIN — SODIUM CHLORIDE 1000 ML: 900 INJECTION, SOLUTION INTRAVENOUS at 21:15

## 2020-01-01 RX ADMIN — HEPARIN SODIUM 5000 UNITS: 5000 INJECTION INTRAVENOUS; SUBCUTANEOUS at 21:32

## 2020-01-01 RX ADMIN — SODIUM CHLORIDE AND POTASSIUM CHLORIDE: 9; 2.98 INJECTION, SOLUTION INTRAVENOUS at 04:41

## 2020-01-01 RX ADMIN — INSULIN LISPRO 6 UNITS: 100 INJECTION, SOLUTION INTRAVENOUS; SUBCUTANEOUS at 18:37

## 2020-01-01 RX ADMIN — DOCUSATE SODIUM 100 MG: 100 CAPSULE, LIQUID FILLED ORAL at 12:43

## 2020-01-01 RX ADMIN — DOCUSATE SODIUM 100 MG: 50 LIQUID ORAL at 14:46

## 2020-01-01 RX ADMIN — LEVOFLOXACIN 750 MG: 5 INJECTION, SOLUTION INTRAVENOUS at 20:51

## 2020-01-01 RX ADMIN — ASPIRIN 81 MG 81 MG: 81 TABLET ORAL at 09:08

## 2020-01-01 RX ADMIN — INSULIN LISPRO 2 UNITS: 100 INJECTION, SOLUTION INTRAVENOUS; SUBCUTANEOUS at 12:30

## 2020-01-01 RX ADMIN — SODIUM CHLORIDE, SODIUM LACTATE, POTASSIUM CHLORIDE, AND CALCIUM CHLORIDE 125 ML/HR: 600; 310; 30; 20 INJECTION, SOLUTION INTRAVENOUS at 13:29

## 2020-01-01 RX ADMIN — SODIUM CHLORIDE 93 ML: 900 INJECTION, SOLUTION INTRAVENOUS at 22:56

## 2020-01-01 RX ADMIN — DOCUSATE SODIUM 100 MG: 50 LIQUID ORAL at 08:56

## 2020-01-01 RX ADMIN — HEPARIN SODIUM 5000 UNITS: 5000 INJECTION INTRAVENOUS; SUBCUTANEOUS at 22:36

## 2020-01-01 RX ADMIN — SODIUM CHLORIDE 1000 MG: 900 INJECTION, SOLUTION INTRAVENOUS at 00:05

## 2020-01-01 RX ADMIN — NOREPINEPHRINE BITARTRATE 16 MCG/MIN: 1 INJECTION INTRAVENOUS at 15:00

## 2020-01-01 RX ADMIN — INSULIN LISPRO 2 UNITS: 100 INJECTION, SOLUTION INTRAVENOUS; SUBCUTANEOUS at 22:00

## 2020-01-01 RX ADMIN — HEPARIN SODIUM 5000 UNITS: 5000 INJECTION INTRAVENOUS; SUBCUTANEOUS at 05:03

## 2020-01-01 RX ADMIN — DEXTROSE MONOHYDRATE 75 ML/HR: 5 INJECTION, SOLUTION INTRAVENOUS at 10:07

## 2020-01-01 RX ADMIN — ASPIRIN 81 MG 81 MG: 81 TABLET ORAL at 09:55

## 2020-01-01 RX ADMIN — SODIUM CHLORIDE 1000 MG: 900 INJECTION, SOLUTION INTRAVENOUS at 11:40

## 2020-01-01 RX ADMIN — HYPROMELLOSE 2910 1 DROP: 5 SOLUTION OPHTHALMIC at 15:22

## 2020-01-01 RX ADMIN — SENNOSIDES 8.6 MG: 8.6 TABLET, FILM COATED ORAL at 09:46

## 2020-01-01 RX ADMIN — SODIUM CHLORIDE, SODIUM LACTATE, POTASSIUM CHLORIDE, AND CALCIUM CHLORIDE 1836 ML: 600; 310; 30; 20 INJECTION, SOLUTION INTRAVENOUS at 20:08

## 2020-01-01 RX ADMIN — SODIUM CHLORIDE 30 MCG/MIN: 900 INJECTION, SOLUTION INTRAVENOUS at 14:22

## 2020-01-01 RX ADMIN — POTASSIUM CHLORIDE 20 MEQ: 29.8 INJECTION, SOLUTION INTRAVENOUS at 10:12

## 2020-01-01 RX ADMIN — MORPHINE SULFATE 10 MG: 10 INJECTION INTRAVENOUS at 18:54

## 2020-01-01 RX ADMIN — FINASTERIDE 5 MG: 5 TABLET, FILM COATED ORAL at 12:17

## 2020-01-01 RX ADMIN — MORPHINE SULFATE 10 MG: 10 INJECTION INTRAVENOUS at 17:55

## 2020-01-01 RX ADMIN — PIPERACILLIN AND TAZOBACTAM 3.38 G: 3; .375 INJECTION, POWDER, LYOPHILIZED, FOR SOLUTION INTRAVENOUS at 21:45

## 2020-01-01 RX ADMIN — SODIUM CHLORIDE 10 ML: 9 INJECTION, SOLUTION INTRAMUSCULAR; INTRAVENOUS; SUBCUTANEOUS at 22:42

## 2020-01-01 RX ADMIN — LORAZEPAM 1 MG: 2 INJECTION, SOLUTION INTRAMUSCULAR; INTRAVENOUS at 00:29

## 2020-01-01 RX ADMIN — INSULIN LISPRO 3 UNITS: 100 INJECTION, SOLUTION INTRAVENOUS; SUBCUTANEOUS at 18:07

## 2020-01-01 RX ADMIN — VASOPRESSIN 0.03 UNITS/MIN: 20 INJECTION INTRAVENOUS at 02:32

## 2020-01-01 RX ADMIN — BACLOFEN 10 MG: 10 TABLET ORAL at 18:06

## 2020-01-01 RX ADMIN — SODIUM CHLORIDE 100 ML/HR: 900 INJECTION, SOLUTION INTRAVENOUS at 03:46

## 2020-01-01 RX ADMIN — HEPARIN SODIUM 5000 UNITS: 5000 INJECTION INTRAVENOUS; SUBCUTANEOUS at 13:50

## 2020-01-01 RX ADMIN — SODIUM CHLORIDE, SODIUM LACTATE, POTASSIUM CHLORIDE, AND CALCIUM CHLORIDE 1000 ML: 600; 310; 30; 20 INJECTION, SOLUTION INTRAVENOUS at 00:00

## 2020-01-01 RX ADMIN — SENNOSIDES 8.6 MG: 8.6 TABLET, FILM COATED ORAL at 18:19

## 2020-01-01 RX ADMIN — INSULIN LISPRO 6 UNITS: 100 INJECTION, SOLUTION INTRAVENOUS; SUBCUTANEOUS at 22:20

## 2020-01-01 RX ADMIN — INSULIN LISPRO 2 UNITS: 100 INJECTION, SOLUTION INTRAVENOUS; SUBCUTANEOUS at 11:30

## 2020-01-01 RX ADMIN — HEPARIN SODIUM 5000 UNITS: 5000 INJECTION INTRAVENOUS; SUBCUTANEOUS at 05:59

## 2020-01-01 RX ADMIN — DOCUSATE SODIUM 100 MG: 50 LIQUID ORAL at 17:40

## 2020-01-01 RX ADMIN — SODIUM CHLORIDE 1000 MG: 900 INJECTION, SOLUTION INTRAVENOUS at 09:09

## 2020-01-01 RX ADMIN — HEPARIN SODIUM AND DEXTROSE 18 UNITS/KG/HR: 10000; 5 INJECTION INTRAVENOUS at 02:09

## 2020-01-01 RX ADMIN — BACLOFEN 10 MG: 10 TABLET ORAL at 09:13

## 2020-01-01 RX ADMIN — INSULIN LISPRO 8 UNITS: 100 INJECTION, SOLUTION INTRAVENOUS; SUBCUTANEOUS at 17:37

## 2020-01-01 RX ADMIN — ASPIRIN 81 MG 81 MG: 81 TABLET ORAL at 10:39

## 2020-01-01 RX ADMIN — SODIUM CHLORIDE: 450 INJECTION, SOLUTION INTRAVENOUS at 00:37

## 2020-01-01 RX ADMIN — HEPARIN SODIUM 5000 UNITS: 5000 INJECTION INTRAVENOUS; SUBCUTANEOUS at 05:16

## 2020-01-01 RX ADMIN — HYPROMELLOSE 2910 1 DROP: 5 SOLUTION OPHTHALMIC at 09:12

## 2020-01-01 RX ADMIN — SODIUM CHLORIDE 10 ML: 9 INJECTION, SOLUTION INTRAMUSCULAR; INTRAVENOUS; SUBCUTANEOUS at 15:02

## 2020-01-01 RX ADMIN — INSULIN LISPRO 4 UNITS: 100 INJECTION, SOLUTION INTRAVENOUS; SUBCUTANEOUS at 18:48

## 2020-01-01 RX ADMIN — MECLIZINE 12.5 MG: 12.5 TABLET ORAL at 09:08

## 2020-01-01 RX ADMIN — LORAZEPAM 1 MG: 2 INJECTION, SOLUTION INTRAMUSCULAR; INTRAVENOUS at 18:32

## 2020-01-01 RX ADMIN — SODIUM CHLORIDE 1000 MG: 900 INJECTION, SOLUTION INTRAVENOUS at 10:01

## 2020-01-01 RX ADMIN — SODIUM CHLORIDE 10.1 UNITS/HR: 900 INJECTION, SOLUTION INTRAVENOUS at 08:14

## 2020-01-01 RX ADMIN — PIPERACILLIN AND TAZOBACTAM 3.38 G: 3; .375 INJECTION, POWDER, LYOPHILIZED, FOR SOLUTION INTRAVENOUS at 13:51

## 2020-01-01 RX ADMIN — INSULIN LISPRO 6 UNITS: 100 INJECTION, SOLUTION INTRAVENOUS; SUBCUTANEOUS at 23:26

## 2020-01-01 RX ADMIN — BACLOFEN 10 MG: 10 TABLET ORAL at 10:39

## 2020-01-01 RX ADMIN — HYPROMELLOSE 2910 1 DROP: 5 SOLUTION OPHTHALMIC at 21:59

## 2020-01-01 RX ADMIN — SODIUM CHLORIDE 40 ML: 9 INJECTION, SOLUTION INTRAMUSCULAR; INTRAVENOUS; SUBCUTANEOUS at 05:08

## 2020-01-01 RX ADMIN — HYPROMELLOSE 2910 1 DROP: 5 SOLUTION OPHTHALMIC at 13:35

## 2020-01-01 RX ADMIN — SODIUM CHLORIDE 40 MG: 9 INJECTION, SOLUTION INTRAMUSCULAR; INTRAVENOUS; SUBCUTANEOUS at 14:00

## 2020-01-01 RX ADMIN — POLYETHYLENE GLYCOL 3350 17 G: 17 POWDER, FOR SOLUTION ORAL at 10:39

## 2020-01-01 RX ADMIN — HYPROMELLOSE 2910 1 DROP: 5 SOLUTION OPHTHALMIC at 18:05

## 2020-01-01 RX ADMIN — THERA TABS 1 TABLET: TAB at 09:23

## 2020-01-01 RX ADMIN — HEPARIN SODIUM 5000 UNITS: 5000 INJECTION INTRAVENOUS; SUBCUTANEOUS at 16:37

## 2020-01-01 RX ADMIN — SODIUM CHLORIDE 1000 ML: 900 INJECTION, SOLUTION INTRAVENOUS at 22:36

## 2020-01-01 RX ADMIN — HEPARIN SODIUM 5000 UNITS: 5000 INJECTION INTRAVENOUS; SUBCUTANEOUS at 23:27

## 2020-01-01 RX ADMIN — SODIUM CHLORIDE 750 MG: 900 INJECTION, SOLUTION INTRAVENOUS at 18:20

## 2020-01-01 RX ADMIN — LEVOFLOXACIN 750 MG: 5 INJECTION, SOLUTION INTRAVENOUS at 21:04

## 2020-01-01 RX ADMIN — ASPIRIN 81 MG 81 MG: 81 TABLET ORAL at 08:56

## 2020-01-01 RX ADMIN — HYPROMELLOSE 2910 1 DROP: 5 SOLUTION OPHTHALMIC at 22:41

## 2020-01-01 RX ADMIN — SENNOSIDES 8.6 MG: 8.6 TABLET, FILM COATED ORAL at 08:56

## 2020-01-01 RX ADMIN — HEPARIN SODIUM 5000 UNITS: 5000 INJECTION INTRAVENOUS; SUBCUTANEOUS at 10:39

## 2020-01-01 RX ADMIN — INSULIN LISPRO 2 UNITS: 100 INJECTION, SOLUTION INTRAVENOUS; SUBCUTANEOUS at 12:47

## 2020-01-01 RX ADMIN — INSULIN GLARGINE 5 UNITS: 100 INJECTION, SOLUTION SUBCUTANEOUS at 09:12

## 2020-01-01 RX ADMIN — HEPARIN SODIUM 5000 UNITS: 5000 INJECTION INTRAVENOUS; SUBCUTANEOUS at 13:57

## 2020-01-01 RX ADMIN — SODIUM CHLORIDE 1000 MG: 900 INJECTION, SOLUTION INTRAVENOUS at 10:00

## 2020-01-01 RX ADMIN — FENTANYL CITRATE 50 MCG/HR: 50 INJECTION, SOLUTION INTRAMUSCULAR; INTRAVENOUS at 15:52

## 2020-01-01 RX ADMIN — SODIUM CHLORIDE 1000 ML: 900 INJECTION, SOLUTION INTRAVENOUS at 12:38

## 2020-01-01 RX ADMIN — PIPERACILLIN AND TAZOBACTAM 3.38 G: 3; .375 INJECTION, POWDER, LYOPHILIZED, FOR SOLUTION INTRAVENOUS at 13:27

## 2020-01-01 RX ADMIN — SODIUM CHLORIDE: 450 INJECTION, SOLUTION INTRAVENOUS at 08:11

## 2020-01-01 RX ADMIN — PIPERACILLIN SODIUM AND TAZOBACTAM SODIUM 4.5 G: 4; .5 INJECTION, POWDER, LYOPHILIZED, FOR SOLUTION INTRAVENOUS at 08:07

## 2020-01-01 RX ADMIN — HEPARIN SODIUM 5000 UNITS: 5000 INJECTION INTRAVENOUS; SUBCUTANEOUS at 18:01

## 2020-01-01 RX ADMIN — THERA TABS 1 TABLET: TAB at 08:47

## 2020-01-01 RX ADMIN — DEXTROSE MONOHYDRATE AND SODIUM CHLORIDE 75 ML/HR: 5; .45 INJECTION, SOLUTION INTRAVENOUS at 08:12

## 2020-01-01 RX ADMIN — MORPHINE SULFATE 1 MG: 2 INJECTION, SOLUTION INTRAMUSCULAR; INTRAVENOUS at 11:40

## 2020-01-01 RX ADMIN — SODIUM CHLORIDE 27 MCG/MIN: 900 INJECTION, SOLUTION INTRAVENOUS at 10:00

## 2020-01-01 RX ADMIN — SODIUM HYPOCHLORITE: 2.5 SOLUTION TOPICAL at 09:16

## 2020-01-01 RX ADMIN — FLUCONAZOLE IN SODIUM CHLORIDE 200 MG: 2 INJECTION, SOLUTION INTRAVENOUS at 00:15

## 2020-01-01 RX ADMIN — POLYETHYLENE GLYCOL 3350 17 G: 17 POWDER, FOR SOLUTION ORAL at 08:10

## 2020-01-01 RX ADMIN — CEFTRIAXONE 1 G: 1 INJECTION, POWDER, FOR SOLUTION INTRAMUSCULAR; INTRAVENOUS at 22:14

## 2020-01-01 RX ADMIN — TRAMADOL HYDROCHLORIDE 50 MG: 50 TABLET, FILM COATED ORAL at 17:24

## 2020-01-01 RX ADMIN — INSULIN LISPRO 2 UNITS: 100 INJECTION, SOLUTION INTRAVENOUS; SUBCUTANEOUS at 18:00

## 2020-01-01 RX ADMIN — LEVOFLOXACIN 750 MG: 5 INJECTION, SOLUTION INTRAVENOUS at 20:45

## 2020-01-01 RX ADMIN — PIPERACILLIN SODIUM AND TAZOBACTAM SODIUM 4.5 G: 4; .5 INJECTION, POWDER, LYOPHILIZED, FOR SOLUTION INTRAVENOUS at 02:56

## 2020-01-01 RX ADMIN — INSULIN LISPRO 4 UNITS: 100 INJECTION, SOLUTION INTRAVENOUS; SUBCUTANEOUS at 22:41

## 2020-01-01 RX ADMIN — HEPARIN SODIUM 5000 UNITS: 5000 INJECTION INTRAVENOUS; SUBCUTANEOUS at 22:14

## 2020-01-01 RX ADMIN — SODIUM CHLORIDE 8.8 UNITS/HR: 9 INJECTION, SOLUTION INTRAVENOUS at 01:33

## 2020-01-01 RX ADMIN — PIPERACILLIN SODIUM AND TAZOBACTAM SODIUM 4.5 G: 4; .5 INJECTION, POWDER, LYOPHILIZED, FOR SOLUTION INTRAVENOUS at 21:10

## 2020-01-01 RX ADMIN — DOCUSATE SODIUM 100 MG: 100 CAPSULE, LIQUID FILLED ORAL at 18:27

## 2020-01-01 RX ADMIN — HEPARIN SODIUM 5000 UNITS: 5000 INJECTION INTRAVENOUS; SUBCUTANEOUS at 18:48

## 2020-01-01 RX ADMIN — THERA TABS 1 TABLET: TAB at 11:39

## 2020-01-01 RX ADMIN — HYPROMELLOSE 2910 1 DROP: 5 SOLUTION OPHTHALMIC at 18:27

## 2020-01-01 RX ADMIN — INSULIN LISPRO 2 UNITS: 100 INJECTION, SOLUTION INTRAVENOUS; SUBCUTANEOUS at 16:44

## 2020-01-01 RX ADMIN — HYPROMELLOSE 2910 1 DROP: 5 SOLUTION OPHTHALMIC at 12:44

## 2020-01-01 RX ADMIN — HYPROMELLOSE 2910 1 DROP: 5 SOLUTION OPHTHALMIC at 09:22

## 2020-01-01 RX ADMIN — BACLOFEN 10 MG: 10 TABLET ORAL at 08:58

## 2020-01-01 RX ADMIN — SODIUM CHLORIDE 16 MCG/MIN: 900 INJECTION, SOLUTION INTRAVENOUS at 04:40

## 2020-01-01 RX ADMIN — HEPARIN SODIUM 5000 UNITS: 5000 INJECTION INTRAVENOUS; SUBCUTANEOUS at 23:18

## 2020-01-01 RX ADMIN — SODIUM CHLORIDE 60 ML/HR: 900 INJECTION, SOLUTION INTRAVENOUS at 11:26

## 2020-01-01 RX ADMIN — SODIUM CHLORIDE 10 ML: 9 INJECTION, SOLUTION INTRAMUSCULAR; INTRAVENOUS; SUBCUTANEOUS at 21:57

## 2020-01-01 RX ADMIN — INSULIN LISPRO 6 UNITS: 100 INJECTION, SOLUTION INTRAVENOUS; SUBCUTANEOUS at 22:25

## 2020-01-01 RX ADMIN — POTASSIUM CHLORIDE 20 MEQ: 29.8 INJECTION, SOLUTION INTRAVENOUS at 08:46

## 2020-01-01 RX ADMIN — HYPROMELLOSE 2910 1 DROP: 5 SOLUTION OPHTHALMIC at 17:40

## 2020-01-01 RX ADMIN — BACLOFEN 10 MG: 10 TABLET ORAL at 22:20

## 2020-01-01 RX ADMIN — VANCOMYCIN HYDROCHLORIDE 1500 MG: 5 INJECTION, POWDER, LYOPHILIZED, FOR SOLUTION INTRAVENOUS at 21:15

## 2020-01-01 RX ADMIN — SODIUM CHLORIDE 10 ML: 9 INJECTION, SOLUTION INTRAMUSCULAR; INTRAVENOUS; SUBCUTANEOUS at 18:20

## 2020-01-01 RX ADMIN — ETOMIDATE 20 MG: 2 INJECTION, SOLUTION INTRAVENOUS at 18:00

## 2020-01-01 RX ADMIN — ASPIRIN 81 MG 81 MG: 81 TABLET ORAL at 09:11

## 2020-01-01 RX ADMIN — SODIUM CHLORIDE 10 ML: 9 INJECTION, SOLUTION INTRAMUSCULAR; INTRAVENOUS; SUBCUTANEOUS at 05:28

## 2020-01-01 RX ADMIN — LISINOPRIL 2.5 MG: 5 TABLET ORAL at 10:02

## 2020-01-01 RX ADMIN — HYPROMELLOSE 2910 1 DROP: 5 SOLUTION OPHTHALMIC at 08:58

## 2020-01-01 RX ADMIN — SODIUM CHLORIDE 200 ML/HR: 900 INJECTION, SOLUTION INTRAVENOUS at 01:05

## 2020-01-01 RX ADMIN — ASPIRIN 81 MG 81 MG: 81 TABLET ORAL at 10:00

## 2020-01-01 RX ADMIN — BACLOFEN 10 MG: 10 TABLET ORAL at 21:59

## 2020-01-01 RX ADMIN — HEPARIN SODIUM 5000 UNITS: 5000 INJECTION INTRAVENOUS; SUBCUTANEOUS at 08:47

## 2020-01-01 RX ADMIN — ASPIRIN 81 MG 81 MG: 81 TABLET ORAL at 08:10

## 2020-01-01 RX ADMIN — INSULIN LISPRO 6 UNITS: 100 INJECTION, SOLUTION INTRAVENOUS; SUBCUTANEOUS at 13:11

## 2020-01-01 RX ADMIN — BACLOFEN 10 MG: 10 TABLET ORAL at 22:48

## 2020-01-01 RX ADMIN — HEPARIN SODIUM 5000 UNITS: 5000 INJECTION INTRAVENOUS; SUBCUTANEOUS at 23:00

## 2020-01-01 RX ADMIN — INSULIN LISPRO 2 UNITS: 100 INJECTION, SOLUTION INTRAVENOUS; SUBCUTANEOUS at 17:40

## 2020-01-01 RX ADMIN — POLYETHYLENE GLYCOL 3350 17 G: 17 POWDER, FOR SOLUTION ORAL at 09:00

## 2020-01-01 RX ADMIN — MIDAZOLAM: 1 INJECTION INTRAMUSCULAR; INTRAVENOUS at 18:41

## 2020-01-01 RX ADMIN — DEXTROSE MONOHYDRATE: 5 INJECTION, SOLUTION INTRAVENOUS at 12:42

## 2020-01-01 RX ADMIN — FINASTERIDE 5 MG: 5 TABLET, FILM COATED ORAL at 09:00

## 2020-01-01 RX ADMIN — SODIUM CHLORIDE, SODIUM LACTATE, POTASSIUM CHLORIDE, AND CALCIUM CHLORIDE 1000 ML: 600; 310; 30; 20 INJECTION, SOLUTION INTRAVENOUS at 01:15

## 2020-01-01 RX ADMIN — DOCUSATE SODIUM 100 MG: 100 CAPSULE, LIQUID FILLED ORAL at 18:19

## 2020-01-01 RX ADMIN — SODIUM CHLORIDE 1000 MG: 900 INJECTION, SOLUTION INTRAVENOUS at 08:40

## 2020-01-01 RX ADMIN — POTASSIUM CHLORIDE 10 MEQ: 7.46 INJECTION, SOLUTION INTRAVENOUS at 21:02

## 2020-01-01 RX ADMIN — SENNOSIDES 8.6 MG: 8.6 TABLET, FILM COATED ORAL at 17:40

## 2020-01-01 RX ADMIN — THERA TABS 1 TABLET: TAB at 08:57

## 2020-01-01 RX ADMIN — TRAMADOL HYDROCHLORIDE 50 MG: 50 TABLET, FILM COATED ORAL at 05:18

## 2020-01-01 RX ADMIN — SENNOSIDES 8.6 MG: 8.6 TABLET, FILM COATED ORAL at 17:44

## 2020-01-01 RX ADMIN — DOCUSATE SODIUM 100 MG: 50 LIQUID ORAL at 09:22

## 2020-01-01 RX ADMIN — SODIUM CHLORIDE 750 MG: 900 INJECTION, SOLUTION INTRAVENOUS at 00:45

## 2020-01-01 RX ADMIN — HEPARIN SODIUM 5000 UNITS: 5000 INJECTION INTRAVENOUS; SUBCUTANEOUS at 06:25

## 2020-01-01 RX ADMIN — VASOPRESSIN 0.03 UNITS/MIN: 20 INJECTION INTRAVENOUS at 21:12

## 2020-01-01 RX ADMIN — LORAZEPAM 1 MG: 2 INJECTION, SOLUTION INTRAMUSCULAR; INTRAVENOUS at 02:58

## 2020-01-01 RX ADMIN — NOREPINEPHRINE BITARTRATE 16 MCG/MIN: 1 INJECTION INTRAVENOUS at 07:37

## 2020-01-01 RX ADMIN — PIPERACILLIN AND TAZOBACTAM 3.38 G: 3; .375 INJECTION, POWDER, LYOPHILIZED, FOR SOLUTION INTRAVENOUS at 15:24

## 2020-01-01 RX ADMIN — POTASSIUM CHLORIDE 10 MEQ: 7.46 INJECTION, SOLUTION INTRAVENOUS at 22:33

## 2020-01-01 RX ADMIN — LORAZEPAM 1 MG: 2 INJECTION, SOLUTION INTRAMUSCULAR; INTRAVENOUS at 22:53

## 2020-01-01 RX ADMIN — HEPARIN SODIUM 5000 UNITS: 5000 INJECTION INTRAVENOUS; SUBCUTANEOUS at 15:21

## 2020-01-01 RX ADMIN — SODIUM CHLORIDE 40 MG: 9 INJECTION, SOLUTION INTRAMUSCULAR; INTRAVENOUS; SUBCUTANEOUS at 12:43

## 2020-01-01 RX ADMIN — SODIUM CHLORIDE 1000 ML: 900 INJECTION, SOLUTION INTRAVENOUS at 21:10

## 2020-01-18 PROBLEM — G93.41 METABOLIC ENCEPHALOPATHY: Status: ACTIVE | Noted: 2020-01-01

## 2020-01-18 NOTE — ED NOTES
AM labs drawn. Patient resting in position of comfort at this time. No complaints or questions at this time.

## 2020-01-18 NOTE — PROGRESS NOTES
Pharmacy Dosing Services: Vancomycin Indication: UTI Day of therapy: 1 Other Antimicrobials (Include dose, start day & day of therapy): 
Zosyn 3.375 gm IV q6hr (pt in ED) Loading dose (date given): 1500 mg IV once  Current Maintenance dose: New start Goal Vancomycin Level: 15-20 
(Trough 15-20 for most infections, 20 for meningitis/osteomyelitis, pre-HD level ~25) Vancomycin Level (if drawn): New start Significant Cultures:  - blood culture - pending Renal function stable? (unstable defined as SCr increase of 0.5 mg/dL or > 50% increase from baseline, whichever is greater) (Y/N): Y  
 
CAPD, Hemodialysis or Renal Replacement Therapy (Y/N): N Recent Labs  
  20 CREA 0.52* BUN 29* WBC 14.1* Temp (24hrs), Av.2 °F (36.8 °C), Min:98.2 °F (36.8 °C), Max:98.2 °F (36.8 °C) Creatinine Clearance (Creatinine Clearance (ml/min)): 85.7 mL/min Regimen assessment: New start Maintenance dose: 1000 mg IV every 12 hours Next scheduled level: Trough on  at 0930 Pharmacy will follow daily and adjust medications as appropriate for renal function and/or serum levels. Thank you, Aide Tellez

## 2020-01-18 NOTE — PROGRESS NOTES
Patient received in bed awake. Patient alert and oriented X3, denies pain and discomfort. Patient resting quietly. Frequent use items within reach. Bed locked in low position. Call bell within reach and patient verbalized understanding of use for assistance and needs. Incontinence care performed during dual skin check with [facility], Keily Banerjee 300:  Writer called [employer] to obtain information to complete admission documents, Nurse at [facility] up on this nurse. Writer obtained information from transfer packet sent with patient.

## 2020-01-18 NOTE — ED NOTES
TRANSFER - ED to INPATIENT REPORT: 
 
Verbal report given to Thomas Valero RN on Roddie Kussmaul  being transferred to [facility](unit) for routine progression of care Report consisted of patients Situation, Background, Assessment and  
Recommendations(SBAR). SBAR report made available to receiving floor on this patient being transferred to [address] [place] [address] ([facility])  for routine progression of care Admitting diagnosis Sepsis ([address] [A41.9] Information from the following report(s) SBAR was made available to receiving floor. Lines:  
Peripheral IV 01/17/20 Right Hand (Active) Site Assessment Clean, dry, & intact 1/17/2020  8:40 PM  
Phlebitis Assessment 0 1/17/2020  8:40 PM  
Infiltration Assessment 0 1/17/2020  8:40 PM  
Dressing Status Clean, dry, & intact 1/17/2020  8:40 PM  
Dressing Type Transparent 1/17/2020  8:40 PM  
Hub Color/Line Status Pink 1/17/2020  8:40 PM  
  
 
 
 
Opportunity for questions and clarification was provided. Patient is oriented to time, place, person and situation Patient is  incontinent and non-ambulatory Valuables transported with patient Patient transported with: 
 Registered Nurse MAP (Monitor): 77 =Monitored (most recent) Vitals w/ MEWS Score (last day) Date/Time MEWS Score Pulse Resp Temp BP Level of Consciousness SpO2  
 01/18/20 14:50:47  1  91  18  98 °F (36.7 °C)  109/66  Alert  99 % 01/18/20 1430    91  20    109/66    100 % 01/18/20 1415    99  20    116/69    100 % 01/18/20 1400    (!) 108  19    125/65    99 % 01/18/20 1330    88  15    107/74    100 % 01/18/20 1315    86  17    115/68    100 % 01/18/20 1300    (!) 108  17    130/74    100 % 01/18/20 1245    93  18    129/75    100 % 01/18/20 1230    97  14    135/85    100 % 01/18/20 1229    91  21    136/78    100 % 01/18/20 1223    77  16        100 % 01/18/20 1215    72  18    105/65    100 % 01/18/20 1202    81  20        100 % 01/18/20 1200    69  17    103/62    100 % 01/18/20 1145    78  16    107/61    98 % 01/18/20 1130    77  17    104/61    100 % 01/18/20 1115    73  17    102/64    100 % 01/18/20 1100    81  17    106/66    100 % 01/18/20 1051    76  20        100 % 01/18/20 1045    75  18    99/62    100 % 01/18/20 1036    82  18        100 % 01/18/20 1030    92  20    107/63    100 % 01/18/20 1015    77  17    98/54    100 % 01/18/20 1002    80  20    94/60    99 % 01/18/20 0945    79  20    95/60    98 % 01/18/20 0930    88  19    92/68    99 % 01/18/20 0915    86  17    113/71    100 % 01/18/20 0900    79  17    104/63    100 % 01/18/20 0845    85  16    106/71    100 % 01/18/20 0730    73  18    105/73    100 % 01/18/20 0700    79  19    100/64    100 % 01/18/20 0645    78  17    102/64    99 % 01/18/20 0630    77  19    97/66    98 % 01/18/20 0615    85  20    97/69    99 % 01/18/20 0600    79  18    102/65    100 % 01/18/20 0545    77  18    102/57    98 % 01/18/20 0530    81  17    96/61    94 % 01/18/20 0521    87  24        100 % 01/18/20 0515    77  18    93/56      
 01/18/20 0500    83  21    94/61    96 % 01/18/20 0445    76  18    (!) 86/61    96 % 01/18/20 0415    84  17    (!) 88/59    98 % 01/18/20 0400  2  92  18  98 °F (36.7 °C)  94/63  Alert  98 % 01/18/20 0345    91  21    92/58    97 % 01/18/20 0330    84  21    100/60    98 % 01/18/20 0315    88  19    95/58    98 % 01/18/20 0300    91  17    106/66    100 % 01/18/20 0230    89  20    90/59      
 01/18/20 0225              99 % 01/18/20 0215    87  19    92/65      
 01/18/20 0206    90  17        100 % 01/18/20 0200    94  18    (!) 88/62      
 01/18/20 0146    90  19        97 % 01/18/20 0145    87  20    92/58      
 01/18/20 0130    88  17    94/61      
 01/18/20 0129    86  18        99 % 01/18/20 0115    83  18    91/63      
 01/18/20 0100    87  20    102/64    100 % 01/18/20 0045    81  18    102/64      
 01/18/20 0042    80  18        100 % 01/18/20 0029    84  13    102/66    100 % 01/18/20 0020    87  15        100 % 01/18/20 0015    81  13    96/64      
 01/17/20 2315    87  21    99/63    100 % 01/17/20 2300    80  17    94/62    97 % 01/17/20 2245    85  19    96/59    100 % 01/17/20 2230    84  19    92/61    100 % 01/17/20 2215    87  11    95/64    99 % 01/17/20 2200    89  16    94/61    100 % 01/17/20 2145    90  16    96/64    100 % 01/17/20 2130    88  17        99 % 01/17/20 2115    99  21    95/64    98 % 01/17/20 2100    93  20    92/64    98 % 01/17/20 2045    93  21    96/68    98 % 01/17/20 1933  2  98  18  98.2 °F (36.8 °C)  (!) 87/60  Alert  97 % Septic Patients:  
 
Lactic Acid Lab Results Component Value Date LACPOC 1.77 01/17/2020 LACPOC 4.70 ([facility]) 11/17/2019

## 2020-01-18 NOTE — ED NOTES
Spoke with Curtis Hernández MD.  No ADT order at this time. Per Dl BLAKE no ADT order until patient's blood pressure is \"better\". MD made aware BP is currently at patient's baseline per [facility] records and patient.

## 2020-01-18 NOTE — ED NOTES
MD made aware of presumed code sepsis on patient per criteria and information received from [address]. MD made aware of current indwelling holm at this time, order for urine sample but no orders to change out catheter or bag at this time. Will obtain urine sample from current holm.

## 2020-01-18 NOTE — H&P
History and Physical 
 
Patient: Roddie Kussmaul               Sex: male          DOA: 1/17/2020 YOB: 1950      Age:  81 y.o.        LOS:  LOS: 0 days HPI:  
 
Roddie Kussmaul is a 81 y.o. male who is a resident of the [address] [place] [address]. He has known Cerebral Palsy and is not fully interactive at baseline. He presented to the ER with decrease in activity and fever. He has had previous complicated UTI in the past.  He did not have nausea or vomiting. No cough or chest pain. In the ER he was found to have sepsis with UTI. His blood pressure is borderline, but it is possible that his baseline blood pressure is also low. He will be admitted for ongoing management. Past Medical History:  
Diagnosis Date  Cerebral palsy ([address]  Hypertension  Mild mental retardation  Polio Social History: 
 Tobacco use:  Patient does not smoke Alcohol use:  Patient does not use alcohol Occupation:  Unknown Family History: 
 Unknown Review of Systems Constitutional:  Fever as above, no weight loss HEENT:  No headache or visual changes Cardiovascular:  No chest pain or diaphoresis Respiratory:  No coughing, wheezing, or shortness of breath. GI:  No nausea or vomitting. No diarrhea :  No hematuria or dysuria Skin:  No rashes or moles Neuro:  No seizures or syncope Hematological:  No bruising or bleeding Endocrine:  No diabetes or thyroid disease Physical Exam:  
  
Visit Vitals /64 Pulse 79 Temp 98 °F (36.7 °C) Resp 19 Wt 61.2 kg (135 lb) SpO2 100% BMI 23.17 kg/m² Physical Exam: 
 
Gen:  Confusion present. Will interact HEENT:  Normal cephalic atraumatic, extra-occular movements are intact. Neck:  Supple, No JVD Lungs:  Clear bilaterally, no wheeze, no rales, normal effort Heart:  Regular Rate and Rhythm, normal S1 and S2, no edema Abdomen:  Soft, non tender, normal bowel sounds, no guarding. Extremities:  Well perfused, no cyanosis or edema Neurological:  Awake, mildly confused. Will not follow commands. Some contractures present Skin:  Skin injury present, pressure sore. Chronic Mental Status:  Unable to assess Laboratory Studies: 
 
BMP:  
Lab Results Component Value Date/Time  01/18/2020 04:13 AM  
 K 4.1 01/18/2020 04:13 AM  
  01/18/2020 04:13 AM  
 CO2 22 01/18/2020 04:13 AM  
 AGAP 6 01/18/2020 04:13 AM  
  (H) 01/18/2020 04:13 AM  
 BUN 19 (H) 01/18/2020 04:13 AM  
 CREA 0.32 (L) 01/18/2020 04:13 AM  
 GFRAA >60 01/18/2020 04:13 AM  
 GFRNA >60 01/18/2020 04:13 AM  
 
CBC:  
Lab Results Component Value Date/Time WBC 13.4 (H) 01/18/2020 04:13 AM  
 HGB 9.0 (L) 01/18/2020 04:13 AM  
 HCT 28.2 (L) 01/18/2020 04:13 AM  
  01/18/2020 04:13 AM  
 
 
Assessment/Plan Principal Problem: 
  UTI (urinary tract infection) (6/11/2015) Active Problems: Hypotension (6/12/2015) Sepsis ([address] (11/17/2019) Metabolic encephalopathy (3/47/9090) Cerebral palsy ([facility] (6/12/2015) History of post-polio syndrome (6/12/2015) Cognitive developmental delay (5/12/2018) PLAN: 
 
Continue with broad spectrum antibiotics Admit to stepdown for close BP monitoring. He is not on vasopressor currently. He could be de-escalated if BP baseline is actually in the same range as it is around admission Monitor mental status Follow renal function and CBC Mobilize as able DVT prophylaxis.

## 2020-01-18 NOTE — ED NOTES
Patient turned, brief checked and dry and clean at this time. Patient made comfortable. Patient aware he is waiting on admission orders and a bed and has no questions or complaints at this time. Patient remains stable with stable vital signs. Dl made aware once again that vital signs are stable and unchanged. No orders received.

## 2020-01-18 NOTE — ED NOTES
Rounded on patient at this time, antibiotics completed, first bolus complete and second almost completed. Patient remains comfortable, vital signs remain stable.

## 2020-01-18 NOTE — ED NOTES
Rounded on patient at this time. Patient aware of current plan of care. Patient moved for comfort. Patient has no questions or concerns at this time.

## 2020-01-18 NOTE — ED NOTES
Patient switched to continuous fluids and all boluses and antibiotics completed. Dr Cain Goldmann made aware. Vital signs remain stable with MAP stable. Dl marquis.

## 2020-01-18 NOTE — ED PROVIDER NOTES
EMERGENCY DEPARTMENT HISTORY AND PHYSICAL EXAM 
 
 
Date: 1/17/2020 Patient Name: Monika Zamorano History of Presenting Illness Chief Complaint Patient presents with  Fever History (Context): Monika Zamorano is a 81 y.o. gentleman with cerebral palsy, poliomyelitis, mild cognitive impairment, chronic indwelling Greenwood catheter, and chronic sacral decubitus ulcer, who presents with subacute onset, severe, persistent unwell feeling with fever at facility. No exacerbating/relieving features or other associated symptoms. On review of systems, the patient denies  rashes, cough, abdominal pain, nausea, vomiting, neck stiffness, headache. PCP: Cornell Ortiz MD 
 
Current Facility-Administered Medications Medication Dose Route Frequency Provider Last Rate Last Dose  sodium chloride (NS) flush 5-10 mL  5-10 mL IntraVENous PRN Liliana Vo MD      
 0.9% sodium chloride infusion  200 mL/hr IntraVENous CONTINUOUS Tea Barrow  mL/hr at 01/18/20 0105 200 mL/hr at 01/18/20 0105  piperacillin-tazobactam (ZOSYN) 3.375 g in 0.9% sodium chloride (MBP/ADV) 100 mL MBP  3.375 g IntraVENous Q6H Frank Lizama MD      
 insulin lispro (HUMALOG) injection   SubCUTAneous AC&HS Tea Barrow MD      
 glucose chewable tablet 16 g  4 Tab Oral PRN Tea Barrow MD      
 glucagon (GLUCAGEN) injection 1 mg  1 mg IntraMUSCular PRN Tea Barrow MD      
 dextrose 10 % infusion 125-250 mL  125-250 mL IntraVENous PRN Tea Barrow MD      
 vancomycin (VANCOCIN) 1,000 mg in 0.9% sodium chloride (MBP/ADV) 250 mL adv  1,000 mg IntraVENous Q12H Liliana Vo MD      
 [START ON 1/19/2020] VANCOMYCIN INFORMATION NOTE   Other ONCE Liliana Vo MD      
 VANCOMYCIN INFORMATION NOTE 1 Each  1 Each Other Rx Dosing/Monitoring Liliana Vo MD      
 
Current Outpatient Medications Medication Sig Dispense Refill  NOVOLIN R REGULAR U-100 INSULN 100 unit/mL injection  fenofibrate micronized (LOFIBRA) 134 mg capsule  metFORMIN (GLUCOPHAGE) 500 mg tablet  terazosin (HYTRIN) 5 mg capsule Take 1 Cap by mouth daily. 30 Cap 0  
 finasteride (PROSCAR) 5 mg tablet Take 1 Tab by mouth daily. 30 Tab 0  cholecalciferol (VITAMIN D3) 1,000 unit cap Take 1,000 Units by mouth daily.  meclizine (ANTIVERT) 12.5 mg tablet Take 12.5 mg by mouth three (3) times daily as needed.  fenofibrate micronized (LOFIBRA) 67 mg capsule Take 67 mg by mouth every morning.  polyvinyl alcohol (LIQUIFILM TEARS) 1.4 % ophthalmic solution Administer 1 Drop to both eyes as needed.  fenofibrate nanocrystallized (TRICOR) 48 mg tablet Take 1 Tab by mouth daily. 30 Tab 0  
 polyvinyl alcohol (LIQUIFILM TEARS) 1.4 % ophthalmic solution Administer 1 Drop to both eyes four (4) times daily.  aspirin 81 mg chewable tablet Take 81 mg by mouth daily.  baclofen (LIORESAL) 10 mg tablet Take  by mouth three (3) times daily.  calcium carbonate (TUMS) 200 mg calcium (500 mg) chew Take 1 Tab by mouth three (3) times daily.  FENOFIBRATE MICRONIZED PO Take 200 mg by mouth daily.  metFORMIN (GLUCOPHAGE) 850 mg tablet Take 500 mg by mouth two (2) times daily (with meals).  calcium-vitamin D (OYSTER SHELL CALCIUM-VIT D3) 250-125 mg-unit tablet Take 1 Tab by mouth two (2) times a day.  polyethylene glycol (MIRALAX) 17 gram packet Take 17 g by mouth every other day.  camphor-methyl salicyl-menthol (SALONPAS) ptmd 1 Patch by Apply Externally route daily.  multivitamin, tx-iron-ca-min (THERAPY M) 9 mg iron-400 mcg tab tablet Take 1 Tab by mouth daily.  ergocalciferol (VITAMIN D2) 50,000 unit capsule Take 50,000 Units by mouth every Tuesday.  lisinopril (PRINIVIL, ZESTRIL) 2.5 mg tablet Take 2.5 mg by mouth daily.     
 acetaminophen (TYLENOL ARTHRITIS PAIN) 650 mg CR tablet Take 650 mg by mouth every six (6) hours as needed for Pain.  loperamide (IMMODIUM) 2 mg tablet Take 2 mg by mouth four (4) times daily as needed for Diarrhea.  promethazine (PHENERGAN) 25 mg tablet Take 25 mg by mouth every eight (8) hours as needed for Nausea. Past History Past Medical History: 
Past Medical History:  
Diagnosis Date  Cerebral palsy ([address]  Hypertension  Mild mental retardation  Polio Past Surgical History: No past surgical history on file. Family History: No family history on file. Social History: 
Social History Tobacco Use  Smoking status: Never Smoker  Smokeless tobacco: Never Used Substance Use Topics  Alcohol use: No  
 Drug use: Not on file Allergies: 
No Known Allergies PMH, PSH, family history, social history, allergies reviewed with the patient with significant items noted above. Review of Systems As per HPI, otherwise reviewed and negative. Physical Exam  
 
Vitals:  
 01/17/20 2300 01/17/20 2315 01/18/20 0015 01/18/20 0020 BP: 94/62 99/63 96/64 Pulse: 80 87 81 87 Resp: 17 21 13 15 Temp:      
SpO2: 97% 100%  100% Weight:      
 
 
Gen: Ill-appearing, although somewhat chronically so HEENT: Normocephalic, sclera anicteric Cardiovascular: Normal rate , regular rhythm, no murmurs, rubs, gallops. Pulses intact and equal distally. Pulmonary: No respiratory distress. No stridor. Clear lungs. ABD: Soft, nontender, nondistended. Neuro: Alert. Oriented. Garbled speech. Diminished strength throughout, with normal sensation Psych: Cognitive slowing. : No CVA tenderness, indwelling Greenwood catheter. EXT: No cyanosis or clubbing. Skin: Large sacral decubitus ulcer with black areas, appearing necrotic, but not acutely infected Diagnostic Study Results Labs - Recent Results (from the past 12 hour(s)) HEMOGLOBIN A1C WITH EAG Collection Time: 01/17/20  7:45 PM  
Result Value Ref Range Hemoglobin A1c 8.1 (H) 4.2 - 5.6 % Est. average glucose 186 mg/dL METABOLIC PANEL, COMPREHENSIVE Collection Time: 01/17/20  7:48 PM  
Result Value Ref Range Sodium 136 136 - 145 mmol/L Potassium 4.5 3.5 - 5.5 mmol/L Chloride 102 100 - 111 mmol/L  
 CO2 28 21 - 32 mmol/L Anion gap 6 3.0 - 18 mmol/L Glucose 196 (H) 74 - 99 mg/dL BUN 29 (H) 7.0 - 18 MG/DL Creatinine 0.52 (L) 0.6 - 1.3 MG/DL  
 BUN/Creatinine ratio 56 (H) 12 - 20 GFR est AA >60 >60 ml/min/1.73m2 GFR est non-AA >60 >60 ml/min/1.73m2 Calcium 9.3 8.5 - 10.1 MG/DL Bilirubin, total 0.2 0.2 - 1.0 MG/DL  
 ALT (SGPT) 32 16 - 61 U/L  
 AST (SGOT) 19 10 - 38 U/L Alk. phosphatase 99 45 - 117 U/L Protein, total 6.7 6.4 - 8.2 g/dL Albumin 2.5 (L) 3.4 - 5.0 g/dL Globulin 4.2 (H) 2.0 - 4.0 g/dL A-G Ratio 0.6 (L) 0.8 - 1.7    
CBC WITH AUTOMATED DIFF Collection Time: 01/17/20  7:48 PM  
Result Value Ref Range WBC 14.1 (H) 4.6 - 13.2 K/uL  
 RBC 4.06 (L) 4.70 - 5.50 M/uL  
 HGB 11.3 (L) 13.0 - 16.0 g/dL HCT 36.0 36.0 - 48.0 % MCV 88.7 74.0 - 97.0 FL  
 MCH 27.8 24.0 - 34.0 PG  
 MCHC 31.4 31.0 - 37.0 g/dL  
 RDW 14.2 11.6 - 14.5 % PLATELET 536 (H) 488 - 420 K/uL MPV 9.7 9.2 - 11.8 FL  
 NEUTROPHILS 67 40 - 73 % LYMPHOCYTES 22 21 - 52 % MONOCYTES 8 3 - 10 % EOSINOPHILS 3 0 - 5 % BASOPHILS 0 0 - 2 %  
 ABS. NEUTROPHILS 9.5 (H) 1.8 - 8.0 K/UL  
 ABS. LYMPHOCYTES 3.1 0.9 - 3.6 K/UL  
 ABS. MONOCYTES 1.1 0.05 - 1.2 K/UL  
 ABS. EOSINOPHILS 0.4 0.0 - 0.4 K/UL  
 ABS. BASOPHILS 0.0 0.0 - 0.1 K/UL  
 DF AUTOMATED    
POC LACTIC ACID Collection Time: 01/17/20  7:53 PM  
Result Value Ref Range Lactic Acid (POC) 1.77 0.40 - 2.00 mmol/L  
EKG, 12 LEAD, INITIAL Collection Time: 01/17/20  8:11 PM  
Result Value Ref Range Ventricular Rate 96 BPM  
 Atrial Rate 96 BPM  
 P-R Interval 176 ms QRS Duration 66 ms  
 Q-T Interval 320 ms QTC Calculation (Bezet) 404 ms Calculated P Axis 70 degrees Calculated R Axis 82 degrees Calculated T Axis 72 degrees Diagnosis Normal sinus rhythm Normal ECG No previous ECGs available URINALYSIS W/ RFLX MICROSCOPIC Collection Time: 01/17/20  8:40 PM  
Result Value Ref Range Color YELLOW Appearance TURBID Specific gravity 1.017 1.005 - 1.030    
 pH (UA) 7.0 5.0 - 8.0 Protein 30 (A) NEG mg/dL Glucose 250 (A) NEG mg/dL Ketone NEGATIVE  NEG mg/dL Bilirubin NEGATIVE  NEG Blood MODERATE (A) NEG Urobilinogen 0.2 0.2 - 1.0 EU/dL Nitrites POSITIVE (A) NEG Leukocyte Esterase LARGE (A) NEG URINE MICROSCOPIC ONLY Collection Time: 01/17/20  8:40 PM  
Result Value Ref Range WBC TOO NUMEROUS TO COUNT 0 - 4 /hpf  
 RBC TOO NUMEROUS TO COUNT 0 - 5 /hpf Epithelial cells NEGATIVE  0 - 5 /lpf Bacteria 4+ (A) NEG /hpf Yeast 1+ (A) NEG  
INFLUENZA A & B AG (RAPID TEST) Collection Time: 01/17/20  8:42 PM  
Result Value Ref Range Influenza A Antigen NEGATIVE  NEG Influenza B Antigen NEGATIVE  NEG Radiologic Studies -  
XR CHEST PORT    (Results Pending) CT Results  (Last 48 hours) None CXR Results  (Last 48 hours) None Medical Decision Making I am the first provider for this patient. I reviewed the vital signs, available nursing notes, past medical history, past surgical history, family history and social history. Vital Signs-Reviewed the patient's vital signs. EKG: Interpreted by myself. Records Reviewed: Personally, on initial evaluation MDM:  
Patient presents with unwell feeling, fever at facility. Exam significant for baseline exam for patient with the exception of near tachycardia. DDX considered: UTI, pneumonia, metabolic abnormality, medication reaction DDX thought to be less likely but also considered due to high risk condition: PE, meningitis, stroke Patient condition on initial evaluation: Moderately ill Plan:  
Fluid resuscitation Close Observation Initiation of sepsis bundle Orders as below: 
Orders Placed This Encounter  SEPSIS BUNDLE INITIATED IN ED (REQUIRED)  CULTURE, BLOOD  CULTURE, BLOOD  
 INFLUENZA A & B AG (RAPID TEST)  XR CHEST PORT  
 URINALYSIS W/ RFLX MICROSCOPIC  METABOLIC PANEL, COMPREHENSIVE  
 CBC WITH AUTOMATED DIFF  
 URINE MICROSCOPIC ONLY  CBC WITH AUTOMATED DIFF  
 METABOLIC PANEL, BASIC  
 HEMOGLOBIN A1C  
 VANCOMYCIN, TROUGH  METABOLIC PANEL, BASIC  
 DIET DIABETIC CONSISTENT CARB Regular  POC LACTIC ACID  VITAL SIGNS - PER UNIT ROUTINE  
 STRICT I & O  
 NEUROLOGIC STATUS ASSESSMENT - PER UNIT ROUTINE  
 POC LACTIC ACID  NURSING-MISCELLANEOUS: SEE HYPOGLYCEMIA PROTOCOL BELOW HYPOGLYCEMIA PROTOCOL: Initiate Hypoglycemia protocol if blood glucose is less than 70 mg/dL FOR CONSCIOUS PATIENT: Administer 4 ounces fruit juice OR regular soda OR 4 glucose tablets. FOR UN. ..  NURSING-MISCELLANEOUS: Blood glucose targets -- ICU: 140 - 180 mg/dL;  NON-ICU: 100 - 140 mg/dL CONTINUOUS  
 POC LACTIC ACID  EKG, 12 LEAD, INITIAL  
 SALINE LOCK IV ONE TIME STAT  sodium chloride (NS) flush 5-10 mL  lactated ringers bolus infusion 1,836 mL  DISCONTD: EPINEPHrine (ADRENALIN) 4 mg in 0.9% sodium chloride 250 mL infusion  vancomycin (VANCOCIN) 1500 mg in  ml infusion  piperacillin-tazobactam (ZOSYN) 3.375 g in 0.9% sodium chloride (MBP/ADV) 100 mL MBP  gentamicin (GARAMYCIN) 120 mg in 0.9% sodium chloride 100 mL IVPB  
 0.9% sodium chloride infusion  piperacillin-tazobactam (ZOSYN) 3.375 g in 0.9% sodium chloride (MBP/ADV) 100 mL MBP  insulin lispro (HUMALOG) injection  glucose chewable tablet 16 g  
 glucagon (GLUCAGEN) injection 1 mg  dextrose 10 % infusion 125-250 mL  lactated ringers bolus infusion 1,000 mL  vancomycin (VANCOCIN) 1,000 mg in 0.9% sodium chloride (MBP/ADV) 250 mL adv  VANCOMYCIN INFORMATION NOTE  VANCOMYCIN INFORMATION NOTE 1 Each  IP CONSULT TO HOSPITALIST  IP CONSULT TO PHARMACY - VANCOMYCIN DOSING  
  
 
ED Course:  
ED Course as of Jan 18 0533 Fri Jan 17, 2020 2123 Patient UA resulted. Patient has urinary tract infection. Given heart rate of 98 on presentation, and given reported fever, and given respiratory rate did increase to 21 despite it being 18 on arrival, will treat the patient as sepsis at this time. Prior to this, the patient did not have a known source of infection. [DT] 2125 Broad-spectrum antibiotics were initiated  
 [DT] 2233 Patient admitted to hospitalist.  
BP: 94/61 [DT] 2259 Repeat assessment of the patient demonstrates fluid status is the same. Blood pressure does not increase with straight leg raise. [DT] ED Course User Index 
[DT] Laquita Graves MD  
 
 
 
Patient condition at time of disposition: Moderately ill Diagnosis Clinical Impression: 1. Sepsis due to urinary tract infection ([facility] 2. Urinary tract infection associated with indwelling urethral catheter, initial encounter ([facility] [address] Signed, Lupe Rosas MD 
Emergency Physician 
[facility] As a voice dictation software was utilized to dictate this note, minor word transpositions can occur. I apologize for confusing wording and typographic errors. Please feel free to contact me for clarification.

## 2020-01-18 NOTE — PROGRESS NOTES
Internal Medicine Progress Note NAME: Pramod Ellis :  1950 MRM:  [id number] Date/Time: 2020 ASSESSMENT/PLAN: 
 
Principal Problem: 
  UTI (urinary tract infection) (2015) Active Problems: Hypotension (2015) Cerebral palsy ([facility] [address] (2015) History of post-polio syndrome (2015) Cognitive developmental delay (2018) Sepsis ([facility] [address] (2019) Metabolic encephalopathy () Admitted this am to step down due to urosepsis then get downgraded to be admitted to telemetry floor after stabilization of vital signs. He is not on vasopressor Urine culture grew ENTEROCOCCUS FAECIUM GROUP D , will stop    broad spectrum antibiotics Zosyn and continue with iv vancomycin for now. Monitor mental status Follow renal function and CBC DVT prophylaxis. Lab Review:  
 
Recent Labs  
  203 20 WBC 13.4* 14.1* HGB 9.0* 11.3* HCT 28.2* 36.0  487* Recent Labs  
  203 20  136  
K 4.1 4.5  
 102 CO2 22 28 * 196* BUN 19* 29* CREA 0.32* 0.52* CA 7.6* 9.3 ALB  --  2.5* TBILI  --  0.2 SGOT  --  19 ALT  --  32 Lab Results Component Value Date/Time Glucose (POC) 185 (H) 2020 12:36 PM  
 Glucose (POC) 137 (H) 2020 09:16 AM  
 Glucose (POC) 260 (H) 2019 11:49 AM  
 Glucose (POC) 173 (H) 2019 06:30 AM  
 Glucose (POC) 237 (H) 2019 09:10 PM  
 
No results for input(s): PH, PCO2, PO2, HCO3, FIO2 in the last 72 hours. No results for input(s): INR, INREXT in the last 72 hours. No results found for: JEROME Lab Results Component Value Date/Time Culture result: NO GROWTH AFTER 13 HOURS 2020 08:40 PM  
 Culture result: NO GROWTH AFTER 14 HOURS 2020 07:48 PM  
 Culture result:  2019 02:31 PM  
  MRSA target DNA is not detected (presumptive not colonized with MRSA) All Cardiac Markers in the last 24 hours: No results found for: CPK, CK, CKMMB, CKMB, RCK3, CKMBT, CKNDX, CKND1, JOHN, TROPT, TROIQ, KENYETTA, TROPT, TNIPOC, BNP, BNPP Subjective: Chief Complaint:     
 
Report been ok. Deny omplain ROS: 
Limited historian due to mental condition Objective:  
 
Vitals: 
Last 24hrs VS reviewed since prior progress note. Most recent are: 
 
Visit Vitals /65 Pulse (!) 108 Temp 98 °F (36.7 °C) Resp 19 Wt 61.2 kg (135 lb) SpO2 99% BMI 23.17 kg/m² SpO2 Readings from Last 6 Encounters:  
01/18/20 99% 12/03/19 97% 11/25/19 98% 05/17/18 100% 06/15/15 95% Intake/Output Summary (Last 24 hours) at 1/18/2020 1423 Last data filed at 1/18/2020 6466 Gross per 24 hour Intake  Output 1400 ml Net -1400 ml Physical Exam:  
Gen: Well-developed,  in no acute distress HEENT: Head atraumatic, normocephalic ,   hearing intact to voice, moist mucous membranes Neck:  Trachea midline , No apparent JVD, Supple Resp: No accessory muscle use, Bilateral BS present,clear  
Card:   normal S1, S2 without Gallop. No significant lower leg peripheral edema. Abd:  Soft, non-tender, not distended, normoactive bowel sounds are present Musc: No cyanosis or clubbing Skin: No rashes or ulcers, skin turgor is good Neuro: Cerebral palsy, limited IQ, quadriparesis , muscle atrophy and contractures in his limbs. Can not follows commands appropriately Medications Reviewed: (see below) Lab Data Reviewed: (see below) 
 
______________________________________________________________________ Medications:  
 
Current Facility-Administered Medications Medication Dose Route Frequency  acetaminophen (TYLENOL) SR tablet 650 mg  650 mg Oral Q6H PRN  
 aspirin chewable tablet 81 mg  81 mg Oral DAILY  baclofen (LIORESAL) tablet 10 mg  10 mg Oral TID  loperamide (IMODIUM) capsule 2 mg  2 mg Oral QID PRN  
  polyethylene glycol (MIRALAX) packet 17 g  17 g Oral EVERY OTHER DAY  promethazine (PHENERGAN) tablet 25 mg  25 mg Oral Q8H PRN  
 sodium chloride (NS) flush 5-10 mL  5-10 mL IntraVENous PRN  
 0.9% sodium chloride infusion  200 mL/hr IntraVENous CONTINUOUS  piperacillin-tazobactam (ZOSYN) 3.375 g in 0.9% sodium chloride (MBP/ADV) 100 mL MBP  3.375 g IntraVENous Q6H  
 insulin lispro (HUMALOG) injection   SubCUTAneous AC&HS  
 glucose chewable tablet 16 g  4 Tab Oral PRN  
 glucagon (GLUCAGEN) injection 1 mg  1 mg IntraMUSCular PRN  
 dextrose 10 % infusion 125-250 mL  125-250 mL IntraVENous PRN  
 vancomycin (VANCOCIN) 1,000 mg in 0.9% sodium chloride (MBP/ADV) 250 mL adv  1,000 mg IntraVENous Q12H  
 [START ON 1/19/2020] VANCOMYCIN INFORMATION NOTE   Other ONCE  
 VANCOMYCIN INFORMATION NOTE 1 Each  1 Each Other Rx Dosing/Monitoring Current Outpatient Medications Medication Sig  
 NOVOLIN R REGULAR U-100 INSULN 100 unit/mL injection  fenofibrate micronized (LOFIBRA) 134 mg capsule  metFORMIN (GLUCOPHAGE) 500 mg tablet  terazosin (HYTRIN) 5 mg capsule Take 1 Cap by mouth daily.  finasteride (PROSCAR) 5 mg tablet Take 1 Tab by mouth daily.  cholecalciferol (VITAMIN D3) 1,000 unit cap Take 1,000 Units by mouth daily.  meclizine (ANTIVERT) 12.5 mg tablet Take 12.5 mg by mouth three (3) times daily as needed.  fenofibrate micronized (LOFIBRA) 67 mg capsule Take 67 mg by mouth every morning.  polyvinyl alcohol (LIQUIFILM TEARS) 1.4 % ophthalmic solution Administer 1 Drop to both eyes as needed.  fenofibrate nanocrystallized (TRICOR) 48 mg tablet Take 1 Tab by mouth daily.  polyvinyl alcohol (LIQUIFILM TEARS) 1.4 % ophthalmic solution Administer 1 Drop to both eyes four (4) times daily.  aspirin 81 mg chewable tablet Take 81 mg by mouth daily.  baclofen (LIORESAL) 10 mg tablet Take  by mouth three (3) times daily.  calcium carbonate (TUMS) 200 mg calcium (500 mg) chew Take 1 Tab by mouth three (3) times daily.  FENOFIBRATE MICRONIZED PO Take 200 mg by mouth daily.  metFORMIN (GLUCOPHAGE) 850 mg tablet Take 500 mg by mouth two (2) times daily (with meals).  calcium-vitamin D (OYSTER SHELL CALCIUM-VIT D3) 250-125 mg-unit tablet Take 1 Tab by mouth two (2) times a day.  polyethylene glycol (MIRALAX) 17 gram packet Take 17 g by mouth every other day.  camphor-methyl salicyl-menthol (SALONPAS) ptmd 1 Patch by Apply Externally route daily.  multivitamin, tx-iron-ca-min (THERAPY M) 9 mg iron-400 mcg tab tablet Take 1 Tab by mouth daily.  ergocalciferol (VITAMIN D2) 50,000 unit capsule Take 50,000 Units by mouth every Tuesday.  lisinopril (PRINIVIL, ZESTRIL) 2.5 mg tablet Take 2.5 mg by mouth daily.  acetaminophen (TYLENOL ARTHRITIS PAIN) 650 mg CR tablet Take 650 mg by mouth every six (6) hours as needed for Pain.  loperamide (IMMODIUM) 2 mg tablet Take 2 mg by mouth four (4) times daily as needed for Diarrhea.  promethazine (PHENERGAN) 25 mg tablet Take 25 mg by mouth every eight (8) hours as needed for Nausea. Total time spent with patient: 35 minutes Care Plan discussed with: Care Manager, Nursing Staff and >50% of time spent in counseling and coordination of care Discussed:  Care Plan Prophylaxis:  Hep SQ Disposition:  Home w/Family Attending Physician: Gissell Mast MD

## 2020-01-18 NOTE — ED NOTES
MD updated that initial fluid bolus still infusing with antibiotics.   Will reassess after initial bolus is done before starting a second bolus per Abi Rosales MD.

## 2020-01-18 NOTE — ED NOTES
Spoke with Dr. Grimm Matter to regarding level of care. States he is going to downgrade patient to telemetry.

## 2020-01-18 NOTE — ED NOTES
Patient continuing to rest on stretcher in no distress, full cardiac monitor on and fluids infusing.

## 2020-01-18 NOTE — ED NOTES
IV infiltrated. Multiple attempts made by ED technicians and this RN for second set of blood cultures and IV access, MD aware.

## 2020-01-18 NOTE — ED NOTES
Rounded on patient at this time. Antibiotics and LR infusing without problems. Patient has no complaints at this time. Vital signs continue to be stable.

## 2020-01-18 NOTE — ED TRIAGE NOTES
Patient sent from [facility] for a reported fever earlier today. Patient was given tylenol before being sent here. Patient has a sacral wound that is currently being treated as well as a holm catheter. Patient at baseline per medical transport. Patient denies any pain or complaints. Per consulate patient is \"normally\" hypotensive.

## 2020-01-19 NOTE — PROGRESS NOTES
Received patient from [facility]. Pt awake and in bed. No pain, distress or discomfort noted. Bed locked in lowest position. Frequently used items and call light within reach. Uneventful night for pt. Pt rested and was treated per eMAR. No further complaints and no distress noted. 0740: Bedside shift change report given to Ritika Harmon RN (oncoming nurse) by Viviane Camargo RN (offgoing nurse). Report included the following information SBAR, Intake/Output, MAR and Quality Measures. Opportunity for questions and clarification provided.

## 2020-01-19 NOTE — PROGRESS NOTES
Problem: Discharge Planning Goal: *Discharge to safe environment Outcome: Progressing Towards Goal 
Plan is back home to the [facility]

## 2020-01-19 NOTE — PROGRESS NOTES
Problem: Diabetes Self-Management Goal: *Disease process and treatment process Description Define diabetes and identify own type of diabetes; list 3 options for treating diabetes. Outcome: Progressing Towards Goal 
Goal: *Incorporating nutritional management into lifestyle Description Describe effect of type, amount and timing of food on blood glucose; list 3 methods for planning meals. Outcome: Progressing Towards Goal 
Goal: *Incorporating physical activity into lifestyle Description State effect of exercise on blood glucose levels. Outcome: Progressing Towards Goal 
Goal: *Developing strategies to promote health/change behavior Description Define the ABC's of diabetes; identify appropriate screenings, schedule and personal plan for screenings. Outcome: Progressing Towards Goal 
Goal: *Using medications safely Description State effect of diabetes medications on diabetes; name diabetes medication taking, action and side effects. Outcome: Progressing Towards Goal 
Goal: *Monitoring blood glucose, interpreting and using results Description Identify recommended blood glucose targets  and personal targets. Outcome: Progressing Towards Goal 
Goal: *Prevention, detection, treatment of acute complications Description List symptoms of hyper- and hypoglycemia; describe how to treat low blood sugar and actions for lowering  high blood glucose level. Outcome: Progressing Towards Goal 
Goal: *Prevention, detection and treatment of chronic complications Description Define the natural course of diabetes and describe the relationship of blood glucose levels to long term complications of diabetes. Outcome: Progressing Towards Goal 
Goal: *Developing strategies to address psychosocial issues Description Describe feelings about living with diabetes; identify support needed and support network Outcome: Progressing Towards Goal 
Goal: *Insulin pump training Outcome: Progressing Towards Goal 
 Goal: *Sick day guidelines Outcome: Progressing Towards Goal 
Goal: *Patient Specific Goal (EDIT GOAL, INSERT TEXT) Outcome: Progressing Towards Goal 
  
Problem: Patient Education: Go to Patient Education Activity Goal: Patient/Family Education Outcome: Progressing Towards Goal 
  
Problem: Pressure Injury - Risk of 
Goal: *Prevention of pressure injury Description Document Alex Scale and appropriate interventions in the flowsheet. Outcome: Progressing Towards Goal 
Note: Pressure Injury Interventions: 
Sensory Interventions: Avoid rigorous massage over bony prominences, Keep linens dry and wrinkle-free, Maintain/enhance activity level, Minimize linen layers, Monitor skin under medical devices Moisture Interventions: Check for incontinence Q2 hours and as needed, Internal/External urinary devices, Limit adult briefs, Maintain skin hydration (lotion/cream), Minimize layers, Moisture barrier, Apply protective barrier, creams and emollients, Assess need for specialty bed, Absorbent underpads Activity Interventions: Pressure redistribution bed/mattress(bed type), PT/OT evaluation Mobility Interventions: HOB 30 degrees or less, Pressure redistribution bed/mattress (bed type), PT/OT evaluation Nutrition Interventions: Offer support with meals,snacks and hydration, Document food/fluid/supplement intake Friction and Shear Interventions: Apply protective barrier, creams and emollients, Lift sheet, Minimize layers, Foam dressings/transparent film/skin sealants Problem: Patient Education: Go to Patient Education Activity Goal: Patient/Family Education Outcome: Progressing Towards Goal 
  
Problem: Falls - Risk of 
Goal: *Absence of Falls Description Document Edgar Brunner Fall Risk and appropriate interventions in the flowsheet. Outcome: Progressing Towards Goal 
Note: Fall Risk Interventions: 
  
 
  
 
Medication Interventions: Bed/chair exit alarm Elimination Interventions: Call light in reach Problem: Patient Education: Go to Patient Education Activity Goal: Patient/Family Education Outcome: Progressing Towards Goal 
  
Problem: Sepsis: Day of Diagnosis Goal: *Fluid resuscitation Outcome: Progressing Towards Goal 
Goal: *Paired blood cultures prior to first dose of antibiotic Outcome: Progressing Towards Goal 
Goal: *First dose of  appropriate antibiotic within 3 hours of arrival to ED, within 1 hour of arrival to ICU Outcome: Progressing Towards Goal 
Goal: *Lactic acid with first set of blood cultures Outcome: Progressing Towards Goal 
Goal: *Pneumococcal immunization (if eligible) Outcome: Progressing Towards Goal 
Goal: *Influenza immunization (if eligible) Outcome: Progressing Towards Goal 
Goal: Activity/Safety Outcome: Progressing Towards Goal 
Goal: Consults, if ordered Outcome: Progressing Towards Goal 
Goal: Diagnostic Test/Procedures Outcome: Progressing Towards Goal 
Goal: Nutrition/Diet Outcome: Progressing Towards Goal 
Goal: Discharge Planning Outcome: Progressing Towards Goal 
Goal: Medications Outcome: Progressing Towards Goal 
Goal: Respiratory Outcome: Progressing Towards Goal 
Goal: Treatments/Interventions/Procedures Outcome: Progressing Towards Goal 
Goal: Psychosocial 
Outcome: Progressing Towards Goal 
  
Problem: Pain Goal: *Control of Pain Outcome: Progressing Towards Goal 
Goal: *PALLIATIVE CARE:  Alleviation of Pain Outcome: Progressing Towards Goal 
  
Problem: Discharge Planning Goal: *Discharge to safe environment Outcome: Progressing Towards Goal

## 2020-01-19 NOTE — PROGRESS NOTES
Internal Medicine Progress Note NAME: Manuela Castro :  1950 MRM:  [id number] Date/Time: 2020 ASSESSMENT/PLAN: 
 
Principal Problem: 
  UTI (urinary tract infection) (2015) Active Problems: Hypotension (2015) Cerebral palsy ([facility] [address] (2015) History of post-polio syndrome (2015) Cognitive developmental delay (2018) Sepsis ([facility] [address] (2019) Metabolic encephalopathy () Admitted this am to step down due to urosepsis then get downgraded to be admitted to telemetry floor after stabilization of vital signs. He is not on vasopressor Urine culture grew ENTEROCOCCUS FAECIUM GROUP D ,stopped Zosyn and continued with iv vancomycin . [place] changed yesterday and another UA requested. Wbc normalized. A febrile Complete course of Abx Monitor mental status Follow renal function and CBC DVT prophylaxis. Lab Review:  
 
Recent Labs  
  20 WBC 11.4 13.4* 14.1* HGB 10.2* 9.0* 11.3* HCT 32.1* 28.2* 36.0 * 372 487* Recent Labs  
  20  139 136  
K 4.1 4.1 4.5  
 111 102 CO2 22 22 28 * 153* 196* BUN 13 19* 29* CREA 0.35* 0.32* 0.52* CA 8.5 7.6* 9.3 ALB  --   --  2.5* TBILI  --   --  0.2 SGOT  --   --  19 ALT  --   --  32 Lab Results Component Value Date/Time Glucose (POC) 239 (H) 2020 09:50 PM  
 Glucose (POC) 216 (H) 2020 09:26 PM  
 Glucose (POC) 185 (H) 2020 12:36 PM  
 Glucose (POC) 137 (H) 2020 09:16 AM  
 Glucose (POC) 260 (H) 2019 11:49 AM  
 
No results for input(s): PH, PCO2, PO2, HCO3, FIO2 in the last 72 hours. No results for input(s): INR, INREXT, INREXT in the last 72 hours. No results found for: SDES Lab Results Component Value Date/Time  Culture result: NO GROWTH 2 DAYS 2020 08:40 PM  
 Culture result: NO GROWTH 2 DAYS 01/17/2020 07:48 PM  
 Culture result:  11/18/2019 02:31 PM  
  MRSA target DNA is not detected (presumptive not colonized with MRSA) All Cardiac Markers in the last 24 hours: No results found for: CPK, CK, CKMMB, CKMB, RCK3, CKMBT, CKNDX, CKND1, JOHN, TROPT, TROIQ, KENYETTA, TROPT, TNIPOC, BNP, BNPP Subjective: Chief Complaint:     
  Deny complain. Eating well per nursing ROS: 
Limited historian due to mental condition Objective:  
 
Vitals: 
Last 24hrs VS reviewed since prior progress note. Most recent are: 
 
Visit Vitals /76 (BP 1 Location: Right arm, BP Patient Position: At rest) Pulse 99 Temp 99 °F (37.2 °C) Resp 18 Wt 61.2 kg (135 lb) SpO2 97% BMI 23.17 kg/m² SpO2 Readings from Last 6 Encounters:  
01/19/20 97% 12/03/19 97% 11/25/19 98% 05/17/18 100% 06/15/15 95% Intake/Output Summary (Last 24 hours) at 1/19/2020 0815 Last data filed at 1/19/2020 0630 Gross per 24 hour Intake 2000 ml Output 2050 ml Net -50 ml Physical Exam:  
Gen: Well-developed,  in no acute distress HEENT: Head atraumatic, normocephalic ,   hearing intact to voice, moist mucous membranes Neck:  Trachea midline , No apparent JVD, Supple Resp: No accessory muscle use, Bilateral BS present,clear  
Card:   normal S1, S2 without Gallop. No significant lower leg peripheral edema. Abd:  Soft, non-tender, not distended, normoactive bowel sounds are present Musc: No cyanosis or clubbing Skin: No rashes or ulcers, skin turgor is good Neuro: Cerebral palsy, limited IQ, quadriparesis , muscle atrophy and contractures in his limbs. Can not follows commands appropriately Medications Reviewed: (see below) Lab Data Reviewed: (see below) 
 
______________________________________________________________________ Medications:  
 
Current Facility-Administered Medications Medication Dose Route Frequency  acetaminophen (TYLENOL) SR tablet 650 mg  650 mg Oral Q6H PRN  
 aspirin chewable tablet 81 mg  81 mg Oral DAILY  baclofen (LIORESAL) tablet 10 mg  10 mg Oral TID  loperamide (IMODIUM) capsule 2 mg  2 mg Oral QID PRN  polyethylene glycol (MIRALAX) packet 17 g  17 g Oral EVERY OTHER DAY  promethazine (PHENERGAN) tablet 25 mg  25 mg Oral Q8H PRN  
 heparin (porcine) injection 5,000 Units  5,000 Units SubCUTAneous Q8H  
 sodium chloride (NS) flush 5-10 mL  5-10 mL IntraVENous PRN  
 0.9% sodium chloride infusion  60 mL/hr IntraVENous CONTINUOUS  
 insulin lispro (HUMALOG) injection   SubCUTAneous AC&HS  
 glucose chewable tablet 16 g  4 Tab Oral PRN  
 glucagon (GLUCAGEN) injection 1 mg  1 mg IntraMUSCular PRN  
 dextrose 10 % infusion 125-250 mL  125-250 mL IntraVENous PRN  
 vancomycin (VANCOCIN) 1,000 mg in 0.9% sodium chloride (MBP/ADV) 250 mL adv  1,000 mg IntraVENous Q12H  VANCOMYCIN INFORMATION NOTE   Other ONCE  
 VANCOMYCIN INFORMATION NOTE 1 Each  1 Each Other Rx Dosing/Monitoring Total time spent with patient: 25 minutes Care Plan discussed with: Care Manager, Nursing Staff and >50% of time spent in counseling and coordination of care Discussed:  Care Plan Prophylaxis:  Hep SQ Disposition:  Home w/Family Attending Physician: Diana Guerrero MD

## 2020-01-19 NOTE — PROGRESS NOTES
met with patient completed the initial Spiritual Assessment of the patient, and offered Pastoral Care, see flow sheets for interventions.  extended the assurance of prayer and spiritual care materials. Patient does not have any Jehovah's witness/cultural needs that will affect patients preferences in health care. The patient was informed that chaplains will continue to follow and will provide pastoral care on an as needed/requested basis. Marquez Vera, MPH, MDiv  
[phone]

## 2020-01-19 NOTE — PROGRESS NOTES
Problem: Diabetes Self-Management Goal: *Disease process and treatment process Description Define diabetes and identify own type of diabetes; list 3 options for treating diabetes. Outcome: Progressing Towards Goal 
Goal: *Incorporating nutritional management into lifestyle Description Describe effect of type, amount and timing of food on blood glucose; list 3 methods for planning meals. Outcome: Progressing Towards Goal 
Goal: *Incorporating physical activity into lifestyle Description State effect of exercise on blood glucose levels. Outcome: Progressing Towards Goal 
Goal: *Developing strategies to promote health/change behavior Description Define the ABC's of diabetes; identify appropriate screenings, schedule and personal plan for screenings. Outcome: Progressing Towards Goal 
Goal: *Using medications safely Description State effect of diabetes medications on diabetes; name diabetes medication taking, action and side effects. Outcome: Progressing Towards Goal 
Goal: *Monitoring blood glucose, interpreting and using results Description Identify recommended blood glucose targets  and personal targets. Outcome: Progressing Towards Goal 
Goal: *Prevention, detection, treatment of acute complications Description List symptoms of hyper- and hypoglycemia; describe how to treat low blood sugar and actions for lowering  high blood glucose level. Outcome: Progressing Towards Goal 
Goal: *Prevention, detection and treatment of chronic complications Description Define the natural course of diabetes and describe the relationship of blood glucose levels to long term complications of diabetes. Outcome: Progressing Towards Goal 
Goal: *Developing strategies to address psychosocial issues Description Describe feelings about living with diabetes; identify support needed and support network Outcome: Progressing Towards Goal 
Goal: *Insulin pump training Outcome: Progressing Towards Goal 
 Goal: *Sick day guidelines Outcome: Progressing Towards Goal 
Goal: *Patient Specific Goal (EDIT GOAL, INSERT TEXT) Outcome: Progressing Towards Goal 
  
Problem: Patient Education: Go to Patient Education Activity Goal: Patient/Family Education Outcome: Progressing Towards Goal 
  
Problem: Pressure Injury - Risk of 
Goal: *Prevention of pressure injury Description Document Alex Scale and appropriate interventions in the flowsheet. Outcome: Progressing Towards Goal 
Note: Pressure Injury Interventions: 
Sensory Interventions: Assess changes in LOC, Discuss PT/OT consult with provider, Keep linens dry and wrinkle-free, Minimize linen layers Moisture Interventions: Absorbent underpads, Minimize layers, Maintain skin hydration (lotion/cream) Activity Interventions: Assess need for specialty bed, Pressure redistribution bed/mattress(bed type) Mobility Interventions: Pressure redistribution bed/mattress (bed type), Assess need for specialty bed Nutrition Interventions: Document food/fluid/supplement intake Friction and Shear Interventions: Apply protective barrier, creams and emollients, HOB 30 degrees or less, Minimize layers Problem: Patient Education: Go to Patient Education Activity Goal: Patient/Family Education Outcome: Progressing Towards Goal 
  
Problem: Falls - Risk of 
Goal: *Absence of Falls Description Document [facility] President Fall Risk and appropriate interventions in the flowsheet. Outcome: Progressing Towards Goal 
Note: Fall Risk Interventions: 
  
 
  
 
Medication Interventions: Patient to call before getting OOB, Bed/chair exit alarm Elimination Interventions: Call light in reach, Patient to call for help with toileting needs, Urinal in reach Problem: Patient Education: Go to Patient Education Activity Goal: Patient/Family Education Outcome: Progressing Towards Goal 
  
Problem: Sepsis: Day of Diagnosis Goal: *Fluid resuscitation Outcome: Progressing Towards Goal 
Goal: *Paired blood cultures prior to first dose of antibiotic Outcome: Progressing Towards Goal 
Goal: *First dose of  appropriate antibiotic within 3 hours of arrival to ED, within 1 hour of arrival to ICU Outcome: Progressing Towards Goal 
Goal: *Lactic acid with first set of blood cultures Outcome: Progressing Towards Goal 
Goal: *Pneumococcal immunization (if eligible) Outcome: Progressing Towards Goal 
Goal: *Influenza immunization (if eligible) Outcome: Progressing Towards Goal 
Goal: Activity/Safety Outcome: Progressing Towards Goal 
Goal: Consults, if ordered Outcome: Progressing Towards Goal 
Goal: Diagnostic Test/Procedures Outcome: Progressing Towards Goal 
Goal: Nutrition/Diet Outcome: Progressing Towards Goal 
Goal: Discharge Planning Outcome: Progressing Towards Goal 
Goal: Medications Outcome: Progressing Towards Goal 
Goal: Respiratory Outcome: Progressing Towards Goal 
Goal: Treatments/Interventions/Procedures Outcome: Progressing Towards Goal 
Goal: Psychosocial 
Outcome: Progressing Towards Goal

## 2020-01-19 NOTE — PROGRESS NOTES
Letter of Status Determination: Current Status INPATIENT is Appropriate Pt Name:  Amparo Webber MR#  [id number] [facility]#   [id number] Room and Hospital  3011/01  @ [address] Hospitalization date  1/17/2020  7:28 PM  
Current Attending Physician  Maykel Ardon MD  
Principal diagnosis  UTI (urinary tract infection) Clinicals  81 y.o. y.o  male hospitalized with UTI [facility] criteria Does  NOT apply STATUS DETERMINATION  On the basis of clinical data, available documentaion, we believe that the current status of this patient as INPATIENT is Appropriate On the basis of above clinical data, this patient's care in acute hospital care setting is expected to exceed two midnights. The final decision of the patient's hospitalization status depends on the attending physician's judgment Additional comments Insurance  Payor: [place] MEDICARE / Plan: VA MEDICARE PART A & B / Product Type: Medicare / Insurance Information [facility] PART A & B Phone:   
 Subscriber: Madison Jurado Subscriber#: [id number] Group#:  Precert#:   
   
 [employer] MEDICAID/[facility] Phone:   
 Subscriber: Carry Erb Subscriber#: [id number] Group#: [id number] Precert#:   
  
 
  
 
 
 
 
Narda Huber MD 
Cell: [id number] Physician Advisor 1/19/2020 [id number] INPATIENT 3011/01 [facility] Amparo Webber Payor: [place] MEDICARE / Plan: VA MEDICARE PART A & B / Product Type: Medicare /   
Status Appropriate Other

## 2020-01-19 NOTE — PROGRESS NOTES
Bedside and Verbal shift change report given to YAS Dalton (oncoming nurse) by Vero Wilson (offgoing nurse). Report included the following information SBAR, Kardex, MAR and Recent Results. Patient complaining of eye irritation will notify provider for eye drops.

## 2020-01-19 NOTE — PROGRESS NOTES
Problem: Discharge Planning Goal: *Discharge to safe environment Outcome: Progressing Towards Goal 
Plan is back home to the [facility] Reason for Admission:   Sepsis RRAT Score:   24 Resources/supports as identified by patient/family:    
             
Top Challenges facing patient (as identified by patient/family and CM): Finances/Medication cost?       
           
Transportation? Will need Medicaid  [employer] medicaid [employer] stretcher transortation Support system or lack thereof? Living arrangements? Lives in [facility] Self-care/ADLs/Cognition? Pt is bed bound and required total care. Current Advanced Directive/Advance Care Plan:  no 
                       
Plan for utilizing home health: no    
              
Transition of Care Plan:      Chart reviewed and pt verified demographics. He has hx of CP, Mild MR, post polio syndrome. He is here for fever/ sepsis. He has no NOK. In 11/19/2019 [facility] told one of our staff the [facility] is working on [employer] for pt. I do not think they have that yet. He is agreeable to be posted back to [facility]. I have the  being a Joan Ponce at [phone]. I called and she is not there today. Care Management Interventions PCP Verified by CM: No 
Palliative Care Criteria Met (RRAT>21 & CHF Dx)?: No 
Mode of Transport at Discharge: Other (see comment)(Will need medicaid stretcher transport  ( has [employer] Medicaid [place])) Transition of Care Consult (CM Consult): Discharge Planning MyChart Signup: No 
Discharge Durable Medical Equipment: No 
Physical Therapy Consult: No 
Occupational Therapy Consult: No 
Speech Therapy Consult: No 
Current Support Network: Nursing Facility(Lives at the [facility]) Confirm Follow Up Transport: Other (see comment) Discharge Location Discharge Placement: [facility] Gissell Boateng. Cleora Schirmer RN, BSN [facility] [phone], Pager [phone] 
[email or web address]

## 2020-01-20 NOTE — DIABETES MGMT
Glycemic Control Plan of Care Lantus 5 units daily added - Zulay Craft MPH RN 
Pager [phone] Office [phone]

## 2020-01-20 NOTE — DIABETES MGMT
NUTRITIONAL ASSESSMENT GLYCEMIC CONTROL/ PLAN OF CARE Vel Blandon           81 y.o.           1/18/2020 Diagnosis  UTI (urinary tract infection)  Hypotension  Tachycardia  Lactic acidosis  Cerebral palsy ([address]  History of post-polio syndrome  Hypertension  Mild intellectual disability  Small bowel obstruction ([address]  Cognitive developmental delay  Uncontrolled type 2 diabetes mellitus INTERVENTIONS/PLAN:  
Encourage increased intake at meals to meet calorie and protein requirements. 
 Add Ensure supplements BID. 
 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 ASSESSMENT:  
Nutritional Status:  Pt is 104% ideal weight and BMI - 23.2 kg/m2; but with thin apparance and muscle atrophy noted upper and lower extremities r/t cerebral palsy.   
 Pt has gained 5 lbs since 11/2019 admission. Altered nutrition-related lab values  RT DX. Diabetes Mellitus  as evidenced by A1c of 8.1% Increased nutrient needs due to wound healing as evidenced by stage 2 wound sacrum. Diabetes Management:  
  
Recent blood glucose:   
Lab Results Component Value Date/Time  (H) 01/20/2020 04:13 AM  
 GLUCPOC 167 (H) 01/20/2020 07:26 AM  
 GLUCPOC 244 (H) 01/19/2020 05:01 PM  
 GLUCPOC 280 (H) 01/19/2020 12:55 PM  
 
Within target range (non-ICU: <140; ICU<180): []? Yes   [x]? No 
 
Current Insulin regimen:  
Corrective lispro Home medication/insulin regimen:  
Per [facility] records:  Metformin 500 mg BID HbA1c: 8.1% - ave BG ~ 183mg/dL over past 3 months Adequate glycemic control PTA:  []? Yes  [x]? No 
  
SUBJECTIVE/OBJECTIVE: Information obtained from: Pt  
Diet: Diabetic Will add Ensure supplement. CNA reports that she fed pt breakfast and he ate 75% of his meal. 
  
Medications: [x]? Reviewed Labs:  
Lab Results Component Value Date/Time  Hemoglobin A1c 8.1 (H) 01/17/2020 07:45 PM  
 equivalent  to ave Blood Glucose of 183mg/dl for 2-3 months prior to admission, improved since 11/2019 admission. Component Value Date/Time  
  Hemoglobin A1c 9.8 (H) 11/17/2019 11:00 AM  
  
 
Lab Results Component Value Date/Time Sodium 140 01/20/2020 04:13 AM  
 Potassium 4.2 01/20/2020 04:13 AM  
 Chloride 112 (H) 01/20/2020 04:13 AM  
 CO2 22 01/20/2020 04:13 AM  
 Anion gap 6 01/20/2020 04:13 AM  
 Glucose 156 (H) 01/20/2020 04:13 AM  
 BUN 11 01/20/2020 04:13 AM  
 Creatinine 0.40 (L) 01/20/2020 04:13 AM  
 BUN/Creatinine ratio 28 (H) 01/20/2020 04:13 AM  
 GFR est AA >60 01/20/2020 04:13 AM  
 GFR est non-AA >60 01/20/2020 04:13 AM  
 Calcium 8.5 01/20/2020 04:13 AM  
 
Anthropometrics: IBW :130lbs  %  %, BMI (calculated): 23.2 kg/m2 Wt Readings from Last 1 Encounters:  
11/19/19 59.4 kg (130 lb 15.3 oz) Ht Readings from Last 1 Encounters:  
11/17/19 5' 4\" (1.626 m)  
 Estimated Nutrition Needs:  6260 Kcals/day, Protein (g): 80 g Fluid (ml): 1900 ml Based on:   [x]? Actual BW      []? ABW     []? Adjusted BW   
    
Nutrition Interventions: 
Insulin recommendations Po supplements - Ensure BID Goal:  
Blood glucose will be within target range of  mg/dL by 1/23/20. Pt will consume > 75% meals by 1/25/20. Weight maintenance (+/- 1-2 kg) or weight gain of 1-2 lbs/week by 1/30/20. Nutrition Monitoring and Evaluation   
  
[x]? Monitor po intake on meal rounds 
[x]? Continue inpatient monitoring and intervention 
[]? Other: 
  
  
Nutrition Risk:  []? High                 [x]?   Moderate                 []?  Minimal/Uncompromised 
  
Rene Davis RD

## 2020-01-20 NOTE — PROGRESS NOTES
Progress Note Patient: Donte Simmons               Sex: male          DOA: 1/17/2020 YOB: 1950      Age:  81 y.o.        LOS:  LOS: 3 days CHIEF COMPLAINT:  UTI, Cerebral palsy Subjective:  
 
Patient awake, interactive No distress Objective:  
  
Visit Vitals /67 (BP 1 Location: Right arm, BP Patient Position: At rest) Pulse 70 Temp 97.1 °F (36.2 °C) Resp 16 Ht 5' 4\" (1.626 m) Wt 61.2 kg (135 lb) SpO2 96% BMI 23.17 kg/m² Physical Exam: 
Gen:  Patient is in no distress. No complaint HEENT and Neck:  PERRL, No cervical tenderness or masses Lungs:  Clear bilaterally. No rales, no wheeze. Normal effort. Heart:  Regular Rate and Rhythm. No murmur or gallop. No JVD. No edema. Abdomen:  Soft, non tender, no masses, no Hepatosplenomegally Extremities:  No digital clubbing, no cyanosis Neuro:  Alert and oriented, Normal affect, Cranial nerves intact, No sensory deficits Lab/Data Reviewed: All lab results for the last 24 hours reviewed. Assessment/Plan Principal Problem: 
  UTI (urinary tract infection) (6/11/2015) Overview: Urinary tract infection associated with indwelling urethral catheter Active Problems: Hypotension (6/12/2015) Sepsis ([address] (11/17/2019) Metabolic encephalopathy (9/18/7392) Cerebral palsy ([address] (6/12/2015) History of post-polio syndrome (6/12/2015) Cognitive developmental delay (5/12/2018) Plan: 
Continue with IV antibiotics Mobilize Working toward disposition

## 2020-01-20 NOTE — WOUND CARE
IP WOUND CONSULT Jori Carmona MEDICAL RECORD NUMBER:  [id number] AGE: 81 y.o. GENDER: male  : 1950 TODAY'S DATE:  2020 GENERAL  
 
[] Follow-up [x] New Consult Jori Carmona is a 81 y.o. male referred by:  
[] Physician 
[x] Nursing 
[] Other: PAST MEDICAL HISTORY Past Medical History:  
Diagnosis Date  Cerebral palsy ([address]  Hypertension  Mild mental retardation  Polio PAST SURGICAL HISTORY No past surgical history on file. FAMILY HISTORY No family history on file. SOCIAL HISTORY Social History Tobacco Use  Smoking status: Never Smoker  Smokeless tobacco: Never Used Substance Use Topics  Alcohol use: No  
 Drug use: Not on file ALLERGIES No Known Allergies MEDICATIONS No current facility-administered medications on file prior to encounter. Current Outpatient Medications on File Prior to Encounter Medication Sig Dispense Refill  NOVOLIN R REGULAR U-100 INSULN 100 unit/mL injection  fenofibrate micronized (LOFIBRA) 134 mg capsule  metFORMIN (GLUCOPHAGE) 500 mg tablet  terazosin (HYTRIN) 5 mg capsule Take 1 Cap by mouth daily. 30 Cap 0  
 finasteride (PROSCAR) 5 mg tablet Take 1 Tab by mouth daily. 30 Tab 0  cholecalciferol (VITAMIN D3) 1,000 unit cap Take 1,000 Units by mouth daily.  meclizine (ANTIVERT) 12.5 mg tablet Take 12.5 mg by mouth three (3) times daily as needed.  fenofibrate micronized (LOFIBRA) 67 mg capsule Take 67 mg by mouth every morning.  polyvinyl alcohol (LIQUIFILM TEARS) 1.4 % ophthalmic solution Administer 1 Drop to both eyes as needed.  fenofibrate nanocrystallized (TRICOR) 48 mg tablet Take 1 Tab by mouth daily. 30 Tab 0  
 polyvinyl alcohol (LIQUIFILM TEARS) 1.4 % ophthalmic solution Administer 1 Drop to both eyes four (4) times daily.  aspirin 81 mg chewable tablet Take 81 mg by mouth daily.  baclofen (LIORESAL) 10 mg tablet Take  by mouth three (3) times daily.  calcium carbonate (TUMS) 200 mg calcium (500 mg) chew Take 1 Tab by mouth three (3) times daily.  FENOFIBRATE MICRONIZED PO Take 200 mg by mouth daily.  metFORMIN (GLUCOPHAGE) 850 mg tablet Take 500 mg by mouth two (2) times daily (with meals).  calcium-vitamin D (OYSTER SHELL CALCIUM-VIT D3) 250-125 mg-unit tablet Take 1 Tab by mouth two (2) times a day.  polyethylene glycol (MIRALAX) 17 gram packet Take 17 g by mouth every other day.  camphor-methyl salicyl-menthol (SALONPAS) ptmd 1 Patch by Apply Externally route daily.  multivitamin, tx-iron-ca-min (THERAPY M) 9 mg iron-400 mcg tab tablet Take 1 Tab by mouth daily.  ergocalciferol (VITAMIN D2) 50,000 unit capsule Take 50,000 Units by mouth every Tuesday.  lisinopril (PRINIVIL, ZESTRIL) 2.5 mg tablet Take 2.5 mg by mouth daily.  acetaminophen (TYLENOL ARTHRITIS PAIN) 650 mg CR tablet Take 650 mg by mouth every six (6) hours as needed for Pain.  loperamide (IMMODIUM) 2 mg tablet Take 2 mg by mouth four (4) times daily as needed for Diarrhea.  promethazine (PHENERGAN) 25 mg tablet Take 25 mg by mouth every eight (8) hours as needed for Nausea. Wt Readings from Last 3 Encounters:  
01/19/20 61.2 kg (135 lb) 01/03/20 60.8 kg (134 lb)  
11/24/19 61.2 kg (135 lb) [unfilled] Visit Vitals /74 (BP 1 Location: Right arm, BP Patient Position: At rest) Pulse 84 Temp 97.4 °F (36.3 °C) Resp 16 Ht 5' 4\" (1.626 m) Wt 61.2 kg (135 lb) SpO2 100% BMI 23.17 kg/m² ASSESSMENT Ulcer Identification: 
Ulcer Type: pressure Contributing Factors: chronic pressure, decreased mobility and incontinence of stool Wound Sacrum Posterior (Active) Number of days: 618 Wound Sacral/coccyx 2 small open areas to sacral area. (Active) Number of days: 59  
   
 Wound Sacral/coccyx black, necrotic sacral wound (Active) Dressing Status Removed 1/20/2020 10:00 AM  
Dressing Type Foam;Gauze; Moist to dry;Packing 1/20/2020 10:00 AM  
Pressure Injury Stage 4 1/20/2020 10:00 AM  
Wound Length (cm) 5.5 cm 1/20/2020 10:00 AM  
Wound Width (cm) 3.5 cm 1/20/2020 10:00 AM  
Wound Depth (cm) 1.2 cm 1/20/2020 10:00 AM  
Wound Surface Area (cm^2) 19.25 cm^2 1/20/2020 10:00 AM  
Wound Volume (cm^3) 23.1 cm^3 1/20/2020 10:00 AM  
Condition of Base Eschar;Bone exposed;Slough; Other (comment) 1/20/2020 10:00 AM  
Tissue Type Percent Pink 60 1/20/2020 10:00 AM  
Tissue Type Percent Other (comment) 40 1/20/2020 10:00 AM  
Tunneling (cm) 3.3 cm 1/20/2020 10:00 AM  
Direction of Tunnel 2 o'clock 1/20/2020 10:00 AM  
Undermining (cm) 2.1 cm 1/20/2020 10:00 AM  
Direction of Undermining 5 o'clock;12 o'clock 1/20/2020 10:00 AM  
Drainage Amount Moderate 1/20/2020 10:00 AM  
Drainage Color Serosanguinous 1/20/2020 10:00 AM  
Wound Odor None 1/20/2020 10:00 AM  
Vandana-wound Assessment Dry; Intact 1/20/2020 10:00 AM  
Margins Unattached edges 1/20/2020 10:00 AM  
Cleansing and Cleansing Agents  Dermal wound cleanser 1/20/2020 10:00 AM  
Dressing Changed Changed/New 1/20/2020 10:00 AM  
Dressing Type Applied Foam;Moist to dry; Other (Comment) 1/20/2020 10:00 AM  
Number of days: 3 PLAN Skin Care & Pressure Relief Recommendations Speciality bed Envision wound surface on Versacare frame Minimize layers of linen Pads under patient to optimize support surface Turn/reposition approximately every 2 hours Pillow wedges Manage incontinence Please use in bed position system Promote continence; Skin Protective lotion/cream to buttocks and sacrum daily and as needed with incontinence care Offloading boots Physician/Provider notified: Yes Orders: Cleanse wound with dermal wound cleanser.  Moisten Mesalt rope and one sheet of Mesalt with Vashe wound cleanser and place in undermining with rope and wound bed with sheet. Cover with bordered Meplex foam. Change dressing daily. Low air loss bed ordered, Envision wound surface on Versacare Frame. Teaching completed with:  
[] Patient          
[] Family member      
[] Caregiver [x] Nursing- Renetta Lamar RN 
[] Other Patient/Caregiver Teaching: 
Level of patient/caregiver understanding able to:  
[] Indicates understanding       [x] Needs reinforcement 
[] Unsuccessful      [] Verbal Understanding 
[] Demonstrated understanding       [] No evidence of learning 
[] Refused teaching         [] N/A Electronically signed by Matteo Robert RN on 1/20/2020 at 12:13 PM

## 2020-01-20 NOTE — CDMP QUERY
Pt admitted with sepsis due to UTI. Noted documentation of \"Urinary tract infection associated with indwelling urethral catheter\" on ED provider note. If possible, please document in progress notes and discharge summary: 
 
? Urinary tract infection associated with indwelling urethral catheter confirmed 
? UTI not associated with holm catheter 
? Other, please specify ? Clinically unable to determine The medical record reflects the following: 
Risk Factors: holm catheter Clinical Indicators: UTI Treatment: holm catheter changed, receiving IV Vanc Thank you, [id number] [facility], [facility] 
[phone]

## 2020-01-21 NOTE — PROGRESS NOTES
Problem: Diabetes Self-Management Goal: *Disease process and treatment process Description Define diabetes and identify own type of diabetes; list 3 options for treating diabetes. Outcome: Progressing Towards Goal 
Goal: *Incorporating nutritional management into lifestyle Description Describe effect of type, amount and timing of food on blood glucose; list 3 methods for planning meals. Outcome: Progressing Towards Goal 
Goal: *Incorporating physical activity into lifestyle Description State effect of exercise on blood glucose levels. Outcome: Progressing Towards Goal 
Goal: *Developing strategies to promote health/change behavior Description Define the ABC's of diabetes; identify appropriate screenings, schedule and personal plan for screenings. Outcome: Progressing Towards Goal 
Goal: *Using medications safely Description State effect of diabetes medications on diabetes; name diabetes medication taking, action and side effects. Outcome: Progressing Towards Goal 
Goal: *Monitoring blood glucose, interpreting and using results Description Identify recommended blood glucose targets  and personal targets. Outcome: Progressing Towards Goal 
Goal: *Prevention, detection, treatment of acute complications Description List symptoms of hyper- and hypoglycemia; describe how to treat low blood sugar and actions for lowering  high blood glucose level. Outcome: Progressing Towards Goal 
Goal: *Prevention, detection and treatment of chronic complications Description Define the natural course of diabetes and describe the relationship of blood glucose levels to long term complications of diabetes. Outcome: Progressing Towards Goal 
Goal: *Developing strategies to address psychosocial issues Description Describe feelings about living with diabetes; identify support needed and support network Outcome: Progressing Towards Goal 
Goal: *Insulin pump training Outcome: Progressing Towards Goal 
 Goal: *Sick day guidelines Outcome: Progressing Towards Goal 
Goal: *Patient Specific Goal (EDIT GOAL, INSERT TEXT) Outcome: Progressing Towards Goal 
  
Problem: Patient Education: Go to Patient Education Activity Goal: Patient/Family Education Outcome: Progressing Towards Goal 
  
Problem: Pressure Injury - Risk of 
Goal: *Prevention of pressure injury Description Document Alex Scale and appropriate interventions in the flowsheet. Outcome: Progressing Towards Goal 
Note: Pressure Injury Interventions: 
Sensory Interventions: Pressure redistribution bed/mattress (bed type) Moisture Interventions: Check for incontinence Q2 hours and as needed, Internal/External urinary devices Activity Interventions: Pressure redistribution bed/mattress(bed type) Mobility Interventions: Pressure redistribution bed/mattress (bed type) Nutrition Interventions: Document food/fluid/supplement intake Friction and Shear Interventions: Transferring/repositioning devices, Foam dressings/transparent film/skin sealants Problem: Patient Education: Go to Patient Education Activity Goal: Patient/Family Education Outcome: Progressing Towards Goal 
  
Problem: Falls - Risk of 
Goal: *Absence of Falls Description Document Umer Pedraza Fall Risk and appropriate interventions in the flowsheet. Outcome: Progressing Towards Goal 
Note: Fall Risk Interventions: 
  
 
Mentation Interventions: Bed/chair exit alarm, More frequent rounding, Reorient patient Medication Interventions: Bed/chair exit alarm, Patient to call before getting OOB, Teach patient to arise slowly Elimination Interventions: Bed/chair exit alarm, Call light in reach, Patient to call for help with toileting needs Problem: Patient Education: Go to Patient Education Activity Goal: Patient/Family Education Outcome: Progressing Towards Goal 
  
Problem: Sepsis: Day of Diagnosis Goal: *Fluid resuscitation Outcome: Progressing Towards Goal 
Goal: *Paired blood cultures prior to first dose of antibiotic Outcome: Progressing Towards Goal 
Goal: *First dose of  appropriate antibiotic within 3 hours of arrival to ED, within 1 hour of arrival to ICU Outcome: Progressing Towards Goal 
Goal: *Lactic acid with first set of blood cultures Outcome: Progressing Towards Goal 
Goal: *Pneumococcal immunization (if eligible) Outcome: Progressing Towards Goal 
Goal: *Influenza immunization (if eligible) Outcome: Progressing Towards Goal 
Goal: Activity/Safety Outcome: Progressing Towards Goal 
Goal: Consults, if ordered Outcome: Progressing Towards Goal 
Goal: Diagnostic Test/Procedures Outcome: Progressing Towards Goal 
Goal: Nutrition/Diet Outcome: Progressing Towards Goal 
Goal: Discharge Planning Outcome: Progressing Towards Goal 
Goal: Medications Outcome: Progressing Towards Goal 
Goal: Respiratory Outcome: Progressing Towards Goal 
Goal: Treatments/Interventions/Procedures Outcome: Progressing Towards Goal 
Goal: Psychosocial 
Outcome: Progressing Towards Goal 
  
Problem: Pain Goal: *Control of Pain Outcome: Progressing Towards Goal 
Goal: *PALLIATIVE CARE:  Alleviation of Pain Outcome: Progressing Towards Goal 
  
Problem: Discharge Planning Goal: *Discharge to safe environment Outcome: Progressing Towards Goal 
  
Problem: Nutrition Deficit Goal: *Optimize nutritional status Outcome: Progressing Towards Goal

## 2020-01-21 NOTE — PROGRESS NOTES
Bedside and Verbal shift change report given to KrystleRN (oncoming nurse) by Ventura Sutton (offgoing nurse). Report included the following information SBAR, Kardex, MAR and Recent Results. 1600 patient vancomycin hung but was not administered will retime medication 2000 gave repost to Adali May

## 2020-01-21 NOTE — ROUTINE PROCESS
Received verbal bedside report from Celso Trinh [facility], [facility] care. Pt asleep, displays no visual signs of pain, No signs of distress noted at this time. Call bell within reach, bed in the lowest locked position.

## 2020-01-21 NOTE — PROGRESS NOTES
Problem: Diabetes Self-Management Goal: *Disease process and treatment process Description Define diabetes and identify own type of diabetes; list 3 options for treating diabetes. Outcome: Progressing Towards Goal 
Goal: *Incorporating nutritional management into lifestyle Description Describe effect of type, amount and timing of food on blood glucose; list 3 methods for planning meals. Outcome: Progressing Towards Goal 
Goal: *Incorporating physical activity into lifestyle Description State effect of exercise on blood glucose levels. Outcome: Progressing Towards Goal 
Goal: *Developing strategies to promote health/change behavior Description Define the ABC's of diabetes; identify appropriate screenings, schedule and personal plan for screenings. Outcome: Progressing Towards Goal 
Goal: *Using medications safely Description State effect of diabetes medications on diabetes; name diabetes medication taking, action and side effects. Outcome: Progressing Towards Goal 
Goal: *Monitoring blood glucose, interpreting and using results Description Identify recommended blood glucose targets  and personal targets. Outcome: Progressing Towards Goal 
Goal: *Prevention, detection, treatment of acute complications Description List symptoms of hyper- and hypoglycemia; describe how to treat low blood sugar and actions for lowering  high blood glucose level. Outcome: Progressing Towards Goal 
Goal: *Prevention, detection and treatment of chronic complications Description Define the natural course of diabetes and describe the relationship of blood glucose levels to long term complications of diabetes. Outcome: Progressing Towards Goal 
Goal: *Developing strategies to address psychosocial issues Description Describe feelings about living with diabetes; identify support needed and support network Outcome: Progressing Towards Goal 
Goal: *Insulin pump training Outcome: Progressing Towards Goal 
 Goal: *Sick day guidelines Outcome: Progressing Towards Goal 
Goal: *Patient Specific Goal (EDIT GOAL, INSERT TEXT) Outcome: Progressing Towards Goal 
  
Problem: Patient Education: Go to Patient Education Activity Goal: Patient/Family Education Outcome: Progressing Towards Goal 
  
Problem: Pressure Injury - Risk of 
Goal: *Prevention of pressure injury Description Document Alex Scale and appropriate interventions in the flowsheet. Outcome: Progressing Towards Goal 
Note: Pressure Injury Interventions: 
Sensory Interventions: Pressure redistribution bed/mattress (bed type) Moisture Interventions: Check for incontinence Q2 hours and as needed, Internal/External urinary devices Activity Interventions: Pressure redistribution bed/mattress(bed type) Mobility Interventions: Pressure redistribution bed/mattress (bed type) Nutrition Interventions: Document food/fluid/supplement intake Friction and Shear Interventions: Transferring/repositioning devices, Foam dressings/transparent film/skin sealants Problem: Patient Education: Go to Patient Education Activity Goal: Patient/Family Education Outcome: Progressing Towards Goal 
  
Problem: Falls - Risk of 
Goal: *Absence of Falls Description Document Emir Reas Fall Risk and appropriate interventions in the flowsheet. Outcome: Progressing Towards Goal 
Note: Fall Risk Interventions: 
  
 
Mentation Interventions: Bed/chair exit alarm, More frequent rounding, Reorient patient Medication Interventions: Bed/chair exit alarm, Patient to call before getting OOB, Teach patient to arise slowly Elimination Interventions: Bed/chair exit alarm, Call light in reach, Patient to call for help with toileting needs Problem: Patient Education: Go to Patient Education Activity Goal: Patient/Family Education Outcome: Progressing Towards Goal 
  
Problem: Sepsis: Day of Diagnosis Goal: *Fluid resuscitation Outcome: Progressing Towards Goal 
Goal: *Paired blood cultures prior to first dose of antibiotic Outcome: Progressing Towards Goal 
Goal: *First dose of  appropriate antibiotic within 3 hours of arrival to ED, within 1 hour of arrival to ICU Outcome: Progressing Towards Goal 
Goal: *Lactic acid with first set of blood cultures Outcome: Progressing Towards Goal 
Goal: *Pneumococcal immunization (if eligible) Outcome: Progressing Towards Goal 
Goal: *Influenza immunization (if eligible) Outcome: Progressing Towards Goal 
Goal: Activity/Safety Outcome: Progressing Towards Goal 
Goal: Consults, if ordered Outcome: Progressing Towards Goal 
Goal: Diagnostic Test/Procedures Outcome: Progressing Towards Goal 
Goal: Nutrition/Diet Outcome: Progressing Towards Goal 
Goal: Discharge Planning Outcome: Progressing Towards Goal 
Goal: Medications Outcome: Progressing Towards Goal 
Goal: Respiratory Outcome: Progressing Towards Goal 
Goal: Treatments/Interventions/Procedures Outcome: Progressing Towards Goal 
Goal: Psychosocial 
Outcome: Progressing Towards Goal 
  
Problem: Pain Goal: *Control of Pain Outcome: Progressing Towards Goal 
Goal: *PALLIATIVE CARE:  Alleviation of Pain Outcome: Progressing Towards Goal 
  
Problem: Discharge Planning Goal: *Discharge to safe environment Outcome: Progressing Towards Goal 
  
Problem: Nutrition Deficit Goal: *Optimize nutritional status Outcome: Progressing Towards Goal

## 2020-01-21 NOTE — PROGRESS NOTES
1945 Bedside, Verbal and Written shift change report given to [address] Nichol Gallego (oncoming nurse) by Santana Epley (offgoing nurse). Report included the following information SBAR, Kardex, Intake/Output, MAR and Recent Results. Patient alert to self and place, some difficulty with situation and time but easily reoriented. 2200 PM medications administered, pt tolerated with ease, will continue to monitor. 0000 Shift reassessment, pt condition unchanged, will continue to monitor. 0400  Shift reassessment, pt sleeping between care, will continue to monitor. 0645 sacral wound care and dressing change completed. 0720 Bedside, Verbal and Written shift change report given to [address] [place] [address] (oncoming nurse) by Norm Pardo RN (offgoing nurse). Report included the following information SBAR, Kardex, Intake/Output, MAR and Recent Results. Skin assessment completed.

## 2020-01-22 NOTE — PROGRESS NOTES
Problem: Diabetes Self-Management Goal: *Disease process and treatment process Description Define diabetes and identify own type of diabetes; list 3 options for treating diabetes. Outcome: Progressing Towards Goal 
Goal: *Incorporating nutritional management into lifestyle Description Describe effect of type, amount and timing of food on blood glucose; list 3 methods for planning meals. Outcome: Progressing Towards Goal 
Goal: *Incorporating physical activity into lifestyle Description State effect of exercise on blood glucose levels. Outcome: Progressing Towards Goal 
Goal: *Developing strategies to promote health/change behavior Description Define the ABC's of diabetes; identify appropriate screenings, schedule and personal plan for screenings. Outcome: Progressing Towards Goal 
Goal: *Using medications safely Description State effect of diabetes medications on diabetes; name diabetes medication taking, action and side effects. Outcome: Progressing Towards Goal 
Goal: *Monitoring blood glucose, interpreting and using results Description Identify recommended blood glucose targets  and personal targets. Outcome: Progressing Towards Goal 
Goal: *Prevention, detection, treatment of acute complications Description List symptoms of hyper- and hypoglycemia; describe how to treat low blood sugar and actions for lowering  high blood glucose level. Outcome: Progressing Towards Goal 
Goal: *Prevention, detection and treatment of chronic complications Description Define the natural course of diabetes and describe the relationship of blood glucose levels to long term complications of diabetes. Outcome: Progressing Towards Goal 
Goal: *Developing strategies to address psychosocial issues Description Describe feelings about living with diabetes; identify support needed and support network Outcome: Progressing Towards Goal 
Goal: *Insulin pump training Outcome: Progressing Towards Goal 
 Goal: *Sick day guidelines Outcome: Progressing Towards Goal 
Goal: *Patient Specific Goal (EDIT GOAL, INSERT TEXT) Outcome: Progressing Towards Goal 
  
Problem: Patient Education: Go to Patient Education Activity Goal: Patient/Family Education Outcome: Progressing Towards Goal 
  
Problem: Pressure Injury - Risk of 
Goal: *Prevention of pressure injury Description Document Alex Scale and appropriate interventions in the flowsheet. Outcome: Progressing Towards Goal 
Note: Pressure Injury Interventions: 
Sensory Interventions: Pressure redistribution bed/mattress (bed type) Moisture Interventions: Check for incontinence Q2 hours and as needed Activity Interventions: Pressure redistribution bed/mattress(bed type) Mobility Interventions: Float heels, Pressure redistribution bed/mattress (bed type), Turn and reposition approx. every two hours(pillow and wedges) Nutrition Interventions: Document food/fluid/supplement intake Friction and Shear Interventions: Transferring/repositioning devices, HOB 30 degrees or less Problem: Patient Education: Go to Patient Education Activity Goal: Patient/Family Education Outcome: Progressing Towards Goal 
  
Problem: Falls - Risk of 
Goal: *Absence of Falls Description Document Michael Avery Fall Risk and appropriate interventions in the flowsheet. Outcome: Progressing Towards Goal 
Note: Fall Risk Interventions: 
  
 
Mentation Interventions: Bed/chair exit alarm, Door open when patient unattended, More frequent rounding, Reorient patient Medication Interventions: Bed/chair exit alarm, Patient to call before getting OOB Elimination Interventions: Bed/chair exit alarm, Call light in reach, Patient to call for help with toileting needs Problem: Patient Education: Go to Patient Education Activity Goal: Patient/Family Education Outcome: Progressing Towards Goal 
  
Problem: Sepsis: Day of Diagnosis Goal: *Fluid resuscitation Outcome: Progressing Towards Goal 
Goal: *Paired blood cultures prior to first dose of antibiotic Outcome: Progressing Towards Goal 
Goal: *First dose of  appropriate antibiotic within 3 hours of arrival to ED, within 1 hour of arrival to ICU Outcome: Progressing Towards Goal 
Goal: *Lactic acid with first set of blood cultures Outcome: Progressing Towards Goal 
Goal: *Pneumococcal immunization (if eligible) Outcome: Progressing Towards Goal 
Goal: *Influenza immunization (if eligible) Outcome: Progressing Towards Goal 
Goal: Activity/Safety Outcome: Progressing Towards Goal 
Goal: Consults, if ordered Outcome: Progressing Towards Goal 
Goal: Diagnostic Test/Procedures Outcome: Progressing Towards Goal 
Goal: Nutrition/Diet Outcome: Progressing Towards Goal 
Goal: Discharge Planning Outcome: Progressing Towards Goal 
Goal: Medications Outcome: Progressing Towards Goal 
Goal: Respiratory Outcome: Progressing Towards Goal 
Goal: Treatments/Interventions/Procedures Outcome: Progressing Towards Goal 
Goal: Psychosocial 
Outcome: Progressing Towards Goal 
  
Problem: Pain Goal: *Control of Pain Outcome: Progressing Towards Goal 
Goal: *PALLIATIVE CARE:  Alleviation of Pain Outcome: Progressing Towards Goal 
  
Problem: Discharge Planning Goal: *Discharge to safe environment Outcome: Progressing Towards Goal 
  
Problem: Nutrition Deficit Goal: *Optimize nutritional status Outcome: Progressing Towards Goal 
  
Problem: Diabetes Self-Management Goal: *Disease process and treatment process Description Define diabetes and identify own type of diabetes; list 3 options for treating diabetes. Outcome: Progressing Towards Goal 
Goal: *Incorporating nutritional management into lifestyle Description Describe effect of type, amount and timing of food on blood glucose; list 3 methods for planning meals. Outcome: Progressing Towards Goal 
Goal: *Incorporating physical activity into lifestyle Description State effect of exercise on blood glucose levels. Outcome: Progressing Towards Goal 
Goal: *Developing strategies to promote health/change behavior Description Define the ABC's of diabetes; identify appropriate screenings, schedule and personal plan for screenings. Outcome: Progressing Towards Goal 
Goal: *Using medications safely Description State effect of diabetes medications on diabetes; name diabetes medication taking, action and side effects. Outcome: Progressing Towards Goal 
Goal: *Monitoring blood glucose, interpreting and using results Description Identify recommended blood glucose targets  and personal targets. Outcome: Progressing Towards Goal 
Goal: *Prevention, detection, treatment of acute complications Description List symptoms of hyper- and hypoglycemia; describe how to treat low blood sugar and actions for lowering  high blood glucose level. Outcome: Progressing Towards Goal 
Goal: *Prevention, detection and treatment of chronic complications Description Define the natural course of diabetes and describe the relationship of blood glucose levels to long term complications of diabetes. Outcome: Progressing Towards Goal 
Goal: *Developing strategies to address psychosocial issues Description Describe feelings about living with diabetes; identify support needed and support network Outcome: Progressing Towards Goal 
Goal: *Insulin pump training Outcome: Progressing Towards Goal 
Goal: *Sick day guidelines Outcome: Progressing Towards Goal 
Goal: *Patient Specific Goal (EDIT GOAL, INSERT TEXT) Outcome: Progressing Towards Goal 
  
Problem: Patient Education: Go to Patient Education Activity Goal: Patient/Family Education Outcome: Progressing Towards Goal 
  
Problem: Pressure Injury - Risk of 
Goal: *Prevention of pressure injury Description Document Alex Scale and appropriate interventions in the flowsheet.  
Outcome: Progressing Towards Goal 
 Note: Pressure Injury Interventions: 
Sensory Interventions: Pressure redistribution bed/mattress (bed type) Moisture Interventions: Check for incontinence Q2 hours and as needed Activity Interventions: Pressure redistribution bed/mattress(bed type) Mobility Interventions: Float heels, Pressure redistribution bed/mattress (bed type), Turn and reposition approx. every two hours(pillow and wedges) Nutrition Interventions: Document food/fluid/supplement intake Friction and Shear Interventions: Transferring/repositioning devices, HOB 30 degrees or less Problem: Patient Education: Go to Patient Education Activity Goal: Patient/Family Education Outcome: Progressing Towards Goal 
  
Problem: Falls - Risk of 
Goal: *Absence of Falls Description Document Qamar Ahumada Fall Risk and appropriate interventions in the flowsheet. Outcome: Progressing Towards Goal 
Note: Fall Risk Interventions: 
  
 
Mentation Interventions: Bed/chair exit alarm, Door open when patient unattended, More frequent rounding, Reorient patient Medication Interventions: Bed/chair exit alarm, Patient to call before getting OOB Elimination Interventions: Bed/chair exit alarm, Call light in reach, Patient to call for help with toileting needs Problem: Patient Education: Go to Patient Education Activity Goal: Patient/Family Education Outcome: Progressing Towards Goal 
  
Problem: Sepsis: Day of Diagnosis Goal: *Fluid resuscitation Outcome: Progressing Towards Goal 
Goal: *Paired blood cultures prior to first dose of antibiotic Outcome: Progressing Towards Goal 
Goal: *First dose of  appropriate antibiotic within 3 hours of arrival to ED, within 1 hour of arrival to ICU Outcome: Progressing Towards Goal 
Goal: *Lactic acid with first set of blood cultures Outcome: Progressing Towards Goal 
Goal: *Pneumococcal immunization (if eligible) Outcome: Progressing Towards Goal 
 Goal: *Influenza immunization (if eligible) Outcome: Progressing Towards Goal 
Goal: Activity/Safety Outcome: Progressing Towards Goal 
Goal: Consults, if ordered Outcome: Progressing Towards Goal 
Goal: Diagnostic Test/Procedures Outcome: Progressing Towards Goal 
Goal: Nutrition/Diet Outcome: Progressing Towards Goal 
Goal: Discharge Planning Outcome: Progressing Towards Goal 
Goal: Medications Outcome: Progressing Towards Goal 
Goal: Respiratory Outcome: Progressing Towards Goal 
Goal: Treatments/Interventions/Procedures Outcome: Progressing Towards Goal 
Goal: Psychosocial 
Outcome: Progressing Towards Goal 
  
Problem: Pain Goal: *Control of Pain Outcome: Progressing Towards Goal 
Goal: *PALLIATIVE CARE:  Alleviation of Pain Outcome: Progressing Towards Goal 
  
Problem: Discharge Planning Goal: *Discharge to safe environment Outcome: Progressing Towards Goal 
  
Problem: Nutrition Deficit Goal: *Optimize nutritional status Outcome: Progressing Towards Goal

## 2020-01-22 NOTE — ROUTINE PROCESS
Received verbal bedside report from [facility], [facility] care. Pt awake, alert and oriented x 3, denies pain, skin assessment completed, pt turned to right side. No signs of distress noted at this time. Call bell within reach, bed in the lowest locked position.

## 2020-01-22 NOTE — PROGRESS NOTES
Chart reviewed. Plan remains to return to [facility] when medically stable. Spoke with Gallito Henley in admissions @ [facility], states they are working on guardianship for pt. Will cont to follow for further needs. Jamia Ortiz RN,ext [phone].

## 2020-01-22 NOTE — PROGRESS NOTES
1920 Bedside, Verbal and Written shift change report given to 1921 Nichol Gallego (oncoming nurse) by Real Wynne (offgoing nurse). Report included the following information SBAR, Kardex, Intake/Output, MAR and Recent Results. Patient alert, oriented x3, requires reminders for situation. Resting comfortably in specialty bed. 
   
2200 PM medications administered, pt tolerated with ease, will continue to monitor. 0000 Shift reassessment, pt condition unchanged, will continue to monitor. 0400  Shift reassessment, pt sleeping between care, will continue to monitor. 0700 wound care sacral dressing change completed. Patient tolerated well. 0730 Bedside, Verbal and Written shift change report given to Real Wynne (oncoming nurse) by Vahe Cavanaugh RN (offgoing nurse). Report included the following information SBAR, Kardex, Intake/Output, MAR and Recent Results. Skin assessment completed.

## 2020-01-22 NOTE — DIABETES MGMT
Glycemic Control Plan of Care 
 
lantus increased to 6 units daily with corrective lispro - very insulin resistant. Tip Rod MPH RN 
Pager [phone] Office [phone]

## 2020-01-23 NOTE — DIABETES MGMT
Glycemic Control Plan of Care 
 
lantus increased to 6 units daily. Mealtime lispro 3 units added with breakfast. 
 
 
 
Terry Cherry MPH RN 
Pager [phone] Office [phone]

## 2020-01-23 NOTE — PROGRESS NOTES
Problem: Diabetes Self-Management Goal: *Disease process and treatment process Description Define diabetes and identify own type of diabetes; list 3 options for treating diabetes. Outcome: Progressing Towards Goal 
Goal: *Incorporating nutritional management into lifestyle Description Describe effect of type, amount and timing of food on blood glucose; list 3 methods for planning meals. Outcome: Progressing Towards Goal 
Goal: *Incorporating physical activity into lifestyle Description State effect of exercise on blood glucose levels. Outcome: Progressing Towards Goal 
Goal: *Developing strategies to promote health/change behavior Description Define the ABC's of diabetes; identify appropriate screenings, schedule and personal plan for screenings. Outcome: Progressing Towards Goal 
Goal: *Using medications safely Description State effect of diabetes medications on diabetes; name diabetes medication taking, action and side effects. Outcome: Progressing Towards Goal 
Goal: *Monitoring blood glucose, interpreting and using results Description Identify recommended blood glucose targets  and personal targets. Outcome: Progressing Towards Goal 
Goal: *Prevention, detection, treatment of acute complications Description List symptoms of hyper- and hypoglycemia; describe how to treat low blood sugar and actions for lowering  high blood glucose level. Outcome: Progressing Towards Goal 
Goal: *Prevention, detection and treatment of chronic complications Description Define the natural course of diabetes and describe the relationship of blood glucose levels to long term complications of diabetes. Outcome: Progressing Towards Goal 
Goal: *Developing strategies to address psychosocial issues Description Describe feelings about living with diabetes; identify support needed and support network Outcome: Progressing Towards Goal 
Goal: *Insulin pump training Outcome: Progressing Towards Goal 
 Goal: *Sick day guidelines Outcome: Progressing Towards Goal 
Goal: *Patient Specific Goal (EDIT GOAL, INSERT TEXT) Outcome: Progressing Towards Goal 
  
Problem: Patient Education: Go to Patient Education Activity Goal: Patient/Family Education Outcome: Progressing Towards Goal 
  
Problem: Pressure Injury - Risk of 
Goal: *Prevention of pressure injury Description Document Alex Scale and appropriate interventions in the flowsheet. Outcome: Progressing Towards Goal 
Note: Pressure Injury Interventions: 
Sensory Interventions: Pressure redistribution bed/mattress (bed type) Moisture Interventions: Check for incontinence Q2 hours and as needed, Internal/External urinary devices Activity Interventions: Pressure redistribution bed/mattress(bed type) Mobility Interventions: Float heels, Pressure redistribution bed/mattress (bed type) Nutrition Interventions: Document food/fluid/supplement intake Friction and Shear Interventions: Transferring/repositioning devices, Lift sheet Problem: Patient Education: Go to Patient Education Activity Goal: Patient/Family Education Outcome: Progressing Towards Goal 
  
Problem: Falls - Risk of 
Goal: *Absence of Falls Description Document Joselyn Kochbetsy Fall Risk and appropriate interventions in the flowsheet. Outcome: Progressing Towards Goal 
Note: Fall Risk Interventions: 
  
 
Mentation Interventions: Bed/chair exit alarm, Door open when patient unattended, Reorient patient, More frequent rounding Medication Interventions: Bed/chair exit alarm Elimination Interventions: Bed/chair exit alarm, Call light in reach, Toileting schedule/hourly rounds Problem: Patient Education: Go to Patient Education Activity Goal: Patient/Family Education Outcome: Progressing Towards Goal 
  
Problem: Sepsis: Day of Diagnosis Goal: *Fluid resuscitation Outcome: Progressing Towards Goal 
 Goal: *Paired blood cultures prior to first dose of antibiotic Outcome: Progressing Towards Goal 
Goal: *First dose of  appropriate antibiotic within 3 hours of arrival to ED, within 1 hour of arrival to ICU Outcome: Progressing Towards Goal 
Goal: *Lactic acid with first set of blood cultures Outcome: Progressing Towards Goal 
Goal: *Pneumococcal immunization (if eligible) Outcome: Progressing Towards Goal 
Goal: *Influenza immunization (if eligible) Outcome: Progressing Towards Goal 
Goal: Activity/Safety Outcome: Progressing Towards Goal 
Goal: Consults, if ordered Outcome: Progressing Towards Goal 
Goal: Diagnostic Test/Procedures Outcome: Progressing Towards Goal 
Goal: Nutrition/Diet Outcome: Progressing Towards Goal 
Goal: Discharge Planning Outcome: Progressing Towards Goal 
Goal: Medications Outcome: Progressing Towards Goal 
Goal: Respiratory Outcome: Progressing Towards Goal 
Goal: Treatments/Interventions/Procedures Outcome: Progressing Towards Goal 
Goal: Psychosocial 
Outcome: Progressing Towards Goal 
  
Problem: Pain Goal: *Control of Pain Outcome: Progressing Towards Goal 
Goal: *PALLIATIVE CARE:  Alleviation of Pain Outcome: Progressing Towards Goal 
  
Problem: Discharge Planning Goal: *Discharge to safe environment Outcome: Progressing Towards Goal 
  
Problem: Nutrition Deficit Goal: *Optimize nutritional status Outcome: Progressing Towards Goal

## 2020-01-23 NOTE — PROGRESS NOTES
conducted a Follow up consultation and Spiritual Assessment for Radha Robertson, who is a 81 y.o.,male. The  provided the following Interventions: 
Continued the relationship of care and support. Listened empathically. Offered prayer and assurance of continued prayer on patients behalf. Chart reviewed. The following outcomes were achieved: 
Patient expressed gratitude for pastoral care visit. Assessment: 
There are no further spiritual or Lutheran issues which require Spiritual Care Services interventions at this time. Plan: 
Chaplains will continue to follow and will provide pastoral care on an as needed/requested basis.  recommends bedside caregivers page  on duty if patient shows signs of acute spiritual or emotional distress. [address] [facility] Staff  Spiritual Care  
[phone]

## 2020-01-23 NOTE — ROUTINE PROCESS
Assumed care of pt report received from [facility]. Pt awake, alert. No verbal distress noted. Bed in lowest position & wheels locked. Call bell within reach. [id number] - Bedside and Verbal shift change report given to Macie Suarez (oncoming nurse) by Wendy Negrete RN BSN ( off going Nurse ) inclusive of SBAR and plan of care, Intake & Output.

## 2020-01-23 NOTE — PROGRESS NOTES
Problem: Diabetes Self-Management Goal: *Disease process and treatment process Description Define diabetes and identify own type of diabetes; list 3 options for treating diabetes. Outcome: Progressing Towards Goal 
Goal: *Using medications safely Description State effect of diabetes medications on diabetes; name diabetes medication taking, action and side effects. Outcome: Progressing Towards Goal 
Goal: *Monitoring blood glucose, interpreting and using results Description Identify recommended blood glucose targets  and personal targets. Outcome: Progressing Towards Goal 
  
Problem: Pressure Injury - Risk of 
Goal: *Prevention of pressure injury Description Document Alex Scale and appropriate interventions in the flowsheet. Outcome: Progressing Towards Goal 
Note: Pressure Injury Interventions: 
Sensory Interventions: Assess changes in LOC, Assess need for specialty bed, Check visual cues for pain, Keep linens dry and wrinkle-free, Monitor skin under medical devices, Pressure redistribution bed/mattress (bed type), Turn and reposition approx. every two hours (pillows and wedges if needed) Moisture Interventions: Absorbent underpads, Apply protective barrier, creams and emollients, Check for incontinence Q2 hours and as needed, Internal/External urinary devices, Maintain skin hydration (lotion/cream), Offer toileting Q_hr Activity Interventions: Increase time out of bed, Pressure redistribution bed/mattress(bed type) Mobility Interventions: HOB 30 degrees or less, Pressure redistribution bed/mattress (bed type), PT/OT evaluation, Turn and reposition approx. every two hours(pillow and wedges) Nutrition Interventions: Document food/fluid/supplement intake Friction and Shear Interventions: HOB 30 degrees or less, Minimize layers, Lift team/patient mobility team, Transferring/repositioning devices Problem: Falls - Risk of 
Goal: *Absence of Falls Description Document Umer Pedraza Fall Risk and appropriate interventions in the flowsheet. Outcome: Progressing Towards Goal 
Note: Fall Risk Interventions: 
  
 
Mentation Interventions: Adequate sleep, hydration, pain control, Bed/chair exit alarm, Evaluate medications/consider consulting pharmacy, More frequent rounding, Room close to nurse's station, Update white board, Toileting rounds Medication Interventions: Evaluate medications/consider consulting pharmacy, Patient to call before getting OOB, Teach patient to arise slowly Elimination Interventions: Call light in reach, Stay With Me (per policy), Toileting schedule/hourly rounds Problem: Sepsis: Day of Diagnosis Goal: *Fluid resuscitation Outcome: Progressing Towards Goal 
Goal: Activity/Safety Outcome: Progressing Towards Goal 
  
Problem: Pain Goal: *Control of Pain Outcome: Progressing Towards Goal

## 2020-01-23 NOTE — DISCHARGE INSTRUCTIONS
DISCHARGE SUMMARY from Nurse    PATIENT INSTRUCTIONS:    After general anesthesia or intravenous sedation, for 24 hours or while taking prescription Narcotics:  · Limit your activities  · Do not drive and operate hazardous machinery  · Do not make important personal or business decisions  · Do  not drink alcoholic beverages  · If you have not urinated within 8 hours after discharge, please contact your surgeon on call. Report the following to your surgeon:  · Excessive pain, swelling, redness or odor of or around the surgical area  · Temperature over 100.5  · Nausea and vomiting lasting longer than 4 hours or if unable to take medications  · Any signs of decreased circulation or nerve impairment to extremity: change in color, persistent  numbness, tingling, coldness or increase pain  · Any questions    What to do at Home:  Recommended activity: Activity as tolerated and PT/OT Eval and Treat    If you experience any of the following symptoms fever, chills, or change in urine odor/color, please follow up with rehab staff/primary care physician. *  Please give a list of your current medications to your Primary Care Provider. *  Please update this list whenever your medications are discontinued, doses are      changed, or new medications (including over-the-counter products) are added. *  Please carry medication information at all times in case of emergency situations. These are general instructions for a healthy lifestyle:    No smoking/ No tobacco products/ Avoid exposure to second hand smoke  Surgeon General's Warning:  Quitting smoking now greatly reduces serious risk to your health.     Obesity, smoking, and sedentary lifestyle greatly increases your risk for illness    A healthy diet, regular physical exercise & weight monitoring are important for maintaining a healthy lifestyle    You may be retaining fluid if you have a history of heart failure or if you experience any of the following symptoms: Weight gain of 3 pounds or more overnight or 5 pounds in a week, increased swelling in our hands or feet or shortness of breath while lying flat in bed. Please call your doctor as soon as you notice any of these symptoms; do not wait until your next office visit. The discharge information has been reviewed with the patient. The patient verbalized understanding. Discharge medications reviewed with the patient and appropriate educational materials and side effects teaching were provided. Patient discharged without removing armband and transfered to another healthcare acute, sub acute , or extended care facility. Informed of privacy risks if armband lost or stolen.

## 2020-01-23 NOTE — DISCHARGE SUMMARY
Discharge Summary Patient: Roddie Kussmaul               Sex: male          DOA: 1/17/2020 YOB: 1950      Age:  81 y.o.        LOS:  LOS: 5 days Admit Date: 1/17/2020 Discharge Date: 1/23/2020 Admission Diagnoses: Sepsis ([facility].) [A41.9] Discharge Diagnoses:   
Problem List as of 1/23/2020 Never Reviewed Codes Class Noted - Resolved Small bowel obstruction ([facility].) ICD-10-CM: X56.178 ICD-9-CM: 560.9  5/12/2018 - Present * (Principal) UTI (urinary tract infection) ICD-10-CM: N39.0 ICD-9-CM: 599.0  6/11/2015 - Present Overview Signed 1/21/2020 12:49 PM by Cyndee Levy MD  
  Urinary tract infection associated with indwelling urethral catheter Metabolic encephalopathy PMM-10-CH: G93.41 
ICD-9-CM: 348.31  1/18/2020 - Present Sepsis ([facility] ICD-10-CM: A41.9 ICD-9-CM: 038.9, 995.91  11/17/2019 - Present Hypotension ICD-10-CM: I95.9 ICD-9-CM: 458.9  6/12/2015 - Present Cognitive developmental delay ICD-10-CM: F81.9 ICD-9-CM: 315.9  5/12/2018 - Present Cerebral palsy (HCC) (Chronic) ICD-10-CM: G80.9 ICD-9-CM: 343.9  6/12/2015 - Present History of post-polio syndrome (Chronic) ICD-10-CM: B99.18 
ICD-9-CM: V12.02  6/12/2015 - Present Uncontrolled type 2 diabetes mellitus with hyperosmolar nonketotic hyperglycemia (HCC) ICD-10-CM: E11.00 ICD-9-CM: 250.22  11/17/2019 - Present Hydronephrosis ICD-10-CM: N13.30 ICD-9-CM: 836  11/17/2019 - Present Hypernatremia ICD-10-CM: E87.0 ICD-9-CM: 276.0  11/17/2019 - Present Elevated troponin ICD-10-CM: R79.89 ICD-9-CM: 790.6  11/17/2019 - Present Urinary retention ICD-10-CM: R33.9 ICD-9-CM: 788.20  11/17/2019 - Present MATTIE (acute kidney injury) ([facility] ICD-10-CM: N17.9 ICD-9-CM: 584.9  11/17/2019 - Present Tachycardia ICD-10-CM: R00.0 ICD-9-CM: 785.0  6/12/2015 - Present Lactic acidosis ICD-10-CM: E87.2 ICD-9-CM: 276.2  6/12/2015 - Present Hypertension (Chronic) ICD-10-CM: I10 
ICD-9-CM: 401.9  6/12/2015 - Present Mild intellectual disability (Chronic) ICD-10-CM: F70 
ICD-9-CM: 317  6/12/2015 - Present Discharge Condition:  Improved Hospital Course:  Mr Dariel Pierre is a 81year old male who has known cerebral palsy and has chronic indwelling holm catheter. In the ER he was found to have UTI and was admitted for ongoing management. Mr Dariel Pierre was treated with IV antibiotics. His UTI has improved and he has returned to his baseline mental status. His holm catheter has been changed. He will be able to transfer back later today. Consults:  
 
Significant Diagnostic Studies:  
 
Discharge Medications:    
Current Discharge Medication List  
  
CONTINUE these medications which have NOT CHANGED Details NOVOLIN R REGULAR U-100 INSULN 100 unit/mL injection   
  
!! fenofibrate micronized (LOFIBRA) 134 mg capsule   
  
!! metFORMIN (GLUCOPHAGE) 500 mg tablet   
  
terazosin (HYTRIN) 5 mg capsule Take 1 Cap by mouth daily. Qty: 30 Cap, Refills: 0  
  
finasteride (PROSCAR) 5 mg tablet Take 1 Tab by mouth daily. Qty: 30 Tab, Refills: 0  
  
cholecalciferol (VITAMIN D3) 1,000 unit cap Take 1,000 Units by mouth daily. meclizine (ANTIVERT) 12.5 mg tablet Take 12.5 mg by mouth three (3) times daily as needed. !! fenofibrate micronized (LOFIBRA) 67 mg capsule Take 67 mg by mouth every morning. !! polyvinyl alcohol (LIQUIFILM TEARS) 1.4 % ophthalmic solution Administer 1 Drop to both eyes as needed. fenofibrate nanocrystallized (TRICOR) 48 mg tablet Take 1 Tab by mouth daily. Qty: 30 Tab, Refills: 0  
  
!! polyvinyl alcohol (LIQUIFILM TEARS) 1.4 % ophthalmic solution Administer 1 Drop to both eyes four (4) times daily. aspirin 81 mg chewable tablet Take 81 mg by mouth daily. baclofen (LIORESAL) 10 mg tablet Take  by mouth three (3) times daily. calcium carbonate (TUMS) 200 mg calcium (500 mg) chew Take 1 Tab by mouth three (3) times daily. !! FENOFIBRATE MICRONIZED PO Take 200 mg by mouth daily. !! metFORMIN (GLUCOPHAGE) 850 mg tablet Take 500 mg by mouth two (2) times daily (with meals). calcium-vitamin D (OYSTER SHELL CALCIUM-VIT D3) 250-125 mg-unit tablet Take 1 Tab by mouth two (2) times a day. polyethylene glycol (MIRALAX) 17 gram packet Take 17 g by mouth every other day. camphor-methyl salicyl-menthol (SALONPAS) ptmd 1 Patch by Apply Externally route daily. multivitamin, tx-iron-ca-min (THERAPY M) 9 mg iron-400 mcg tab tablet Take 1 Tab by mouth daily. ergocalciferol (VITAMIN D2) 50,000 unit capsule Take 50,000 Units by mouth every Tuesday. lisinopril (PRINIVIL, ZESTRIL) 2.5 mg tablet Take 2.5 mg by mouth daily. acetaminophen (TYLENOL ARTHRITIS PAIN) 650 mg CR tablet Take 650 mg by mouth every six (6) hours as needed for Pain. loperamide (IMMODIUM) 2 mg tablet Take 2 mg by mouth four (4) times daily as needed for Diarrhea. promethazine (PHENERGAN) 25 mg tablet Take 25 mg by mouth every eight (8) hours as needed for Nausea. !! - Potential duplicate medications found. Please discuss with provider. Activity: Activity as tolerated Diet: Diabetic Diet Wound Care: None needed Follow-up: Follow up with staff MD on arrival. 
 
Total Discharge time 35 minutes

## 2020-04-28 PROBLEM — A41.9 SEPTIC SHOCK (HCC): Status: ACTIVE | Noted: 2020-01-01

## 2020-04-28 PROBLEM — N17.9 STAGE 2 ACUTE KIDNEY INJURY (HCC): Status: ACTIVE | Noted: 2020-01-01

## 2020-04-28 PROBLEM — N39.0 COMPLICATED UTI (URINARY TRACT INFECTION): Status: ACTIVE | Noted: 2020-01-01

## 2020-04-28 PROBLEM — R65.21 SEPTIC SHOCK (HCC): Status: ACTIVE | Noted: 2020-01-01

## 2020-04-28 PROBLEM — L89.154 SACRAL DECUBITUS ULCER, STAGE IV (HCC): Status: ACTIVE | Noted: 2020-01-01

## 2020-04-28 NOTE — CONSULTS
[facility] Pulmonary, Critical Care, and Sleep Medicine Name: Mason Faust MRN: [id number] : 1950 Hospital: [address] [place] [address] Date: 2020  Consulted By:   
 
Pulmonary & Critical Care Subjective/History:  
Patient is a 78 y.o. male with HTN, DM, Post-polio syndrome, Cerebral palsy, Chronic holm, Mental retardation, and Sacral decubitus ulcer, presented to the ER from [facility] after he had labs that showed elevated potassium and BUN. He supposedly reported SOB to ER staff upon arrival. Upon arrival to the ER, his chronic holm was not working properly. It was removed with 400cc of urine draining. New holm placed. Patient found to be in septic shock due to UTI and Pna. A central line was placed. He was given 3L NS bolus and started on Levophed and broad spectrum antibiotics. PCCM consulted. Patient is awake/alert during my exam, frequently grinding his teeth, answers all questions with \"yes\" or \"good\". He is not oriented and while he does not follow commands, he is calm and cooperative. ER RN says he ate a breakfast tray this AM but she is not sure if he required assistance. He reportedly has a sacral decubitus ulcer but it has yet to be examined. The patient is critically ill and can not provide additional history due to Unable to comprehend. Past Medical History:  
Diagnosis Date  Cerebral palsy ([address]  Cognitive developmental delay  Decubitus ulcer of sacral region, stage 4 ([address]  Diabetes mellitus, type II ([address]  Hepatic steatosis ?  History of hepatomegaly  History of recurrent UTIs  History of small bowel obstruction  Hypertension  Polio  Splenic artery aneurysm (HCC)   
 ?; 1.7 cm  
 Urinary retention Prior to Admission medications Medication Sig Start Date End Date Taking?  Authorizing Provider  
sodium hypochlorite (Dakin's Solution) external solution Apply  to affected area now. Yes Jagdeep, MD Mak  
fenofibrate micronized (LOFIBRA) 200 mg capsule Take  by mouth every morning. Yes Mak Gannon MD  
promethazine (Phenergan) 12.5 mg suppository Insert  into rectum every six (6) hours as needed for Nausea. Yes Jagdeep, MD Mak  
multivitamin,tx-iron-ca-min (Thera-M) 27-0.4 mg tab Take 1 Tab by mouth daily. Yes Mak Gannon MD  
traMADoL (ULTRAM) 50 mg tablet Take 50 mg by mouth every six (6) hours as needed for Pain. Yes Mak Gannon MD  
metFORMIN (GLUCOPHAGE) 500 mg tablet  11/7/19  Yes Stefanie Magana  
terazosin (HYTRIN) 5 mg capsule Take 1 Cap by mouth daily. 11/26/19  Yes Marisel Altamirano MD  
finasteride (PROSCAR) 5 mg tablet Take 1 Tab by mouth daily. 11/25/19  Yes Marisel Altamirano MD  
meclizine (ANTIVERT) 12.5 mg tablet Take 12.5 mg by mouth three (3) times daily as needed. Yes Mak Gannon MD  
polyvinyl alcohol (LIQUIFILM TEARS) 1.4 % ophthalmic solution Administer 1 Drop to both eyes four (4) times daily. Yes Mak Gannon MD  
aspirin 81 mg chewable tablet Take 81 mg by mouth daily. Yes Mak Gannon MD  
baclofen (LIORESAL) 10 mg tablet Take  by mouth three (3) times daily. Yes Mak Gannon MD  
calcium carbonate (TUMS) 200 mg calcium (500 mg) chew Take 1 Tab by mouth three (3) times daily. Yes Mak Gannon MD  
polyethylene glycol (MIRALAX) 17 gram packet Take 17 g by mouth every other day. Yes Mak Gannon MD  
lisinopril (PRINIVIL, ZESTRIL) 2.5 mg tablet Take 2.5 mg by mouth daily. Yes Mak Gannon MD  
acetaminophen (TYLENOL ARTHRITIS PAIN) 650 mg CR tablet Take 650 mg by mouth every six (6) hours as needed for Pain. Yes Jagdeep, MD Mak  
 
Current Facility-Administered Medications Medication Dose Route Frequency  aspirin chewable tablet 81 mg  81 mg Oral DAILY  [Held by provider] finasteride (PROSCAR) tablet 5 mg  5 mg Oral DAILY  [Held by provider] meclizine (ANTIVERT) tablet 12.5 mg  12.5 mg Oral TID  polyethylene glycol (MIRALAX) packet 17 g  17 g Oral EVERY OTHER DAY  [Held by provider] terazosin (HYTRIN) capsule 5 mg  5 mg Oral DAILY  insulin lispro (HUMALOG) injection   SubCUTAneous AC&HS  
 NOREPINephrine (LEVOPHED) 8 mg in 0.9% NS 250ml infusion  0.5-30 mcg/min IntraVENous TITRATE  [START ON 2020] VANCOMYCIN INFORMATION NOTE   Other ONCE  
 VANCOMYCIN INFORMATION NOTE 1 Each  1 Each Other Rx Dosing/Monitoring  vancomycin (VANCOCIN) 750 mg in 0.9% sodium chloride (MBP/ADV) 250 mL ADV  750 mg IntraVENous Q8H  
 hydroxypropyl methylcellulose (ISOPTO TEARS) 0.5 % ophthalmic solution 1 Drop  1 Drop Both Eyes QID  lactated Ringers infusion  125 mL/hr IntraVENous CONTINUOUS  piperacillin-tazobactam (ZOSYN) 4.5 g in 0.9% sodium chloride (MBP/ADV) 100 mL MBP  4.5 g IntraVENous Q6H  
 levoFLOXacin (LEVAQUIN) 750 mg in D5W IVPB  750 mg IntraVENous Q24H No Known Allergies Social History Tobacco Use  Smoking status: Never Smoker  Smokeless tobacco: Never Used Substance Use Topics  Alcohol use: No  
  
History reviewed. No pertinent family history. Review of Systems: 
Review of systems not obtained due to patient factors. Objective:  
Vital Signs:   
Visit Vitals BP 94/58 Pulse (!) 126 Temp 98.9 °F (37.2 °C) Resp 27 Wt 58.5 kg (129 lb) SpO2 100% BMI 22.14 kg/m² O2 Device: Room air Temp (24hrs), Av.1 °F (36.7 °C), Min:96.6 °F (35.9 °C), Max:99.4 °F (37.4 °C) Intake/Output:  
Last shift:      No intake/output data recorded. Last 3 shifts:  1901 -  0700 In: -  
Out: 4592 [AMECZ:4050] Intake/Output Summary (Last 24 hours) at 2020 1309 Last data filed at 2020 4061 Gross per 24 hour Intake  Output 1600 ml Net -1600 ml Physical Exam:  
General:  Adult male, with developmental delay and muscle wasting, Awake/Alert, cooperative, Critically ill on Levophed HEENT:   NCAT, PERRL, OP clear, MM moist   
 Neck: Supple, trachea midline. Lungs:   Symmetrical chest rise; good AE bilat; CTAB; no wheezes/rhonchi/rales noted. Heart:  RRR, S1, S2 normal, no m/r/g Abdomen:   Soft, somewhat distended,non-tender. Bowel sounds hypoactive. Extremities: Extremities with muscle atrophy. Small decubitus ulcer on right ankle and on left posterior calf. Reportedly has sacral decub as well but need to wait for RN to roll him to examine it. No LE edema Pulses: 2+ and symmetric all extremities. Skin: Skin color, texture, turgor normal. No rashes or lesions Neurologic: Grossly nonfocal, PERRL, Awake/alert, moving all extremities, cooperative but not obeying verbal commands, not oriented Devices: R-fem TLC, Greenwood catheter Data:  
 
Recent Results (from the past 24 hour(s)) CBC WITH AUTOMATED DIFF Collection Time: 04/27/20  4:42 PM  
Result Value Ref Range WBC 15.8 (H) 4.6 - 13.2 K/uL  
 RBC 4.23 (L) 4.70 - 5.50 M/uL  
 HGB 10.3 (L) 13.0 - 16.0 g/dL HCT 32.2 (L) 36.0 - 48.0 % MCV 76.1 74.0 - 97.0 FL  
 MCH 24.3 24.0 - 34.0 PG  
 MCHC 32.0 31.0 - 37.0 g/dL  
 RDW 18.9 (H) 11.6 - 14.5 % PLATELET 464 (H) 205 - 420 K/uL MPV 10.1 9.2 - 11.8 FL  
 NEUTROPHILS 72 40 - 73 % LYMPHOCYTES 20 (L) 21 - 52 % MONOCYTES 7 3 - 10 % EOSINOPHILS 1 0 - 5 % BASOPHILS 0 0 - 2 %  
 ABS. NEUTROPHILS 11.3 (H) 1.8 - 8.0 K/UL  
 ABS. LYMPHOCYTES 3.1 0.9 - 3.6 K/UL  
 ABS. MONOCYTES 1.1 0.05 - 1.2 K/UL  
 ABS. EOSINOPHILS 0.2 0.0 - 0.4 K/UL  
 ABS. BASOPHILS 0.0 0.0 - 0.1 K/UL  
 DF AUTOMATED METABOLIC PANEL, COMPREHENSIVE Collection Time: 04/27/20  4:42 PM  
Result Value Ref Range Sodium 131 (L) 136 - 145 mmol/L Potassium 7.2 (HH) 3.5 - 5.5 mmol/L Chloride 102 100 - 111 mmol/L  
 CO2 21 21 - 32 mmol/L Anion gap 8 3.0 - 18 mmol/L Glucose 282 (H) 74 - 99 mg/dL  (H) 7.0 - 18 MG/DL  Creatinine 1.06 0.6 - 1.3 MG/DL  
 BUN/Creatinine ratio 128 (H) 12 - 20    
 GFR est AA >60 >60 ml/min/1.73m2 GFR est non-AA >60 >60 ml/min/1.73m2 Calcium 10.3 (H) 8.5 - 10.1 MG/DL Bilirubin, total 0.2 0.2 - 1.0 MG/DL  
 ALT (SGPT) 62 (H) 16 - 61 U/L  
 AST (SGOT) 49 (H) 10 - 38 U/L Alk. phosphatase 108 45 - 117 U/L Protein, total 6.6 6.4 - 8.2 g/dL Albumin 2.4 (L) 3.4 - 5.0 g/dL Globulin 4.2 (H) 2.0 - 4.0 g/dL A-G Ratio 0.6 (L) 0.8 - 1.7 EKG, 12 LEAD, INITIAL Collection Time: 04/27/20  8:29 PM  
Result Value Ref Range Ventricular Rate 111 BPM  
 Atrial Rate 111 BPM  
 P-R Interval 206 ms QRS Duration 70 ms Q-T Interval 278 ms QTC Calculation (Bezet) 378 ms Calculated P Axis 86 degrees Calculated R Axis 93 degrees Calculated T Axis 74 degrees Diagnosis Sinus tachycardia Rightward axis Borderline ECG When compared with ECG of 17-JAN-2020 20:11, No significant change was found Confirmed by Vicky Warner ([id number]) on 4/28/2020 11:38:14 AM 
  
CBC WITH AUTOMATED DIFF Collection Time: 04/27/20  8:30 PM  
Result Value Ref Range WBC 12.3 4.6 - 13.2 K/uL  
 RBC 4.51 (L) 4.70 - 5.50 M/uL  
 HGB 11.0 (L) 13.0 - 16.0 g/dL HCT 34.4 (L) 36.0 - 48.0 % MCV 76.3 74.0 - 97.0 FL  
 MCH 24.4 24.0 - 34.0 PG  
 MCHC 32.0 31.0 - 37.0 g/dL  
 RDW 18.8 (H) 11.6 - 14.5 % PLATELET 360 (H) 666 - 420 K/uL MPV 9.9 9.2 - 11.8 FL  
 NEUTROPHILS 66 40 - 73 % LYMPHOCYTES 24 21 - 52 % MONOCYTES 7 3 - 10 % EOSINOPHILS 3 0 - 5 % BASOPHILS 0 0 - 2 %  
 ABS. NEUTROPHILS 8.3 (H) 1.8 - 8.0 K/UL  
 ABS. LYMPHOCYTES 2.9 0.9 - 3.6 K/UL  
 ABS. MONOCYTES 0.8 0.05 - 1.2 K/UL  
 ABS. EOSINOPHILS 0.3 0.0 - 0.4 K/UL  
 ABS. BASOPHILS 0.0 0.0 - 0.1 K/UL  
 DF AUTOMATED METABOLIC PANEL, COMPREHENSIVE Collection Time: 04/27/20  8:30 PM  
Result Value Ref Range Sodium 130 (L) 136 - 145 mmol/L Potassium 7.0 (HH) 3.5 - 5.5 mmol/L Chloride 103 100 - 111 mmol/L  
 CO2 18 (L) 21 - 32 mmol/L  Anion gap 9 3.0 - 18 mmol/L  
 Glucose 332 (H) 74 - 99 mg/dL  (H) 7.0 - 18 MG/DL Creatinine 1.04 0.6 - 1.3 MG/DL  
 BUN/Creatinine ratio 128 (H) 12 - 20 GFR est AA >60 >60 ml/min/1.73m2 GFR est non-AA >60 >60 ml/min/1.73m2 Calcium 10.3 (H) 8.5 - 10.1 MG/DL Bilirubin, total 0.2 0.2 - 1.0 MG/DL  
 ALT (SGPT) 64 (H) 16 - 61 U/L  
 AST (SGOT) 50 (H) 10 - 38 U/L Alk. phosphatase 127 (H) 45 - 117 U/L Protein, total 6.9 6.4 - 8.2 g/dL Albumin 2.5 (L) 3.4 - 5.0 g/dL Globulin 4.4 (H) 2.0 - 4.0 g/dL A-G Ratio 0.6 (L) 0.8 - 1.7 MAGNESIUM Collection Time: 04/27/20  8:30 PM  
Result Value Ref Range Magnesium 2.2 1.6 - 2.6 mg/dL AMMONIA Collection Time: 04/27/20  8:30 PM  
Result Value Ref Range Ammonia 19 11 - 32 UMOL/L  
CULTURE, BLOOD Collection Time: 04/27/20  8:30 PM  
Result Value Ref Range Special Requests: NO SPECIAL REQUESTS Culture result: NO GROWTH AFTER 10 HOURS    
CARDIAC PANEL,(CK, CKMB & TROPONIN) Collection Time: 04/27/20  8:30 PM  
Result Value Ref Range  39 - 308 U/L  
 CK - MB 6.3 (H) <3.6 ng/ml CK-MB Index 2.1 0.0 - 4.0 % Troponin-I, QT <0.02 0.0 - 0.045 NG/ML  
TSH 3RD GENERATION Collection Time: 04/27/20  8:30 PM  
Result Value Ref Range TSH 1.76 0.36 - 3.74 uIU/mL LACTIC ACID Collection Time: 04/27/20  8:35 PM  
Result Value Ref Range Lactic acid 2.6 (HH) 0.4 - 2.0 MMOL/L  
URINALYSIS W/ RFLX MICROSCOPIC Collection Time: 04/27/20  8:47 PM  
Result Value Ref Range Color YELLOW Appearance CLOUDY Specific gravity 1.020 1.005 - 1.030    
 pH (UA) 5.0 5.0 - 8.0 Protein Negative NEG mg/dL Glucose 250 (A) NEG mg/dL Ketone Negative NEG mg/dL Bilirubin Negative NEG Blood Negative NEG Urobilinogen 0.2 0.2 - 1.0 EU/dL Nitrites Negative NEG Leukocyte Esterase LARGE (A) NEG URINE MICROSCOPIC ONLY Collection Time: 04/27/20  8:47 PM  
Result Value Ref Range  WBC 21 to 35 0 - 4 /hpf  
 RBC Negative 0 - 5 /hpf Epithelial cells FEW 0 - 5 /lpf Bacteria 4+ (A) NEG /hpf Yeast 4+ (A) NEG  
CULTURE, BLOOD Collection Time: 04/27/20  9:01 PM  
Result Value Ref Range Special Requests: NO SPECIAL REQUESTS Culture result: NO GROWTH AFTER 10 HOURS    
LACTIC ACID Collection Time: 04/27/20 11:35 PM  
Result Value Ref Range Lactic acid 2.4 (HH) 0.4 - 2.0 MMOL/L  
METABOLIC PANEL, BASIC Collection Time: 04/28/20 12:45 AM  
Result Value Ref Range Sodium 138 136 - 145 mmol/L Potassium 5.5 3.5 - 5.5 mmol/L Chloride 114 (H) 100 - 111 mmol/L  
 CO2 15 (L) 21 - 32 mmol/L Anion gap 9 3.0 - 18 mmol/L Glucose 257 (H) 74 - 99 mg/dL BUN 97 (H) 7.0 - 18 MG/DL Creatinine 0.69 0.6 - 1.3 MG/DL  
 BUN/Creatinine ratio 141 (H) 12 - 20 GFR est AA >60 >60 ml/min/1.73m2 GFR est non-AA >60 >60 ml/min/1.73m2 Calcium 8.0 (L) 8.5 - 10.1 MG/DL  
HEMOGLOBIN A1C WITH EAG Collection Time: 04/28/20  3:49 AM  
Result Value Ref Range Hemoglobin A1c 10.0 (H) 4.2 - 5.6 % Est. average glucose 240 mg/dL METABOLIC PANEL, COMPREHENSIVE Collection Time: 04/28/20  3:49 AM  
Result Value Ref Range Sodium 139 136 - 145 mmol/L Potassium 5.5 3.5 - 5.5 mmol/L Chloride 117 (H) 100 - 111 mmol/L  
 CO2 16 (L) 21 - 32 mmol/L Anion gap 6 3.0 - 18 mmol/L Glucose 166 (H) 74 - 99 mg/dL BUN 84 (H) 7.0 - 18 MG/DL Creatinine 0.61 0.6 - 1.3 MG/DL  
 BUN/Creatinine ratio 138 (H) 12 - 20 GFR est AA >60 >60 ml/min/1.73m2 GFR est non-AA >60 >60 ml/min/1.73m2 Calcium 8.3 (L) 8.5 - 10.1 MG/DL Bilirubin, total 0.4 0.2 - 1.0 MG/DL  
 ALT (SGPT) 56 16 - 61 U/L  
 AST (SGOT) 47 (H) 10 - 38 U/L Alk. phosphatase 100 45 - 117 U/L Protein, total 6.0 (L) 6.4 - 8.2 g/dL Albumin 2.1 (L) 3.4 - 5.0 g/dL Globulin 3.9 2.0 - 4.0 g/dL A-G Ratio 0.5 (L) 0.8 - 1.7    
CBC WITH AUTOMATED DIFF Collection Time: 04/28/20  3:49 AM  
Result Value Ref Range WBC 16.5 (H) 4.6 - 13.2 K/uL  
 RBC 3.93 (L) 4.70 - 5.50 M/uL HGB 9.6 (L) 13.0 - 16.0 g/dL HCT 29.7 (L) 36.0 - 48.0 % MCV 75.6 74.0 - 97.0 FL  
 MCH 24.4 24.0 - 34.0 PG  
 MCHC 32.3 31.0 - 37.0 g/dL  
 RDW 18.7 (H) 11.6 - 14.5 % PLATELET 233 (H) 969 - 420 K/uL MPV 9.6 9.2 - 11.8 FL  
 NEUTROPHILS 73 40 - 73 % LYMPHOCYTES 16 (L) 21 - 52 % MONOCYTES 9 3 - 10 % EOSINOPHILS 2 0 - 5 % BASOPHILS 0 0 - 2 %  
 ABS. NEUTROPHILS 12.2 (H) 1.8 - 8.0 K/UL  
 ABS. LYMPHOCYTES 2.6 0.9 - 3.6 K/UL  
 ABS. MONOCYTES 1.5 (H) 0.05 - 1.2 K/UL  
 ABS. EOSINOPHILS 0.3 0.0 - 0.4 K/UL  
 ABS. BASOPHILS 0.0 0.0 - 0.1 K/UL  
 DF AUTOMATED    
LACTIC ACID Collection Time: 04/28/20  3:49 AM  
Result Value Ref Range Lactic acid 1.3 0.4 - 2.0 MMOL/L  
POC VENOUS BLOOD GAS Collection Time: 04/28/20  4:31 AM  
Result Value Ref Range Device: ROOM AIR    
 FIO2 (POC) 21 %  
 pH, venous (POC) 7.311 (L) 7.32 - 7.42    
 pCO2, venous (POC) 26.8 (L) 41 - 51 MMHG  
 pO2, venous (POC) 53 (H) 25 - 40 mmHg HCO3, venous (POC) 13.5 (L) 23.0 - 28.0 MMOL/L  
 sO2, venous (POC) 85 65 - 88 % Base deficit, venous (POC) 13 mmol/L Allens test (POC) N/A Total resp. rate 18 Site OTHER Patient temp. 99.00 Specimen type (POC) VENOUS BLOOD Performed by Van Steele GLUCOSE, POC Collection Time: 04/28/20  6:33 AM  
Result Value Ref Range Glucose (POC) 171 (H) 70 - 110 mg/dL GLUCOSE, POC Collection Time: 04/28/20  9:09 AM  
Result Value Ref Range Glucose (POC) 173 (H) 70 - 110 mg/dL Recent Labs  
  04/28/20 
0431 FIO2I 21 Imaging: 
I have personally reviewed the patients radiographs and have reviewed the reports: 
 
CT CHEST/ABD/PELVIS w/o contrast [4/28]: 1. Left lower lobe consolidation, probable pneumonia. 2. Multiple bilateral nonobstructing renal calculi.  Thick-walled urinary bladder containing a Greenwood catheter and questionable mildly thick urothelium, raising the possibility of UTI. 3. Moderate colonic fecal debris. Considerable distention of the rectal vault by stool with thickening of the rectal wall, possibly constipation/fecal impaction plus or minus stercoral colitis. 4. Right femoral venous catheter with its tip in the right common iliac vein. IMPRESSION:  
70yoM with HTN, DM, Post-polio syndrome, Cerebral palsy, Chronic holm, Mental retardation, and Sacral decubitus ulcer, presented to the ER from [facility] after he had labs that showed elevated potassium and BUN, found to have MATTIE and Septic shock due to UTI and Pneumonia PLAN:  
Respiratory: 1. Community acquired pneumonia:  
- CT Chest on my review shows a small LLL infiltrate consistent with a pneumonia - patient is not a reliable historian regarding symptoms, but he has no tachypnea or accessory muscle use during my exam, is not hypoxic, and lungs are CTA b/l. Furthermore he is not febrile and the infiltrate does not have the radiographic features of covid, for which my suspicion is low. - obtain sputum cx if able. Antibiotics as below. Cardiovascular: 1. Shock: likely septic in etiology; check cortisol. Received 3L IVF bolus and continuing IVF gtt. Unfortunately can't check CVP since is a femoral line. Hgb stable. No suspicion of PE or cardiogenic shock. Renal: 1. MATTIE; Hyperkalemia: 
- creatinine only 1.04 but this is in a patient with very little muscle mass and typically with creatinine 0.4.  improved to 84 after hydration. Unclear if this MATTIE is related to post-obstructive in nature (old holm was clogged and removed) vs due to the UTI 
- new holm catheter in place; monitor UOP. Avoid nephrotoxic agents 
- CT Abd showed multiple nonobstructive renal stones; no hydronephrosis - UA consistent with a UTI (plan discussed below) - K initially 7.2 which was medically treated with insulin and IVF's. K on repeat is 5.5 this AM. Repeat BMP now; if K not lower, will give kayexalate and consider renal consult. - is a bit hyperchloridemic (Cl 117); will change NS gtt to LR gtt. Infectious Disease: 1. Septic shock due to UTI and Pneumonia (CAP vs Asp) - s/p 3L NS in ER, pancultured and started on vanc, zosyn 
- started on levophed via femoral TLC; wean as tolerated - check procalcitonin and cortisol level; lactate improved from 2.6 to now 1.3 
- monitor UOP 
- obtain sputum culture if able Endocrine: 1. DM: continue SSI 
 
GI: 
1. Questionable dysphagia: despite the septic shock patient is suprisingly awake/alert and reportedly ate breakfast in the ER this AM. I am not convinced his infiltrate does not represent an aspiration pna though. Will ask Speech to evaluate him. Start PPI for GI prophylaxis Hematologic: 
- no active issues; start heparin for DVT prophylaxis Neurologic: 
- no active issues Best Practice: · Sepsis Bundle per Hospital Protocol · Glycemic control; avoid Hypoglycemia · IHI ICU Bundles: 
·  Central Line Bundle Followed  and Holm Bundle Followed · Stress ulcer prophylaxis. PPI · DVT prophylaxis. heparin · Need for Lines, holm assessed. · Restraints need. · Palliative care evaluation. · Code Status: FULL Total of 60 min critical care time spent at bedside during the course of care providing evaluation,management and care decisions and ordering appropriate treatment related to critical care problems exclusive of procedures. The reason for providing this level of medical care for this critically ill patient was due a critical illness that impaired one or more vital organ systems such that there was a high probability of imminent or life threatening deterioration in the patients condition.  This care involved high complexity decision making to assess, manipulate, and support vital system functions, to treat this degree vital organ system failure and to prevent further life threatening deterioration of the patients condition. Pablo Martin MD 
Pulmonary & Critical Care

## 2020-04-28 NOTE — PROGRESS NOTES
Problem: Discharge Planning Goal: *Discharge to safe environment Outcome: Progressing Towards Goal 
 Plan is to return to [facility] for [address] Reason for Admission:  Septic Shock, Complicated UTI, Stage II acute kidney injury RUR Score:   24% PCP: First and Last name:  Dr Sabina Jerry Name of Practice: In house physician at [facility] Are you a current patient: Yes/No: yes Approximate date of last visit: Do you (patient/family) have any concerns for transition/discharge? Pt is to return to [facility] Plan for utilizing home health:  no  
 
Current Advanced Directive/Advance Care Plan:  Pt is altered and called [facility] and pt does not have an advance directive and full code at facility. Pt doesn't have NOK. [facility] is working on guardianship. Transition of Care Plan: Long term Care-return to [facility] Unable to interview pt due to AMS. [place] and spoke with staff. Pt is bed/wheelchair bound. Dependent with ADLs. No NOK and facility working on guardianship. Pt to return back to [facility] when medically stable. Spoke with Etelvina Hickey from [facility] and will accept back to facility when discharged. Care Management Interventions PCP Verified by CM: Yes(lives at [facility] and sees in house MD) Palliative Care Criteria Met (RRAT>21 & CHF Dx)?: No 
Mode of Transport at Discharge: BLS Transition of Care Consult (CM Consult): Discharge Planning Physical Therapy Consult: No 
Occupational Therapy Consult: No 
Speech Therapy Consult: No 
Current Support Network: [address] [place] [address] Confirm Follow Up Transport: Other (see comment)(in house MD for follow up) The Plan for Transition of Care is Related to the Following Treatment Goals : return to long term care facility The Patient and/or Patient Representative was Provided with a Choice of Provider and Agrees with the Discharge Plan?: No 
 Freedom of Choice List was Provided with Basic Dialogue that Supports the Patient's Individualized Plan of Care/Goals, Treatment Preferences and Shares the Quality Data Associated with the Providers?: No 
[place] Resource Information Provided?: No 
Discharge Location Discharge Placement: Long Term Care(pt will retun back to [facility])

## 2020-04-28 NOTE — ED NOTES
BMP collected and sent to lab. Repositioned patient. Patient resting in stretcher. Will continue to monitor.

## 2020-04-28 NOTE — ED NOTES
Received call from a Dr. Moe Ribeiro who received a call from the lab regarding abnormal lab results for this patient to include hyperkalemia. Patient's BUN was also noted to be elevated with a normal creatinine. Patient is due to be sent into the ER for evaluation This provider was unfamiliar with the patient but was covering the assisted living facility Miguel Agarwal MD

## 2020-04-28 NOTE — CONSULTS
Consult Note Assessment: · MATTIE. Etio- volume depletion/uti/sepsis. Urinary retention may be a contributing factor. Doubt progression to ischemic atn given improving renal function. · Hyperkalemia in context of mattie/use of ace I.  
· Normal ag met acidosis due to mattie. · UTI/septic shock in a setting of chronic greenwood, kidney stones. On abx. · Debilitation/malnutrition. Recommendations:  
· Continue ivf, will change to isotonic combination of NaCL and Nabicarb. · Continue pressors to keep map 65-70. · Continue to hold ace I.  
· Avoid NSAID's, IV dye. Thank you. Consult requested by: Pretty Yip DO 
 
ADMIT DATE: 4/27/2020  CONSULT DATE: April 28, 2020 Admission diagnosis: Septic shock ([address] Reason for Nephrology Consultation: MATTIE, hyperkalemia. HPI: Stormy Meneses is a 78 y.o. male OTHER with h/o of dm, htn, cerebral palsy, kidney stones with chronic urinary retention, kidney stones and intellectual disability. Patient was seen by our group in November of last year also for mattie in a setting of uti. Patient can't contribute to hpi. He was brought from local SNF due to abnormal labs. In ED he was found to be hypotensive and tachycardic. He was started on ivf, levophed and abx. Greenwood was replaced with reportedly 400 mg of immediate output. Baseline scr is around 0.5. It was 1.06 yesterday, improved to 0.5 today. BUN was 133 yesterday, down to 56 today. K was 7.2. With insulin, D50, kalyexalate, albuterol K is down to 4.8 today. Past Medical History:  
Diagnosis Date  Cerebral palsy ([address]  Cognitive developmental delay  Decubitus ulcer of sacral region, stage 4 ([address]  Diabetes mellitus, type II ([address]  Hepatic steatosis ?  History of hepatomegaly  History of recurrent UTIs  History of small bowel obstruction  Hypertension  Polio  Splenic artery aneurysm (HCC)   
 ?; 1.7 cm  
 Urinary retention Past Surgical History:  
Procedure Laterality Date  HX CHOLECYSTECTOMY Social History Socioeconomic History  Marital status: SINGLE Spouse name: Not on file  Number of children: Not on file  Years of education: Not on file  Highest education level: Not on file Occupational History  Not on file Social Needs  Financial resource strain: Not on file  Food insecurity Worry: Not on file Inability: Not on file  Transportation needs Medical: Not on file Non-medical: Not on file Tobacco Use  Smoking status: Never Smoker  Smokeless tobacco: Never Used Substance and Sexual Activity  Alcohol use: No  
 Drug use: Not on file  Sexual activity: Not on file Lifestyle  Physical activity Days per week: Not on file Minutes per session: Not on file  Stress: Not on file Relationships  Social connections Talks on phone: Not on file Gets together: Not on file Attends Voodoo service: Not on file Active member of club or organization: Not on file Attends meetings of clubs or organizations: Not on file Relationship status: Not on file  Intimate partner violence Fear of current or ex partner: Not on file Emotionally abused: Not on file Physically abused: Not on file Forced sexual activity: Not on file Other Topics Concern [address] Service Not Asked  Blood Transfusions Not Asked  Caffeine Concern Not Asked  Occupational Exposure Not Asked [place] Hazards Not Asked  Sleep Concern Not Asked  Stress Concern Not Asked  Weight Concern Not Asked  Special Diet Not Asked  Back Care Not Asked  Exercise Not Asked  Bike Helmet Not Asked  Seat Belt Not Asked  Self-Exams Not Asked Social History Narrative  Not on file History reviewed. No pertinent family history. No Known Allergies Home Medications: (Not in a hospital admission) Current Inpatient Medications:  
 
Current Facility-Administered Medications Medication Dose Route Frequency  aspirin chewable tablet 81 mg  81 mg Oral DAILY  [Held by provider] finasteride (PROSCAR) tablet 5 mg  5 mg Oral DAILY  [Held by provider] meclizine (ANTIVERT) tablet 12.5 mg  12.5 mg Oral TID  polyethylene glycol (MIRALAX) packet 17 g  17 g Oral EVERY OTHER DAY  [Held by provider] terazosin (HYTRIN) capsule 5 mg  5 mg Oral DAILY  ondansetron (ZOFRAN) injection 4 mg  4 mg IntraVENous Q4H PRN  
 insulin lispro (HUMALOG) injection   SubCUTAneous AC&HS  
 glucose chewable tablet 16 g  4 Tab Oral PRN  
 glucagon (GLUCAGEN) injection 1 mg  1 mg IntraMUSCular PRN  
 albuterol-ipratropium (DUO-NEB) 2.5 MG-0.5 MG/3 ML  3 mL Nebulization Q6H PRN  
 acetaminophen (TYLENOL) tablet 650 mg  650 mg Oral Q6H PRN  pantoprazole (PROTONIX) injection 40 mg  40 mg IntraVENous ONCE PRN  
 melatonin tablet 12 mg  12 mg Oral QHS PRN  
 traMADoL (ULTRAM) tablet 50 mg  50 mg Oral Q6H PRN  
 NOREPINephrine (LEVOPHED) 8 mg in 0.9% NS 250ml infusion  0.5-30 mcg/min IntraVENous TITRATE  [START ON 4/29/2020] VANCOMYCIN INFORMATION NOTE   Other ONCE  
 VANCOMYCIN INFORMATION NOTE 1 Each  1 Each Other Rx Dosing/Monitoring  vancomycin (VANCOCIN) 750 mg in 0.9% sodium chloride (MBP/ADV) 250 mL ADV  750 mg IntraVENous Q8H  
 hydroxypropyl methylcellulose (ISOPTO TEARS) 0.5 % ophthalmic solution 1 Drop  1 Drop Both Eyes QID  lactated Ringers infusion  125 mL/hr IntraVENous CONTINUOUS  
 pantoprazole (PROTONIX) 40 mg in 0.9% sodium chloride 10 mL injection  40 mg IntraVENous DAILY  heparin (porcine) injection 5,000 Units  5,000 Units SubCUTAneous Q8H  
 sodium chloride (NS) flush 5-10 mL  5-10 mL IntraVENous PRN  
 sodium chloride (NS) flush 5-40 mL  5-40 mL IntraVENous Q8H  
 sodium chloride (NS) flush 5-40 mL  5-40 mL IntraVENous PRN  
  docusate sodium (COLACE) capsule 100 mg  100 mg Oral BID  senna (SENOKOT) tablet 8.6 mg  1 Tab Oral BID  sodium chloride (NS) flush 5-10 mL  5-10 mL IntraVENous PRN  piperacillin-tazobactam (ZOSYN) 4.5 g in 0.9% sodium chloride (MBP/ADV) 100 mL MBP  4.5 g IntraVENous Q6H  
 levoFLOXacin (LEVAQUIN) 750 mg in D5W IVPB  750 mg IntraVENous Q24H Current Outpatient Medications Medication Sig  
 sodium hypochlorite (Dakin's Solution) external solution Apply  to affected area now.  fenofibrate micronized (LOFIBRA) 200 mg capsule Take  by mouth every morning.  promethazine (Phenergan) 12.5 mg suppository Insert  into rectum every six (6) hours as needed for Nausea.  multivitamin,tx-iron-ca-min (Thera-M) 27-0.4 mg tab Take 1 Tab by mouth daily.  traMADoL (ULTRAM) 50 mg tablet Take 50 mg by mouth every six (6) hours as needed for Pain.  metFORMIN (GLUCOPHAGE) 500 mg tablet  terazosin (HYTRIN) 5 mg capsule Take 1 Cap by mouth daily.  finasteride (PROSCAR) 5 mg tablet Take 1 Tab by mouth daily.  meclizine (ANTIVERT) 12.5 mg tablet Take 12.5 mg by mouth three (3) times daily as needed.  polyvinyl alcohol (LIQUIFILM TEARS) 1.4 % ophthalmic solution Administer 1 Drop to both eyes four (4) times daily.  aspirin 81 mg chewable tablet Take 81 mg by mouth daily.  baclofen (LIORESAL) 10 mg tablet Take  by mouth three (3) times daily.  calcium carbonate (TUMS) 200 mg calcium (500 mg) chew Take 1 Tab by mouth three (3) times daily.  polyethylene glycol (MIRALAX) 17 gram packet Take 17 g by mouth every other day.  lisinopril (PRINIVIL, ZESTRIL) 2.5 mg tablet Take 2.5 mg by mouth daily.  acetaminophen (TYLENOL ARTHRITIS PAIN) 650 mg CR tablet Take 650 mg by mouth every six (6) hours as needed for Pain. Review of Systems:  
Unobtainable except was able to nolberto chest pain and sob. Physical Assessment:  
 
Vitals: 04/28/20 0915 04/28/20 1030 04/28/20 1100 04/28/20 1115 BP: 113/74 (!) 87/65 96/58 94/58 Pulse: (!) 127 (!) 132 (!) 125 (!) 126 Resp: 18 23 29 27 Temp: 99.3 °F (37.4 °C) 99.1 °F (37.3 °C) 98.9 °F (37.2 °C) 98.9 °F (37.2 °C) SpO2: 100% 100% 100% 100% Weight:      
 
Last 3 Recorded Weights in this Encounter 04/27/20 2025 Weight: 58.5 kg (129 lb) Admission weight: Weight: 58.5 kg (129 lb) (04/27/20 2025) Intake/Output Summary (Last 24 hours) at 4/28/2020 1417 Last data filed at 4/28/2020 2843 Gross per 24 hour Intake  Output 1600 ml Net -1600 ml Patient is in no apparent distress. HEENT: Head is normocephalic and atraumatic. Pupils are round, equal, reactive to light. Sclerae are anicteric. Dry oral mucosa. Neck: no cervical lymphadenopathy or thyromegaly. Lungs: good air entry, clear to auscultation bilaterally. Trachea at the midline. Cardiovascular system: S1, S2, regular rate and rhythm. No murmurs, gallops or rubs. No jvd. Carotid upstroke 2 + bilaterally. Abdomen: soft, non tender, non distended. Positive bowel sounds. No hepatosplenomegaly. No abdominal bruits. Greenwood in place. Extremities: no clubbing, cyanosis or edema. Strong dorsalis pedis pulses. Brisk capillary refill on the toes bilaterally. LE are atrophic. Integumentary: skin is grossly intact. Poor turgor. Sacral area wasn't examined. Neurologic: Alert, oriented time one. Cooperative, follows simple commands. Data Review: 
 
Labs: Results:  
   
Chemistry Recent Labs  
  04/28/20 
1320 04/28/20 
0349 04/28/20 
0045 04/27/20 2030 04/27/20 
1642 * 166* 257* 332* 282*  139 138 130* 131* K 4.8 5.5 5.5 7.0* 7.2*  
* 117* 114* 103 102 CO2 14* 16* 15* 18* 21 BUN 56* 84* 97* 133* 136* CREA 0.50* 0.61 0.69 1.04 1.06  
CA 8.5 8.3* 8.0* 10.3* 10.3* AGAP 10 6 9 9 8 BUCR 112* 138* 141* 128* 128* AP  --  100  --  127* 108 TP  --  6.0*  --  6.9 6.6 ALB  --  2.1*  --  2.5* 2.4*  
GLOB  --  3.9  --  4.4* 4.2* AGRAT  --  0.5*  --  0.6* 0.6* CBC w/Diff Recent Labs  
  04/28/20 
0349 04/27/20 
2030 04/27/20 
1642 WBC 16.5* 12.3 15.8*  
RBC 3.93* 4.51* 4.23* HGB 9.6* 11.0* 10.3* HCT 29.7* 34.4* 32.2*  
* 585* 609* GRANS 73 66 72 LYMPH 16* 24 20* EOS 2 3 1 Iron/Ferritin No results for input(s): IRON in the last 72 hours. No lab exists for component: TIBCCALC  
PTH/VIT D No results for input(s): PTH in the last 72 hours. No lab exists for component: VITD Kevin Conway M.D [facility] Office [phone] Pager [phone] April 28, 2020

## 2020-04-28 NOTE — H&P
History and Physical 
 
Patient: Mason Faust MRN: [id number]  SSN: [id number] YOB: 1950  Age: 78 y.o. Sex: male Subjective:  
  
Mason Faust is a 78 y.o. male who hails from [address] [place] [address] and who presents to [facility] ER with complaint of Abnormal Labs. Patient is a Poor Historian, so Subjective Note is derived from Nursing Staff, [facility] ER Physician, and EHR. Patient denies any pain and reports that he is doing well except for a pain that he cannot point to or describe. Probably this pain is in Patient's feet, as he endorses this most.  Patient does not answer complex questions and interjects \"yeah,\" \"okay,\" or some other form of agreement before questions are fully asked. Patient has a history of Cerebral Palsy, Cognitive Developmental Delay, and Post-Polio Syndrome. Patient also has a history of recurrent UTIs, DM II, HTN, Stercoral colitis, and Small Bowel Obstruction. In [facility] ER, Patient is noted to have a Heart Rate of 116 bpm, Blood Pressure of 72/44 mm Hg, WBC 15.8, Platelets 300, K+ 7.2, Glucose 332 mg/dL, , Cr 1.06 (baseline 0.35-0.45), Lactic Acid 2.6, and Urinalysis positive for both Bacteria and Yeast.  Patient was started on IV Norepinephrine gtt in [facility] ER after receiving 3 L of fluid resuscitation. A Right Femoral CVC was placed in [facility] ER by [facility] ER Physician. Intensivist was contacted by [facility] ER Physician in [facility] ER. Patient is admitted to [facility] ICU for management of Septic Shock 2°/2 Complicated (Male, Diabetes) UTI, Acute Kidney Injury Stage II, and Hyperkalemia. Past Medical History:  
Diagnosis Date  Cerebral palsy ([address]  Cognitive developmental delay  Decubitus ulcer of sacral region, stage 4 ([address]  Diabetes mellitus, type II ([address]  Hepatic steatosis ?  History of hepatomegaly  History of recurrent UTIs  History of small bowel obstruction  Hypertension  Polio  Splenic artery aneurysm ([address] ?; 1.7 cm  
 Urinary retention Past Surgical History:  
Procedure Laterality Date  HX CHOLECYSTECTOMY History reviewed. No pertinent family history. Social History Tobacco Use  Smoking status: Never Smoker  Smokeless tobacco: Never Used Substance Use Topics  Alcohol use: No  
  
Patient lives at [facility]. Prior to Admission medications Medication Sig Start Date End Date Taking? Authorizing Provider  
sodium hypochlorite (Dakin's Solution) external solution Apply  to affected area now. Yes Jagdeep, MD Mak  
fenofibrate micronized (LOFIBRA) 200 mg capsule Take  by mouth every morning. Yes Jagdeep, MD Mak  
promethazine (Phenergan) 12.5 mg suppository Insert  into rectum every six (6) hours as needed for Nausea. Yes Jagdeep, MD Mak  
multivitamin,tx-iron-ca-min (Thera-M) 27-0.4 mg tab Take 1 Tab by mouth daily. Yes Mak Gannon MD  
traMADoL (ULTRAM) 50 mg tablet Take 50 mg by mouth every six (6) hours as needed for Pain. Yes Jagdeep, MD Mak  
metFORMIN (GLUCOPHAGE) 500 mg tablet  11/7/19  Yes Provider, Stefanie  
terazosin (HYTRIN) 5 mg capsule Take 1 Cap by mouth daily. 11/26/19  Yes Neelam Meléndez MD  
finasteride (PROSCAR) 5 mg tablet Take 1 Tab by mouth daily. 11/25/19  Yes Neelam Meléndez MD  
meclizine (ANTIVERT) 12.5 mg tablet Take 12.5 mg by mouth three (3) times daily as needed. Yes Mak Gannon MD  
polyvinyl alcohol (LIQUIFILM TEARS) 1.4 % ophthalmic solution Administer 1 Drop to both eyes four (4) times daily. Yes Mak Gannon MD  
aspirin 81 mg chewable tablet Take 81 mg by mouth daily. Yes Mak Gannon MD  
baclofen (LIORESAL) 10 mg tablet Take  by mouth three (3) times daily. Yes Mak Gannon MD  
calcium carbonate (TUMS) 200 mg calcium (500 mg) chew Take 1 Tab by mouth three (3) times daily.    Yes Mak Gannon MD  
polyethylene glycol (MIRALAX) 17 gram packet Take 17 g by mouth every other day. Yes Jagdeep, MD Mak  
lisinopril (PRINIVIL, ZESTRIL) 2.5 mg tablet Take 2.5 mg by mouth daily. Yes Jagdeep, MD Mak  
acetaminophen (TYLENOL ARTHRITIS PAIN) 650 mg CR tablet Take 650 mg by mouth every six (6) hours as needed for Pain. Yes Jagdeep, MD Mak  
  
 
No Known Allergies Review of Systems: 
Patient does not appear to be a good historian and presently denies pain; when asked a question of any degree of complexity, Patient will typically blurt out \"okay\" or \"yeah\" before the question is completed. Objective:  
 
Vitals:  
 04/28/20 8164 04/28/20 0530 04/28/20 0615 04/28/20 4025 BP: 94/54 98/64 106/61 (!) 86/59 Pulse: (!) 119 (!) 116 (!) 126 (!) 117 Resp: 24 25 14 24 Temp: 99 °F (37.2 °C) 99.2 °F (37.3 °C) 99.2 °F (37.3 °C) 99.2 °F (37.3 °C) SpO2: 100% 100% 100% 99% Weight:      
  
 
Physical Exam: 
General:  Older Adult male lying in bed in (+) Minimal acute distress; (+) Mildly Toxic appearing HEENT:  Atraumatic;  (+) Old, Well-healed Scar over Anterior Aspect of Right Forehead with (+) Bone protrusion over inferior aspect of Mid-line Forehead; Pupils equally round and reactive to light; (+) Dysjunction of Gaze with appearance of Left Medial Strabismus; Extraocular muscles otherwise intact; Moist Oropharynx without erythema, edema, or exudates; (+) Fair to Poor Dentition; (+) Procedure Mask in place Neck:  No Bruits; No Lymphadenopathy Chest:  No pectus carinatum; No pectus excavatum Cardiovascular:  (+)Tachycardic rate, regular rhythm without rubs, gallops, or murmurs Respiratory:  Clear to Auscultation Bilaterally without wheezes, rales, or rhonchi; normal effort of breathing Abdominal:  Obese, non-tense, non-tender abdomen; BS present without guarding, rebound, or masses :  (+) Greenwood Catheter is place and draining urine into a Collecting Bag Extremities:  Pulses 2+ x4 with (+) Minimal Pitting edema to Bilateral Proximal-most Thigh; (+) Right Femoral CVC Musculoskeletal:  (+) Strength 0+/5 in LUE; (+) Strength 3+/5 in RUE; (+) Strength 2+/5 in LLE; (+) Strength 0+/5 in BLE; Notably, it is questionable whether Patient is cooperating with Strength Testing to anticipated standards Integument:  No rash on face, forearms, or legs; (+) Wound over posterior aspect of Right Distal Tibia Neurological:  (+) A&O x1/4 (Patient is Oriented to Self); (+) Strabismus present with appearance of Left Medial Deviation; No gross deficits of Visual Acuity, Jaw Opening, Facial Expression, Hearing, Phonation, or Head Movement; No gross deficits of Tongue Movement or Slurring of Speech Psychiatric:  (+) Affect is Mildly to Moderately Constricted with unknown baseline; Language is present, but (+) simplistic and, at times, not understandable; (+) Behavior is typically defferent and cooperative with questionable limitations of understanding I have personally reviewed the CXR and have found, per my read, rotation with mildly increased markings of Right Lung and Left Daphne of questionable significance. Possible presence of Ventriculoperitoneal shunt. I have personally reviewed the EKG and have found, per my read, tachycardia and no other findings of significance. Assessment:  
 
Hospital Problems  Date Reviewed: 4/28/2020 Codes Class Noted POA * (Principal) Septic shock ([facility] ICD-10-CM: A41.9, R65.21 ICD-9-CM: 038.9, 785.52, 995.92  4/28/2020 Yes Complicated UTI (urinary tract infection) ICD-10-CM: N39.0 ICD-9-CM: 599.0  4/28/2020 Yes Stage 2 acute kidney injury ([facility] ICD-10-CM: N17.9 ICD-9-CM: 584.9  4/28/2020 Yes Sacral decubitus ulcer, stage IV (HCC) ICD-10-CM: W16.191 ICD-9-CM: 707.03, 707.24  4/28/2020 Yes Uncontrolled type 2 diabetes mellitus with hyperosmolar nonketotic hyperglycemia (HCC) ICD-10-CM: E11.00 ICD-9-CM: 250.22  11/17/2019 Yes Cognitive developmental delay ICD-10-CM: F81.9 ICD-9-CM: 315.9  5/12/2018 Yes Cerebral palsy (HCC) (Chronic) ICD-10-CM: G80.9 ICD-9-CM: 343.9  6/12/2015 Yes History of post-polio syndrome (Chronic) ICD-10-CM: L40.39 
ICD-9-CM: V12.02  6/12/2015 Yes Hypertension (Chronic) ICD-10-CM: I10 
ICD-9-CM: 401.9  6/12/2015 Yes Plan:  
 
IV Norepinephrine gtt, IV Levofloxacin, IV Zosyn, IV Fluconazole, IV fluids ( mL/hr), PRN Acetaminophen, and PRN Ondansetron. Blood Cultures and Urine Cultures obtained. Follow Blood Pressure, Lactic Acid, and Serum Creatinine (baseline 0.35-0.45). Continue home medications for Constipation, Vertigo, BPH, and Chronic Pain. Consult Wound Care for Sacral Decubitus Ulcer. POC Glucose checks qACHS with Corrective Insulin. Check HbA1c. DVT mechanoprophylaxis. IV Pantoprazole for GI Prophylaxis. Signed By: Rom Fitzpatrick DO April 28, 2020

## 2020-04-28 NOTE — ED NOTES
Bedside and Verbal shift change report given to YAS Ramirez (oncoming nurse) by Surekha Yo RN  (offgoing nurse). Report included the following information ED Summary, MAR and Recent Results.

## 2020-04-28 NOTE — ED PROVIDER NOTES
Aisha Santiago is a 78 y.o. male with history of cerebral palsy and mild retardation who was sent in after he has noted to have abnormal labs that were drawn today to include elevated potassium and BUN. I received a phone call as noted earlier. Patient is unable to provide any history due to his cerebral palsy and is only oriented and alert to himself. This is normal mental state. The history is provided by the EMS personnel and medical records. The history is limited by the condition of the patient. Past Medical History:  
Diagnosis Date  Cerebral palsy ([address]  Hypertension  Mild mental retardation  Polio No past surgical history on file. No family history on file. Social History Socioeconomic History  Marital status: SINGLE Spouse name: Not on file  Number of children: Not on file  Years of education: Not on file  Highest education level: Not on file Occupational History  Not on file Social Needs  Financial resource strain: Not on file  Food insecurity Worry: Not on file Inability: Not on file  Transportation needs Medical: Not on file Non-medical: Not on file Tobacco Use  Smoking status: Never Smoker  Smokeless tobacco: Never Used Substance and Sexual Activity  Alcohol use: No  
 Drug use: Not on file  Sexual activity: Not on file Lifestyle  Physical activity Days per week: Not on file Minutes per session: Not on file  Stress: Not on file Relationships  Social connections Talks on phone: Not on file Gets together: Not on file Attends Jainism service: Not on file Active member of club or organization: Not on file Attends meetings of clubs or organizations: Not on file Relationship status: Not on file  Intimate partner violence Fear of current or ex partner: Not on file Emotionally abused: Not on file Physically abused: Not on file Forced sexual activity: Not on file Other Topics Concern  Not on file Social History Narrative  Not on file ALLERGIES: Patient has no known allergies. Review of Systems Unable to perform ROS: Mental status change Vitals:  
 04/27/20 2300 04/27/20 2306 04/27/20 2315 04/27/20 2330 BP: (!) 80/48 (!) 81/54 (!) 79/54 (!) 87/56 Pulse: (!) 115 (!) 115 (!) 116 (!) 111 Resp: 17 16 18 18 Temp: 97.2 °F (36.2 °C) 97.1 °F (36.2 °C) 97.2 °F (36.2 °C) 97.5 °F (36.4 °C) SpO2: 100% 100% 100% 91% Weight:      
      
 
Physical Exam 
Vitals signs and nursing note reviewed. Constitutional:   
   General: He is in acute distress. Appearance: He is ill-appearing and toxic-appearing. He is not diaphoretic. HENT:  
   Head: Normocephalic and atraumatic. Right Ear: External ear normal.  
   Left Ear: External ear normal.  
   Nose: Nose normal.  
   Mouth/Throat:  
   Mouth: Mucous membranes are dry. Pharynx: No oropharyngeal exudate. Eyes:  
   Conjunctiva/sclera: Conjunctivae normal.  
Neck: Musculoskeletal: Normal range of motion. Cardiovascular:  
   Rate and Rhythm: Regular rhythm. Tachycardia present. Heart sounds: Normal heart sounds. Pulmonary:  
   Effort: Pulmonary effort is normal. No respiratory distress. Breath sounds: Normal breath sounds. Abdominal:  
   Palpations: Abdomen is soft. Tenderness: There is no abdominal tenderness. Musculoskeletal: Normal range of motion. Right lower leg: Edema present. Left lower leg: Edema present. Skin: 
   General: Skin is warm and dry. Capillary Refill: Capillary refill takes 2 to 3 seconds. Findings: Lesion present. Comments: Large stage V sacral decubitus ulcer Neurological:  
   Mental Status: He is alert. Comments: Alert to voice and knows his name. Provide any other history. Will move all 4 extremities Psychiatric:     
   Behavior: Behavior normal.  
 
  
 
 MDM 
  
 
Central Line 
Date/Time: 4/27/2020 11:46 PM 
Performed by: Jennifer Wilson MD 
Authorized by: Jennifer Wilson MD  
 
Consent:  
  Consent obtained:  Emergent situation Pre-procedure details:  
  Hand hygiene: Hand hygiene performed prior to insertion Sterile barrier technique: All elements of maximal sterile technique followed Skin preparation:  ChloraPrep Skin preparation agent: Skin preparation agent completely dried prior to procedure Anesthesia (see MAR for exact dosages): Anesthesia method:  Local infiltration Local anesthetic:  Lidocaine 1% w/o epi Procedure details: Location:  R femoral 
  Patient position:  Flat Procedural supplies:  Triple lumen Catheter size:  7 Fr Landmarks identified: yes Ultrasound guidance: yes Number of attempts:  1 Successful placement: yes Post-procedure details: Post-procedure:  Dressing applied and line sutured Assessment:  Blood return through all ports and free fluid flow Patient tolerance of procedure: Tolerated well, no immediate complications Vitals: 
Patient Vitals for the past 12 hrs: 
 Temp Pulse Resp BP SpO2  
04/27/20 2330 97.5 °F (36.4 °C) (!) 111 18 (!) 87/56 91 % 04/27/20 2315 97.2 °F (36.2 °C) (!) 116 18 (!) 79/54 100 % 04/27/20 2306 97.1 °F (36.2 °C) (!) 115 16 (!) 81/54 100 % 04/27/20 2300 97.2 °F (36.2 °C) (!) 115 17 (!) 80/48 100 % 04/27/20 2245 97.2 °F (36.2 °C) (!) 110 14 (!) 72/44 98 % 04/27/20 2230 97 °F (36.1 °C) (!) 107 18 (!) 77/49 98 % 04/27/20 2215 96.8 °F (36 °C) (!) 106 17 (!) 79/57 100 % 04/27/20 2200 96.7 °F (35.9 °C) (!) 113 23 (!) 80/58 99 % 04/27/20 2145 96.7 °F (35.9 °C) (!) 114 16 90/53 100 % 04/27/20 2138 96.7 °F (35.9 °C) (!) 116 20 93/58 100 % 04/27/20 2118 96.9 °F (36.1 °C) (!) 101 20  100 % 04/27/20 2115  (!) 101 19 (!) 79/50 100 % 04/27/20 2100  99 15 (!) 77/43 100 % 04/27/20 2045  99 17 (!) 72/47 100 % 04/27/20 2025 96.6 °F (35.9 °C) (!) 115  (!) 74/53 100 % Medications ordered:  
Medications  
sodium chloride (NS) flush 5-10 mL (has no administration in time range)  
piperacillin-tazobactam (ZOSYN) 4.5 g in 0.9% sodium chloride (MBP/ADV) 100 mL MBP (0 g IntraVENous IV Completed 4/27/20 2140) Followed by  
piperacillin-tazobactam (ZOSYN) 4.5 g in 0.9% sodium chloride (MBP/ADV) 100 mL MBP (has no administration in time range)  
vancomycin (VANCOCIN) 1,000 mg in 0.9% sodium chloride (MBP/ADV) 250 mL adv (1,000 mg IntraVENous New Bag 4/27/20 2236) levoFLOXacin (LEVAQUIN) 750 mg in D5W IVPB (0 mg IntraVENous IV Completed 4/27/20 2313) NOREPINephrine (LEVOPHED) 8 mg in 0.9% NS 250ml infusion (10 mcg/min IntraVENous Rate Change 4/27/20 2344) fluconazole (DIFLUCAN) 200mg/100 mL IVPB (premix) (has no administration in time range)  
sodium chloride 0.9 % bolus infusion 1,000 mL (1,000 mL IntraVENous New Bag 4/27/20 2236)  
sodium chloride 0.9 % bolus infusion 1,000 mL (0 mL IntraVENous IV Completed 4/27/20 2210)  
sodium chloride 0.9 % bolus infusion 1,000 mL (0 mL IntraVENous IV Completed 4/27/20 2150) insulin regular (NOVOLIN R, HUMULIN R) injection 10 Units (10 Units IntraVENous Given 4/27/20 2254) dextrose 10 % infusion 250 mL (0 mL IntraVENous IV Completed 4/27/20 2321)  
albuterol (PROVENTIL VENTOLIN) nebulizer solution 2.5 mg (2.5 mg Nebulization Given 4/27/20 2348) Lab findings: 
Recent Results (from the past 12 hour(s)) CBC WITH AUTOMATED DIFF Collection Time: 04/27/20  4:42 PM  
Result Value Ref Range WBC 15.8 (H) 4.6 - 13.2 K/uL  
 RBC 4.23 (L) 4.70 - 5.50 M/uL  
 HGB 10.3 (L) 13.0 - 16.0 g/dL HCT 32.2 (L) 36.0 - 48.0 % MCV 76.1 74.0 - 97.0 FL  
 MCH 24.3 24.0 - 34.0 PG  
 MCHC 32.0 31.0 - 37.0 g/dL  
 RDW 18.9 (H) 11.6 - 14.5 % PLATELET 648 (H) 628 - 420 K/uL MPV 10.1 9.2 - 11.8 FL  
 NEUTROPHILS 72 40 - 73 % LYMPHOCYTES 20 (L) 21 - 52 % MONOCYTES 7 3 - 10 % EOSINOPHILS 1 0 - 5 % BASOPHILS 0 0 - 2 %  
 ABS. NEUTROPHILS 11.3 (H) 1.8 - 8.0 K/UL  
 ABS. LYMPHOCYTES 3.1 0.9 - 3.6 K/UL  
 ABS. MONOCYTES 1.1 0.05 - 1.2 K/UL  
 ABS. EOSINOPHILS 0.2 0.0 - 0.4 K/UL  
 ABS. BASOPHILS 0.0 0.0 - 0.1 K/UL  
 DF AUTOMATED METABOLIC PANEL, COMPREHENSIVE Collection Time: 04/27/20  4:42 PM  
Result Value Ref Range Sodium 131 (L) 136 - 145 mmol/L Potassium 7.2 (HH) 3.5 - 5.5 mmol/L Chloride 102 100 - 111 mmol/L  
 CO2 21 21 - 32 mmol/L Anion gap 8 3.0 - 18 mmol/L Glucose 282 (H) 74 - 99 mg/dL  (H) 7.0 - 18 MG/DL Creatinine 1.06 0.6 - 1.3 MG/DL  
 BUN/Creatinine ratio 128 (H) 12 - 20 GFR est AA >60 >60 ml/min/1.73m2 GFR est non-AA >60 >60 ml/min/1.73m2 Calcium 10.3 (H) 8.5 - 10.1 MG/DL Bilirubin, total 0.2 0.2 - 1.0 MG/DL  
 ALT (SGPT) 62 (H) 16 - 61 U/L  
 AST (SGOT) 49 (H) 10 - 38 U/L Alk. phosphatase 108 45 - 117 U/L Protein, total 6.6 6.4 - 8.2 g/dL Albumin 2.4 (L) 3.4 - 5.0 g/dL Globulin 4.2 (H) 2.0 - 4.0 g/dL A-G Ratio 0.6 (L) 0.8 - 1.7 EKG, 12 LEAD, INITIAL Collection Time: 04/27/20  8:29 PM  
Result Value Ref Range Ventricular Rate 111 BPM  
 Atrial Rate 111 BPM  
 P-R Interval 206 ms QRS Duration 70 ms Q-T Interval 278 ms QTC Calculation (Bezet) 378 ms Calculated P Axis 86 degrees Calculated R Axis 93 degrees Calculated T Axis 74 degrees Diagnosis Sinus tachycardia Rightward axis Borderline ECG When compared with ECG of 17-JAN-2020 20:11, No significant change was found CBC WITH AUTOMATED DIFF Collection Time: 04/27/20  8:30 PM  
Result Value Ref Range WBC 12.3 4.6 - 13.2 K/uL  
 RBC 4.51 (L) 4.70 - 5.50 M/uL  
 HGB 11.0 (L) 13.0 - 16.0 g/dL HCT 34.4 (L) 36.0 - 48.0 % MCV 76.3 74.0 - 97.0 FL  
 MCH 24.4 24.0 - 34.0 PG  
 MCHC 32.0 31.0 - 37.0 g/dL  
 RDW 18.8 (H) 11.6 - 14.5 % PLATELET 862 (H) 967 - 420 K/uL MPV 9.9 9.2 - 11.8 FL  
 NEUTROPHILS 66 40 - 73 % LYMPHOCYTES 24 21 - 52 % MONOCYTES 7 3 - 10 % EOSINOPHILS 3 0 - 5 % BASOPHILS 0 0 - 2 %  
 ABS. NEUTROPHILS 8.3 (H) 1.8 - 8.0 K/UL  
 ABS. LYMPHOCYTES 2.9 0.9 - 3.6 K/UL  
 ABS. MONOCYTES 0.8 0.05 - 1.2 K/UL  
 ABS. EOSINOPHILS 0.3 0.0 - 0.4 K/UL  
 ABS. BASOPHILS 0.0 0.0 - 0.1 K/UL  
 DF AUTOMATED METABOLIC PANEL, COMPREHENSIVE Collection Time: 04/27/20  8:30 PM  
Result Value Ref Range Sodium 130 (L) 136 - 145 mmol/L Potassium 7.0 (HH) 3.5 - 5.5 mmol/L Chloride 103 100 - 111 mmol/L  
 CO2 18 (L) 21 - 32 mmol/L Anion gap 9 3.0 - 18 mmol/L Glucose 332 (H) 74 - 99 mg/dL  (H) 7.0 - 18 MG/DL Creatinine 1.04 0.6 - 1.3 MG/DL  
 BUN/Creatinine ratio 128 (H) 12 - 20 GFR est AA >60 >60 ml/min/1.73m2 GFR est non-AA >60 >60 ml/min/1.73m2 Calcium 10.3 (H) 8.5 - 10.1 MG/DL Bilirubin, total 0.2 0.2 - 1.0 MG/DL  
 ALT (SGPT) 64 (H) 16 - 61 U/L  
 AST (SGOT) 50 (H) 10 - 38 U/L Alk. phosphatase 127 (H) 45 - 117 U/L Protein, total 6.9 6.4 - 8.2 g/dL Albumin 2.5 (L) 3.4 - 5.0 g/dL Globulin 4.4 (H) 2.0 - 4.0 g/dL A-G Ratio 0.6 (L) 0.8 - 1.7 MAGNESIUM Collection Time: 04/27/20  8:30 PM  
Result Value Ref Range Magnesium 2.2 1.6 - 2.6 mg/dL AMMONIA Collection Time: 04/27/20  8:30 PM  
Result Value Ref Range Ammonia 19 11 - 32 UMOL/L  
CARDIAC PANEL,(CK, CKMB & TROPONIN) Collection Time: 04/27/20  8:30 PM  
Result Value Ref Range  39 - 308 U/L  
 CK - MB 6.3 (H) <3.6 ng/ml CK-MB Index 2.1 0.0 - 4.0 % Troponin-I, QT <0.02 0.0 - 0.045 NG/ML  
TSH 3RD GENERATION Collection Time: 04/27/20  8:30 PM  
Result Value Ref Range TSH 1.76 0.36 - 3.74 uIU/mL LACTIC ACID Collection Time: 04/27/20  8:35 PM  
Result Value Ref Range Lactic acid 2.6 (HH) 0.4 - 2.0 MMOL/L  
URINALYSIS W/ RFLX MICROSCOPIC Collection Time: 04/27/20  8:47 PM  
Result Value Ref Range Color YELLOW Appearance CLOUDY Specific gravity 1.020 1.005 - 1.030    
 pH (UA) 5.0 5.0 - 8.0 Protein Negative NEG mg/dL Glucose 250 (A) NEG mg/dL Ketone Negative NEG mg/dL Bilirubin Negative NEG Blood Negative NEG Urobilinogen 0.2 0.2 - 1.0 EU/dL Nitrites Negative NEG Leukocyte Esterase LARGE (A) NEG URINE MICROSCOPIC ONLY Collection Time: 04/27/20  8:47 PM  
Result Value Ref Range WBC 21 to 35 0 - 4 /hpf  
 RBC Negative 0 - 5 /hpf Epithelial cells FEW 0 - 5 /lpf Bacteria 4+ (A) NEG /hpf Yeast 4+ (A) NEG  
 
 
EKG interpretation by ED Physician: 
Sinus tach with no acute ST or T wave changes Rate 111 QRS 70 QTc 378 No significant change Cardiac monitor: Tachycardia with regular rhythm and no ectopy Pulse ox 100% on room air X-Ray, CT or other radiology findings or impressions: 
XR CHEST PORT    (Results Pending) Likely atelectasis in the right base. No consolidation. Progress notes, Consult notes or additional Procedure notes:  
Likely source is UTI. Patient with history of enterococci in the past and was also covered for yeast as well. Unclear etiology of the hyperkalemia given elevated BUN but fairly normal creatinine. Patient was given treatment for this as well. Discussed with Dr. Montez Skiff on-call for the hospitalist service will admit D/w Dr Marleen Tejada on call for ICU who will arrange consultation. Discussed with her regarding the pertinent facts and results for the patient and management thus far. ED Critical Care Note System at risk for life threatening failure: Neuro, cardiac, pulmonary, renal 
Associated problems: Hyperkalemia, uremia, hypotension, septic shock, UTI Critical Care services provided: Bedside management of hyperkalemia, uremia, hypotension, septic shock, urinary tract infection, documentation, consultation, bedside reassessment Excluded procedures (time not included in critical care): EKG interpretation, central line placement Total Critical Care Time (in minutes) 128 Sepsis Re-Assessment Documentation:  
 
Date: 4/27/2020 Time: 11:50 PM 
 
 
Vital Signs Temp: 97.5 °F (36.4 °C) Temp Source: Rectal 
Pulse (Heart Rate): (!) 111 Heart Rate Source: Monitor Cardiac Rhythm: Sinus tachycardia Resp Rate: 18 
BP: (!) 87/56 MAP (Monitor): (!) 63 MAP (Calculated): (!) 60 BP 1 Location: Left arm BP 1 Method: Automatic Improve skin perfusion. Patient blood pressure is improving on the Levophed. Repeat lactate pending Reevaluation of patient:  
Stable, critical condition Disposition: 
Diagnosis: 1. Acute hyperkalemia 2. Septic shock ([address] 3. Uremia 4. Acute UTI Disposition: admit Follow-up Information None Patient's Medications Start Taking No medications on file Continue Taking ACETAMINOPHEN (TYLENOL ARTHRITIS PAIN) 650 MG CR TABLET    Take 650 mg by mouth every six (6) hours as needed for Pain. ASPIRIN 81 MG CHEWABLE TABLET    Take 81 mg by mouth daily. BACLOFEN (LIORESAL) 10 MG TABLET    Take  by mouth three (3) times daily. CALCIUM CARBONATE (TUMS) 200 MG CALCIUM (500 MG) CHEW    Take 1 Tab by mouth three (3) times daily. FENOFIBRATE MICRONIZED (LOFIBRA) 200 MG CAPSULE    Take  by mouth every morning. FINASTERIDE (PROSCAR) 5 MG TABLET    Take 1 Tab by mouth daily. LISINOPRIL (PRINIVIL, ZESTRIL) 2.5 MG TABLET    Take 2.5 mg by mouth daily. MECLIZINE (ANTIVERT) 12.5 MG TABLET    Take 12.5 mg by mouth three (3) times daily as needed. METFORMIN (GLUCOPHAGE) 500 MG TABLET      
 MULTIVITAMIN,TX-IRON-CA-MIN (THERA-M) 27-0.4 MG TAB    Take 1 Tab by mouth daily. POLYETHYLENE GLYCOL (MIRALAX) 17 GRAM PACKET    Take 17 g by mouth every other day. POLYVINYL ALCOHOL (LIQUIFILM TEARS) 1.4 % OPHTHALMIC SOLUTION    Administer 1 Drop to both eyes four (4) times daily. PROMETHAZINE (PHENERGAN) 12.5 MG SUPPOSITORY    Insert  into rectum every six (6) hours as needed for Nausea. SODIUM HYPOCHLORITE (DAKIN'S SOLUTION) EXTERNAL SOLUTION    Apply  to affected area now. TERAZOSIN (HYTRIN) 5 MG CAPSULE    Take 1 Cap by mouth daily. TRAMADOL (ULTRAM) 50 MG TABLET    Take 50 mg by mouth every six (6) hours as needed for Pain. These Medications have changed No medications on file Stop Taking CALCIUM-VITAMIN D (OYSTER SHELL CALCIUM-VIT D3) 250-125 MG-UNIT TABLET    Take 1 Tab by mouth two (2) times a day. CAMPHOR-METHYL SALICYL-MENTHOL (SALONPAS) PTMD    1 Patch by Apply Externally route daily. CHOLECALCIFEROL (VITAMIN D3) 1,000 UNIT CAP    Take 1,000 Units by mouth daily. ERGOCALCIFEROL (VITAMIN D2) 50,000 UNIT CAPSULE    Take 50,000 Units by mouth every Tuesday. FENOFIBRATE MICRONIZED (LOFIBRA) 134 MG CAPSULE    200 mg. FENOFIBRATE MICRONIZED (LOFIBRA) 67 MG CAPSULE    Take 67 mg by mouth every morning. FENOFIBRATE MICRONIZED PO    Take 200 mg by mouth daily. FENOFIBRATE NANOCRYSTALLIZED (TRICOR) 48 MG TABLET    Take 1 Tab by mouth daily. LOPERAMIDE (IMMODIUM) 2 MG TABLET    Take 2 mg by mouth four (4) times daily as needed for Diarrhea. METFORMIN (GLUCOPHAGE) 850 MG TABLET    Take 500 mg by mouth two (2) times daily (with meals). MULTIVITAMIN, TX-IRON-CA-MIN (THERAPY M) 9 MG IRON-400 MCG TAB TABLET    Take 1 Tab by mouth daily. NOVOLIN R REGULAR U-100 INSULN 100 UNIT/ML INJECTION      
 POLYVINYL ALCOHOL (LIQUIFILM TEARS) 1.4 % OPHTHALMIC SOLUTION    Administer 1 Drop to both eyes as needed. PROMETHAZINE (PHENERGAN) 25 MG TABLET    Take 25 mg by mouth every eight (8) hours as needed for Nausea.

## 2020-04-28 NOTE — PROGRESS NOTES
completed the initial Spiritual Assessment of the patient in bed 7 of the emergency room and offered Pastoral Care support to the patient., Patient seems to be a little sow in response and communication efforts. Patient does not have any Adventist/cultural needs that will affect patients preferences in health care. Chaplains will continue to follow and will provide pastoral care on an as needed/requested basis. Benjamin 3 Board Certified [employer] Spiritual Care Department [phone]

## 2020-04-28 NOTE — PROGRESS NOTES
Pharmacy: Non-Formulary Medication [facility] Please note that medication Liquifilm Tears is not on the formulary and has been changed to Isopto Tears. If patient prefers to take home medication, please have family member bring from home for pharmacy to verify. Please call pharmacy for questions. Thanks, 
Shan Orr, PharmD

## 2020-04-28 NOTE — ACP (ADVANCE CARE PLANNING)
Advanced Care Planning Chart reviewed. Pt is a long-term resident at [facility] since April 2010. No AMD on File and this was verified with the NF by the Case-manager that completed initial assessment. No NOK per the NF Patient has altered mental status and current and past records dating back to November 2019 indicate that [employer] is working on obtaining Guardianship. Status of guardianaship is with their  [address] [place] [address],[address] at ext [phone]. Escalated to Director of Care-management. Saulo Bacon RN Outcomes Manager 
[phone]

## 2020-04-28 NOTE — ED NOTES
Assuming care of patient. Patient arrives by EMS from [facility]. Report states that patient has a potassium level of 7.2 and a BUN of 137 with shortness of breath. Arrived on nasal cannula which patient does not normally wear. Nasal cannula removed and patient is 100% on room air with a cough. Mask placed on patient. Patient is alert to self per staff and EMS that is his baseline. Patient had an existing indwelling holm catheter. Upon inspection, holm catheter was not in properly and only the tip of holm was in the penis. Removed old holm and replaced with a temp holm. Holm immediately drained 400ml of urine. 3 warm blankets placed on patient. No katherine hugger at this time per Dr. Khushi Prescott.

## 2020-04-28 NOTE — ED NOTES
Patient incontinent of stool. Patient cleaned and new brief applied. Patient has stage 4 sacral ulcer. Informed provider.

## 2020-04-29 NOTE — PROGRESS NOTES
Bedside report given by Darya Palafox RN Pt in bed resting alert and oriented to self, follows command , delayed response. Pt upper extremities contracted, no visual signs of pain noted. Assessement completed plan of care for the shift explained pt verbalized understanding by nooding his head. IVF infusing well, HOB elevated, bed low and in locked position. Call light and frequently used items within reach 2300- Pt turned and repositioned. Pt given 120 ml of thickened liquid tolerated well HOB elevated. 0200- Pt sleeping turned and repositioned. Pt had BM care done pt made comfortable in bed. 0515- Pt incontinent of stool care done , dressing to wound changed as per the order. Pt turned and repositioned. Kris Kohli 0518- Pt given tramadol 50 mg po( crushed in apple sauce ) for pain. Pt tolerated well. 0630- Pt awake resting in bed. uneventiful night. Pt received scheduled antibiotics back and forth. 7926- Bedside and Verbal shift change report given to Gwendolyn Reina (oncoming nurse) by Linda Ramirez RN (offgoing nurse). Report included the following information SBAR, Kardex, Intake/Output, MAR and Recent Results.

## 2020-04-29 NOTE — ED NOTES
Spoke with Dr. Del Massey in regards to pt status and verbal report was to downgrade pt to Tele. Order repeated. Order placed.

## 2020-04-29 NOTE — WOUND CARE
IP WOUND CONSULT Katlin Escobedo MEDICAL RECORD NUMBER:  [id number] AGE: 78 y.o. GENDER: male  : 1950 TODAY'S DATE:  2020 GENERAL  
 
[] Follow-up [x] New Consult Katlin Escobedo is a 78 y.o. male referred by:  
[] Physician 
[x] Nursing 
[] Other: PAST MEDICAL HISTORY Past Medical History:  
Diagnosis Date  Cerebral palsy ([facility] [address]  Cognitive developmental delay  Decubitus ulcer of sacral region, stage 4 ([address]  Diabetes mellitus, type II ([address]  Hepatic steatosis ?  History of hepatomegaly  History of recurrent UTIs  History of small bowel obstruction  Hypertension  Polio  Splenic artery aneurysm ([facility])   
 ?; 1.7 cm  
 Urinary retention PAST SURGICAL HISTORY Past Surgical History:  
Procedure Laterality Date  HX CHOLECYSTECTOMY FAMILY HISTORY History reviewed. No pertinent family history. SOCIAL HISTORY Social History Tobacco Use  Smoking status: Never Smoker  Smokeless tobacco: Never Used Substance Use Topics  Alcohol use: No  
 Drug use: Not on file ALLERGIES No Known Allergies MEDICATIONS No current facility-administered medications on file prior to encounter. Current Outpatient Medications on File Prior to Encounter Medication Sig Dispense Refill  sodium hypochlorite (Dakin's Solution) external solution Apply  to affected area now.  fenofibrate micronized (LOFIBRA) 200 mg capsule Take  by mouth every morning.  promethazine (Phenergan) 12.5 mg suppository Insert  into rectum every six (6) hours as needed for Nausea.  multivitamin,tx-iron-ca-min (Thera-M) 27-0.4 mg tab Take 1 Tab by mouth daily.  traMADoL (ULTRAM) 50 mg tablet Take 50 mg by mouth every six (6) hours as needed for Pain.  metFORMIN (GLUCOPHAGE) 500 mg tablet  terazosin (HYTRIN) 5 mg capsule Take 1 Cap by mouth daily.  30 Cap 0  
  finasteride (PROSCAR) 5 mg tablet Take 1 Tab by mouth daily. 30 Tab 0  
 meclizine (ANTIVERT) 12.5 mg tablet Take 12.5 mg by mouth three (3) times daily as needed.  polyvinyl alcohol (LIQUIFILM TEARS) 1.4 % ophthalmic solution Administer 1 Drop to both eyes four (4) times daily.  aspirin 81 mg chewable tablet Take 81 mg by mouth daily.  baclofen (LIORESAL) 10 mg tablet Take  by mouth three (3) times daily.  calcium carbonate (TUMS) 200 mg calcium (500 mg) chew Take 1 Tab by mouth three (3) times daily.  polyethylene glycol (MIRALAX) 17 gram packet Take 17 g by mouth every other day.  lisinopril (PRINIVIL, ZESTRIL) 2.5 mg tablet Take 2.5 mg by mouth daily.  acetaminophen (TYLENOL ARTHRITIS PAIN) 650 mg CR tablet Take 650 mg by mouth every six (6) hours as needed for Pain. Wt Readings from Last 3 Encounters:  
04/27/20 58.5 kg (129 lb)  
01/22/20 62.8 kg (138 lb 7.2 oz) 01/03/20 60.8 kg (134 lb) [unfilled] Visit Vitals /68 (BP 1 Location: Right arm, BP Patient Position: At rest) Pulse 87 Temp 98.1 °F (36.7 °C) Resp 19 Wt 58.5 kg (129 lb) SpO2 100% BMI 22.14 kg/m² ASSESSMENT Ulcer Identification: 
Ulcer Type: pressure Contributing Factors: decreased mobility Wound Sacrum Posterior (Active) Number of days: 783 Wound Sacral/coccyx 2 small open areas to sacral area. (Active) Number of days: 164 Wound Sacral/coccyx stage 4 (Active) Dressing Status Removed 4/29/2020 10:35 AM  
Dressing Type Foam 4/29/2020 10:35 AM  
Pressure Injury Stage 4 4/29/2020 10:35 AM  
Wound Length (cm) 6.5 cm 4/29/2020 10:35 AM  
Wound Width (cm) 4.2 cm 4/29/2020 10:35 AM  
Wound Depth (cm) 1.9 cm 4/29/2020 10:35 AM  
Wound Surface Area (cm^2) 27.3 cm^2 4/29/2020 10:35 AM  
Wound Volume (cm^3) 51.87 cm^3 4/29/2020 10:35 AM  
Change in Wound Size % -41.82 4/29/2020 10:35 AM  
Assessment Allisonia;Swelling 4/29/2020 10:35 AM  
 Undermining (cm) 3.8 cm 4/29/2020 10:35 AM  
Direction of Undermining 10 o'clock;5 o'clock 4/29/2020 10:35 AM  
Drainage Amount Moderate 4/29/2020 10:35 AM  
Drainage Color Serosanguinous 4/29/2020 10:35 AM  
Vandana-wound Assessment Purple;Red 4/29/2020 10:35 AM  
Cleansing and Cleansing Agents  Dermal wound cleanser 4/29/2020 10:35 AM  
Dressing Changed Changed/New 4/29/2020 10:35 AM  
Dressing Type Applied Foam;Other (Comment) 4/29/2020 10:35 AM  
Number of days: 103 PLAN Skin Care & Pressure Relief Recommendations Speciality bed envision matress on versacare frame Minimize layers of linen Pads under patient to optimize support surface Turn/reposition approximately every 2 hours Pillow wedges Manage incontinence Please use in bed position system Promote continence; Skin Protective lotion/cream to buttocks and sacrum daily and as needed with incontinence care Offloading boots Other: foam wedges to offload sacrum Physician/Provider notified: Yes Orders: Sacral pressure injury- cleanse with dermal wound cleanser or NS. Apply one sheet of Aquacel AG to wound bed taking care to feed edges into all undermined sides. Cover with large bordered foam dressing. Offload sacrum at all times with foam wedges, turn patient Q 2 hours. Offload heels with foam boots at all times. Right hip stage 1- apply bordered foam dressing to red area. Lift dressing and assess every shift. Offload with foam wedges and notify wound care nurses if wound deteriorates. Specialty bed ordered from [facility]- Envision mattress on [employer]are frame Teaching completed with:  
[] Patient          
[] Family member      
[] Caregiver [x] Nursing 
[] Other Patient/Caregiver Teaching: 
Level of patient/caregiver understanding able to:  
[x] Indicates understanding       [] Needs reinforcement 
[] Unsuccessful      [] Verbal Understanding 
[] Demonstrated understanding       [] No evidence of learning [] Refused teaching         [] N/A Electronically signed by Prabhu Shepard RN, CWON on 4/29/2020 at 11:01 AM

## 2020-04-29 NOTE — PROGRESS NOTES
Internal Medicine Progress Note Patient's Name: Kirti Castillo Admit Date: 4/27/2020 Length of Stay: 1 Assessment/Plan Active Hospital Problems Diagnosis Date Noted  Complicated UTI (urinary tract infection) 04/28/2020  Septic shock ([facility] [address] 04/28/2020  Stage 2 acute kidney injury ([facility] [address] 04/28/2020  Sacral decubitus ulcer, stage IV ([facility] [address] 04/28/2020  Uncontrolled type 2 diabetes mellitus with hyperosmolar nonketotic hyperglycemia ([facility] [address] 11/17/2019  Cognitive developmental delay 05/12/2018  Cerebral palsy ([facility] [address] 06/12/2015  History of post-polio syndrome 06/12/2015  Hypertension 06/12/2015  UTI (urinary tract infection) 06/11/2015 Urinary tract infection associated with indwelling urethral catheter UTI due to chronic [place] catheter - Afebrile, normal WBCs 
- Cont IV vanco/zosyn/levaquin for now - F/u cultures, urine pending w/ GNR 
- Potassium levels WNL 
- MBS today, otherwise pass swallow - Cont acceptable home medications for chronic conditions  
- DVT protocol I have personally reviewed all pertinent labs and films that have officially resulted over the last 24 hours. I have personally checked for all pending labs that are awaiting final results. Subjective Pt s/e @ bedside No major events overnight Afebrile overnight Appears comfortable and at baseline Unable to obtain accurate ROS Objective Visit Vitals /68 (BP 1 Location: Right arm, BP Patient Position: At rest) Pulse 87 Temp 98.1 °F (36.7 °C) Resp 19 Wt 58.5 kg (129 lb) SpO2 100% BMI 22.14 kg/m² Physical Exam: 
General Appearance: NAD, non-conversant, pleasant Lungs: CTA with normal respiratory effort CV: RRR, no m/r/g Abdomen: soft, non-tender, normal bowel sounds Extremities: no cyanosis, no peripheral edema Neuro: No focal deficits, contracted Lab/Data Reviewed: 
BMP:  
Lab Results Component Value Date/Time   04/29/2020 04:32 AM  
 K 4.1 04/29/2020 04:32 AM  
  (H) 04/29/2020 04:32 AM  
 CO2 20 (L) 04/29/2020 04:32 AM  
 AGAP 6 04/29/2020 04:32 AM  
 GLU 91 04/29/2020 04:32 AM  
 BUN 30 (H) 04/29/2020 04:32 AM  
 CREA 0.35 (L) 04/29/2020 04:32 AM  
 GFRAA >60 04/29/2020 04:32 AM  
 GFRNA >60 04/29/2020 04:32 AM  
 
CBC:  
Lab Results Component Value Date/Time WBC 9.1 04/29/2020 04:32 AM  
 HGB 7.8 (L) 04/29/2020 04:32 AM  
 HCT 24.1 (L) 04/29/2020 04:32 AM  
  (H) 04/29/2020 04:32 AM  
 
 
Imaging Reviewed: 
No results found. Medications Reviewed: 
Current Facility-Administered Medications Medication Dose Route Frequency  dextrose 10% infusion 125-250 mL  125-250 mL IntraVENous PRN  
 sodium phosphate 20 mmol in 0.9% sodium chloride 250 mL infusion  20 mmol IntraVENous ONCE  
 magnesium sulfate 2 g/50 ml IVPB (premix or compounded)  2 g IntraVENous ONCE  piperacillin-tazobactam (ZOSYN) 3.375 g in 0.9% sodium chloride (MBP/ADV) 100 mL MBP  3.375 g IntraVENous Q8H  
 aspirin chewable tablet 81 mg  81 mg Oral DAILY  [Held by provider] finasteride (PROSCAR) tablet 5 mg  5 mg Oral DAILY  [Held by provider] meclizine (ANTIVERT) tablet 12.5 mg  12.5 mg Oral TID  polyethylene glycol (MIRALAX) packet 17 g  17 g Oral EVERY OTHER DAY  [Held by provider] terazosin (HYTRIN) capsule 5 mg  5 mg Oral DAILY  ondansetron (ZOFRAN) injection 4 mg  4 mg IntraVENous Q4H PRN  
 insulin lispro (HUMALOG) injection   SubCUTAneous AC&HS  
 glucose chewable tablet 16 g  4 Tab Oral PRN  
 glucagon (GLUCAGEN) injection 1 mg  1 mg IntraMUSCular PRN  
 albuterol-ipratropium (DUO-NEB) 2.5 MG-0.5 MG/3 ML  3 mL Nebulization Q6H PRN  
 acetaminophen (TYLENOL) tablet 650 mg  650 mg Oral Q6H PRN  
 melatonin tablet 12 mg  12 mg Oral QHS PRN  
 traMADoL (ULTRAM) tablet 50 mg  50 mg Oral Q6H PRN  
 VANCOMYCIN INFORMATION NOTE   Other ONCE  
 VANCOMYCIN INFORMATION NOTE 1 Each  1 Each Other Rx Dosing/Monitoring  vancomycin (VANCOCIN) 750 mg in 0.9% sodium chloride (MBP/ADV) 250 mL ADV  750 mg IntraVENous Q8H  
 hydroxypropyl methylcellulose (ISOPTO TEARS) 0.5 % ophthalmic solution 1 Drop  1 Drop Both Eyes QID  pantoprazole (PROTONIX) 40 mg in 0.9% sodium chloride 10 mL injection  40 mg IntraVENous DAILY  heparin (porcine) injection 5,000 Units  5,000 Units SubCUTAneous Q8H  
 sodium chloride (NS) flush 5-40 mL  5-40 mL IntraVENous Q8H  
 sodium chloride (NS) flush 5-40 mL  5-40 mL IntraVENous PRN  
 docusate sodium (COLACE) capsule 100 mg  100 mg Oral BID  senna (SENOKOT) tablet 8.6 mg  1 Tab Oral BID  sodium bicarbonate (8.4%) 75 mEq in 0.45% sodium chloride 1,000 mL infusion   IntraVENous CONTINUOUS  
 levoFLOXacin (LEVAQUIN) 750 mg in D5W IVPB  750 mg IntraVENous Q24H Adrian Le DO Internal Medicine, Hospitalist 
Pager: [phone] Located within [facility]

## 2020-04-29 NOTE — ED NOTES
Pt's pressure has dropped some, he is sleeping at this time. MAP remains above 65. Will continue to assess need to restart norepi

## 2020-04-29 NOTE — PROGRESS NOTES
[facility] Pulmonary, Critical Care, and Sleep Medicine Name: Gregory Foster MRN: [id number] : 1950 Hospital: [address] [place] [address] Date: 2020  Consulted By:   
 
Pulmonary & Critical Care Subjective/History:  
Patient is a 78 y.o. male with HTN, DM, Post-polio syndrome, Cerebral palsy, Chronic holm, Mental retardation, and Sacral decubitus ulcer, presented to the ER from [facility] after he had labs that showed elevated potassium and BUN. He supposedly reported SOB to ER staff upon arrival. Upon arrival to the ER, his chronic holm was not working properly. It was removed with 400cc of urine draining. New holm placed. Patient found to be in septic shock due to UTI and Pna. A central line was placed. He was given 3L NS bolus and started on Levophed and broad spectrum antibiotics. PCCM consulted. Overnight: weaned off pressors at 12am. UOP approx 75cc/hr overnight. Patient remains awake/alert. Frequently grinding his teeth, answers all questions with \"yes\" or \"good\". He is not oriented and while he does not follow commands, he is calm and cooperative. Passed swallow eval with speech this AM for mechanical soft diet with nectar thick. Going for MBS today. The patient is critically ill and can not provide additional history due to Unable to comprehend. Past Medical History:  
Diagnosis Date  Cerebral palsy ([address]  Cognitive developmental delay  Decubitus ulcer of sacral region, stage 4 ([address]  Diabetes mellitus, type II ([address]  Hepatic steatosis ?  History of hepatomegaly  History of recurrent UTIs  History of small bowel obstruction  Hypertension  Polio  Splenic artery aneurysm (HCC)   
 ?; 1.7 cm  
 Urinary retention Prior to Admission medications Medication Sig Start Date End Date Taking?  Authorizing Provider  
sodium hypochlorite (Dakin's Solution) external solution Apply  to affected area now. Yes Jagdeep, MD Mak  
fenofibrate micronized (LOFIBRA) 200 mg capsule Take  by mouth every morning. Yes Mak Gannon MD  
promethazine (Phenergan) 12.5 mg suppository Insert  into rectum every six (6) hours as needed for Nausea. Yes Jagdeep, MD Mak  
multivitamin,tx-iron-ca-min (Thera-M) 27-0.4 mg tab Take 1 Tab by mouth daily. Yes Mak Gannon MD  
traMADoL (ULTRAM) 50 mg tablet Take 50 mg by mouth every six (6) hours as needed for Pain. Yes Jgadeep, MD Mak  
metFORMIN (GLUCOPHAGE) 500 mg tablet  11/7/19  Yes Stefanie Magana  
terazosin (HYTRIN) 5 mg capsule Take 1 Cap by mouth daily. 11/26/19  Yes Juan Miugel Perdomo MD  
finasteride (PROSCAR) 5 mg tablet Take 1 Tab by mouth daily. 11/25/19  Yes Juan Miguel Perdomo MD  
meclizine (ANTIVERT) 12.5 mg tablet Take 12.5 mg by mouth three (3) times daily as needed. Yes Jagdeep, MD Mak  
polyvinyl alcohol (LIQUIFILM TEARS) 1.4 % ophthalmic solution Administer 1 Drop to both eyes four (4) times daily. Yes Mak Gannon MD  
aspirin 81 mg chewable tablet Take 81 mg by mouth daily. Yes Mak Gannon MD  
baclofen (LIORESAL) 10 mg tablet Take  by mouth three (3) times daily. Yes Mak Gannon MD  
calcium carbonate (TUMS) 200 mg calcium (500 mg) chew Take 1 Tab by mouth three (3) times daily. Yes Mak Gannon MD  
polyethylene glycol (MIRALAX) 17 gram packet Take 17 g by mouth every other day. Yes Jagdeep, MD Mak  
lisinopril (PRINIVIL, ZESTRIL) 2.5 mg tablet Take 2.5 mg by mouth daily. Yes Mak Gannon MD  
acetaminophen (TYLENOL ARTHRITIS PAIN) 650 mg CR tablet Take 650 mg by mouth every six (6) hours as needed for Pain. Yes Jagdeep, MD Mak  
 
Current Facility-Administered Medications Medication Dose Route Frequency  sodium phosphate 20 mmol in 0.9% sodium chloride 250 mL infusion  20 mmol IntraVENous ONCE  
 magnesium sulfate 2 g/50 ml IVPB (premix or compounded)  2 g IntraVENous ONCE  
  barium sulfate (VARIBAR HONEY) 40 % (w/v) 29% (w/w) contrast suspension 15 mL  15 mL Oral RAD ONCE  
 barium sulfate (VARIBAR NECTAR) 40 % (w/v) contrast suspension 15 mL  15 mL Oral RAD ONCE  
 barium sulfate (VARIBAR PUDDING) 40 % (w/v), 30% (w/w) contrast oral paste 15 mL  15 mL Oral RAD ONCE  
 barium sulfate (VARIBAR THIN) 40 % (w/v) oral powder 30 mL  30 mL Oral RAD ONCE  piperacillin-tazobactam (ZOSYN) 3.375 g in 0.9% sodium chloride (MBP/ADV) 100 mL MBP  3.375 g IntraVENous Q8H  
 aspirin chewable tablet 81 mg  81 mg Oral DAILY  [Held by provider] finasteride (PROSCAR) tablet 5 mg  5 mg Oral DAILY  [Held by provider] meclizine (ANTIVERT) tablet 12.5 mg  12.5 mg Oral TID  polyethylene glycol (MIRALAX) packet 17 g  17 g Oral EVERY OTHER DAY  [Held by provider] terazosin (HYTRIN) capsule 5 mg  5 mg Oral DAILY  insulin lispro (HUMALOG) injection   SubCUTAneous AC&HS  
 NOREPINephrine (LEVOPHED) 8 mg in 0.9% NS 250ml infusion  0.5-30 mcg/min IntraVENous TITRATE  VANCOMYCIN INFORMATION NOTE   Other ONCE  
 VANCOMYCIN INFORMATION NOTE 1 Each  1 Each Other Rx Dosing/Monitoring  vancomycin (VANCOCIN) 750 mg in 0.9% sodium chloride (MBP/ADV) 250 mL ADV  750 mg IntraVENous Q8H  
 hydroxypropyl methylcellulose (ISOPTO TEARS) 0.5 % ophthalmic solution 1 Drop  1 Drop Both Eyes QID  pantoprazole (PROTONIX) 40 mg in 0.9% sodium chloride 10 mL injection  40 mg IntraVENous DAILY  heparin (porcine) injection 5,000 Units  5,000 Units SubCUTAneous Q8H  
 sodium chloride (NS) flush 5-40 mL  5-40 mL IntraVENous Q8H  
 docusate sodium (COLACE) capsule 100 mg  100 mg Oral BID  senna (SENOKOT) tablet 8.6 mg  1 Tab Oral BID  sodium bicarbonate (8.4%) 75 mEq in 0.45% sodium chloride 1,000 mL infusion   IntraVENous CONTINUOUS  
 levoFLOXacin (LEVAQUIN) 750 mg in D5W IVPB  750 mg IntraVENous Q24H No Known Allergies Social History Tobacco Use  Smoking status: Never Smoker  Smokeless tobacco: Never Used Substance Use Topics  Alcohol use: No  
  
History reviewed. No pertinent family history. Review of Systems: 
Review of systems not obtained due to patient factors. Objective:  
Vital Signs:   
Visit Vitals /68 (BP 1 Location: Right arm, BP Patient Position: At rest) Pulse 87 Temp 98.1 °F (36.7 °C) Resp 19 Wt 58.5 kg (129 lb) SpO2 100% BMI 22.14 kg/m² O2 Device: Room air Temp (24hrs), Av.8 °F (37.1 °C), Min:98.1 °F (36.7 °C), Max:99.3 °F (37.4 °C) Intake/Output:  
Last shift:       07 -  190 In: 250 [I.V.:250] Out: 0 Last 3 shifts: 1901 -  0700 In: -  
Out: 5900 [[id number]] Intake/Output Summary (Last 24 hours) at 2020 1059 Last data filed at 2020 7082 Gross per 24 hour Intake 250 ml Output 2600 ml Net -2350 ml Physical Exam:  
General:  Adult male, with developmental delay and muscle wasting, Awake/Alert, cooperative, in NAD HEENT:   NCAT, PERRL, OP clear, MM moist   
Neck: Supple, trachea midline. Lungs:   Symmetrical chest rise; good AE bilat; CTAB; no wheezes/rhonchi/rales noted. Heart:  RRR, S1, S2 normal, no m/r/g Abdomen:   Soft, somewhat distended,non-tender. Bowel sounds hypoactive. Extremities: Extremities with muscle atrophy. Small decubitus ulcer on right ankle and on left posterior calf. Sacral decub. No LE edema Pulses: 2+ and symmetric all extremities. Skin: Skin color, texture, turgor normal. No rashes or lesions Neurologic: Grossly nonfocal, PERRL, Awake/alert, moving all extremities, cooperative but not obeying verbal commands, not oriented Devices: R-fem TLC, Greenwood catheter Data:  
 
Recent Results (from the past 24 hour(s)) PROCALCITONIN Collection Time: 20  1:20 PM  
Result Value Ref Range Procalcitonin 0.15 ng/mL CORTISOL Collection Time: 20  1:20 PM  
Result Value Ref Range Cortisol, random 26.1 ug/dL METABOLIC PANEL, BASIC Collection Time: 04/28/20  1:20 PM  
Result Value Ref Range Sodium 141 136 - 145 mmol/L Potassium 4.8 3.5 - 5.5 mmol/L Chloride 117 (H) 100 - 111 mmol/L  
 CO2 14 (L) 21 - 32 mmol/L Anion gap 10 3.0 - 18 mmol/L Glucose 201 (H) 74 - 99 mg/dL BUN 56 (H) 7.0 - 18 MG/DL Creatinine 0.50 (L) 0.6 - 1.3 MG/DL  
 BUN/Creatinine ratio 112 (H) 12 - 20 GFR est AA >60 >60 ml/min/1.73m2 GFR est non-AA >60 >60 ml/min/1.73m2 Calcium 8.5 8.5 - 10.1 MG/DL  
GLUCOSE, POC Collection Time: 04/28/20  1:46 PM  
Result Value Ref Range Glucose (POC) 221 (H) 70 - 110 mg/dL PROTHROMBIN TIME + INR Collection Time: 04/28/20  2:41 PM  
Result Value Ref Range Prothrombin time 16.8 (H) 11.5 - 15.2 sec INR 1.4 (H) 0.8 - 1.2 GLUCOSE, POC Collection Time: 04/28/20  6:12 PM  
Result Value Ref Range Glucose (POC) 169 (H) 70 - 110 mg/dL GLUCOSE, POC Collection Time: 04/28/20 10:32 PM  
Result Value Ref Range Glucose (POC) 142 (H) 70 - 110 mg/dL RENAL FUNCTION PANEL Collection Time: 04/29/20  4:32 AM  
Result Value Ref Range Sodium 143 136 - 145 mmol/L Potassium 4.1 3.5 - 5.5 mmol/L Chloride 117 (H) 100 - 111 mmol/L  
 CO2 20 (L) 21 - 32 mmol/L Anion gap 6 3.0 - 18 mmol/L Glucose 91 74 - 99 mg/dL BUN 30 (H) 7.0 - 18 MG/DL Creatinine 0.35 (L) 0.6 - 1.3 MG/DL  
 BUN/Creatinine ratio 86 (H) 12 - 20 GFR est AA >60 >60 ml/min/1.73m2 GFR est non-AA >60 >60 ml/min/1.73m2 Calcium 7.4 (L) 8.5 - 10.1 MG/DL Phosphorus 1.6 (L) 2.5 - 4.9 MG/DL Albumin 1.7 (L) 3.4 - 5.0 g/dL MAGNESIUM Collection Time: 04/29/20  4:32 AM  
Result Value Ref Range Magnesium 1.5 (L) 1.6 - 2.6 mg/dL CBC WITH AUTOMATED DIFF Collection Time: 04/29/20  4:32 AM  
Result Value Ref Range WBC 9.1 4.6 - 13.2 K/uL  
 RBC 3.20 (L) 4.70 - 5.50 M/uL HGB 7.8 (L) 13.0 - 16.0 g/dL HCT 24.1 (L) 36.0 - 48.0 %  MCV 75.3 74.0 - 97.0 FL  
 MCH 24.4 24.0 - 34.0 PG  
 MCHC 32.4 31.0 - 37.0 g/dL  
 RDW 18.9 (H) 11.6 - 14.5 % PLATELET 447 (H) 099 - 420 K/uL MPV 9.0 (L) 9.2 - 11.8 FL  
 NEUTROPHILS 62 40 - 73 % LYMPHOCYTES 28 21 - 52 % MONOCYTES 8 3 - 10 % EOSINOPHILS 2 0 - 5 % BASOPHILS 0 0 - 2 %  
 ABS. NEUTROPHILS 5.7 1.8 - 8.0 K/UL  
 ABS. LYMPHOCYTES 2.6 0.9 - 3.6 K/UL  
 ABS. MONOCYTES 0.7 0.05 - 1.2 K/UL  
 ABS. EOSINOPHILS 0.2 0.0 - 0.4 K/UL  
 ABS. BASOPHILS 0.0 0.0 - 0.1 K/UL  
 DF AUTOMATED    
GLUCOSE, POC Collection Time: 04/29/20  8:26 AM  
Result Value Ref Range Glucose (POC) 99 70 - 110 mg/dL Recent Labs  
  04/28/20 
0431 FIO2I 21 Imaging: 
I have personally reviewed the patients radiographs and have reviewed the reports: 
 
CT CHEST/ABD/PELVIS w/o contrast [4/28]: 1. Left lower lobe consolidation, probable pneumonia. 2. Multiple bilateral nonobstructing renal calculi. Thick-walled urinary bladder containing a Holm catheter and questionable mildly thick urothelium, raising the possibility of UTI. 3. Moderate colonic fecal debris. Considerable distention of the rectal vault by stool with thickening of the rectal wall, possibly constipation/fecal impaction plus or minus stercoral colitis. 4. Right femoral venous catheter with its tip in the right common iliac vein. IMPRESSION:  
70yoM with HTN, DM, Post-polio syndrome, Cerebral palsy, Chronic holm, Mental retardation, and Sacral decubitus ulcer, presented to the ER from [facility] after he had labs that showed elevated potassium and BUN, found to have MATTIE and Septic shock due to UTI and Pneumonia PLAN:  
Respiratory: 1. Community acquired pneumonia:  
- CT Chest on my review shows a small LLL infiltrate consistent with a pneumonia - patient is not a reliable historian regarding symptoms, but he has no tachypnea or accessory muscle use during my exam, is not hypoxic, and lungs are CTA b/l. Furthermore he is not febrile and the infiltrate does not have the radiographic features of covid, for which my suspicion is low. - obtain sputum cx if able. Antibiotics as below. - PCCM will now sign off 
 
Cardiovascular: 1. Shock (resolved): likely septic in etiology. Cortisol 26. Received 3L IVF bolus and continuing IVF gtt. Unfortunately can't check CVP since is a femoral line. Hgb stable. Came off levophed overnight. Renal: 1. MATTIE (improving); Hyperkalemia (resolved): 
- creatinine was never high but this is in a patient with very little muscle mass and typically with creatinine 0.4, I believe the initial labs were reflective of MATTIE. >>84>>30 after hydration. Unclear if this MATTIE is related to post-obstructive in nature (old holm was clogged and removed) vs due to the UTI 
- new holm catheter in place; monitor UOP. Avoid nephrotoxic agents 
- CT Abd showed multiple nonobstructive renal stones; no hydronephrosis - UA consistent with a UTI (plan discussed below) - K initially 7.2 which was medically treated with insulin and IVF's. K now 4.1.  
- continue bicarb gtt (75MEQ) in 1/2NS @ 125 per Renal 
 
2. Hypomagnesemia and Hypophosphatemia 
- both repleted Infectious Disease: 1. Septic shock (resolved) due to UTI and Pneumonia (CAP vs Asp) - s/p 3L NS in ER, continue IVF gtt 
- blood cultures no growth; urine cx pending.  
- continue vanc, zosyn, levoflox and wean based on cultures 
- weaned off levophed overnight. - cortisol normal at 26. procal low at 0.15.  
- lactate improved from 2.6 to 1.3 
- monitor UOP 
- obtain sputum culture if able Endocrine: 1. DM: continue SSI 
 
GI: 
1. Questionable dysphagia:  
- speech working with patient, cleared him for mechanical soft with nectar thick 
- going for MBS today 
- continue PPI for GI prophylaxis Hematologic: 
- no active issues; continue heparin for DVT prophylaxis Neurologic: 
- no active issues Best Practice: · Sepsis Bundle per Hospital Protocol · Glycemic control; avoid Hypoglycemia · IHI ICU Bundles: 
·  Central Line Bundle Followed  and Holm Bundle Followed · Stress ulcer prophylaxis. PPI · DVT prophylaxis. heparin · Need for Lines, holm assessed. · Restraints need. · Palliative care evaluation. · Code Status: FULL Total of 35min spent on this patient's care Magy Brewer MD 
Pulmonary & Critical Care

## 2020-04-29 NOTE — PROGRESS NOTES
Problem: Discharge Planning Goal: *Discharge to safe environment Outcome: Progressing Towards Goal 
 Plan: Return to [facility]

## 2020-04-29 NOTE — PROGRESS NOTES
[age]  Received from  ER, awake was talking, unable to move at all. Not even wiggle his toes or fingers. Both hands swollen, both feet dropping, speech therapist ,came to feed pt, for barium  eval today. 0940  Decubitus  wound cleansing done,by  Wound nurse, skin assessment done with  Bianca Alaniz, wound darnell. There is tunnel in the big wound, small skin tear around and one in the right  Hip. Bath given and  Ronaldo Grumman. 1050  To  Radiology. 1200  hard to get admission data base ,poor historian, I called the contact number but  it was [facility] nurse who answer, was talking to one of the nurses, I was told  Pt has no  Family , [facility] trying to call for [facility]  But due to the present  Situation it was on hold ADMISSION DATA base not done, pt can not answer  The questioner. Lungs diminished with ocasional dry cough. 1400  HOB elevated during meal time, reposition to sides with  Wedge. 1640  Bed change to [place] bed, during the transfer from the old bed to  The [place] bed, noted  Both hands shaking, but his eyes are reactive to light  and even  Talking a little bit  To the staff, shaking stop when he was already on the [place] bed. Monitor. 1830  Feed pt, ate well, HOB  In sitting position, tolerated and no coughing noted, feed  By the spoon, looks comfortable no  Pain at this time, reposition to sides, at this time he is on his back.

## 2020-04-29 NOTE — PROGRESS NOTES
SPEECH LANGUAGE PATHOLOGY BEDSIDE SWALLOW  
EVALUATION & TREATMENT Patient: Moshe [place] (74 y.o. male) Date: 4/29/2020 Primary Diagnosis: Septic shock ([address] [A41.9, R65.21] Complicated UTI (urinary tract infection) [N39.0] Stage 2 acute kidney injury ([address] [N17.9] Septic shock ([address] [A41.9, R65.21] UTI (urinary tract infection) [N39.0] Stage 2 acute kidney injury ([address] [N17.9] Precautions: Aspiration risk;  
PLOF: LTC with h/o SILENT aspiration ASSESSMENT : 
Based on the objective data described below, the patient presents with moderate oropharyngeal dysphagia. Clinical beside swallow eval completed per MD orders. Pt presents with septic shock with abnormal labs from LTC. At baseline, pt reported to have CP, mildly ID, post-polio syndrome, and h/o of dysphagia. Pt presents as poor historian. He acknowledges his name, but disoriented to time, place, situation. He does say \"yes\" to dysphagia and drinking thickening liquids at LTC. Speech/voice is mostly unintelligible beyond simple word level. Oral mech examination revealed: decreased labial and lingual strength/ROM/coordination, natural dentition and fair oral hygiene. He exhibits difficutly following structured  commands,but pleasant. Pt observed with thin liquids single cup sip exhibiting delayed swallow  and wet gurgly VQ. HTLs, and NTLs given via tsp and cup sip, indicating delayed swallow with weak laryngeal elevation 0 overt s/sx of aspiration. He tolerate puree via tsp with + bolus control, A-P transit, and oral clearance. Pt demo prolonged/ incoordinated mastication however with thorough oral clearance with mech soft trials with no overt s/sx aspiration. SLP recs Mech soft with Nectar thick liquids diet via cup sip vs tsp meds as tolerated with general safe swallow precautions. Meds crushed in puree. Rec MBS this date to of further assess pharyngeal dysphagia.   Pt educated with regard to s/sx aspiration, aspiration risk, diet recs and role of SLP. Pt able to verbalize understanding, however will need re-enforcement. D/w RN who verbalized understanding. TREATMENT : 
Skilled therapy initiated; Educated pt on aspiration precautions and importance of compensatory swallow techniques to decrease aspiration risk (decrease rate of intake & sip/bite size, upright @HOB for all po intake and ~30 minutes after po); verbalized comprehension. SLP to follow as indicated. Patient will benefit from skilled intervention to address the above impairments. Patient's rehabilitation potential is considered to be Fair Factors which may influence rehabilitation potential include:  
[]            None noted [x]            Mental ability/status [x]            Medical condition [x]            Home/family situation and support systems [x]            Safety awareness 
[]            Pain tolerance/management []            Other: PLAN : 
Recommendations and Planned Interventions: 
Mech soft with Nectar/mildly thick liquids via cup sip vs tsp Aspiration precautions HOB >45 during po intake, remain >30 for 30-45 minutes after po Small bites/sips; alternate liquid/solid with slow feeding rate Oral care TID Meds crushed in puree MBS to further assess oropharyngeal swallow fxn Frequency/Duration: Patient will be followed by speech-language pathology 1-2 times per day/3-5 days per week to address goals. Discharge Recommendations: Missael Michel SUBJECTIVE:  
Patient stated No. OBJECTIVE:  
 
Past Medical History:  
Diagnosis Date  Cerebral palsy ([address]  Cognitive developmental delay  Decubitus ulcer of sacral region, stage 4 ([address]  Diabetes mellitus, type II ([address]  Hepatic steatosis ?  History of hepatomegaly  History of recurrent UTIs  History of small bowel obstruction  Hypertension  Polio  Splenic artery aneurysm (HCC)   
 ?; 1.7 cm  
 Urinary retention Past Surgical History:  
Procedure Laterality Date  HX CHOLECYSTECTOMY Home Situation: LTC [facility] Diet prior to admission: Pt is poor historian; h/o Silent aspiration Current Diet:  Mech soft with Nectar/ Mildly thick liquids via cup sip vs tsp; No straws Cognitive and Communication Status: 
Neurologic State: Alert, Confused Orientation Level: Oriented to person Cognition: Decreased attention/concentration, Decreased command following, Poor safety awareness Oral Assessment: 
Oral Assessment Labial: Decreased rate;Decreased seal;Impaired coordination Dentition: Natural 
Oral Hygiene: fair Lingual: Decreased rate;Decreased strength; Incoordinated P.O. Trials: 
  
Vocal quality prior to P.O.:   
  
  
 
PAIN: 
Pain level pre-treatment: 0/10 Pain level post-treatment: 0/10 Pain Intervention(s): Medication (see MAR); Rest, Ice, Repositioning Response to intervention: Nurse notified, See doc flow After treatment:  
[]            Patient left in no apparent distress sitting up in chair 
[x]            Patient left in no apparent distress in bed 
[x]            Call bell left within reach [x]            Nursing notified []            Family present 
[]            Caregiver present 
[]            Bed alarm activated COMMUNICATION/EDUCATION:  
[x]            Aspiration precautions; swallow safety; compensatory techniques. []            Patient/family have participated as able in goal setting and plan of care. [x]            Patient/family agree to work toward stated goals and plan of care. []            Patient understands intent and goals of therapy; neutral about participation. []            Patient unable to participate in goal setting/plan of care; educ ongoing with interdisciplinary staff [x]         Posted safety precautions in patient's room. Thank you for this referral. 
MOHSEN Noble Time Calculation: 24 mins Evaluation Time: 10 minutes Treatment Time: 14 minutes

## 2020-04-29 NOTE — CDMP QUERY
Pt admitted with UTI/Pt noted to have chronic Greenwood catheter that was changed in the ED. . If possible, please document in the progress notes and d/c summary if you are evaluating and / or treating any of the following: 
 
? UTI due to chronic Greenwood catheter 
? UTI not due to Greenwood ? Other ? Clinically unable to determine The medical record reflects the following: 
 
   Risk Factors: Chronic Greenwood catheter, nursing home resident. Clinical Indicators: UTI with septic shock Treatment: Catheter changed in ED, IV Levaquin, Zosn, Vanco 
 
 
Thank you Dr. Oneyda Dalton, Monique Miranda RN, TOÑOS 
[email or web address]

## 2020-04-29 NOTE — ROUTINE PROCESS
Bedside and Verbal shift change report given to Juan Lane (oncoming nurse) by Cindy Chacon RN 
 (offgoing nurse). Report included the following information SBAR, Intake/Output and MAR.

## 2020-04-29 NOTE — ED NOTES
TRANSFER - ED to INPATIENT REPORT: 
 
Verbal report given to Vania(name) on Edward Escobar  being transferred to Telemetry(unit) for routine progression of care Report consisted of patients Situation, Background, Assessment and  
Recommendations(SBAR). SBAR report made available to receiving floor on this patient being transferred to [address] [place] [address] ([facility])  for routine progression of care Admitting diagnosis Septic shock ([address] [A41.9, R65.21] Complicated UTI (urinary tract infection) [N39.0] Stage 2 acute kidney injury ([address] [N17.9] Septic shock ([address] [A41.9, R65.21] UTI (urinary tract infection) [N39.0] Stage 2 acute kidney injury ([address] [N17.9] Information from the following report(s) SBAR, ED Summary, Intake/Output, Recent Results and Cardiac Rhythm Sinus was made available to receiving floor. Lines:  
Triple Lumen 04/27/20 Right Femoral (Active) Peripheral IV 04/27/20 Right Wrist (Active) Site Assessment Clean, dry, & intact 4/27/2020  8:35 PM  
Phlebitis Assessment 0 4/27/2020  8:35 PM  
Infiltration Assessment 0 4/27/2020  8:35 PM  
Dressing Status Clean, dry, & intact 4/27/2020  8:35 PM  
   
Peripheral IV 04/27/20 Left Hand (Active) HCG Status for ALL Females under 53 y/o: NO Medication list confirmed with patient Opportunity for questions and clarification was provided. Patient is oriented to time, place, person and situation Sepsis summary Yes* Patient is  incontinent and non-ambulatory Valuables transported with patient Patient transported with: 
 Monitor Registered Nurse MAP (Monitor): 73 =Monitored (most recent) Vitals w/ MEWS Score (last day) Date/Time MEWS Score Pulse Resp Temp BP Level of Consciousness SpO2  
 04/29/20 0815    89    99 °F (37.2 °C)  99/66    99 % 04/29/20 0800    85    98.9 °F (37.2 °C)  99/66    99 % 04/29/20 0745    83    98.9 °F (37.2 °C)  96/67    100 % 04/29/20 0730    87    98.8 °F (37.1 °C)  99/65    100 % 04/29/20 0715    90    98.8 °F (37.1 °C)  95/67    100 % 04/29/20 0700    93    98.8 °F (37.1 °C)  100/66    100 % 04/29/20 0645    84    98.7 °F (37.1 °C)  98/65    100 % 04/29/20 0630    84    98.8 °F (37.1 °C)  97/63    100 % 04/29/20 0615    83    98.8 °F (37.1 °C)  (!) 89/68    100 % 04/29/20 0600    87    98.7 °F (37.1 °C)  95/65    100 % 04/29/20 0545    86    98.7 °F (37.1 °C)  92/62    100 % 04/29/20 0530    90    98.7 °F (37.1 °C)  95/63    100 % 04/29/20 0515    90    98.7 °F (37.1 °C)  (!) 87/58    100 % 04/29/20 0500    91    98.8 °F (37.1 °C)      100 % 04/29/20 0445    90    98.8 °F (37.1 °C)  (!) 89/60    100 % 04/29/20 0430    86    98.8 °F (37.1 °C)  (!) 88/61    100 % 04/29/20 0415    93    98.7 °F (37.1 °C)  (!) 89/58    100 % 04/29/20 0400    90    98.7 °F (37.1 °C)  93/61    100 % 04/29/20 0345    85    98.7 °F (37.1 °C)  (!) 89/61    100 % 04/29/20 0330    87    98.7 °F (37.1 °C)  92/59    100 % 04/29/20 0315    89    98.8 °F (37.1 °C)  91/57    100 % 04/29/20 0300    90    98.8 °F (37.1 °C)  91/61    100 % 04/29/20 0245    88    98.8 °F (37.1 °C)  91/60    100 % 04/29/20 0230    91    98.8 °F (37.1 °C)  92/60    100 % 04/29/20 0215    95    98.8 °F (37.1 °C)  96/60    100 % 04/29/20 0200    91    98.8 °F (37.1 °C)  (!) 87/61    100 % 04/29/20 0145    87    98.8 °F (37.1 °C)  (!) 81/59    100 % 04/29/20 0130    100    98.8 °F (37.1 °C)  90/56    100 % 04/29/20 0115    90    98.7 °F (37.1 °C)  (!) 87/64    100 % 04/29/20 0100    92    98.6 °F (37 °C)  91/68    100 % 04/29/20 0045    96    98.7 °F (37.1 °C)  (!) 89/60    100 % 04/29/20 0031    92    98.7 °F (37.1 °C)  (!) 88/61    100 % 04/29/20 0015    (!) 110    98.7 °F (37.1 °C)  91/63    100 % 04/28/20 2345    88    98.7 °F (37.1 °C)  100/73    100 % 04/28/20 2330    93    98.7 °F (37.1 °C)  100/71    100 % 04/28/20 2315    89    98.8 °F (37.1 °C)  104/67    100 % 04/28/20 2300    93    98.8 °F (37.1 °C)  103/69    100 % 04/28/20 2245    92    98.8 °F (37.1 °C)  98/65    100 % 04/28/20 2230    98    98.8 °F (37.1 °C)  101/66    100 % 04/28/20 2215    95    98.8 °F (37.1 °C)  105/69    100 % 04/28/20 2200    99    98.8 °F (37.1 °C)  105/67    100 % 04/28/20 2145    97    98.9 °F (37.2 °C)  103/76    100 % 04/28/20 2130    99    99 °F (37.2 °C)  102/72    100 % 04/28/20 2115    (!) 101    99 °F (37.2 °C)  107/74    100 % 04/28/20 2100    (!) 104    99 °F (37.2 °C)  108/76    100 % 04/28/20 2045    96    98.9 °F (37.2 °C)  108/79    100 % 04/28/20 2030    97    98.9 °F (37.2 °C)  113/76    100 % 04/28/20 2015    (!) 102    98.8 °F (37.1 °C)  113/74    100 % 04/28/20 2000    (!) 108    98.8 °F (37.1 °C)  111/78    100 % 04/28/20 1930    (!) 108    98.9 °F (37.2 °C)  118/82    100 % 04/28/20 1900    (!) 107    99 °F (37.2 °C)  112/83    100 % 04/28/20 1830    97    99.1 °F (37.3 °C)  118/70    99 % 04/28/20 1800    (!) 104  19  99.3 °F (37.4 °C)  112/73    99 % 04/28/20 1700    99    99.3 °F (37.4 °C)  104/59    100 % 04/28/20 1600    (!) 108    99.3 °F (37.4 °C)  94/64    100 % 04/28/20 1500    (!) 113    99.3 °F (37.4 °C)  95/66    100 % 04/28/20 1400    (!) 118    99.3 °F (37.4 °C)  104/64    95 % 04/28/20 1300    (!) 120    99.2 °F (37.3 °C)  96/59    96 % 04/28/20 1200    (!) 121  28  99 °F (37.2 °C)  98/63    100 % 04/28/20 1115    (!) 126  27  98.9 °F (37.2 °C)  94/58    100 % 04/28/20 1100    (!) 125  29  98.9 °F (37.2 °C)  96/58    100 % 04/28/20 1030    (!) 132  23  99.1 °F (37.3 °C)  (!) 87/65    100 % 04/28/20 0915    (!) 127  18  99.3 °F (37.4 °C)  113/74    100 % 04/28/20 0900    (!) 115    99.4 °F (37.4 °C)  109/57    100 % 04/28/20 0830    (!) 116  24  99.3 °F (37.4 °C)  (!) 89/63    100 % 04/28/20 0645    (!) 117  24  99.2 °F (37.3 °C)  (!) 86/59    99 % 04/28/20 0615    (!) 126  14  99.2 °F (37.3 °C)  106/61    100 % 04/28/20 0530    (!) 116  25  99.2 °F (37.3 °C)  98/64    100 % 04/28/20 0426    (!) 119  24  99 °F (37.2 °C)  94/54    100 % 04/28/20 0345    (!) 122  23  98.8 °F (37.1 °C)  94/55    100 % 04/28/20 0330    (!) 125  25  98.7 °F (37.1 °C)  91/57    100 % 04/28/20 0315    (!) 127  22  98.7 °F (37.1 °C)  92/53    100 % 04/28/20 0245    (!) 122  19  98.7 °F (37.1 °C)  91/56    98 % 04/28/20 0215    (!) 118  17  98.6 °F (37 °C)  91/54    100 % 04/28/20 0200    (!) 116  20  98.5 °F (36.9 °C)  (!) 89/53    100 % 04/28/20 0145    (!) 107  21  98.4 °F (36.9 °C)  (!) 89/52    97 % 04/28/20 0130    (!) 112  16  98.4 °F (36.9 °C)  90/54    100 % 04/28/20 0115    (!) 113  15  98.3 °F (36.8 °C)  91/52    100 % 04/28/20 0030    (!) 116  24  98 °F (36.7 °C)  95/59    100 % 04/28/20 00:24:02    (!) 114  22  97.9 °F (36.6 °C)  90/58    100 % 04/28/20 0015    (!) 119  21  97.9 °F (36.6 °C)  (!) 84/57    100 % Septic Patients:  
 
Lactic Acid Lab Results Component Value Date LACPOC 1.77 01/17/2020 LACPOC 4.70 ([facility]) 11/17/2019  
 (Most recent on top) Repeat drawn: YES Time: ALL LACTIC ACIDS GREATER THAN 2 MUST BE REPEATED POC WITHIN 4 HOURS OR PRIOR TO ADMISSION Total Fluid Bolus initiated and documented on MAR: YES All ordered antibiotics initiated within first 3 hours of TIME ZERO?   YES

## 2020-04-29 NOTE — ED NOTES
Assumed care of pt at time. Pt is resting in stable condition. No distress at this time. Will begin to wean off levo

## 2020-04-29 NOTE — ED NOTES
Call to Dr. Brenna Krishnan. Discussed weaning pt off norepi. Pt has tolerated weaning thus far. She states to wean pt to maintain MAP of 65.

## 2020-04-29 NOTE — PROGRESS NOTES
Problem: Dysphagia (Adult) Goal: *Acute Goals and Plan of Care (Insert Text) Description: Patient will: 1. Tolerate PO trials with 0 s/s overt distress in 4/5 trials 2. Utilize compensatory swallow strategies/maneuvers (decrease bite/sip, size/rate, alt. liq/sol) with min cues in 4/5 trials 3. Perform oral-motor/laryngeal exercises to increase oropharyngeal swallow function with min cues 4. Complete an objective swallow study (i.e., MBSS) to assess swallow integrity, r/o aspiration, and determine of safest LRD, min A Rec:    
Mech soft with Nectar/mildly thick liquids via TSP Aspiration precautions HOB >45 during po intake, remain >30 for 30-45 minutes after po Small bites/sips; alternate liquid/solid with slow feeding rate Oral care TID Meds crushed in puree MBS to further assess oropharyngeal swallow fxn-MET 4/29/2020 4/29/2020 0940 by Adrian Torres, SLP Outcome: Progressing Towards Goal 
 SPEECH PATHOLOGY MODIFIED BARIUM SWALLOW STUDY & TREATMENT Patient: Radha Mckinney (18 y.o. male) Date: 4/29/2020 Primary Diagnosis: Septic shock ([address] [A41.9, R65.21] Complicated UTI (urinary tract infection) [N39.0] Stage 2 acute kidney injury ([address] [N17.9] Septic shock ([address] [A41.9, R65.21] UTI (urinary tract infection) [N39.0] Stage 2 acute kidney injury ([address] [N17.9] Precautions: Aspiration Risk; + SILENT Aspiration PLOF:LTC ASSESSMENT : 
 
MBS completed with silent aspiration of thin (via straw/tsp) and silent aspiration of Nectar thick liquids (via straw). Further noted with x1 trial of large volume residuals during nectar thick swallow with straw. Resulting in SILENT aspiration after the swallow. Swallow delay noted with thin liquids (straw/spoon), with Nectar (via straw) and tsp of pudding. Premature spillage present across all po trials to the vallecuale; furthermore into pyriforms with all liquid presentations via straw and with cracker coated in barium.  Trace penetration apparent with thin liquids presentations and nectar thick liquids via straw . Given cue pt was unable to produce productive cough/throat clear to clear airway. Further comp strategies not utilized d/t underlying cognitive status. Following tsp of pudding, pt presented with Mod residuals remaining in valleculae and pyriforms that eventually cleared with multiple swallows. Pt tolerated Nectar liquids via TSP with trace penetration and min valleculae and pyriform residuals, but cleared without any aspiration occurrences. He exhibited functional oral mastication and manipulation of cracker coated in barium, resulting in premature to valleculae which cleared without any aspiration/penetration events. SLP attempted 13mm Ba pill in pudding, however, pt unable to swallow, likely with impact of underlying cognitive impairment. Deficits include decreased tongue base retraction with reduced pharyngeal wall constriction, decreased laryngeal elevation/sensation with min movement of epiglottic inversion, and compromised airway protection. Pt presents with mild-moderate oral and moderate-severe pharyngeal dysphagia, as evidenced above, which places pt at risk for aspiration. At this time, safest for Rec: Mech soft solids NECTAR/mildly thick liquids VIA TSP ONLY. SLP will continue to follow. SLP utilized video of study for visual feedback, education, and recommendations for pt; pt unable to demo evidence of learning; SLP d/w Jame Guadalupe; Precautions of white board updated. Treatment: 
Skilled therapy initiated: Educated pt on MBS results, aspiration precautions, and importance of compensatory swallow techniques to decrease aspiration risk (decrease rate of intake & sip/bite size, upright @HOB for all po intake and ~30 minutes after po); verbalized comprehension. SLP to follow as indicated. Patient will benefit from skilled intervention to address the above impairments. Patient's rehabilitation potential is considered to be Fair Factors which may influence rehabilitation potential include:  
[]              None noted [x]              Mental ability/status [x]              Medical condition [x]              Home/family situation and support systems []              Safety awareness [x]              Pain tolerance/management 
[]              Other: PLAN : 
Recommendations and Planned Interventions: 
Mech soft with Nectar/mildly thick liquids via TSP Aspiration precautions HOB >45 during po intake, remain >30 for 30-45 minutes after po Small bites/sips; alternate liquid/solid with slow feeding rate Oral care TID Meds crushed in puree MBS to further assess oropharyngeal swallow fxn-MET 4/29/2020 Frequency/Duration: Patient will be followed by speech-language pathology 1-2 times per day/3-5days per week to address goals. Discharge Recommendations: Missael Michel SUBJECTIVE:  
Patient stated OK. OBJECTIVE:  
 
Past Medical History:  
Diagnosis Date  Cerebral palsy ([address]  Cognitive developmental delay  Decubitus ulcer of sacral region, stage 4 ([address]  Diabetes mellitus, type II ([address]  Hepatic steatosis ?  History of hepatomegaly  History of recurrent UTIs  History of small bowel obstruction  Hypertension  Polio  Splenic artery aneurysm (HCC)   
 ?; 1.7 cm  
 Urinary retention Past Surgical History:  
Procedure Laterality Date  HX CHOLECYSTECTOMY Home Situation: LTC Diet prior to admission: Unknown; h/o SILENT ASPIRATION Current Diet:  Mech soft with Nectar thick liquids Trial 1: Trial 2:  
     
     
     
     
 :    :    
     
     
     
     
     
     
     
     
     
     
     
 
Trial 3: Trial 4:  
     
     
     
     
 :    :    
     
     
    
     
     
     
     
     
     
     
     
 
  
  
  
  
  
  
 
  
  
 
 8-point Penetration-Aspiration Scale: Score 8 PAIN: 
Pain level pre-treatment: unable to verbalize/10 Pain level post-treatment: unable to verbalize/10 Pain Intervention(s): Medication (see MAR); Rest, Ice, Repositioning Response to intervention: Nurse notified, See doc flow COMMUNICATION/EDUCATION:  
[]  Patient educated regarding MBS results and diet recommendations. [x]  Patient/family have participated as able in goal setting and plan of care. []  Patient/family agree to work toward stated goals and plan of care. []  Patient understands intent and goals of therapy, but is neutral about his/her participation. [x]  Patient is unable to participate in goal setting and plan of care. Thank you for this referral. 
Brendan Pastor, SLP Time Calculation: 10 mins

## 2020-04-29 NOTE — DIABETES MGMT
NUTRITIONAL ASSESSMENT GLYCEMIC CONTROL/ PLAN OF CARE Billie Chatman           78 y.o.           4/27/2020 1. Acute hyperkalemia 2. Septic shock ([address] 3. Uremia 4. Acute UTI   
DM, Sacral Decubitus INTERVENTIONS/PLAN:  
Added [place] Instant breakfast supplement, nectar thickened  BID. Encourage increased intake at meals to meet calorie and protein requirements. Recommend multivitamin with  St 4 sacral decubitus plus additional pressure injuries. ASSESSMENT:  
Nutritional Status: 
Pt is within ideal weight range AEB 96% of ideal weight. BMI=22.1 kg/k2, which meets criteria for nutritional risk for his age. He has gained 12 lbs. since 12/2019. Nutrition dx:  Pt with moderate oropharyngeal dysphagia per SLP eval AEB requires diet texture modification. 
 Pt is at risk for refeeding AEB  Mg 1.5 Phos 1.6 and intake <=30% meals, Mg and Phos being replaced. Pt w/hypoalbuminemia as evidenced by albumin=1.7mg/dl. Pt with increased nutrient requirements related to pressure injuries. Diabetes Management:  
 Recent blood glucose:    
  
Lab Results Component Value Date/Time GLU 91 04/29/2020 04:32 AM  
 GLUCPOC 111 (H) 04/29/2020 12:55 PM  
 GLUCPOC 99 04/29/2020 08:26 AM  
 GLUCPOC 142 (H) 04/28/2020 10:32 PM  
 
Within target range (non-ICU: <140; ICU<180): [x] Yes   []  No 
Current Insulin regimen: Corrective lispro  scale Home medication/insulin regimen: metformin HbA1c: 10% equivalent  to ave Blood Glucose of 240 mg/dl for 2-3 months prior to admission Adequate glycemic control PTA:  [] Yes  [x] No 
  
SUBJECTIVE/OBJECTIVE: Information obtained from: EMR Diet: mechanical soft with nectar thick liquids Patient Vitals for the past 100 hrs: 
 % Diet Eaten 04/29/20 0917 0 % 04/28/20 1905 20 % 04/28/20 1429 30 % Medications: [x]                Reviewed Labs:  
Lab Results Component Value Date/Time  Hemoglobin A1c 10.0 (H) 04/28/2020 03:49 AM  
 
 Lab Results Component Value Date/Time Sodium 143 04/29/2020 04:32 AM  
 Potassium 4.1 04/29/2020 04:32 AM  
 Chloride 117 (H) 04/29/2020 04:32 AM  
 CO2 20 (L) 04/29/2020 04:32 AM  
 Anion gap 6 04/29/2020 04:32 AM  
 Glucose 91 04/29/2020 04:32 AM  
 BUN 30 (H) 04/29/2020 04:32 AM  
 Creatinine 0.35 (L) 04/29/2020 04:32 AM  
 Calcium 7.4 (L) 04/29/2020 04:32 AM  
 Magnesium 1.5 (L) 04/29/2020 04:32 AM  
 Phosphorus 1.6 (L) 04/29/2020 04:32 AM  
 Albumin 1.7 (L) 04/29/2020 04:32 AM  
 
 
Anthropometrics: IBW : 61.2 kg (135 lb), % IBW (Calculated): 95.56 %, BMI (calculated): 22.1 Wt Readings from Last 1 Encounters:  
04/27/20 58.5 kg (129 lb) Ht Readings from Last 1 Encounters:  
04/27/20 5' 4\" (1.626 m) Estimated Nutrition Needs:  1860 Kcals/day, Protein (g): 87 g Fluid (ml): 1800 ml Based on:   [x]          Actual BW    []          ABW   []            Adjusted BW   
Nutrition Diagnoses: As stated above. Nutrition Interventions: supplements and multivitamin Goal: 
Intake will meet >75% of energy and protein requirements by 5/4/20. Blood glucose will be within target range of  mg/dL by 5/2. Weight maintenance by 5/7/20. Nutrition Monitoring and Evaluation   
 
[x]     Monitor po intake on meal rounds 
[x]     Continue inpatient monitoring and intervention 
[]     Other: 
 
 
Nutrition Risk:  [x]   High     []  Moderate    []  Minimal/Uncompromised Tyler Foster, ASPEN 
pgr [phone]

## 2020-04-29 NOTE — PROGRESS NOTES
RENAL PROGRESS NOTE Praveena Loredo Assessment/Plan: · MATTIE (volume depletion/uti/sepsis/urinary retention). Resolving, continue ivf till oral intake is adequate. · Hyperkalemia in context of mattie/use of ace I. Resolved. · Normal ag met acidosis due to mattie. Improving, continue ivf with [place]. · UTI/septic shock in a setting of chronic holm, kidney stones. On abx. · Debilitation/malnutrition. Subjective: Out of the icu, off pressors. Patient complaints off: No complaints, states he is \"allright\". No SOB/CP/N/V. Not eating yet. Patient Active Problem List  
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  
 Hypertension I10  
 Mild intellectual disability F70  
 Small bowel obstruction ([address] K56.609  Cognitive developmental delay F81.9  
 Uncontrolled type 2 diabetes mellitus with hyperosmolar nonketotic hyperglycemia (HCC) E11.00  Hydronephrosis N13.30  Hypernatremia E87.0  Sepsis ([address] A41.9  Elevated troponin R79.89  
 Urinary retention R33.9  MATTIE (acute kidney injury) ([address] N17.9  Metabolic encephalopathy O74.24  
 Complicated UTI (urinary tract infection) N39.0  Septic shock (HCC) A41.9, R65.21  
 Stage 2 acute kidney injury ([address] N17.9  Sacral decubitus ulcer, stage IV (HCC) L89.154 Current Facility-Administered Medications Medication Dose Route Frequency Provider Last Rate Last Dose  dextrose 10% infusion 125-250 mL  125-250 mL IntraVENous PRN Rebel Tillman E, DO      
 piperacillin-tazobactam (ZOSYN) 3.375 g in 0.9% sodium chloride (MBP/ADV) 100 mL MBP  3.375 g IntraVENous Q8H Olivia Candelaria H, DO 25 mL/hr at 04/29/20 1351 3.375 g at 04/29/20 1351  aspirin chewable tablet 81 mg  81 mg Oral DAILY Basilia Aleman, DO   81 mg at 04/28/20 2037  [Held by provider] finasteride (PROSCAR) tablet 5 mg  5 mg Oral DAILY Rebel Tillman DO   5 mg at 04/28/20 6343  [Held by provider] meclizine (ANTIVERT) tablet 12.5 mg  12.5 mg Oral TID Roberth Antonio E, DO   12.5 mg at 04/28/20 0582  polyethylene glycol (MIRALAX) packet 17 g  17 g Oral EVERY OTHER DAY Basilia Aleman, DO   17 g at 04/28/20 8832  [Held by provider] terazosin (HYTRIN) capsule 5 mg  5 mg Oral DAILY Rebel Tillman DO   5 mg at 04/28/20 0216  ondansetron (ZOFRAN) injection 4 mg  4 mg IntraVENous Q4H PRN Rebel Tillman,       
 insulin lispro (HUMALOG) injection   SubCUTAneous AC&HS Basiliajenna Aleman, DO   Stopped at 04/28/20 2200  
 glucose chewable tablet 16 g  4 Tab Oral PRN Basilia Denisr, DO      
 glucagon (GLUCAGEN) injection 1 mg  1 mg IntraMUSCular PRN Basilia Aleman, DO      
 albuterol-ipratropium (DUO-NEB) 2.5 MG-0.5 MG/3 ML  3 mL Nebulization Q6H PRN Basilia Denisr, DO      
 acetaminophen (TYLENOL) tablet 650 mg  650 mg Oral Q6H PRN Rebel Tillman, DO      
 melatonin tablet 12 mg  12 mg Oral QHS PRN Rebel Tillman DO      
 traMADoL (ULTRAM) tablet 50 mg  50 mg Oral Q6H PRN Basilia Denisr, DO      
 VANCOMYCIN INFORMATION NOTE   Other ONCE Abe Altman MD      
 VANCOMYCIN INFORMATION NOTE 1 Each  1 Each Other Rx Dosing/Monitoring Abe Altman MD      
 vancomycin (VANCOCIN) 750 mg in 0.9% sodium chloride (MBP/ADV) 250 mL ADV  750 mg IntraVENous Q8H Abe Altman  mL/hr at 04/29/20 0804 750 mg at 04/29/20 3197  hydroxypropyl methylcellulose (ISOPTO TEARS) 0.5 % ophthalmic solution 1 Drop  1 Drop Both Eyes QID Rebel Tillman DO   1 Drop at 04/29/20 1244  pantoprazole (PROTONIX) 40 mg in 0.9% sodium chloride 10 mL injection  40 mg IntraVENous DAILY Missael Armendariz MD   40 mg at 04/29/20 2396  heparin (porcine) injection 5,000 Units  5,000 Units SubCUTAneous Q8H Bethanie Armendariz MD   5,000 Units at 04/29/20 1350  sodium chloride (NS) flush 5-40 mL  5-40 mL IntraVENous Q8H Bethanie Armendariz MD   10 mL at 04/28/20 2200  
 sodium chloride (NS) flush 5-40 mL  5-40 mL IntraVENous PRN Bethanie Armendariz MD      
 docusate sodium (COLACE) capsule 100 mg  100 mg Oral BID Bethanie Armendariz MD   100 mg at 04/29/20 1243  senna (SENOKOT) tablet 8.6 mg  1 Tab Oral BID Jose Manuel Sandra H, DO   8.6 mg at 04/28/20 1849  
 sodium bicarbonate (8.4%) 75 mEq in 0.45% sodium chloride 1,000 mL infusion   IntraVENous CONTINUOUS Bernard Vazquez  mL/hr at 04/29/20 6209  levoFLOXacin (LEVAQUIN) 750 mg in D5W IVPB  750 mg IntraVENous Q24H Mary Roch, DO   Stopped at 04/28/20 2140 Objective Vitals:  
 04/29/20 0800 04/29/20 0815 04/29/20 0917 04/29/20 1332 BP: 99/66 99/66 107/68 105/69 Pulse: 85 89 87 91 Resp:   19 19 Temp: 98.9 °F (37.2 °C) 99 °F (37.2 °C) 98.1 °F (36.7 °C) 97.8 °F (36.6 °C) SpO2: 99% 99% 100% 98% Weight:      
Height:      
 
 
 
Intake/Output Summary (Last 24 hours) at 4/29/2020 1416 Last data filed at 4/29/2020 1331 Gross per 24 hour Intake 250 ml Output 3100 ml Net -2850 ml Admission weight: Weight: 58.5 kg (129 lb) (04/27/20 2025) Last Weight Metrics: 
Weight Loss Metrics 4/27/2020 1/22/2020 1/3/2020 11/24/2019 5/17/2018 6/12/2015 Today's Wt 129 lb 138 lb 7.2 oz 134 lb 135 lb 124 lb 100 lb BMI 22.14 kg/m2 23.76 kg/m2 23 kg/m2 23.17 kg/m2 20.63 kg/m2 19.53 kg/m2 Physical Assessment:  
 
General: NAD, alert. Neck: No jvd. LUNGS: Clear to Auscultation, No rales, rhonchi or wheezes. CVS EXM: S1, S2  RRR. Abdomen: soft, non tender. [place] in place. Lower Extremities:  1+edema. Lab CBC w/Diff Recent Labs  
  04/29/20 
0432 04/28/20 
0349 04/27/20 
2030 WBC 9.1 16.5* 12.3 RBC 3.20* 3.93* 4.51* HGB 7.8* 9.6* 11.0*  
HCT 24.1* 29.7* 34.4*  
* 651* 585* GRANS 62 73 66 LYMPH 28 16* 24 EOS 2 2 3 Chemistry Recent Labs  
  04/29/20 
0432 04/28/20 
1320 04/28/20 0349 04/27/20 2030 04/27/20 
1642 GLU 91 201* 166*   < > 332* 282*  141 139   < > 130* 131* K 4.1 4.8 5.5   < > 7.0* 7.2*  
* 117* 117*   < > 103 102 CO2 20* 14* 16*   < > 18* 21 BUN 30* 56* 84*   < > 133* 136* CREA 0.35* 0.50* 0.61   < > 1.04 1.06  
CA 7.4* 8.5 8.3*   < > 10.3* 10.3* AGAP 6 10 6   < > 9 8  
BUCR 86* 112* 138*   < > 128* 128* AP  --   --  100  --  127* 108 TP  --   --  6.0*  --  6.9 6.6 ALB 1.7*  --  2.1*  --  2.5* 2.4*  
GLOB  --   --  3.9  --  4.4* 4.2* AGRAT  --   --  0.5*  --  0.6* 0.6* PHOS 1.6*  --   --   --   --   --   
 < > = values in this interval not displayed. No results found for: IRON, FE, TIBC, IBCT, PSAT, FERR Lab Results Component Value Date/Time Calcium 7.4 (L) 04/29/2020 04:32 AM  
 Phosphorus 1.6 (L) 04/29/2020 04:32 AM  
  
 
David Still M.D. [facility] Phone [phone] Pager [phone]

## 2020-04-30 NOTE — PROGRESS NOTES
RENAL PROGRESS NOTE Kirti Castillo Assessment/Plan: · MATTIE (volume depletion/uti/sepsis/urinary retention). Resolving, reduce ivf but continue till oral intake is adequate. · Hypokalemia. Replace. Hyperkalemia on admission has resolved. · Normal ag met acidosis due to mattie. Corrected, d/c ivf with  Nabicarb. · UTI/septic shock in a setting of chronic holm, kidney stones. On abx. · Debilitation/malnutrition. · Will s/o, please call if any questions. Subjective: Out of the icu, off pressors. Patient complaints off: No complaints. No SOB/CP/N/V. States he is eating a little. Patient Active Problem List  
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  
 Hypertension I10  
 Mild intellectual disability F70  
 Small bowel obstruction ([address] K56.609  Cognitive developmental delay F81.9  
 Uncontrolled type 2 diabetes mellitus with hyperosmolar nonketotic hyperglycemia (HCC) E11.00  Hydronephrosis N13.30  Hypernatremia E87.0  Sepsis ([address] A41.9  Elevated troponin R79.89  
 Urinary retention R33.9  MATTIE (acute kidney injury) ([address] N17.9  Metabolic encephalopathy L66.91  
 Complicated UTI (urinary tract infection) N39.0  Septic shock (HCC) A41.9, R65.21  
 Stage 2 acute kidney injury ([address] N17.9  Sacral decubitus ulcer, stage IV (HCC) L89.154 Current Facility-Administered Medications Medication Dose Route Frequency Provider Last Rate Last Dose  [START ON 5/1/2020] VANCOMYCIN INFORMATION NOTE   Other ONCE Nicolas COBB DO      
 dextrose 10% infusion 125-250 mL  125-250 mL IntraVENous PRN Nick Gore DO      
  piperacillin-tazobactam (ZOSYN) 3.375 g in 0.9% sodium chloride (MBP/ADV) 100 mL MBP  3.375 g IntraVENous Q8H Sonia Eis H, DO 25 mL/hr at 04/30/20 0613 3.375 g at 04/30/20 0073  therapeutic multivitamin (THERAGRAN) tablet 1 Tab  1 Tab Oral DAILY Sonia Eis H, DO   1 Tab at 04/30/20 4312  VANCOMYCIN INFORMATION NOTE   Other ONCE Jose L Briggss, DO      
 aspirin chewable tablet 81 mg  81 mg Oral DAILY Monika Lo DO   81 mg at 04/30/20 4894  [Held by provider] finasteride (PROSCAR) tablet 5 mg  5 mg Oral DAILY Rebel Tillman, DO   5 mg at 04/28/20 1757  [Held by provider] meclizine (ANTIVERT) tablet 12.5 mg  12.5 mg Oral TID Ludy OROURKE, DO   12.5 mg at 04/28/20 0341  polyethylene glycol (MIRALAX) packet 17 g  17 g Oral EVERY OTHER DAY Ludy OROURKE, DO   17 g at 04/30/20 0900  [Held by provider] terazosin (HYTRIN) capsule 5 mg  5 mg Oral DAILY Rebel Tillman, DO   5 mg at 04/28/20 8463  ondansetron (ZOFRAN) injection 4 mg  4 mg IntraVENous Q4H PRN Rebel Tillman DO      
 insulin lispro (HUMALOG) injection   SubCUTAneous AC&HS Monika Lo, DO   2 Units at 04/30/20 1247  
 glucose chewable tablet 16 g  4 Tab Oral PRN Monika Lo DO      
 glucagon (GLUCAGEN) injection 1 mg  1 mg IntraMUSCular PRN Monika Lo DO      
 albuterol-ipratropium (DUO-NEB) 2.5 MG-0.5 MG/3 ML  3 mL Nebulization Q6H PRN Monika Lo DO      
 acetaminophen (TYLENOL) tablet 650 mg  650 mg Oral Q6H PRN Rebel Tillman,       
 melatonin tablet 12 mg  12 mg Oral QHS PRN Monika Lo DO      
 traMADoL (ULTRAM) tablet 50 mg  50 mg Oral Q6H PRN Ludy OROURKE, DO   50 mg at 04/30/20 4672  VANCOMYCIN INFORMATION NOTE 1 Each  1 Each Other Rx Dosing/Monitoring Pratik Whitaker MD      
 hydroxypropyl methylcellulose (ISOPTO TEARS) 0.5 % ophthalmic solution 1 Drop  1 Drop Both Eyes QID Rebel Tillman, DO   1 Drop at 04/30/20 1241  pantoprazole (PROTONIX) 40 mg in 0.9% sodium chloride 10 mL injection  40 mg IntraVENous DAILY Alfonso Armendariz MD   40 mg at 04/30/20 3027  heparin (porcine) injection 5,000 Units  5,000 Units SubCUTAneous Q8H Alfonso Armendariz MD   5,000 Units at 04/30/20 3307  sodium chloride (NS) flush 5-40 mL  5-40 mL IntraVENous Q8H Alfonso Armendariz MD   10 mL at 04/30/20 0519  
 sodium chloride (NS) flush 5-40 mL  5-40 mL IntraVENous PRN Alfonso Armendariz MD      
 docusate sodium (COLACE) capsule 100 mg  100 mg Oral BID Alfonso Armendariz MD   100 mg at 04/30/20 3007  senna (SENOKOT) tablet 8.6 mg  1 Tab Oral BID Shana Lawrence H, DO   8.6 mg at 04/30/20 9938  sodium bicarbonate (8.4%) 75 mEq in 0.45% sodium chloride 1,000 mL infusion   IntraVENous CONTINUOUS Jasielpatrica Urbano  mL/hr at 04/30/20 1116  levoFLOXacin (LEVAQUIN) 750 mg in D5W IVPB  750 mg IntraVENous Q24H Nina Mancilla,  100 mL/hr at 04/29/20 2051 750 mg at 04/29/20 2051 Objective Vitals:  
 04/29/20 2004 04/30/20 0185 04/30/20 8077 04/30/20 1111 BP: 107/71 108/70  106/68 Pulse: 76 77  76 Resp: 18 18  18 Temp: 99.1 °F (37.3 °C) 98.9 °F (37.2 °C)  98.6 °F (37 °C) SpO2: 100% 100%  99% Weight:   59.9 kg (132 lb) Height:      
 
 
 
Intake/Output Summary (Last 24 hours) at 4/30/2020 1308 Last data filed at 4/30/2020 7261 Gross per 24 hour Intake 2590.83 ml Output 2052 ml Net 538.83 ml Admission weight: Weight: 58.5 kg (129 lb) (04/27/20 2025) Last Weight Metrics: 
Weight Loss Metrics 4/30/2020 4/27/2020 1/22/2020 1/3/2020 11/24/2019 5/17/2018 6/12/2015 Today's Wt 132 lb - 138 lb 7.2 oz 134 lb 135 lb 124 lb 100 lb BMI - 22.66 kg/m2 23.76 kg/m2 23 kg/m2 23.17 kg/m2 20.63 kg/m2 19.53 kg/m2 Physical Assessment:  
 
General: NAD, alert. Neck: No jvd. LUNGS: Clear to Auscultation, No rales, rhonchi or wheezes. CVS EXM: S1, S2  RRR. Abdomen: soft, non tender. Greenwood in place. Lower Extremities:  1+edema. Lab CBC w/Diff Recent Labs 04/30/20 
0745 04/29/20 
1869 04/28/20 
1418 WBC 9.6 9.1 16.5*  
RBC 3.36* 3.20* 3.93* HGB 8.1* 7.8* 9.6* HCT 24.9* 24.1* 29.7*  
 444* 651* GRANS 61 62 73 LYMPH 29 28 16* EOS 2 2 2 Chemistry Recent Labs 04/30/20 
0745 04/29/20 
0432  04/28/20 
1320 04/28/20 
0349  04/27/20 
2030 04/27/20 
1642 GLU 95 91  --  201* 166*   < > 332* 282*  143  --  141 139   < > 130* 131*  
K 3.2* 4.1  --  4.8 5.5   < > 7.0* 7.2*  
* 117*  --  117* 117*   < > 103 102 CO2 22 20*  --  14* 16*   < > 18* 21 BUN 18 30*  --  56* 84*   < > 133* 136* CREA 0.50* 0.35*  --  0.50* 0.61   < > 1.04 1.06  
CA 6.9* 7.4*  --  8.5 8.3*   < > 10.3* 10.3* AGAP 8 6  --  10 6   < > 9 8  
BUCR 36* 86*  --  112* 138*   < > 128* 128* AP  --   --   --   --  100  --  127* 108 TP  --   --   --   --  6.0*  --  6.9 6.6 ALB 1.6* 1.7*  --   --  2.1*  --  2.5* 2.4*  
GLOB  --   --   --   --  3.9  --  4.4* 4.2* AGRAT  --   --   --   --  0.5*  --  0.6* 0.6* PHOS 1.8* 1.6*   < >  --   --   --   --   --   
 < > = values in this interval not displayed. No results found for: IRON, FE, TIBC, IBCT, PSAT, FERR Lab Results Component Value Date/Time Calcium 6.9 (L) 04/30/2020 07:45 AM  
 Phosphorus 1.8 (L) 04/30/2020 07:45 AM  
  
 
Jesus Manuel Ruvalcaba M.D. [facility] Phone [phone] Pager [phone]

## 2020-04-30 NOTE — PROGRESS NOTES
Internal Medicine Progress Note Patient's Name: Kirti Castillo Admit Date: 4/27/2020 Length of Stay: 2 Assessment/Plan Active Hospital Problems Diagnosis Date Noted  Complicated UTI (urinary tract infection) 04/28/2020  Septic shock ([address] 04/28/2020  Stage 2 acute kidney injury ([address] 04/28/2020  Sacral decubitus ulcer, stage IV ([address] 04/28/2020  Uncontrolled type 2 diabetes mellitus with hyperosmolar nonketotic hyperglycemia ([address] 11/17/2019  Cognitive developmental delay 05/12/2018  Cerebral palsy ([address] 06/12/2015  History of post-polio syndrome 06/12/2015  Hypertension 06/12/2015  UTI (urinary tract infection) 06/11/2015 Urinary tract infection associated with indwelling urethral catheter UTI due to chronic Greenwood catheter - Afebrile, normal WBCs 
- Cont IV levaquin alone given urine cult findings 
- Urine cult w/ pansensitive ecoli 
- Potassium levels WNL 
- Cr trending down - Cont IV fluids - Speech recs in place - Psych consult for capacity for guardianship  
- Cont acceptable home medications for chronic conditions  
- DVT protocol I have personally reviewed all pertinent labs and films that have officially resulted over the last 24 hours. I have personally checked for all pending labs that are awaiting final results. Subjective Pt s/e @ bedside No major events overnight Afebrile overnight Appears comfortable Unable to obtain accurate ROS Objective Visit Vitals /68 Pulse 76 Temp 98.6 °F (37 °C) Resp 18 Ht 5' 4\" (1.626 m) Wt 59.9 kg (132 lb) SpO2 99% BMI 22.66 kg/m² Physical Exam: 
General Appearance: NAD, non-conversant, pleasant Lungs: CTA with normal respiratory effort CV: RRR, no m/r/g Abdomen: soft, non-tender, normal bowel sounds Extremities: no cyanosis, no peripheral edema Neuro: No focal deficits, contracted Lab/Data Reviewed: 
BMP:  
Lab Results Component Value Date/Time  04/30/2020 07:45 AM  
 K 3.2 (L) 04/30/2020 07:45 AM  
  (H) 04/30/2020 07:45 AM  
 CO2 22 04/30/2020 07:45 AM  
 AGAP 8 04/30/2020 07:45 AM  
 GLU 95 04/30/2020 07:45 AM  
 BUN 18 04/30/2020 07:45 AM  
 CREA 0.50 (L) 04/30/2020 07:45 AM  
 GFRAA >60 04/30/2020 07:45 AM  
 GFRNA >60 04/30/2020 07:45 AM  
 
CBC:  
Lab Results Component Value Date/Time WBC 9.6 04/30/2020 07:45 AM  
 HGB 8.1 (L) 04/30/2020 07:45 AM  
 HCT 24.9 (L) 04/30/2020 07:45 AM  
  04/30/2020 07:45 AM  
 
 
Imaging Reviewed: 
No results found. Medications Reviewed: 
Current Facility-Administered Medications Medication Dose Route Frequency  [START ON 5/1/2020] VANCOMYCIN INFORMATION NOTE   Other ONCE  
 0.9% sodium chloride with KCl 40 mEq/L infusion   IntraVENous CONTINUOUS  
 dextrose 10% infusion 125-250 mL  125-250 mL IntraVENous PRN  piperacillin-tazobactam (ZOSYN) 3.375 g in 0.9% sodium chloride (MBP/ADV) 100 mL MBP  3.375 g IntraVENous Q8H  
 therapeutic multivitamin (THERAGRAN) tablet 1 Tab  1 Tab Oral DAILY  aspirin chewable tablet 81 mg  81 mg Oral DAILY  [Held by provider] finasteride (PROSCAR) tablet 5 mg  5 mg Oral DAILY  [Held by provider] meclizine (ANTIVERT) tablet 12.5 mg  12.5 mg Oral TID  polyethylene glycol (MIRALAX) packet 17 g  17 g Oral EVERY OTHER DAY  [Held by provider] terazosin (HYTRIN) capsule 5 mg  5 mg Oral DAILY  ondansetron (ZOFRAN) injection 4 mg  4 mg IntraVENous Q4H PRN  
 insulin lispro (HUMALOG) injection   SubCUTAneous AC&HS  
 glucose chewable tablet 16 g  4 Tab Oral PRN  
 glucagon (GLUCAGEN) injection 1 mg  1 mg IntraMUSCular PRN  
 albuterol-ipratropium (DUO-NEB) 2.5 MG-0.5 MG/3 ML  3 mL Nebulization Q6H PRN  
 acetaminophen (TYLENOL) tablet 650 mg  650 mg Oral Q6H PRN  
 melatonin tablet 12 mg  12 mg Oral QHS PRN  
 traMADoL (ULTRAM) tablet 50 mg  50 mg Oral Q6H PRN  
 VANCOMYCIN INFORMATION NOTE 1 Each  1 Each Other Rx Dosing/Monitoring  hydroxypropyl methylcellulose (ISOPTO TEARS) 0.5 % ophthalmic solution 1 Drop  1 Drop Both Eyes QID  pantoprazole (PROTONIX) 40 mg in 0.9% sodium chloride 10 mL injection  40 mg IntraVENous DAILY  heparin (porcine) injection 5,000 Units  5,000 Units SubCUTAneous Q8H  
 sodium chloride (NS) flush 5-40 mL  5-40 mL IntraVENous Q8H  
 sodium chloride (NS) flush 5-40 mL  5-40 mL IntraVENous PRN  
 docusate sodium (COLACE) capsule 100 mg  100 mg Oral BID  senna (SENOKOT) tablet 8.6 mg  1 Tab Oral BID  levoFLOXacin (LEVAQUIN) 750 mg in D5W IVPB  750 mg IntraVENous Q24H Matthew Solis,  Internal Medicine, Hospitalist 
Pager: [phone] [facility]

## 2020-04-30 NOTE — PROGRESS NOTES
Pharmacy Dosing Services: Vancomycin Indication: Urinary Tract Infection and Sepsis of Unknown Etiology Day of therapy: 3 / X Other Antimicrobials (Include dose, start day & day of therapy):  Levofloxacin (Levaquin) and Piperacillin/Tazobactam (Zosyn) Current Antimicrobial Therapy (168h ago, onward) Ordered     Start Stop  
 20  piperacillin-tazobactam (ZOSYN) 4.5 g in 0.9% sodium chloride (MBP/ADV) 100 mL MBP  4.5 g,   IntraVENous,   EVERY 6 HOURS    
 20 0240 --  
 20  levoFLOXacin (LEVAQUIN) 750 mg in D5W IVPB  750 mg,   IntraVENous,   EVERY 24 HOURS    
 20 -- Loading dose (date given): 1000 mg 
Current Maintenance dose: 750 mg IV every 8 hours Goal Vancomycin Level: Vancomycin Trough: 15 - 20 mcg/mL (most infections) Vancomycin Level (if drawn):  
Recent Labs 20 
0930 VANCT 32.5* Pt Weight Weight: 59.9 kg (132 lb) Temperature Temp: 98.9 °F (37.2 °C) Temp (72hrs), Av.6 °F (37 °C), Min:96.6 °F (35.9 °C), Max:99.4 °F (37.4 °C) HR Pulse (Heart Rate): 77 BP BP: 108/70 Recent Labs 20 
0745 20 
6074 20 
1320 20 
8462 CREA 0.50* 0.35* 0.50* 0.61  
BUN 18 30* 56* 84* WBC 9.6 9.1  --  16.5* Estimated Creatinine Clearance Estimated Creatinine Clearance: 84.6 mL/min (A) (by C-G formula based on SCr of 0.5 mg/dL (L)). Estimated Creatinine Clearance (using IBW): 118.4 mL/min CAPD, Hemodialysis or Renal Replacement Therapy: N/A Renal function stable? (unstable defined as SCr increase of 0.5 mg/dL or > 50% increase from baseline, whichever is greater) (Y/N): Y Significant Cultures:  
Results Procedure Component Value Units Date/Time CULTURE, RESPIRATORY/SPUTUM/BRONCH Frank Mckusick STAIN [[id number]] Order Status:  Sent Specimen:  Sputum CULTURE, BLOOD [[id number]] Collected:  20 210 Order Status:  Completed Specimen:  Blood Updated:  20 1408 Special Requests: NO SPECIAL REQUESTS Culture result: NO GROWTH 3 DAYS     
 CULTURE, URINE [[id number]]  (Abnormal)  (Susceptibility) Collected:  04/27/20 2047 Order Status:  Completed Specimen:  Cath Urine Updated:  04/30/20 1021 Special Requests: NO SPECIAL REQUESTS [place] Count --     
  >100,000 COLONIES/mL Culture result: ESCHERICHIA COLI     
   LACTOBACILLUS SPECIES Susceptibility Escherichia coli PHILL Amikacin ($) Susceptible Ampicillin ($) Resistant Ampicillin/sulbactam ($) Susceptible Cefazolin ($) Susceptible Cefepime ($$) Susceptible Cefoxitin Susceptible Ceftazidime ($) Susceptible Ceftriaxone ($) Susceptible Ciprofloxacin ($) Susceptible [1] Ext. Spec. Beta Lactamase Susceptible Gentamicin ($) Susceptible Levofloxacin ($) Susceptible [1] Meropenem ($$) Susceptible Nitrofurantoin Susceptible Piperacillin/Tazobac ($) Susceptible Tobramycin ($) Susceptible Trimeth/Sulfa Resistant  
      
 
  [1]   **FDA INTERPRETATION REFLECTED, REFER TO CLSI FOR ALTERNATE INTERPRETATIONS. **  
   
 
  
  
  
 CULTURE, BLOOD [[id number]] Collected:  04/27/20 2030 Order Status:  Completed Specimen:  Blood Updated:  04/30/20 0493 Special Requests: NO SPECIAL REQUESTS Culture result: NO GROWTH 3 DAYS Regimen assessment:  
- received call about 32.5 trough level and had nurse stop bag after about 30 minutes of infusing 750mg dose given at 1005 Will draw random level with morning labs, if therapeutic, 750mg q12h reasonable Maintenance dose: depending on morning labs Next scheduled level: 5/1 at 0400 Pharmacy will follow daily and adjust medications as appropriate for renal function and/or serum levels. Thank you, 
Eldon Adan, [address] Pharmacist 
[phone]

## 2020-04-30 NOTE — PROGRESS NOTES
Problem: Diabetes Self-Management Goal: *Disease process and treatment process Description: Define diabetes and identify own type of diabetes; list 3 options for treating diabetes. Outcome: Progressing Towards Goal 
Goal: *Incorporating nutritional management into lifestyle Description: Describe effect of type, amount and timing of food on blood glucose; list 3 methods for planning meals. Outcome: Progressing Towards Goal 
Goal: *Incorporating physical activity into lifestyle Description: State effect of exercise on blood glucose levels. Outcome: Progressing Towards Goal 
Goal: *Developing strategies to promote health/change behavior Description: Define the ABC's of diabetes; identify appropriate screenings, schedule and personal plan for screenings. Outcome: Progressing Towards Goal 
Goal: *Using medications safely Description: State effect of diabetes medications on diabetes; name diabetes medication taking, action and side effects. Outcome: Progressing Towards Goal 
Goal: *Monitoring blood glucose, interpreting and using results Description: Identify recommended blood glucose targets  and personal targets. Outcome: Progressing Towards Goal 
Goal: *Prevention, detection, treatment of acute complications Description: List symptoms of hyper- and hypoglycemia; describe how to treat low blood sugar and actions for lowering  high blood glucose level. Outcome: Progressing Towards Goal 
Goal: *Prevention, detection and treatment of chronic complications Description: Define the natural course of diabetes and describe the relationship of blood glucose levels to long term complications of diabetes. Outcome: Progressing Towards Goal 
Goal: *Developing strategies to address psychosocial issues Description: Describe feelings about living with diabetes; identify support needed and support network Outcome: Progressing Towards Goal 
Goal: *Insulin pump training Outcome: Progressing Towards Goal 
 Goal: *Sick day guidelines Outcome: Progressing Towards Goal 
Goal: *Patient Specific Goal (EDIT GOAL, INSERT TEXT) Outcome: Progressing Towards Goal 
  
Problem: Patient Education: Go to Patient Education Activity Goal: Patient/Family Education Outcome: Progressing Towards Goal 
  
Problem: Discharge Planning Goal: *Discharge to safe environment Outcome: Progressing Towards Goal 
  
Problem: Discharge Planning Goal: *Discharge to safe environment Outcome: Progressing Towards Goal 
  
Problem: Pressure Injury - Risk of 
Goal: *Prevention of pressure injury Description: Document Alex Scale and appropriate interventions in the flowsheet. Outcome: Progressing Towards Goal 
Note: Pressure Injury Interventions: 
Sensory Interventions: Assess changes in LOC, Assess need for specialty bed, Check visual cues for pain, Pressure redistribution bed/mattress (bed type), Keep linens dry and wrinkle-free, Float heels Moisture Interventions: Check for incontinence Q2 hours and as needed, Assess need for specialty bed, Absorbent underpads, Internal/External urinary devices, Maintain skin hydration (lotion/cream) Activity Interventions: Pressure redistribution bed/mattress(bed type) Mobility Interventions: Pressure redistribution bed/mattress (bed type), HOB 30 degrees or less, Turn and reposition approx. every two hours(pillow and wedges), Suspension boots Nutrition Interventions: Document food/fluid/supplement intake Friction and Shear Interventions: Foam dressings/transparent film/skin sealants, Feet elevated on foot rest 
 
  
 
 
 
  
Problem: Patient Education: Go to Patient Education Activity Goal: Patient/Family Education Outcome: Progressing Towards Goal 
  
Problem: Falls - Risk of 
Goal: *Absence of Falls Description: Document Earma Martha Fall Risk and appropriate interventions in the flowsheet. Outcome: Progressing Towards Goal 
Note: Fall Risk Interventions: Mentation Interventions: Adequate sleep, hydration, pain control, Reorient patient, More frequent rounding Medication Interventions: Evaluate medications/consider consulting pharmacy Elimination Interventions: Call light in reach, Patient to call for help with toileting needs Problem: Patient Education: Go to Patient Education Activity Goal: Patient/Family Education Outcome: Progressing Towards Goal 
  
Problem: Nutrition Deficit Goal: *Optimize nutritional status Outcome: Progressing Towards Goal 
  
Problem: General Wound Care Goal: *Non-infected wound: Improvement of existing wound, absence of infection, and maintenance of skin integrity Outcome: Progressing Towards Goal 
Goal: *Infected Wound: Prevention of further infection and promotion of healing Description: Infection control procedures (eg: clean dressings, clean gloves, hand washing, precautions to isolate wound from contamination, sterile instruments used for wound debridement) should be implemented. Outcome: Progressing Towards Goal 
Goal: Interventions Outcome: Progressing Towards Goal

## 2020-04-30 NOTE — ROUTINE PROCESS
Bedside shift report received from [facility] with sbar 1025 pharmacy called unit vanco stopped Patient had apparent seizure while being cleaned up. rrt called [phone] ativan I mg iv given Patient sleeping Tele psy request sent Bedside shift report given to [facility] with sbar

## 2020-05-01 NOTE — PROGRESS NOTES
1930  Bedside, Verbal, and Written shift change report given to [address] [place] [address] (oncoming nurse) by Darwin Estrada RN (offgoing nurse). Report included the following information SBAR, Kardex, and MAR.  
 
2000  Patient is in bed, alert but only oriented to name. Did utter unintelligible words randomly. Does ot respond to question about pain but shows non-verbal sign when in pain like guarding (when I flushed and changed dressing on IV). Room air, IV dressing loose (was changed), dressing on right hip CDI, dressing on sacrum CDI, no sign of distress. Padded hand rails, patient is on seizure precaution. Bed low, call bell within reach. 2030  I was at bedside  during telepsych appointment. Patient did not pass evaluation of MD. 
 
2100  Patient is in bed, resting turned per order. Pt had a small BM, formed but soft. 2200  Patient is in bed, resting/ sleeping. No sign of distress. 2300  Patient is in bed, resting. Changed batteries on tele box. 0100  Checked on patient per tele nurse, he is showing a lot of noise in the monitor. Went to pt's room, patient was awake but not in distress. Turned patient. Patient had a medium sized BM, loose. 0300  Turned patient. 0500  Turned patient. 0600  Patient had another BM, medium amount soft/ formed. Cleaned and turned patient. 0730  Bedside, Verbal, and Written shift change report given to Richie Ugalde and [address] [place] [address] (oncoming nurse) by [address] [place] [address] (offgoing nurse). Report included the following information SBAR, Kardex, and MAR.

## 2020-05-01 NOTE — PROGRESS NOTES
Problem: Dysphagia (Adult) Goal: *Acute Goals and Plan of Care (Insert Text) Description: Patient will: 1. Tolerate PO trials with 0 s/s overt distress in 4/5 trials 2. Utilize compensatory swallow strategies/maneuvers (decrease bite/sip, size/rate, alt. liq/sol) with min cues in 4/5 trials 3. Perform oral-motor/laryngeal exercises to increase oropharyngeal swallow function with min cues 4. Complete an objective swallow study (i.e., MBSS) to assess swallow integrity, r/o aspiration, and determine of safest LRD, min A Rec:    
Mech soft with Nectar/mildly thick liquids via TSP Aspiration precautions HOB >45 during po intake, remain >30 for 30-45 minutes after po Small bites/sips; alternate liquid/solid with slow feeding rate Oral care TID Meds crushed in puree MBS to further assess oropharyngeal swallow fxn-MET 4/29/2020 Outcome: Progressing Towards Goal 
SPEECH LANGUAGE PATHOLOGY DYSPHAGIA TREATMENT Patient: Kirti Castillo (10 y.o. male) Date: 5/1/2020 Diagnosis: Septic shock ([address] [A41.9, R65.21] Complicated UTI (urinary tract infection) [N39.0] Stage 2 acute kidney injury ([address] [N17.9] Septic shock ([address] [A41.9, R65.21] UTI (urinary tract infection) [N39.0] Stage 2 acute kidney injury ([address] [N17.9] Septic shock ([address] Precautions: Aspiration risk; Seizure risk PLOF:Per H&P 
 
ASSESSMENT: 
Seen at b/s this morning for follow dysphagia intervention/management with mech soft/ Nectar/mildly thick breakfast tray. Pt positioned on right side with > 45* HOB. SLP fed pt , who demo'd  functional mastication /bolus control of solids and + oral clearance with 100%. Pt tolerated ~4 oz of Nectar/ mildly thick liquids via TSP without any overt s/sx of aspiration/ penetration with clear VQ. Pt tolerating current diet well.  SLP attempted to educate pt on risk of aspiration, positioning, diet recs, and role of SLP, however he demod no evidence of learning d/t cognitive impairment. ST will continue to follow. D/W RN Jabier Alves. Progression toward goals: 
[]         Improving appropriately and progressing toward goals [x]         Improving slowly and progressing toward goals 
[]         Not making progress toward goals and plan of care will be adjusted PLAN: 
Recommendations and Planned Interventions: 
Mech soft with Nectar/mildly thick liquids via TSP Aspiration precautions HOB >45 during po intake, remain >30 for 30-45 minutes after po Small bites/sips; alternate liquid/solid with slow feeding rate Oral care TID Meds crushed in puree MBS to further assess oropharyngeal swallow fxn-MET 4/29/2020 Patient continues to benefit from skilled intervention to address the above impairments. Continue treatment per established plan of care. Discharge Recommendations:  Missael Michel SUBJECTIVE:  
Patient stated Goudy. OBJECTIVE:  
Cognitive and Communication Status: 
Neurologic State: Alert, Confused Orientation Level: Disoriented to person Cognition: Follows commands Dysphagia Treatment: 
Oral Assessment: 
Oral Assessment Labial: Decreased rate, Decreased seal, Impaired coordination Dentition: Natural 
Oral Hygiene: fair Lingual: Decreased rate, Decreased strength, Incoordinated P.O. Trials: 
 Patient Position: > 45 Vocal quality prior to P.O.: Breathy, Fatigue, Low volume Consistency Presented: Honey thick liquid, Mechanical soft, Nectar thick liquid, Puree, Thin liquid How Presented: SLP-fed/presented Bolus Formation/Control: Impaired Type of Impairment: Delayed, Lip closure, Mastication Propulsion: Delayed (# of seconds) Oral Residue: Less than 10% of bolus Initiation of Swallow: Delayed (# of seconds) Laryngeal Elevation: Decreased, Weak Aspiration Signs/Symptoms: Change vocal quality, Infiltrate on chest xray Effective Modifications: Alternate liquids/solids, Cup/sip, Spoon, Small sips and bites, Other (comment) Cues for Modifications: Moderate Oral Phase Severity: Moderate Pharyngeal Phase Severity : Moderate Oral Motor Exercises: 
   
   
   
   
   
   
   
   
   
   
   
  
   
   
  
Exercises: 
Laryngeal Exercises: PAIN: 
Pain level pre-treatment: 0/10 Pain level post-treatment: 0/10 Pain Intervention(s): Medication (see MAR); Rest, Ice, Repositioning Response to intervention: Nurse notified, See doc flow After treatment:  
[]              Patient left in no apparent distress sitting up in chair 
[x]              Patient left in no apparent distress in bed 
[x]              Call bell left within reach [x]              Nursing notified 
[]              Family present 
[]              Caregiver present 
[]              Bed alarm activated COMMUNICATION/EDUCATION:  
[x] Aspiration precautions; swallow safety; compensatory techniques []        Patient unable to participate in education; education ongoing with staff [x]  Posted safety precautions in patient's room. [] Oral-motor/laryngeal strengthening exercises MOHSEN Sanchez Time Calculation: 20 mins

## 2020-05-01 NOTE — PROGRESS NOTES
0730 Bedside and Verbal shift change report given to Aparna Armas and Stewart Wisdom, RN (oncoming nurse) by Salvador Lindquist (offgoing nurse). Report included the following information SBAR, Kardex, Intake/Output and MAR. 1030 morning care given, tolerated well, turned pt q2 
 
 
1500 pt iv on right hand dislodged no infiltration, patient had bm and urine, loose bm, changed pt bedding, pad, gown and total care provided with assist Dee 1765 
 
1691 dressing on sacrum changed, tolerated well 1845 patient resting on bed, no sign of sob, dyspnea, nausea or vomiting, patient is asymptomatic, bed locked, low, call bell in reach

## 2020-05-01 NOTE — CDMP QUERY
After further study, do you concur with the findings of \"Pneumonia (CAP vs Asp)\" noted in the Pulmonology Consultation note and progress notes? ? Aspiration pneumonia ? Community acquired pneumonia ? No pneumonia ? Atelectasis ? Other Explanation of the clinical findings ? Clinically Undetermined (no explanation for clinical findings) The medical record reflects the following clinical findings, risk factors and treatment:  
 
Risk Factors:  Acute illness; 4/29 SLP note: Pt presents with mild-moderate oral and moderate-severe pharyngeal dysphagia Clinical Indicators:   CT chest: Left lower lobe consolidation, probable pneumonia. CXR: Retrocardiac consolidation, atelectasis and/or pneumonia. Left infrahilar linear 
atelectasis Treatment:  ABX; Nectar thick/dysphagia diet; aspiration precautions Thank you, Ronald Luna RN/[email or web address]

## 2020-05-01 NOTE — ROUTINE PROCESS
Bedside and Verbal shift change report given to [facility] (oncoming nurse) by Mayra Gandhi RN 
 (offgoing nurse). Report included the following information SBAR, Kardex, Intake/Output and MAR.

## 2020-05-01 NOTE — CONSULTS
This evaluation was conducted via telepsychiatry with the assistance of onsite staff. NAME: Radha Mckinney : 1950 DATE: [id number] TIME:  [facility] Location of Physician: [facility] Reason for Consult: Medical Decision Making Capacity History of Present Illness:  Pt is a 54G/G M with a complicated medical history and cerebral palsy/cognitive delay admitted for UTI/Sepsis. Pt was seen via telepsych and with nurse at bedside to assist.  Per discussion with nursing pt is A&O x1 and has had limited cognitive function/interactions per report. Upon discussion with pt, pt is found to be very limited in interactions. Pt awake and attentive to speaking (by myself and nursing) as he frequently made verbal utterances in response to questions. He was able to say his name but was otherwise not oriented - location was unintelligible utterance (asked and repeated several times and nursing at bedside could not understand either), year was , month was march, day of week was the first.  Unable to answer current president. When asked about reason for being in the hospital he stated because he struggled to eat. Was unable to answer questions about what an infection was or how one might treat it (attempting to assess understanding of current medical situation) - answer to question regarding an infection after several attempts at clarifications was \"bake a cake\". He also stated \"[place] the [place]\"  During the course of the interview the patient was not able to correctly answer or respond to a single question other than his name. He was unable to show understanding of any basic facts regarding his current situation or retain those facts after attempts at explaining them to him. Sources of Info: Chart Review, discussion with ED staff, and pt interview SI/Self-harm: unable to assess HI/Violence: unable to assess Psychiatric Hx: chart review shows dx of cerebral palsy with mild intellectual disability but no evidence of psychiatric illness or psychiatric care Drugs/Alcohol Hx: unable to assess - chart review does not reveal any evidence of drug/alcohol use/abuse Medical Hx:   
Patient Active Problem List  
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  
 Hypertension I10  
 Mild intellectual disability F70  
 Small bowel obstruction ([facility] K56.609  Cognitive developmental delay F81.9  
 Uncontrolled type 2 diabetes mellitus with hyperosmolar nonketotic hyperglycemia (HCC) E11.00  Hydronephrosis N13.30  Hypernatremia E87.0  Sepsis ([address] A41.9  Elevated troponin R79.89  
 Urinary retention R33.9  MATTIE (acute kidney injury) ([address] N17.9  Metabolic encephalopathy L55.46  
 Complicated UTI (urinary tract infection) N39.0  Septic shock (HCC) A41.9, R65.21  
 Stage 2 acute kidney injury ([address] N17.9  Sacral decubitus ulcer, stage IV (HCC) L89.154 Medications:  
Current Facility-Administered Medications Medication Dose Route Frequency Provider Last Rate Last Dose  [START ON 5/1/2020] VANCOMYCIN INFORMATION NOTE   [facility]      
 dextrose 10% infusion 125-250 mL  125-250 mL IntraVENous PRN Rebel Tillman, DO      
 piperacillin-tazobactam (ZOSYN) 3.375 g in 0.9% sodium chloride (MBP/ADV) 100 mL MBP  3.375 g IntraVENous Q8H Judieth Salk H, DO 25 mL/hr at 04/30/20 0613 3.375 g at 04/30/20 1435  therapeutic multivitamin (THERAGRAN) tablet 1 Tab  1 Tab Oral DAILY Judieth Salk H, DO   1 Tab at 04/30/20 2142  VANCOMYCIN INFORMATION NOTE   Other ONCE Maggie See, DO      
 aspirin chewable tablet 81 mg  81 mg Oral DAILY Surekha Sands, DO   81 mg at 04/30/20 6727  [Held by provider] finasteride (PROSCAR) tablet 5 mg  5 mg Oral DAILY Rebel Tillman DO   5 mg at 04/28/20 1224  [Held by provider] meclizine (ANTIVERT) tablet 12.5 mg  12.5 mg Oral TID Rachel OROURKE, DO   12.5 mg at 04/28/20 4610  polyethylene glycol (MIRALAX) packet 17 g  17 g Oral EVERY OTHER DAY Rachel OROURKE DO   17 g at 04/30/20 0900  [Held by provider] terazosin (HYTRIN) capsule 5 mg  5 mg Oral DAILY Rebel Tillman DO   5 mg at 04/28/20 5377  ondansetron (ZOFRAN) injection 4 mg  4 mg IntraVENous Q4H PRN Rebel Tillman DO      
 insulin lispro (HUMALOG) injection   SubCUTAneous AC&HS Vola , DO   2 Units at 04/30/20 1247  
 glucose chewable tablet 16 g  4 Tab Oral PRN Vola , DO      
 glucagon (GLUCAGEN) injection 1 mg  1 mg IntraMUSCular PRN Vola , DO      
 albuterol-ipratropium (DUO-NEB) 2.5 MG-0.5 MG/3 ML  3 mL Nebulization Q6H PRN Vola , DO      
 acetaminophen (TYLENOL) tablet 650 mg  650 mg Oral Q6H PRN Rebel Tillman,       
 melatonin tablet 12 mg  12 mg Oral QHS PRN Vola , DO      
 traMADoL (ULTRAM) tablet 50 mg  50 mg Oral Q6H PRN Rachel OROURKE DO   50 mg at 04/30/20 0722  VANCOMYCIN INFORMATION NOTE 1 Each  1 Each Other Rx Dosing/Monitoring Geronimo Dumont MD      
 hydroxypropyl methylcellulose (ISOPTO TEARS) 0.5 % ophthalmic solution 1 Drop  1 Drop Both Eyes QID Rebel Tillman, DO   1 Drop at 04/30/20 1245  pantoprazole (PROTONIX) 40 mg in 0.9% sodium chloride 10 mL injection  40 mg IntraVENous DAILY Hilaria Armendariz MD   40 mg at 04/30/20 3437  heparin (porcine) injection 5,000 Units  5,000 Units SubCUTAneous Q8H Hilaria Armendariz MD   5,000 Units at 04/30/20 1278  sodium chloride (NS) flush 5-40 mL  5-40 mL IntraVENous Q8H Hilaria Armendariz MD   10 mL at 04/30/20 0519  
 sodium chloride (NS) flush 5-40 mL  5-40 mL IntraVENous PRN Hilaria Armendariz MD      
  docusate sodium (COLACE) capsule 100 mg  100 mg Oral BID Petar Armendariz MD   100 mg at 04/30/20 9411  senna (SENOKOT) tablet 8.6 mg  1 Tab Oral BID [employer]illy [employer] H, DO   8.6 mg at 04/30/20 8463  sodium bicarbonate (8.4%) 75 mEq in 0.45% sodium chloride 1,000 mL infusion   IntraVENous CONTINUOUS Anthony Allison  mL/hr at 04/30/20 1116  levoFLOXacin (LEVAQUIN) 750 mg in D5W IVPB  750 mg IntraVENous Q24H Tontrey Irving,  mL/hr at 04/29/20 2051 750 mg at 04/29/20 2051 Allergies: NKDA Family Psych Hx/History of suicides: unable to assess Social Hx: unable to assess Mental Status Exam: 
  Appearance/Attire: laying in hospital bed fairly rigidly. In hospital gown Attitude/Behavior: attentive to voices and interaction but otherwise confused Speech: soft and mumbled. Difficult to understand. Mood: unable to assess. Pt did not answer Affect: unable to assess fully. No evidence of agitation or depression Thought Process/Perceptions/Associations: unable to fully assess. No evidence of AVH Thought Content: unable to assess Memory/Orientation:  Oriented only to self. Substantial cognitive/memory/orientation compromise Impression/Risk Assessment: 69y/o M who does NOT have medical decision making capacity based on the current evaluation. Pt does not exhibit understanding of his disease, can not identify or describe treatment options, prognosis, or how any information regarding his care applies to him. Pt is not able to logically weigh options presented to him nor does he appear to have a decision making process. Findings/Recommendations:  
1) Pt lacks medical decision making capacity at this time. 2) Treat underlying medical condition as per medical team as improvements in medical condition may improve cognitive status 3) If cognition changes would recommend re-evaluation for medical decision making capacity Level of care: continued care per medical team

## 2020-05-01 NOTE — PROGRESS NOTES
Problem: Discharge Planning Goal: *Discharge to safe environment Outcome: Progressing Towards Goal 
 Plan: Return to [facility] Per Psych evaluation , lacks capacity. Guardianship papers completed and faxed.

## 2020-05-01 NOTE — PROGRESS NOTES
Problem: Diabetes Self-Management Goal: *Disease process and treatment process Description: Define diabetes and identify own type of diabetes; list 3 options for treating diabetes. Outcome: Not Progressing Towards Goal 
Goal: *Incorporating nutritional management into lifestyle Description: Describe effect of type, amount and timing of food on blood glucose; list 3 methods for planning meals. Outcome: Not Progressing Towards Goal 
Goal: *Incorporating physical activity into lifestyle Description: State effect of exercise on blood glucose levels. Outcome: Not Progressing Towards Goal 
Goal: *Developing strategies to promote health/change behavior Description: Define the ABC's of diabetes; identify appropriate screenings, schedule and personal plan for screenings. Outcome: Not Progressing Towards Goal 
Goal: *Using medications safely Description: State effect of diabetes medications on diabetes; name diabetes medication taking, action and side effects. Outcome: Not Progressing Towards Goal 
Goal: *Monitoring blood glucose, interpreting and using results Description: Identify recommended blood glucose targets  and personal targets. Outcome: Not Progressing Towards Goal 
Goal: *Prevention, detection, treatment of acute complications Description: List symptoms of hyper- and hypoglycemia; describe how to treat low blood sugar and actions for lowering  high blood glucose level. Outcome: Not Progressing Towards Goal 
Goal: *Prevention, detection and treatment of chronic complications Description: Define the natural course of diabetes and describe the relationship of blood glucose levels to long term complications of diabetes. Outcome: Not Progressing Towards Goal 
Goal: *Developing strategies to address psychosocial issues Description: Describe feelings about living with diabetes; identify support needed and support network Outcome: Not Progressing Towards Goal 
Goal: *Insulin pump training Outcome: Not Progressing Towards Goal 
Goal: *Sick day guidelines Outcome: Not Progressing Towards Goal 
Goal: *Patient Specific Goal (EDIT GOAL, INSERT TEXT) Outcome: Not Progressing Towards Goal 
  
Problem: Patient Education: Go to Patient Education Activity Goal: Patient/Family Education Outcome: Not Progressing Towards Goal 
  
Problem: Discharge Planning Goal: *Discharge to safe environment Outcome: Progressing Towards Goal 
  
Problem: Discharge Planning Goal: *Discharge to safe environment Outcome: Progressing Towards Goal 
  
Problem: Pressure Injury - Risk of 
Goal: *Prevention of pressure injury Description: Document Alex Scale and appropriate interventions in the flowsheet. Outcome: Not Progressing Towards Goal 
Note: Pressure Injury Interventions: 
Sensory Interventions: Assess changes in LOC, Avoid rigorous massage over bony prominences Moisture Interventions: Absorbent underpads, Check for incontinence Q2 hours and as needed Activity Interventions: Pressure redistribution bed/mattress(bed type) Mobility Interventions: HOB 30 degrees or less Nutrition Interventions: Document food/fluid/supplement intake Friction and Shear Interventions: Foam dressings/transparent film/skin sealants, HOB 30 degrees or less Problem: Patient Education: Go to Patient Education Activity Goal: Patient/Family Education Outcome: Not Progressing Towards Goal 
  
Problem: Falls - Risk of 
Goal: *Absence of Falls Description: Document Clydene Bone Fall Risk and appropriate interventions in the flowsheet. Outcome: Progressing Towards Goal 
Note: Fall Risk Interventions: 
  
 
Mentation Interventions: Door open when patient unattended Medication Interventions: Evaluate medications/consider consulting pharmacy Elimination Interventions: Call light in reach Problem: Patient Education: Go to Patient Education Activity Goal: Patient/Family Education Outcome: Not Progressing Towards Goal 
  
Problem: Nutrition Deficit Goal: *Optimize nutritional status Outcome: Not Progressing Towards Goal 
  
Problem: General Wound Care Goal: *Non-infected wound: Improvement of existing wound, absence of infection, and maintenance of skin integrity Outcome: Not Progressing Towards Goal 
Goal: *Infected Wound: Prevention of further infection and promotion of healing Description: Infection control procedures (eg: clean dressings, clean gloves, hand washing, precautions to isolate wound from contamination, sterile instruments used for wound debridement) should be implemented. Outcome: Not Progressing Towards Goal 
Goal: Interventions Outcome: Not Progressing Towards Goal 
  
Problem: Pain Goal: *Control of Pain Outcome: Not Progressing Towards Goal 
  
Problem: Patient Education: Go to Patient Education Activity Goal: Patient/Family Education Outcome: Not Progressing Towards Goal

## 2020-05-01 NOTE — PROGRESS NOTES
Bedside report received from [address] [facility] 
 
2006: pt in bed, alert and oriented to self, disoriented to place, time and situation, on room air, no distress, shift assessment completed, bed locked and low position, call bell within reach 
 
2100: Ceftriaxone 1 G administered, infusing well 2236: Due medication given, , no insulin coverage given per protocol 0005: V/S checked, no changed from previous assessment 
 
0100: incontinence care provided, reposition pt to right side, call bell within reach 0405: sleeping, no distress noted, bed alarm on 
 
0546: medication given as schedule 0630: incontinence care provided Bedside and Verbal shift change report given to Allen Figueroa RN (oncoming nurse) by Leidy Aleman RN (offgoing nurse). Report included the following information SBAR, Kardex, ED Summary, Intake/Output, MAR, Recent Results and Cardiac Rhythm SR, S tach on tele # [phone].

## 2020-05-01 NOTE — PROGRESS NOTES
Internal Medicine Progress Note Patient's Name: Billie Chatman Admit Date: 4/27/2020 Length of Stay: 3 Assessment/Plan Active Hospital Problems Diagnosis Date Noted  Complicated UTI (urinary tract infection) 04/28/2020  Septic shock ([facility] [address] 04/28/2020  Stage 2 acute kidney injury ([facility] [address] 04/28/2020  Sacral decubitus ulcer, stage IV ([facility] [address] 04/28/2020  Uncontrolled type 2 diabetes mellitus with hyperosmolar nonketotic hyperglycemia ([facility] [address] 11/17/2019  Cognitive developmental delay 05/12/2018  Cerebral palsy ([facility] [address] 06/12/2015  History of post-polio syndrome 06/12/2015  Hypertension 06/12/2015  UTI (urinary tract infection) 06/11/2015 Urinary tract infection associated with indwelling urethral catheter UTI due to chronic [place] catheter Community Acquired Pneumonia - Afebrile, normal WBCs 
- Change levaquin to rocephin given possible seizure - Seizure precautions - Will eventually need EEG in outpatient setting - t/c neuro consult if another episode - Urine cult w/ pansensitive ecoli 
- Potassium levels WNL 
- Cr WNL 
- Cont IV fluids per nephro - Speech recs in place - Pt lacks capacity per psych 
- Cont acceptable home medications for chronic conditions  
- DVT protocol I have personally reviewed all pertinent labs and films that have officially resulted over the last 24 hours. I have personally checked for all pending labs that are awaiting final results. Subjective Pt s/e @ bedside Possible seizure late afternoon but unclear bc brief and no change in mentation afterwards, nothing since Afebrile overnight Appears comfortable and answering questions somewhat Unable to obtain accurate ROS Objective Visit Vitals /72 (BP 1 Location: Left arm, BP Patient Position: At rest) Pulse 70 Temp 97.6 °F (36.4 °C) Resp 18 Ht 5' 4\" (1.626 m) Wt 59.9 kg (132 lb) SpO2 98% BMI 22.66 kg/m² Physical Exam: General Appearance: NAD, non-conversant, pleasant Lungs: CTA with normal respiratory effort CV: RRR, no m/r/g Abdomen: soft, non-tender, normal bowel sounds Extremities: no cyanosis, no peripheral edema Neuro: No focal deficits, contracted Lab/Data Reviewed: 
BMP:  
Lab Results Component Value Date/Time  05/01/2020 04:15 AM  
 K 4.4 05/01/2020 04:15 AM  
  (H) 05/01/2020 04:15 AM  
 CO2 18 (L) 05/01/2020 04:15 AM  
 AGAP 7 05/01/2020 04:15 AM  
 GLU 77 05/01/2020 04:15 AM  
 BUN 13 05/01/2020 04:15 AM  
 CREA 0.46 (L) 05/01/2020 04:15 AM  
 GFRAA >60 05/01/2020 04:15 AM  
 GFRNA >60 05/01/2020 04:15 AM  
 
CBC:  
Lab Results Component Value Date/Time WBC 10.8 05/01/2020 04:15 AM  
 HGB 8.5 (L) 05/01/2020 04:15 AM  
 HCT 26.7 (L) 05/01/2020 04:15 AM  
  05/01/2020 04:15 AM  
 
 
Imaging Reviewed: 
No results found. Medications Reviewed: 
Current Facility-Administered Medications Medication Dose Route Frequency  0.9% sodium chloride with KCl 40 mEq/L infusion   IntraVENous CONTINUOUS  
 dextrose 10% infusion 125-250 mL  125-250 mL IntraVENous PRN  therapeutic multivitamin (THERAGRAN) tablet 1 Tab  1 Tab Oral DAILY  aspirin chewable tablet 81 mg  81 mg Oral DAILY  [Held by provider] finasteride (PROSCAR) tablet 5 mg  5 mg Oral DAILY  [Held by provider] meclizine (ANTIVERT) tablet 12.5 mg  12.5 mg Oral TID  polyethylene glycol (MIRALAX) packet 17 g  17 g Oral EVERY OTHER DAY  [Held by provider] terazosin (HYTRIN) capsule 5 mg  5 mg Oral DAILY  ondansetron (ZOFRAN) injection 4 mg  4 mg IntraVENous Q4H PRN  
 insulin lispro (HUMALOG) injection   SubCUTAneous AC&HS  
 glucose chewable tablet 16 g  4 Tab Oral PRN  
 glucagon (GLUCAGEN) injection 1 mg  1 mg IntraMUSCular PRN  
 albuterol-ipratropium (DUO-NEB) 2.5 MG-0.5 MG/3 ML  3 mL Nebulization Q6H PRN  
 acetaminophen (TYLENOL) tablet 650 mg  650 mg Oral Q6H PRN  
  melatonin tablet 12 mg  12 mg Oral QHS PRN  
 traMADoL (ULTRAM) tablet 50 mg  50 mg Oral Q6H PRN  
 hydroxypropyl methylcellulose (ISOPTO TEARS) 0.5 % ophthalmic solution 1 Drop  1 Drop Both Eyes QID  heparin (porcine) injection 5,000 Units  5,000 Units SubCUTAneous Q8H  
 sodium chloride (NS) flush 5-40 mL  5-40 mL IntraVENous Q8H  
 sodium chloride (NS) flush 5-40 mL  5-40 mL IntraVENous PRN  
 docusate sodium (COLACE) capsule 100 mg  100 mg Oral BID  senna (SENOKOT) tablet 8.6 mg  1 Tab Oral BID  levoFLOXacin (LEVAQUIN) 750 mg in D5W IVPB  750 mg IntraVENous Q24H Linda Chaudhari DO Internal Medicine, Hospitalist 
Pager: [phone] [facility]

## 2020-05-01 NOTE — PROGRESS NOTES
1000  Feeder, not moving but he talks and answer  Simple  Question, no pain, HOB elevated when feeding, dry cough noted. 1535  Complete linen changed, has 1 large  Semi formed and pasty consistency. 1900  Ate well , feeder, second time coccyx dressing was been change due to bowel movement no seizure.

## 2020-05-02 NOTE — PROGRESS NOTES
Patient received in bed awake. Seizure Precautions with bed railing padded. A&O to self; reoriented to place, time and situation. Denies pain; behavior indicators negative. No distress noted. Frequently use items within reach. Bed locked in low position. Call bell within reach and Patient verbalized understanding of use for assistance and needs.

## 2020-05-02 NOTE — PROGRESS NOTES
Internal Medicine Progress Note Patient's Name: Dianah Gitelman Admit Date: 4/27/2020 Length of Stay: 4 Assessment/Plan Active Hospital Problems Diagnosis Date Noted  Complicated UTI (urinary tract infection) 04/28/2020  Septic shock ([facility] [address] 04/28/2020  Stage 2 acute kidney injury ([facility] [address] 04/28/2020  Sacral decubitus ulcer, stage IV ([facility] [address] 04/28/2020  Uncontrolled type 2 diabetes mellitus with hyperosmolar nonketotic hyperglycemia ([facility] [address] 11/17/2019  Cognitive developmental delay 05/12/2018  Cerebral palsy ([facility] [address] 06/12/2015  History of post-polio syndrome 06/12/2015  Hypertension 06/12/2015  UTI (urinary tract infection) 06/11/2015 Urinary tract infection associated with indwelling urethral catheter UTI due to chronic [place] catheter Community Acquired Pneumonia - Afebrile, normal WBCs 
- Urine cult w/ pansensitive ecoli 
- Cont rocephin 
- Seizure precautions - Will eventually need EEG in outpatient setting - t/c neuro consult if another episode - Potassium levels WNL 
- Cr WNL 
- Change fluid to sodium bicarb given acidosis - Speech recs in place - Pt lacks capacity per psych 
- Cont acceptable home medications for chronic conditions  
- DVT protocol I have personally reviewed all pertinent labs and films that have officially resulted over the last 24 hours. I have personally checked for all pending labs that are awaiting final results. Subjective Pt s/e @ bedside No major events overnight Appears comfortable and answering questions somewhat Unable to obtain accurate ROS Objective Visit Vitals /73 (BP 1 Location: Left arm, BP Patient Position: At rest) Pulse 72 Temp 97.7 °F (36.5 °C) Resp 16 Ht 5' 4\" (1.626 m) Wt 60 kg (132 lb 4.4 oz) SpO2 100% BMI 22.71 kg/m² Physical Exam: 
General Appearance: NAD, non-conversant, pleasant Lungs: CTA with normal respiratory effort CV: RRR, no m/r/g 
 Abdomen: soft, non-tender, normal bowel sounds Extremities: no cyanosis, no peripheral edema Neuro: No focal deficits, contracted Lab/Data Reviewed: 
BMP:  
Lab Results Component Value Date/Time  05/02/2020 04:25 AM  
 K 4.8 05/02/2020 04:25 AM  
  (H) 05/02/2020 04:25 AM  
 CO2 15 (L) 05/02/2020 04:25 AM  
 AGAP 8 05/02/2020 04:25 AM  
 GLU 93 05/02/2020 04:25 AM  
 BUN 9 05/02/2020 04:25 AM  
 CREA 0.40 (L) 05/02/2020 04:25 AM  
 GFRAA >60 05/02/2020 04:25 AM  
 GFRNA >60 05/02/2020 04:25 AM  
 
CBC:  
Lab Results Component Value Date/Time WBC 12.2 05/02/2020 04:25 AM  
 HGB 9.2 (L) 05/02/2020 04:25 AM  
 HCT 28.5 (L) 05/02/2020 04:25 AM  
  (H) 05/02/2020 04:25 AM  
 
 
Imaging Reviewed: 
No results found. Medications Reviewed: 
Current Facility-Administered Medications Medication Dose Route Frequency  sodium bicarbonate (8.4%) 150 mEq in dextrose 5% 1,000 mL infusion   IntraVENous CONTINUOUS  
 cefTRIAXone (ROCEPHIN) 1 g in 0.9% sodium chloride (MBP/ADV) 50 mL MBP  1 g IntraVENous Q24H  
 dextrose 10% infusion 125-250 mL  125-250 mL IntraVENous PRN  therapeutic multivitamin (THERAGRAN) tablet 1 Tab  1 Tab Oral DAILY  aspirin chewable tablet 81 mg  81 mg Oral DAILY  [Held by provider] finasteride (PROSCAR) tablet 5 mg  5 mg Oral DAILY  [Held by provider] meclizine (ANTIVERT) tablet 12.5 mg  12.5 mg Oral TID  polyethylene glycol (MIRALAX) packet 17 g  17 g Oral EVERY OTHER DAY  [Held by provider] terazosin (HYTRIN) capsule 5 mg  5 mg Oral DAILY  ondansetron (ZOFRAN) injection 4 mg  4 mg IntraVENous Q4H PRN  
 insulin lispro (HUMALOG) injection   SubCUTAneous AC&HS  
 glucose chewable tablet 16 g  4 Tab Oral PRN  
 glucagon (GLUCAGEN) injection 1 mg  1 mg IntraMUSCular PRN  
 albuterol-ipratropium (DUO-NEB) 2.5 MG-0.5 MG/3 ML  3 mL Nebulization Q6H PRN  
 acetaminophen (TYLENOL) tablet 650 mg  650 mg Oral Q6H PRN  
  melatonin tablet 12 mg  12 mg Oral QHS PRN  
 traMADoL (ULTRAM) tablet 50 mg  50 mg Oral Q6H PRN  
 hydroxypropyl methylcellulose (ISOPTO TEARS) 0.5 % ophthalmic solution 1 Drop  1 Drop Both Eyes QID  heparin (porcine) injection 5,000 Units  5,000 Units SubCUTAneous Q8H  
 sodium chloride (NS) flush 5-40 mL  5-40 mL IntraVENous Q8H  
 sodium chloride (NS) flush 5-40 mL  5-40 mL IntraVENous PRN  
 docusate sodium (COLACE) capsule 100 mg  100 mg Oral BID  senna (SENOKOT) tablet 8.6 mg  1 Tab Oral BID Mohit Vaz DO Internal Medicine, Hospitalist 
Pager: [phone] [facility]

## 2020-05-02 NOTE — PROGRESS NOTES
Problem: Diabetes Self-Management Goal: *Disease process and treatment process Description: Define diabetes and identify own type of diabetes; list 3 options for treating diabetes. Outcome: Progressing Towards Goal 
  
Problem: Falls - Risk of 
Goal: *Absence of Falls Description: Document Jerman Ydaav Fall Risk and appropriate interventions in the flowsheet. Outcome: Progressing Towards Goal 
Note: Fall Risk Interventions: 
  
 
Mentation Interventions: Door open when patient unattended, Evaluate medications/consider consulting pharmacy, Reorient patient Medication Interventions: Evaluate medications/consider consulting pharmacy, Patient to call before getting OOB Elimination Interventions: Call light in reach, Patient to call for help with toileting needs

## 2020-05-02 NOTE — PROGRESS NOTES
Problem: Diabetes Self-Management Goal: *Disease process and treatment process Description: Define diabetes and identify own type of diabetes; list 3 options for treating diabetes. Outcome: Progressing Towards Goal 
Goal: *Incorporating nutritional management into lifestyle Description: Describe effect of type, amount and timing of food on blood glucose; list 3 methods for planning meals. Outcome: Progressing Towards Goal 
Goal: *Incorporating physical activity into lifestyle Description: State effect of exercise on blood glucose levels. Outcome: Progressing Towards Goal 
Goal: *Developing strategies to promote health/change behavior Description: Define the ABC's of diabetes; identify appropriate screenings, schedule and personal plan for screenings. Outcome: Progressing Towards Goal 
Goal: *Using medications safely Description: State effect of diabetes medications on diabetes; name diabetes medication taking, action and side effects. Outcome: Progressing Towards Goal 
Goal: *Monitoring blood glucose, interpreting and using results Description: Identify recommended blood glucose targets  and personal targets. Outcome: Progressing Towards Goal 
Goal: *Prevention, detection, treatment of acute complications Description: List symptoms of hyper- and hypoglycemia; describe how to treat low blood sugar and actions for lowering  high blood glucose level. Outcome: Progressing Towards Goal 
Goal: *Prevention, detection and treatment of chronic complications Description: Define the natural course of diabetes and describe the relationship of blood glucose levels to long term complications of diabetes. Outcome: Progressing Towards Goal 
Goal: *Developing strategies to address psychosocial issues Description: Describe feelings about living with diabetes; identify support needed and support network Outcome: Progressing Towards Goal 
Goal: *Insulin pump training Outcome: Progressing Towards Goal 
 Goal: *Sick day guidelines Outcome: Progressing Towards Goal 
Goal: *Patient Specific Goal (EDIT GOAL, INSERT TEXT) Outcome: Progressing Towards Goal 
  
Problem: Patient Education: Go to Patient Education Activity Goal: Patient/Family Education Outcome: Progressing Towards Goal 
  
Problem: Discharge Planning Goal: *Discharge to safe environment Outcome: Progressing Towards Goal 
  
Problem: Discharge Planning Goal: *Discharge to safe environment Outcome: Progressing Towards Goal 
  
Problem: Pressure Injury - Risk of 
Goal: *Prevention of pressure injury Description: Document Alex Scale and appropriate interventions in the flowsheet. Outcome: Progressing Towards Goal 
Note: Pressure Injury Interventions: 
Sensory Interventions: Avoid rigorous massage over bony prominences, Keep linens dry and wrinkle-free, Minimize linen layers, Pressure redistribution bed/mattress (bed type) Moisture Interventions: Internal/External urinary devices, Absorbent underpads, Check for incontinence Q2 hours and as needed Activity Interventions: Pressure redistribution bed/mattress(bed type) Mobility Interventions: Float heels, HOB 30 degrees or less Nutrition Interventions: Offer support with meals,snacks and hydration Friction and Shear Interventions: Foam dressings/transparent film/skin sealants, Feet elevated on foot rest 
 
  
 
 
 
  
Problem: Patient Education: Go to Patient Education Activity Goal: Patient/Family Education Outcome: Progressing Towards Goal 
  
Problem: Falls - Risk of 
Goal: *Absence of Falls Description: Document Murphy Colon Fall Risk and appropriate interventions in the flowsheet. Outcome: Progressing Towards Goal 
Note: Fall Risk Interventions: 
  
 
Mentation Interventions: Door open when patient unattended, Room close to nurse's station Medication Interventions: Patient to call before getting OOB, Bed/chair exit alarm Elimination Interventions: Toileting schedule/hourly rounds, Bed/chair exit alarm, Call light in reach, Patient to call for help with toileting needs Problem: Patient Education: Go to Patient Education Activity Goal: Patient/Family Education Outcome: Progressing Towards Goal 
  
Problem: Nutrition Deficit Goal: *Optimize nutritional status Outcome: Progressing Towards Goal 
  
Problem: General Wound Care Goal: *Non-infected wound: Improvement of existing wound, absence of infection, and maintenance of skin integrity Outcome: Progressing Towards Goal 
Goal: *Infected Wound: Prevention of further infection and promotion of healing Description: Infection control procedures (eg: clean dressings, clean gloves, hand washing, precautions to isolate wound from contamination, sterile instruments used for wound debridement) should be implemented. Outcome: Progressing Towards Goal 
Goal: Interventions Outcome: Progressing Towards Goal 
  
Problem: Pain Goal: *Control of Pain Outcome: Progressing Towards Goal 
  
Problem: Patient Education: Go to Patient Education Activity Goal: Patient/Family Education Outcome: Progressing Towards Goal

## 2020-05-02 NOTE — ROUTINE PROCESS
Bedside and Verbal shift change report given to YAS Nassar (oncoming nurse) by Indira Rodney RN, BSN (offgoing nurse). Report given with SBAR, Kardex, Intake/Output, MAR and Recent Results.

## 2020-05-02 NOTE — PROGRESS NOTES
1924: Assume care form [facility] RN 
 
2035: shift assessment completed, no distress, incontinence care provided, bed locked and low position, call bell within reach 2112: Due Antibiotic administered, infusing well 
 
2200: , Humalog 2 units sc given per protocol 2714: V/S checked, no visual sign of distress, no change from previous assessment 0150: pt resting quietly, seizure precaution at all times 0431: V/S checked, no distress noted 3147: Medication given as schedule Bedside and Verbal shift change report given to Kennedy Tapia (oncoming nurse) by Brittny Johnson RN (offgoing nurse). Report included the following information SBAR, Kardex, ED Summary, Intake/Output, MAR, Recent Results and Cardiac Rhythm SR on tele # [phone].

## 2020-05-03 NOTE — PROGRESS NOTES
Problem: Diabetes Self-Management Goal: *Disease process and treatment process Description: Define diabetes and identify own type of diabetes; list 3 options for treating diabetes. Outcome: Progressing Towards Goal 
Goal: *Incorporating nutritional management into lifestyle Description: Describe effect of type, amount and timing of food on blood glucose; list 3 methods for planning meals. Outcome: Progressing Towards Goal 
Goal: *Incorporating physical activity into lifestyle Description: State effect of exercise on blood glucose levels. Outcome: Progressing Towards Goal 
Goal: *Developing strategies to promote health/change behavior Description: Define the ABC's of diabetes; identify appropriate screenings, schedule and personal plan for screenings. Outcome: Progressing Towards Goal 
Goal: *Using medications safely Description: State effect of diabetes medications on diabetes; name diabetes medication taking, action and side effects. Outcome: Progressing Towards Goal 
Goal: *Monitoring blood glucose, interpreting and using results Description: Identify recommended blood glucose targets  and personal targets. Outcome: Progressing Towards Goal 
Goal: *Prevention, detection, treatment of acute complications Description: List symptoms of hyper- and hypoglycemia; describe how to treat low blood sugar and actions for lowering  high blood glucose level. Outcome: Progressing Towards Goal 
Goal: *Prevention, detection and treatment of chronic complications Description: Define the natural course of diabetes and describe the relationship of blood glucose levels to long term complications of diabetes. Outcome: Progressing Towards Goal 
Goal: *Developing strategies to address psychosocial issues Description: Describe feelings about living with diabetes; identify support needed and support network Outcome: Progressing Towards Goal 
  
Problem: Patient Education: Go to Patient Education Activity Goal: Patient/Family Education Outcome: Progressing Towards Goal 
  
Problem: Discharge Planning Goal: *Discharge to safe environment Outcome: Progressing Towards Goal 
  
Problem: Discharge Planning Goal: *Discharge to safe environment Outcome: Progressing Towards Goal 
  
Problem: Pressure Injury - Risk of 
Goal: *Prevention of pressure injury Description: Document Alex Scale and appropriate interventions in the flowsheet. Outcome: Progressing Towards Goal 
Note: Pressure Injury Interventions: 
Sensory Interventions: Assess need for specialty bed, Check visual cues for pain, Minimize linen layers Moisture Interventions: Absorbent underpads, Check for incontinence Q2 hours and as needed Activity Interventions: Increase time out of bed, Pressure redistribution bed/mattress(bed type) Mobility Interventions: Float heels, HOB 30 degrees or less, Pressure redistribution bed/mattress (bed type) Nutrition Interventions: Document food/fluid/supplement intake, Offer support with meals,snacks and hydration Friction and Shear Interventions: HOB 30 degrees or less, Lift sheet, Minimize layers Problem: Patient Education: Go to Patient Education Activity Goal: Patient/Family Education Outcome: Progressing Towards Goal 
  
Problem: Falls - Risk of 
Goal: *Absence of Falls Description: Document [place] Gu[place] Fall Risk and appropriate interventions in the flowsheet. Outcome: Progressing Towards Goal 
Note: Fall Risk Interventions: 
  
 
Mentation Interventions: Door open when patient unattended, Toileting rounds Medication Interventions: Assess postural VS orthostatic hypotension, Evaluate medications/consider consulting pharmacy Elimination Interventions: Toileting schedule/hourly rounds Problem: Patient Education: Go to Patient Education Activity Goal: Patient/Family Education Outcome: Progressing Towards Goal 
  
Problem: Nutrition Deficit Goal: *Optimize nutritional status Outcome: Progressing Towards Goal 
  
Problem: General Wound Care Goal: *Non-infected wound: Improvement of existing wound, absence of infection, and maintenance of skin integrity Outcome: Progressing Towards Goal 
Goal: *Infected Wound: Prevention of further infection and promotion of healing Description: Infection control procedures (eg: clean dressings, clean gloves, hand washing, precautions to isolate wound from contamination, sterile instruments used for wound debridement) should be implemented. Outcome: Progressing Towards Goal 
Goal: Interventions Outcome: Progressing Towards Goal 
  
Problem: Pain Goal: *Control of Pain Outcome: Progressing Towards Goal 
  
Problem: Patient Education: Go to Patient Education Activity Goal: Patient/Family Education Outcome: Progressing Towards Goal

## 2020-05-03 NOTE — PROGRESS NOTES
Problem: Diabetes Self-Management Goal: *Disease process and treatment process Description: Define diabetes and identify own type of diabetes; list 3 options for treating diabetes. Outcome: Progressing Towards Goal 
Goal: *Incorporating nutritional management into lifestyle Description: Describe effect of type, amount and timing of food on blood glucose; list 3 methods for planning meals. Outcome: Progressing Towards Goal 
Goal: *Incorporating physical activity into lifestyle Description: State effect of exercise on blood glucose levels. Outcome: Progressing Towards Goal 
Goal: *Developing strategies to promote health/change behavior Description: Define the ABC's of diabetes; identify appropriate screenings, schedule and personal plan for screenings. Outcome: Progressing Towards Goal 
Goal: *Using medications safely Description: State effect of diabetes medications on diabetes; name diabetes medication taking, action and side effects. Outcome: Progressing Towards Goal 
Goal: *Monitoring blood glucose, interpreting and using results Description: Identify recommended blood glucose targets  and personal targets. Outcome: Progressing Towards Goal 
Goal: *Prevention, detection, treatment of acute complications Description: List symptoms of hyper- and hypoglycemia; describe how to treat low blood sugar and actions for lowering  high blood glucose level. Outcome: Progressing Towards Goal 
Goal: *Prevention, detection and treatment of chronic complications Description: Define the natural course of diabetes and describe the relationship of blood glucose levels to long term complications of diabetes. Outcome: Progressing Towards Goal 
  
Problem: Patient Education: Go to Patient Education Activity Goal: Patient/Family Education Outcome: Progressing Towards Goal 
  
Problem: Discharge Planning Goal: *Discharge to safe environment Outcome: Progressing Towards Goal 
  
Problem: Pressure Injury - Risk of 
 Goal: *Prevention of pressure injury Description: Document Alex Scale and appropriate interventions in the flowsheet. Outcome: Progressing Towards Goal 
Note: Pressure Injury Interventions: 
Sensory Interventions: Assess changes in LOC Moisture Interventions: Absorbent underpads, Internal/External urinary devices Activity Interventions: Pressure redistribution bed/mattress(bed type) Mobility Interventions: Float heels, Pressure redistribution bed/mattress (bed type), HOB 30 degrees or less Nutrition Interventions: Offer support with meals,snacks and hydration Friction and Shear Interventions: HOB 30 degrees or less Problem: Patient Education: Go to Patient Education Activity Goal: Patient/Family Education Outcome: Progressing Towards Goal 
  
Problem: Falls - Risk of 
Goal: *Absence of Falls Description: Document Roly Gricelda Fall Risk and appropriate interventions in the flowsheet. Outcome: Progressing Towards Goal 
Note: Fall Risk Interventions: 
  
 
Mentation Interventions: Door open when patient unattended, Bed/chair exit alarm, Adequate sleep, hydration, pain control, Room close to nurse's station Medication Interventions: Bed/chair exit alarm Elimination Interventions: Toileting schedule/hourly rounds, Call light in reach, Bed/chair exit alarm Problem: Patient Education: Go to Patient Education Activity Goal: Patient/Family Education Outcome: Progressing Towards Goal 
  
Problem: Nutrition Deficit Goal: *Optimize nutritional status Outcome: Progressing Towards Goal 
  
Problem: General Wound Care Goal: *Non-infected wound: Improvement of existing wound, absence of infection, and maintenance of skin integrity Outcome: Progressing Towards Goal 
Goal: *Infected Wound: Prevention of further infection and promotion of healing Description: Infection control procedures (eg: clean dressings, clean gloves, hand washing, precautions to isolate wound from contamination, sterile instruments used for wound debridement) should be implemented. Outcome: Progressing Towards Goal 
Goal: Interventions Outcome: Progressing Towards Goal 
  
Problem: Pain Goal: *Control of Pain Outcome: Progressing Towards Goal 
  
Problem: Patient Education: Go to Patient Education Activity Goal: Patient/Family Education Outcome: Progressing Towards Goal

## 2020-05-03 NOTE — PROGRESS NOTES
1911: Assume care from [address]: pt resting in bed, alert and oriented to self and place, disoriented to situation and time, V/S checked, shift assessment done, bed locked and low position, call bell within reach 2038: Ceftriaxone 1 g administered as scheduled 2132: schedule med given, , no insulin coverage given per protocol 2318: reposition pt in bed comfortably 0220: sleeping 
 
0530: Bathed, dressing changed, seizure precaution at all times 0630: resting in bed, continue to monitor Bedside and Verbal shift change report given to Kerry Stapleton RN (oncoming nurse) by Cassia Bello RN (offgoing nurse). Report included the following information SBAR, Kardex, Intake/Output, MAR, Recent Results and Cardiac Rhythm SR on tele # [phone].

## 2020-05-03 NOTE — PROGRESS NOTES
Received patient from Sharri now. 1443 Patient having seizure-like activity, except it is interruptable. Patient has several repetitive motions: will roll head in a small [place], purse then relax lips, and rapidly blink eyes. But as soon as you speak to him, he will stop. [address] [facility] [address] Yanna Chavez aware, believes it is due to CP diagnosis and CNA says this is not new behavior, he was doing this yesterday. 1730 Patient crying, given pain medications. 1911 Bedside shift change report given to YAS Harrell (oncoming nurse) by Marilee Jara RN (offgoing nurse). Report included the following information SBAR, Kardex and Intake/Output.

## 2020-05-03 NOTE — PROGRESS NOTES
Internal Medicine Progress Note Patient's Name: Boyd Dumont Admit Date: 4/27/2020 Length of Stay: 5 Assessment/Plan Active Hospital Problems Diagnosis Date Noted  Complicated UTI (urinary tract infection) 04/28/2020  Septic shock ([facility] 04/28/2020  Stage 2 acute kidney injury ([facility] 04/28/2020  Sacral decubitus ulcer, stage IV ([facility] 04/28/2020  Uncontrolled type 2 diabetes mellitus with hyperosmolar nonketotic hyperglycemia ([facility] 11/17/2019  Cognitive developmental delay 05/12/2018  Cerebral palsy ([facility] 06/12/2015  History of post-polio syndrome 06/12/2015  Hypertension 06/12/2015  UTI (urinary tract infection) 06/11/2015 Urinary tract infection associated with indwelling urethral catheter UTI due to chronic [place] catheter Community Acquired Pneumonia - Afebrile, normal WBCs 
- Urine cult w/ pansensitive ecoli 
- Cont rocephin (last day sander) - Seizure precautions - Will eventually need EEG in outpatient setting - t/c neuro consult if another episode (no further episodes thus far) - Potassium levels WNL 
- Cr WNL 
- Acidosis resolved with bicarb, will stop - Speech recs in place - Pt lacks capacity per psych - Should be ok for d/c in next 24-36 hours - Cont acceptable home medications for chronic conditions  
- DVT protocol I have personally reviewed all pertinent labs and films that have officially resulted over the last 24 hours. I have personally checked for all pending labs that are awaiting final results. Subjective Pt s/e @ bedside No major events overnight Appears comfortable No complaints Objective Visit Vitals /69 (BP 1 Location: Left arm, BP Patient Position: At rest) Pulse 82 Temp 97.7 °F (36.5 °C) Resp 16 Ht 5' 4\" (1.626 m) Wt 68 kg (150 lb) SpO2 98% BMI 25.75 kg/m² Physical Exam: 
General Appearance: NAD, non-conversant, pleasant Lungs: CTA with normal respiratory effort CV: RRR, no m/r/g Abdomen: soft, non-tender, normal bowel sounds Extremities: no cyanosis, no peripheral edema Neuro: No focal deficits, contracted Lab/Data Reviewed: 
BMP:  
Lab Results Component Value Date/Time  05/03/2020 04:51 AM  
 K 3.9 05/03/2020 04:51 AM  
  (H) 05/03/2020 04:51 AM  
 CO2 23 05/03/2020 04:51 AM  
 AGAP 5 05/03/2020 04:51 AM  
  (H) 05/03/2020 04:51 AM  
 BUN 7 05/03/2020 04:51 AM  
 CREA 0.44 (L) 05/03/2020 04:51 AM  
 GFRAA >60 05/03/2020 04:51 AM  
 GFRNA >60 05/03/2020 04:51 AM  
 
CBC:  
Lab Results Component Value Date/Time WBC 12.9 05/03/2020 04:51 AM  
 HGB 8.9 (L) 05/03/2020 04:51 AM  
 HCT 27.4 (L) 05/03/2020 04:51 AM  
  (H) 05/03/2020 04:51 AM  
 
 
Imaging Reviewed: 
No results found. Medications Reviewed: 
Current Facility-Administered Medications Medication Dose Route Frequency  docusate (COLACE) 50 mg/5 mL oral liquid 100 mg  100 mg Oral BID  sodium bicarbonate (8.4%) 150 mEq in dextrose 5% 1,000 mL infusion   IntraVENous CONTINUOUS  
 cefTRIAXone (ROCEPHIN) 1 g in 0.9% sodium chloride (MBP/ADV) 50 mL MBP  1 g IntraVENous Q24H  
 dextrose 10% infusion 125-250 mL  125-250 mL IntraVENous PRN  therapeutic multivitamin (THERAGRAN) tablet 1 Tab  1 Tab Oral DAILY  aspirin chewable tablet 81 mg  81 mg Oral DAILY  [Held by provider] finasteride (PROSCAR) tablet 5 mg  5 mg Oral DAILY  [Held by provider] meclizine (ANTIVERT) tablet 12.5 mg  12.5 mg Oral TID  polyethylene glycol (MIRALAX) packet 17 g  17 g Oral EVERY OTHER DAY  [Held by provider] terazosin (HYTRIN) capsule 5 mg  5 mg Oral DAILY  ondansetron (ZOFRAN) injection 4 mg  4 mg IntraVENous Q4H PRN  
 insulin lispro (HUMALOG) injection   SubCUTAneous AC&HS  
 glucose chewable tablet 16 g  4 Tab Oral PRN  
 glucagon (GLUCAGEN) injection 1 mg  1 mg IntraMUSCular PRN  
 albuterol-ipratropium (DUO-NEB) 2.5 MG-0.5 MG/3 ML  3 mL Nebulization Q6H PRN  
  acetaminophen (TYLENOL) tablet 650 mg  650 mg Oral Q6H PRN  
 melatonin tablet 12 mg  12 mg Oral QHS PRN  
 traMADoL (ULTRAM) tablet 50 mg  50 mg Oral Q6H PRN  
 hydroxypropyl methylcellulose (ISOPTO TEARS) 0.5 % ophthalmic solution 1 Drop  1 Drop Both Eyes QID  heparin (porcine) injection 5,000 Units  5,000 Units SubCUTAneous Q8H  
 sodium chloride (NS) flush 5-40 mL  5-40 mL IntraVENous Q8H  
 sodium chloride (NS) flush 5-40 mL  5-40 mL IntraVENous PRN  
 senna (SENOKOT) tablet 8.6 mg  1 Tab Oral BID Emir Hernandez DO Internal Medicine, Hospitalist 
Pager: [phone] [facility]

## 2020-05-04 NOTE — PROGRESS NOTES
Internal Medicine Progress Note Patient's Name: Robb Olvera Admit Date: 4/27/2020 Length of Stay: 6 Assessment/Plan Active Hospital Problems Diagnosis Date Noted  Complicated UTI (urinary tract infection) 04/28/2020  Septic shock ([facility] 04/28/2020  Stage 2 acute kidney injury ([facility] 04/28/2020  Sacral decubitus ulcer, stage IV ([facility] 04/28/2020  Uncontrolled type 2 diabetes mellitus with hyperosmolar nonketotic hyperglycemia ([facility] 11/17/2019  Cognitive developmental delay 05/12/2018  Cerebral palsy ([facility] 06/12/2015  History of post-polio syndrome 06/12/2015  Hypertension 06/12/2015  UTI (urinary tract infection) 06/11/2015 Urinary tract infection associated with indwelling urethral catheter UTI due to chronic [place] catheter Community Acquired Pneumonia 
 
- Urine cult w/ pansensitive ecoli 
- Cont rocephin (last day today) - Seizure precautions - Will eventually need EEG in outpatient setting - Speech recs in place - Pt lacks capacity per psych Discharge when social work plan in place Subjective NAD Non verbal this am 
 
Objective Visit Vitals /69 (BP 1 Location: Left arm, BP Patient Position: At rest) Pulse 89 Temp 97.6 °F (36.4 °C) Resp 18 Ht 5' 4\" (1.626 m) Wt 68 kg (150 lb) SpO2 97% BMI 25.75 kg/m² Physical Exam: 
General Appearance: NAD, non-conversant, pleasant Lungs: CTA with normal respiratory effort CV: RRR, no m/r/g Abdomen: soft, non-tender, normal bowel sounds Extremities: no cyanosis, no peripheral edema Neuro: No focal deficits, contracted Lab/Data Reviewed: 
BMP:  
Lab Results Component Value Date/Time   05/04/2020 04:10 AM  
 K 3.5 05/04/2020 04:10 AM  
  (H) 05/04/2020 04:10 AM  
 CO2 22 05/04/2020 04:10 AM  
 AGAP 8 05/04/2020 04:10 AM  
 GLU 93 05/04/2020 04:10 AM  
 BUN 7 05/04/2020 04:10 AM  
 CREA 0.34 (L) 05/04/2020 04:10 AM  
 GFRAA >60 05/04/2020 04:10 AM  
 GFRNA >60 05/04/2020 04:10 AM  
 
CBC:  
Lab Results Component Value Date/Time WBC 13.3 (H) 05/04/2020 04:10 AM  
 HGB 9.1 (L) 05/04/2020 04:10 AM  
 HCT 27.8 (L) 05/04/2020 04:10 AM  
  (H) 05/04/2020 04:10 AM  
 
 
Imaging Reviewed: 
No results found. Medications Reviewed: 
Current Facility-Administered Medications Medication Dose Route Frequency  docusate (COLACE) 50 mg/5 mL oral liquid 100 mg  100 mg Oral BID  cefTRIAXone (ROCEPHIN) 1 g in 0.9% sodium chloride (MBP/ADV) 50 mL MBP  1 g IntraVENous Q24H  
 dextrose 10% infusion 125-250 mL  125-250 mL IntraVENous PRN  therapeutic multivitamin (THERAGRAN) tablet 1 Tab  1 Tab Oral DAILY  aspirin chewable tablet 81 mg  81 mg Oral DAILY  [Held by provider] finasteride (PROSCAR) tablet 5 mg  5 mg Oral DAILY  [Held by provider] meclizine (ANTIVERT) tablet 12.5 mg  12.5 mg Oral TID  polyethylene glycol (MIRALAX) packet 17 g  17 g Oral EVERY OTHER DAY  [Held by provider] terazosin (HYTRIN) capsule 5 mg  5 mg Oral DAILY  ondansetron (ZOFRAN) injection 4 mg  4 mg IntraVENous Q4H PRN  
 insulin lispro (HUMALOG) injection   SubCUTAneous AC&HS  
 glucose chewable tablet 16 g  4 Tab Oral PRN  
 glucagon (GLUCAGEN) injection 1 mg  1 mg IntraMUSCular PRN  
 albuterol-ipratropium (DUO-NEB) 2.5 MG-0.5 MG/3 ML  3 mL Nebulization Q6H PRN  
 acetaminophen (TYLENOL) tablet 650 mg  650 mg Oral Q6H PRN  
 melatonin tablet 12 mg  12 mg Oral QHS PRN  
 traMADoL (ULTRAM) tablet 50 mg  50 mg Oral Q6H PRN  
 hydroxypropyl methylcellulose (ISOPTO TEARS) 0.5 % ophthalmic solution 1 Drop  1 Drop Both Eyes QID  heparin (porcine) injection 5,000 Units  5,000 Units SubCUTAneous Q8H  
 sodium chloride (NS) flush 5-40 mL  5-40 mL IntraVENous Q8H  
 sodium chloride (NS) flush 5-40 mL  5-40 mL IntraVENous PRN  
 senna (SENOKOT) tablet 8.6 mg  1 Tab Oral BID

## 2020-05-04 NOTE — PROGRESS NOTES
Problem: Diabetes Self-Management Goal: *Disease process and treatment process Description: Define diabetes and identify own type of diabetes; list 3 options for treating diabetes. Outcome: Progressing Towards Goal 
Goal: *Incorporating nutritional management into lifestyle Description: Describe effect of type, amount and timing of food on blood glucose; list 3 methods for planning meals. Outcome: Progressing Towards Goal 
Goal: *Incorporating physical activity into lifestyle Description: State effect of exercise on blood glucose levels. Outcome: Progressing Towards Goal 
Goal: *Developing strategies to promote health/change behavior Description: Define the ABC's of diabetes; identify appropriate screenings, schedule and personal plan for screenings. Outcome: Progressing Towards Goal 
Goal: *Using medications safely Description: State effect of diabetes medications on diabetes; name diabetes medication taking, action and side effects. Outcome: Progressing Towards Goal 
Goal: *Monitoring blood glucose, interpreting and using results Description: Identify recommended blood glucose targets  and personal targets. Outcome: Progressing Towards Goal 
Goal: *Prevention, detection, treatment of acute complications Description: List symptoms of hyper- and hypoglycemia; describe how to treat low blood sugar and actions for lowering  high blood glucose level. Outcome: Progressing Towards Goal 
  
Problem: Patient Education: Go to Patient Education Activity Goal: Patient/Family Education Outcome: Progressing Towards Goal 
  
Problem: Discharge Planning Goal: *Discharge to safe environment Outcome: Progressing Towards Goal 
  
Problem: Pressure Injury - Risk of 
Goal: *Prevention of pressure injury Description: Document Alex Scale and appropriate interventions in the flowsheet. Outcome: Progressing Towards Goal 
Note: Pressure Injury Interventions: 
Sensory Interventions: Assess need for specialty bed Moisture Interventions: Absorbent underpads, Internal/External urinary devices Activity Interventions: Pressure redistribution bed/mattress(bed type), Increase time out of bed Mobility Interventions: Float heels, HOB 30 degrees or less, Pressure redistribution bed/mattress (bed type) Nutrition Interventions: Document food/fluid/supplement intake Friction and Shear Interventions: HOB 30 degrees or less, Minimize layers Problem: Falls - Risk of 
Goal: *Absence of Falls Description: Document Sole Starch Fall Risk and appropriate interventions in the flowsheet. Outcome: Progressing Towards Goal 
Note: Fall Risk Interventions: 
  
 
Mentation Interventions: Door open when patient unattended Medication Interventions: Bed/chair exit alarm Elimination Interventions: Toileting schedule/hourly rounds, Call light in reach Problem: Patient Education: Go to Patient Education Activity Goal: Patient/Family Education Outcome: Progressing Towards Goal 
  
Problem: Nutrition Deficit Goal: *Optimize nutritional status Outcome: Progressing Towards Goal 
  
Problem: General Wound Care Goal: *Non-infected wound: Improvement of existing wound, absence of infection, and maintenance of skin integrity Outcome: Progressing Towards Goal 
Goal: *Infected Wound: Prevention of further infection and promotion of healing Description: Infection control procedures (eg: clean dressings, clean gloves, hand washing, precautions to isolate wound from contamination, sterile instruments used for wound debridement) should be implemented. Outcome: Progressing Towards Goal 
Goal: Interventions Outcome: Progressing Towards Goal 
  
Problem: Pain Goal: *Control of Pain Outcome: Progressing Towards Goal 
  
Problem: Patient Education: Go to Patient Education Activity Goal: Patient/Family Education Outcome: Progressing Towards Goal

## 2020-05-04 NOTE — PROGRESS NOTES
Problem: Discharge Planning Goal: *Discharge to safe environment Outcome: Progressing Towards Goal 
 Plan: Return to [facility] Care Management following and chart reviewed. Pt awaiting guardianship and will return to [facility] Yoshi Chowdhury RN BSN Outcomes Manager Pager # [phone]

## 2020-05-04 NOTE — PROGRESS NOTES
NUTRITION FOLLOW-UP/PLAN OF CARE 
 
RECOMMENDATIONS:  
1. CC Soft Solid Diet with NTL (2) per SLP with SF CIB BID 2. Monitor labs, weight and document all PO intake 3. Feeding assist with encouragement GOALS:  
1. Progressing/Ongoing: PO intake meets >75% of protein/calorie needs by 5/9 
 
 
ASSESSMENT:  
Wt status is classified as overweight per Body mass index is 25.75 kg/m². PO intake improving overall. Labs noted. BG range () over the past 24 hours; A1c (10.0%). Pt w/ elevated WBC (13.3) and hypophosphatemia. Nutrition recommendations listed. RD to follow. SUBJECTIVE/OBJECTIVE:  
Placed parameters for no concentrated sweets to assist with BG control. SLP following: s/p MBS on (4/29) Pt presents with mild-moderate oral and moderate-severe pharyngeal dysphagia with recommendations for Mech soft solids NECTAR/mildly thick liquids VIA TSP ONLY. Noted wounds including stage 1 pressure injury to right ischial and a stage 4 pressure njury to sacral/coccyx documented by wound care on (4/29). Last BM on (5/3) per I/Os. Pt seen in room this afternoon; observed good PO intake Used bed scale during visit; 149.8 lb. Encouraged to continue to increase PO intake as tolerated. Pt became tearful so was able to calm him by turning the TV on for him. Will continue to monitor. Information Obtained From:  
[x] Chart Review [x] Patient 
[] Family/Caregiver [x] Nurse/Physician  
[] Patient Rounds/Interdisciplinary Meeting Diet: Mech Altered (NDD2) with NTL (2) Patient Vitals for the past 100 hrs: 
 % Diet Eaten 05/04/20 1000 90 % 05/03/20 1731 90 % 05/03/20 1242 10 % 05/03/20 0850 75 % 05/02/20 1742 100 % 05/02/20 1225 90 % 05/01/20 1740 20 % 05/01/20 1330 75 % 05/01/20 0913 75 % 05/01/20 0730 0 % Medications: [x] Reviewed Colace, heparin, Humalog, Miralax, Senokot, MVI Encounter Diagnoses ICD-10-CM ICD-9-CM 1. Acute hyperkalemia E87.5 276.7 2. Septic shock (HCC) A41.9 038.9  
 R65.21 785.52  
  995.92  
3. Uremia N19 586  
4. Acute UTI N39.0 599.0 Past Medical History:  
Diagnosis Date  Cerebral palsy ([facility]  Cognitive developmental delay  Decubitus ulcer of sacral region, stage 4 ([facility]  Diabetes mellitus, type II ([facility]  Hepatic steatosis ?  History of hepatomegaly  History of recurrent UTIs  History of small bowel obstruction  Hypertension  Polio  Splenic artery aneurysm (HCC)   
 ?; 1.7 cm  
 Urinary retention Labs:   
Lab Results Component Value Date/Time Sodium 142 05/04/2020 04:10 AM  
 Potassium 3.5 05/04/2020 04:10 AM  
 Chloride 112 (H) 05/04/2020 04:10 AM  
 CO2 22 05/04/2020 04:10 AM  
 Anion gap 8 05/04/2020 04:10 AM  
 Glucose 93 05/04/2020 04:10 AM  
 BUN 7 05/04/2020 04:10 AM  
 Creatinine 0.34 (L) 05/04/2020 04:10 AM  
 Calcium 7.6 (L) 05/04/2020 04:10 AM  
 Magnesium 1.8 05/04/2020 04:10 AM  
 Phosphorus 2.4 (L) 05/04/2020 04:10 AM  
 Albumin 1.7 (L) 05/04/2020 04:10 AM  
 
Anthropometrics: BMI (calculated): 25.7 Last 3 Recorded Weights in this Encounter 04/30/20 0267 05/01/20 1332 05/03/20 9690 Weight: 59.9 kg (132 lb) 60 kg (132 lb 4.4 oz) 68 kg (150 lb) Ht Readings from Last 1 Encounters:  
04/27/20 5' 4\" (1.626 m) Documented Weight History: 
Weight Metrics 5/3/2020 4/27/2020 1/22/2020 1/3/2020 11/24/2019 5/17/2018 6/12/2015 Weight 150 lb - 138 lb 7.2 oz 134 lb 135 lb 124 lb 100 lb BMI - 25.75 kg/m2 23.76 kg/m2 23 kg/m2 23.17 kg/m2 20.63 kg/m2 19.53 kg/m2 []  Weight Loss 
[x]  Weight Gain 
[]  Weight Stable  
[x]  New wts on record variable Estimated Nutrition Needs:  
5 Kcals/day Protein (g): 87 g Nutrition Problems Identified:  
[] Suboptimal PO intake  
[] Food Allergies [] Difficulty chewing/swallowing/poor dentition 
[] Constipation/Diarrhea  
[] Nausea/Vomiting  
[] None [x] Other: Wound healing (stage 1 right ischial, stage 4 sacral/coccyx Plan:  
[] Therapeutic Diet 
[]  Obtained/adjusted food preferences/tolerances and/or snacks options [x]  Continue supplements added  
[] Occupational therapy following for feeding techniques []  HS snack added  
[x]  Modify diet texture  
[]  Modify diet for food allergies []  Educate patient  
[]  Assist with menu selection  
[x]  Monitor PO intake on meal rounds  
[x]  Continue inpatient monitoring and intervention  
[x]  Participated in discharge planning/Interdisciplinary rounds/Team meetings  
[]  Other:  
 
Education Needs: 
 [x] Not appropriate for teaching at this time 
 [x] Identified and addressed Nutrition Monitoring and Evaluation: 
 [x] Continue inpatient monitoring and interventions [] Other:  
 
Author Mary

## 2020-05-05 NOTE — PROGRESS NOTES
Problem: Diabetes Self-Management Goal: *Disease process and treatment process Description: Define diabetes and identify own type of diabetes; list 3 options for treating diabetes. Outcome: Progressing Towards Goal 
Goal: *Incorporating nutritional management into lifestyle Description: Describe effect of type, amount and timing of food on blood glucose; list 3 methods for planning meals. Outcome: Progressing Towards Goal 
Goal: *Incorporating physical activity into lifestyle Description: State effect of exercise on blood glucose levels. Outcome: Progressing Towards Goal 
Goal: *Developing strategies to promote health/change behavior Description: Define the ABC's of diabetes; identify appropriate screenings, schedule and personal plan for screenings. Outcome: Progressing Towards Goal 
  
Problem: Patient Education: Go to Patient Education Activity Goal: Patient/Family Education Outcome: Progressing Towards Goal 
  
Problem: Discharge Planning Goal: *Discharge to safe environment Outcome: Progressing Towards Goal 
  
Problem: Falls - Risk of 
Goal: *Absence of Falls Description: Document Terrie Kruse Fall Risk and appropriate interventions in the flowsheet. Outcome: Progressing Towards Goal 
Note: Fall Risk Interventions: 
  
 
Mentation Interventions: Door open when patient unattended Medication Interventions: Bed/chair exit alarm Elimination Interventions: Toilet paper/wipes in reach Problem: General Wound Care Goal: *Non-infected wound: Improvement of existing wound, absence of infection, and maintenance of skin integrity Outcome: Progressing Towards Goal 
  
Problem: Pain Goal: *Control of Pain Outcome: Progressing Towards Goal

## 2020-05-05 NOTE — PROGRESS NOTES
0800  Received pt awake, responding when talking to him, not able to move extremities,looks comfortable. 1000 feeder, ate good when feed, no coughing noted, on seizure precaution, bed padded. 1200 looks comfortable reposition to sides, back dressing remain intact.

## 2020-05-05 NOTE — PROGRESS NOTES
Bedside and Verbal shift change report given to Dee Pickard  (oncoming nurse) by Jacey Bravo RN  (offgoing nurse). Report included the following information Kardex, Intake/Output, MAR and Recent Results.

## 2020-05-05 NOTE — PROGRESS NOTES
Internal Medicine Progress Note Patient's Name: Juan Carlos Kumari Admit Date: 4/27/2020 Length of Stay: 7 Assessment/Plan Active Hospital Problems Diagnosis Date Noted  Complicated UTI (urinary tract infection) 04/28/2020  Septic shock ([facility] 04/28/2020  Stage 2 acute kidney injury ([facility] [address] 04/28/2020  Sacral decubitus ulcer, stage IV ([facility] [address] 04/28/2020  Uncontrolled type 2 diabetes mellitus with hyperosmolar nonketotic hyperglycemia ([facility] [address] 11/17/2019  Cognitive developmental delay 05/12/2018  Cerebral palsy ([facility] [address] 06/12/2015  History of post-polio syndrome 06/12/2015  Hypertension 06/12/2015  UTI (urinary tract infection) 06/11/2015 Urinary tract infection associated with indwelling urethral catheter UTI due to chronic [place] catheter Community Acquired Pneumonia 
 
- Urine cult w/ pansensitive ecoli 
- Cont rocephin (last day today) - Seizure precautions - Will eventually need EEG in outpatient setting - Speech recs in place - Pt lacks capacity per psych - wbc up today, xray and UA ordered, will f/u Discharge when social work plan in place Subjective NAD Non verbal this am 
 
Objective Visit Vitals /67 (BP 1 Location: Left arm, BP Patient Position: At rest) Pulse 95 Temp 98.3 °F (36.8 °C) Resp 18 Ht 5' 4\" (1.626 m) Wt 67.9 kg (149 lb 11.1 oz) SpO2 100% BMI 25.69 kg/m² Physical Exam: 
General Appearance: NAD, non-conversant, pleasant Lungs: CTA with normal respiratory effort CV: RRR, no m/r/g Abdomen: soft, non-tender, normal bowel sounds Extremities: no cyanosis, no peripheral edema Neuro: No focal deficits, contracted Lab/Data Reviewed: 
BMP:  
Lab Results Component Value Date/Time   05/05/2020 04:05 AM  
 K 3.3 (L) 05/05/2020 04:05 AM  
  (H) 05/05/2020 04:05 AM  
 CO2 21 05/05/2020 04:05 AM  
 AGAP 8 05/05/2020 04:05 AM  
 GLU 84 05/05/2020 04:05 AM  
 BUN 7 05/05/2020 04:05 AM  
 CREA 0.33 (L) 05/05/2020 04:05 AM  
 GFRAA >60 05/05/2020 04:05 AM  
 GFRNA >60 05/05/2020 04:05 AM  
 
CBC:  
Lab Results Component Value Date/Time WBC 15.4 (H) 05/05/2020 04:05 AM  
 HGB 9.2 (L) 05/05/2020 04:05 AM  
 HCT 28.2 (L) 05/05/2020 04:05 AM  
  (H) 05/05/2020 04:05 AM  
 
 
Imaging Reviewed: 
Xr Chest Sngl V Result Date: 5/5/2020 EXAM: XR CHEST SNGL V CLINICAL INDICATION/HISTORY: leukocytosis -Additional: None COMPARISON: 4/27/2020 TECHNIQUE: Portable frontal view of the chest _______________ FINDINGS: SUPPORT DEVICES: None. HEART AND MEDIASTINUM: Cardiomediastinal silhouette within normal limits. LUNGS AND PLEURAL SPACES: No dense consolidation, large effusion or pneumothorax. Visualized  shunt tubing. _______________ IMPRESSION: No acute cardiopulmonary abnormality. Medications Reviewed: 
Current Facility-Administered Medications Medication Dose Route Frequency  docusate (COLACE) 50 mg/5 mL oral liquid 100 mg  100 mg Oral BID  dextrose 10% infusion 125-250 mL  125-250 mL IntraVENous PRN  therapeutic multivitamin (THERAGRAN) tablet 1 Tab  1 Tab Oral DAILY  aspirin chewable tablet 81 mg  81 mg Oral DAILY  [Held by provider] finasteride (PROSCAR) tablet 5 mg  5 mg Oral DAILY  [Held by provider] meclizine (ANTIVERT) tablet 12.5 mg  12.5 mg Oral TID  polyethylene glycol (MIRALAX) packet 17 g  17 g Oral EVERY OTHER DAY  [Held by provider] terazosin (HYTRIN) capsule 5 mg  5 mg Oral DAILY  ondansetron (ZOFRAN) injection 4 mg  4 mg IntraVENous Q4H PRN  
 insulin lispro (HUMALOG) injection   SubCUTAneous AC&HS  
 glucose chewable tablet 16 g  4 Tab Oral PRN  
 glucagon (GLUCAGEN) injection 1 mg  1 mg IntraMUSCular PRN  
 albuterol-ipratropium (DUO-NEB) 2.5 MG-0.5 MG/3 ML  3 mL Nebulization Q6H PRN  
 acetaminophen (TYLENOL) tablet 650 mg  650 mg Oral Q6H PRN  
 melatonin tablet 12 mg  12 mg Oral QHS PRN  
  traMADoL (ULTRAM) tablet 50 mg  50 mg Oral Q6H PRN  
 hydroxypropyl methylcellulose (ISOPTO TEARS) 0.5 % ophthalmic solution 1 Drop  1 Drop Both Eyes QID  heparin (porcine) injection 5,000 Units  5,000 Units SubCUTAneous Q8H  
 sodium chloride (NS) flush 5-40 mL  5-40 mL IntraVENous Q8H  
 sodium chloride (NS) flush 5-40 mL  5-40 mL IntraVENous PRN  
 senna (SENOKOT) tablet 8.6 mg  1 Tab Oral BID

## 2020-05-06 NOTE — PROGRESS NOTES
Received patient from [facility]. Patient sleeping at this time. 2656 Patient alert and interactive. States he is \"doing good! \" and in no pain at the moment. 1500 Changed patient's sacral ulcer dressing and femoral line dressing. Some clear drainage under dressing, but unclear if this is from line or sweat. Testicles and penis are edematous, both now elevated. 1554 Noted patient's thick yellow foggy urine. Informed Dr. Mickie Roca who stated that patient can be treated if he is symptomatic. Patient appears to be at baseline now.

## 2020-05-06 NOTE — PROGRESS NOTES
Problem: Diabetes Self-Management Goal: *Disease process and treatment process Description: Define diabetes and identify own type of diabetes; list 3 options for treating diabetes. Outcome: Progressing Towards Goal 
Goal: *Incorporating nutritional management into lifestyle Description: Describe effect of type, amount and timing of food on blood glucose; list 3 methods for planning meals. Outcome: Progressing Towards Goal 
Goal: *Incorporating physical activity into lifestyle Description: State effect of exercise on blood glucose levels. Outcome: Progressing Towards Goal 
Goal: *Developing strategies to promote health/change behavior Description: Define the ABC's of diabetes; identify appropriate screenings, schedule and personal plan for screenings. Outcome: Progressing Towards Goal 
Goal: *Using medications safely Description: State effect of diabetes medications on diabetes; name diabetes medication taking, action and side effects. Outcome: Progressing Towards Goal 
Goal: *Monitoring blood glucose, interpreting and using results Description: Identify recommended blood glucose targets  and personal targets. Outcome: Progressing Towards Goal 
Goal: *Prevention, detection and treatment of chronic complications Description: Define the natural course of diabetes and describe the relationship of blood glucose levels to long term complications of diabetes. Outcome: Progressing Towards Goal 
Goal: *Sick day guidelines Outcome: Progressing Towards Goal 
  
Problem: Falls - Risk of 
Goal: *Absence of Falls Description: Document Bard Mae Fall Risk and appropriate interventions in the flowsheet. Outcome: Progressing Towards Goal 
Note: Fall Risk Interventions: 
  
 
Mentation Interventions: Door open when patient unattended Medication Interventions: Patient to call before getting OOB Elimination Interventions: Call light in reach Problem: General Wound Care Goal: *Non-infected wound: Improvement of existing wound, absence of infection, and maintenance of skin integrity Outcome: Progressing Towards Goal 
  
Problem: Pain Goal: *Control of Pain Outcome: Progressing Towards Goal

## 2020-05-06 NOTE — PROGRESS NOTES
attempted to complete a follow up visit with and a Spiritual assessment of patient but patient was not very talkative or responsive. Chaplains will continue to follow and will provide pastoral care on an as needed/requested basis Reiseñor 3 Board Certified [address] [place] [address] Care  
[phone]

## 2020-05-06 NOTE — PROGRESS NOTES
Internal Medicine Progress Note Patient's Name: Kirti Castillo Admit Date: 4/27/2020 Length of Stay: 8 Assessment/Plan Active Hospital Problems Diagnosis Date Noted  Complicated UTI (urinary tract infection) 04/28/2020  Septic shock ([facility] [address] 04/28/2020  Stage 2 acute kidney injury ([facility] [address] 04/28/2020  Sacral decubitus ulcer, stage IV ([facility] [address] 04/28/2020  Uncontrolled type 2 diabetes mellitus with hyperosmolar nonketotic hyperglycemia ([facility] [address] 11/17/2019  Cognitive developmental delay 05/12/2018  Cerebral palsy ([facility] [address] 06/12/2015  History of post-polio syndrome 06/12/2015  Hypertension 06/12/2015  UTI (urinary tract infection) 06/11/2015 Urinary tract infection associated with indwelling urethral catheter UTI due to chronic [place] catheter Community Acquired Pneumonia 
 
- Urine cult w/ pansensitive ecoli 
- completed treatment 
- Seizure precautions - Will eventually need EEG in outpatient setting - Speech recs in place - Pt lacks capacity per psych 
- xray ok, wbc improved 
- monitor bp if improved can restart terazosin Discharge when social work plan in place Subjective NAD He is talkative this am and in good spirits Objective Visit Vitals /70 (BP 1 Location: Left arm, BP Patient Position: At rest) Pulse 87 Temp 98.1 °F (36.7 °C) Resp 16 Ht 5' 4\" (1.626 m) Wt 68 kg (150 lb) SpO2 100% BMI 25.75 kg/m² Physical Exam: 
General Appearance: NAD, non-conversant, pleasant Lungs: CTA with normal respiratory effort CV: RRR, no m/r/g Abdomen: soft, non-tender, normal bowel sounds Extremities: no cyanosis, no peripheral edema Neuro: No focal deficits, contracted Lab/Data Reviewed: 
BMP:  
Lab Results Component Value Date/Time   (H) 05/06/2020 04:00 AM  
 K 3.6 05/06/2020 04:00 AM  
  (H) 05/06/2020 04:00 AM  
 CO2 25 05/06/2020 04:00 AM  
 AGAP 4 05/06/2020 04:00 AM  
 GLU 88 05/06/2020 04:00 AM  
 BUN 9 05/06/2020 04:00 AM  
 CREA 0.42 (L) 05/06/2020 04:00 AM  
 GFRAA >60 05/06/2020 04:00 AM  
 GFRNA >60 05/06/2020 04:00 AM  
 
CBC:  
Lab Results Component Value Date/Time WBC 13.2 05/06/2020 04:00 AM  
 HGB 8.7 (L) 05/06/2020 04:00 AM  
 HCT 27.1 (L) 05/06/2020 04:00 AM  
  (H) 05/06/2020 04:00 AM  
 
 
Imaging Reviewed: 
No results found. Medications Reviewed: 
Current Facility-Administered Medications Medication Dose Route Frequency  docusate (COLACE) 50 mg/5 mL oral liquid 100 mg  100 mg Oral BID  dextrose 10% infusion 125-250 mL  125-250 mL IntraVENous PRN  therapeutic multivitamin (THERAGRAN) tablet 1 Tab  1 Tab Oral DAILY  aspirin chewable tablet 81 mg  81 mg Oral DAILY  finasteride (PROSCAR) tablet 5 mg  5 mg Oral DAILY  [Held by provider] meclizine (ANTIVERT) tablet 12.5 mg  12.5 mg Oral TID  polyethylene glycol (MIRALAX) packet 17 g  17 g Oral EVERY OTHER DAY  [Held by provider] terazosin (HYTRIN) capsule 5 mg  5 mg Oral DAILY  ondansetron (ZOFRAN) injection 4 mg  4 mg IntraVENous Q4H PRN  
 insulin lispro (HUMALOG) injection   SubCUTAneous AC&HS  
 glucose chewable tablet 16 g  4 Tab Oral PRN  
 glucagon (GLUCAGEN) injection 1 mg  1 mg IntraMUSCular PRN  
 albuterol-ipratropium (DUO-NEB) 2.5 MG-0.5 MG/3 ML  3 mL Nebulization Q6H PRN  
 acetaminophen (TYLENOL) tablet 650 mg  650 mg Oral Q6H PRN  
 melatonin tablet 12 mg  12 mg Oral QHS PRN  
 traMADoL (ULTRAM) tablet 50 mg  50 mg Oral Q6H PRN  
 hydroxypropyl methylcellulose (ISOPTO TEARS) 0.5 % ophthalmic solution 1 Drop  1 Drop Both Eyes QID  heparin (porcine) injection 5,000 Units  5,000 Units SubCUTAneous Q8H  
 sodium chloride (NS) flush 5-40 mL  5-40 mL IntraVENous Q8H  
 sodium chloride (NS) flush 5-40 mL  5-40 mL IntraVENous PRN  
 senna (SENOKOT) tablet 8.6 mg  1 Tab Oral BID

## 2020-05-06 NOTE — PROGRESS NOTES
0715= Bedside and Verbal shift change report given to  Gertrude SORENSON  (oncoming nurse) by Milan Warner RN (offgoing nurse). Report included the following information Kardex, Intake/Output, MAR and Recent Results.

## 2020-05-06 NOTE — PROGRESS NOTES
Problem: Dysphagia (Adult) Goal: *Acute Goals and Plan of Care (Insert Text) Description: Patient will: 1. Tolerate PO trials with 0 s/s overt distress in 4/5 trials 2. Utilize compensatory swallow strategies/maneuvers (decrease bite/sip, size/rate, alt. liq/sol) with min cues in 4/5 trials 3. Perform oral-motor/laryngeal exercises to increase oropharyngeal swallow function with min cues 4. Complete an objective swallow study (i.e., MBSS) to assess swallow integrity, r/o aspiration, and determine of safest LRD, min A Rec:    
Soft Solids (dental) with Nectar/mildly thick liquids via TSP Aspiration precautions HOB >45 during po intake, remain >30 for 30-45 minutes after po Small bites/sips; alternate liquid/solid with slow feeding rate Oral care TID Meds crushed in puree MBS to further assess oropharyngeal swallow [id number] 4/29/2020 Outcome: Progressing Towards Goal 
SPEECH LANGUAGE PATHOLOGY DYSPHAGIA TREATMENT Patient: Stormy Meneses (6 y.o. male) Date: 5/6/2020 Diagnosis: Septic shock ([address] [A41.9, R65.21] Complicated UTI (urinary tract infection) [N39.0] Stage 2 acute kidney injury ([address] [N17.9] Septic shock ([address] [A41.9, R65.21] UTI (urinary tract infection) [N39.0] Stage 2 acute kidney injury ([address] [N17.9] Septic shock ([address] Precautions: Aspiration Precautions PLOF:Per H&P 
 
ASSESSMENT: 
Seen at b/s this morning for dysphagia management/ intervention follow up. RN reports good po intake with breakfast s/p diet advancement yesterday. Pt seen with regular/ NTL via tsp po trials, demo'ing prolonged min labial spillage prolonged oral prep, delayed swallow, and weak hyolaryngeal elevation/excursion. No overt s/x of aspiration/ penetration indicated; no change in VQ. + oral clearance noted. SLP facilitated hard /k/ words to increase airway protection with mod cues/imitation. ST will continue to follow. Progression toward goals: []         Improving appropriately and progressing toward goals [x]         Improving slowly and progressing toward goals 
[]         Not making progress toward goals and plan of care will be adjusted PLAN: 
Recommendations and Planned Interventions: 
Soft solids (dental) with Nectar/mildly thick liquids via TSP Aspiration precautions HOB >45 during po intake, remain >30 for 30-45 minutes after po Small bites/sips; alternate liquid/solid with slow feeding rate Oral care TID Meds crushed in puree Patient continues to benefit from skilled intervention to address the above impairments. Continue treatment per established plan of care. Discharge Recommendations:  Missael Michel SUBJECTIVE:  
Patient stated There's a bug biting my foot. OBJECTIVE:  
Cognitive and Communication Status: 
Neurologic State: Alert, Confused Orientation Level: Oriented to person Cognition: Follows commands Dysphagia Treatment: 
Oral Assessment: 
Oral Assessment Labial: Decreased rate, Decreased seal, Impaired coordination Dentition: Natural 
Oral Hygiene: fair Lingual: Decreased rate, Decreased strength, Incoordinated P.O. Trials: 
 Patient Position: >60 Vocal quality prior to P.O.: Breathy, Fatigue, Low volume Consistency Presented: Nectar thick liquid, Solid How Presented: SLP-fed/presented Bolus Acceptance: No impairment Bolus Formation/Control: Impaired Type of Impairment: Delayed, Premature spillage Propulsion: Delayed (# of seconds) Oral Residue: None Initiation of Swallow: Delayed (# of seconds) Laryngeal Elevation: Weak Aspiration Signs/Symptoms: Change vocal quality, Infiltrate on chest xray Effective Modifications: Alternate liquids/solids, Cup/sip, Spoon, Small sips and bites, Other (comment) Cues for Modifications: Moderate Oral Phase Severity: Mild Pharyngeal Phase Severity : Moderate Oral Motor Exercises: Exercises: 
Laryngeal Exercises: PAIN: 
Pain level pre-treatment: 0/10 Pain level post-treatment: 0/10 Pain Intervention(s): Medication (see MAR); Rest, Ice, Repositioning Response to intervention: Nurse notified, See doc flow After treatment:  
[]              Patient left in no apparent distress sitting up in chair 
[x]              Patient left in no apparent distress in bed 
[x]              Call bell left within reach [x]              Nursing notified 
[]              Family present 
[]              Caregiver present 
[]              Bed alarm activated COMMUNICATION/EDUCATION:  
[x] Aspiration precautions; swallow safety; compensatory techniques []        Patient unable to participate in education; education ongoing with staff 
[]  Posted safety precautions in patient's room. [] Oral-motor/laryngeal strengthening exercises MOHSEN Sanchez Time Calculation: 18 mins

## 2020-05-07 NOTE — PROGRESS NOTES
Problem: Diabetes Self-Management Goal: *Disease process and treatment process Description: Define diabetes and identify own type of diabetes; list 3 options for treating diabetes. Outcome: Progressing Towards Goal 
  
Problem: Pressure Injury - Risk of 
Goal: *Prevention of pressure injury Description: Document Alex Scale and appropriate interventions in the flowsheet. Outcome: Progressing Towards Goal 
Note: Pressure Injury Interventions: 
Sensory Interventions: Assess need for specialty bed, Monitor skin under medical devices, Pressure redistribution bed/mattress (bed type), Turn and reposition approx. every two hours (pillows and wedges if needed) Moisture Interventions: Absorbent underpads, Internal/External urinary devices, Maintain skin hydration (lotion/cream) Activity Interventions: Pressure redistribution bed/mattress(bed type) Mobility Interventions: HOB 30 degrees or less, Turn and reposition approx. every two hours(pillow and wedges) Nutrition Interventions: Document food/fluid/supplement intake Friction and Shear Interventions: HOB 30 degrees or less Problem: Falls - Risk of 
Goal: *Absence of Falls Description: Document Murphy Colon Fall Risk and appropriate interventions in the flowsheet. Outcome: Progressing Towards Goal 
Note: Fall Risk Interventions: 
  
 
Mentation Interventions: Door open when patient unattended, Bed/chair exit alarm Medication Interventions: Patient to call before getting OOB, Bed/chair exit alarm Elimination Interventions: Bed/chair exit alarm Problem: Nutrition Deficit Goal: *Optimize nutritional status Outcome: Progressing Towards Goal 
  
Problem: General Wound Care Goal: *Non-infected wound: Improvement of existing wound, absence of infection, and maintenance of skin integrity Outcome: Progressing Towards Goal

## 2020-05-07 NOTE — ROUTINE PROCESS
0740 Received report from [address] [facility]. Patient alert and oriented. 1230 Patient repositioned comfortably in the bed. 1530 Patient sleeping, equal and non-labored breathing. 1930 Bedside and Verbal shift change report given to [facility] (oncoming nurse) by me (offgoing nurse). Report included the following information SBAR, Kardex, Intake/Output, MAR, Recent Results and Cardiac Rhythm NSR.

## 2020-05-07 NOTE — PROGRESS NOTES
Problem: Diabetes Self-Management Goal: *Disease process and treatment process Description: Define diabetes and identify own type of diabetes; list 3 options for treating diabetes. Outcome: Progressing Towards Goal 
Goal: *Using medications safely Description: State effect of diabetes medications on diabetes; name diabetes medication taking, action and side effects. Outcome: Progressing Towards Goal 
Goal: *Monitoring blood glucose, interpreting and using results Description: Identify recommended blood glucose targets  and personal targets. Outcome: Progressing Towards Goal 
  
Problem: Patient Education: Go to Patient Education Activity Goal: Patient/Family Education Outcome: Progressing Towards Goal 
  
Problem: Discharge Planning Goal: *Discharge to safe environment Outcome: Progressing Towards Goal 
  
Problem: Pressure Injury - Risk of 
Goal: *Prevention of pressure injury Description: Document Alex Scale and appropriate interventions in the flowsheet. Outcome: Progressing Towards Goal 
Note: Pressure Injury Interventions: 
Sensory Interventions: Assess changes in LOC, Keep linens dry and wrinkle-free, Float heels, Minimize linen layers, Use 30-degree side-lying position Moisture Interventions: Absorbent underpads, Internal/External urinary devices Activity Interventions: Pressure redistribution bed/mattress(bed type) Mobility Interventions: HOB 30 degrees or less, Pressure redistribution bed/mattress (bed type) Nutrition Interventions: Document food/fluid/supplement intake, Offer support with meals,snacks and hydration Friction and Shear Interventions: Foam dressings/transparent film/skin sealants, HOB 30 degrees or less, Minimize layers Problem: Patient Education: Go to Patient Education Activity Goal: Patient/Family Education Outcome: Progressing Towards Goal 
  
Problem: Falls - Risk of 
Goal: *Absence of Falls Description: Document Nish Sol Fall Risk and appropriate interventions in the flowsheet. Outcome: Progressing Towards Goal 
Note: Fall Risk Interventions: 
  
 
Mentation Interventions: Adequate sleep, hydration, pain control, Bed/chair exit alarm, Door open when patient unattended, Reorient patient, Room close to nurse's station Medication Interventions: Bed/chair exit alarm Elimination Interventions: Bed/chair exit alarm, Toileting schedule/hourly rounds Problem: Patient Education: Go to Patient Education Activity Goal: Patient/Family Education Outcome: Progressing Towards Goal 
  
Problem: Nutrition Deficit Goal: *Optimize nutritional status Outcome: Progressing Towards Goal 
  
Problem: General Wound Care Goal: *Non-infected wound: Improvement of existing wound, absence of infection, and maintenance of skin integrity Outcome: Progressing Towards Goal 
Goal: *Infected Wound: Prevention of further infection and promotion of healing Description: Infection control procedures (eg: clean dressings, clean gloves, hand washing, precautions to isolate wound from contamination, sterile instruments used for wound debridement) should be implemented. Outcome: Progressing Towards Goal 
Goal: Interventions Outcome: Progressing Towards Goal 
  
Problem: Pain Goal: *Control of Pain Outcome: Progressing Towards Goal 
  
Problem: Patient Education: Go to Patient Education Activity Goal: Patient/Family Education Outcome: Progressing Towards Goal 
  
Problem: Urinary Tract Infection - Adult Goal: *Absence of infection signs and symptoms Outcome: Progressing Towards Goal 
  
Problem: Patient Education: Go to Patient Education Activity Goal: Patient/Family Education Outcome: Progressing Towards Goal

## 2020-05-07 NOTE — ROUTINE PROCESS
1950: Assumed care. Awake. HOB elevated. No SOB on RA. Denies any pain or discomfort at this time. Call light within reach. 2241: Due meds given. . Coverage provided per protocol. 2353: No change from previous assessment. 0200: Sleeping.  
 
0327: No change from previous assessment. 0718: Blood drawn from Rt groin. 0645: Slept on & off thru night. Needs attended. Turned & repositioned at intervals. 0740: Bedside and Verbal shift change report given to Tommy Greenberg (oncoming nurse) by me (offgoing nurse).  Report included the following information SBAR, Kardex, Intake/Output, MAR, Recent Results and Cardiac Rhythm SR.

## 2020-05-07 NOTE — PROGRESS NOTES
Bedside shift change report given to YAS Langley and Reggie Davidson (oncoming nurse) by Vamsi Dejesus RN (offgoing nurse). Report included the following information SBAR, Kardex and Intake/Output.

## 2020-05-07 NOTE — PROGRESS NOTES
Internal Medicine Progress Note Patient's Name: Aisha Santiago Admit Date: 4/27/2020 Length of Stay: 9 Assessment/Plan Active Hospital Problems Diagnosis Date Noted  Complicated UTI (urinary tract infection) 04/28/2020  Septic shock ([facility] 04/28/2020  Stage 2 acute kidney injury ([facility] 04/28/2020  Sacral decubitus ulcer, stage IV ([facility] 04/28/2020  Uncontrolled type 2 diabetes mellitus with hyperosmolar nonketotic hyperglycemia ([facility] 11/17/2019  Cognitive developmental delay 05/12/2018  Cerebral palsy ([facility] 06/12/2015  History of post-polio syndrome 06/12/2015  Hypertension 06/12/2015  UTI (urinary tract infection) 06/11/2015 Urinary tract infection associated with indwelling urethral catheter UTI due to chronic Greenwood catheter Community Acquired Pneumonia 
 
- Urine cult w/ pansensitive ecoli 
- completed treatment 
- Seizure precautions - Will eventually need EEG in outpatient setting - Speech recs in place - Pt lacks capacity per psych 
- xray ok, wbc improved 
- monitor bp if improved can restart terazosin Guardianship hearing tomorrow. DC per cm after that Subjective NAD No complaints , he is talking to me this am 
 
Objective Visit Vitals /70 (BP 1 Location: Left arm, BP Patient Position: At rest) Pulse 93 Temp 98.1 °F (36.7 °C) Resp 18 Ht 5' 4\" (1.626 m) Wt 65.2 kg (143 lb 11.2 oz) SpO2 97% BMI 24.67 kg/m² Physical Exam: 
General Appearance: NAD, non-conversant, pleasant Lungs: CTA with normal respiratory effort CV: RRR, no m/r/g Abdomen: soft, non-tender, normal bowel sounds Extremities: no cyanosis, no peripheral edema Neuro: No focal deficits, contracted Lab/Data Reviewed: 
BMP:  
Lab Results Component Value Date/Time   05/07/2020 05:00 AM  
 K 4.0 05/07/2020 05:00 AM  
  (H) 05/07/2020 05:00 AM  
 CO2 23 05/07/2020 05:00 AM  
 AGAP 6 05/07/2020 05:00 AM  
  (H) 05/07/2020 05:00 AM  
 BUN 13 05/07/2020 05:00 AM  
 CREA 0.41 (L) 05/07/2020 05:00 AM  
 GFRAA >60 05/07/2020 05:00 AM  
 GFRNA >60 05/07/2020 05:00 AM  
 
CBC:  
Lab Results Component Value Date/Time WBC 10.4 05/07/2020 05:00 AM  
 HGB 8.1 (L) 05/07/2020 05:00 AM  
 HCT 26.0 (L) 05/07/2020 05:00 AM  
  (H) 05/07/2020 05:00 AM  
 
 
Imaging Reviewed: 
No results found. Medications Reviewed: 
Current Facility-Administered Medications Medication Dose Route Frequency  docusate (COLACE) 50 mg/5 mL oral liquid 100 mg  100 mg Oral BID  dextrose 10% infusion 125-250 mL  125-250 mL IntraVENous PRN  therapeutic multivitamin (THERAGRAN) tablet 1 Tab  1 Tab Oral DAILY  aspirin chewable tablet 81 mg  81 mg Oral DAILY  finasteride (PROSCAR) tablet 5 mg  5 mg Oral DAILY  [Held by provider] meclizine (ANTIVERT) tablet 12.5 mg  12.5 mg Oral TID  polyethylene glycol (MIRALAX) packet 17 g  17 g Oral EVERY OTHER DAY  [Held by provider] terazosin (HYTRIN) capsule 5 mg  5 mg Oral DAILY  ondansetron (ZOFRAN) injection 4 mg  4 mg IntraVENous Q4H PRN  
 insulin lispro (HUMALOG) injection   SubCUTAneous AC&HS  
 glucose chewable tablet 16 g  4 Tab Oral PRN  
 glucagon (GLUCAGEN) injection 1 mg  1 mg IntraMUSCular PRN  
 albuterol-ipratropium (DUO-NEB) 2.5 MG-0.5 MG/3 ML  3 mL Nebulization Q6H PRN  
 acetaminophen (TYLENOL) tablet 650 mg  650 mg Oral Q6H PRN  
 melatonin tablet 12 mg  12 mg Oral QHS PRN  
 traMADoL (ULTRAM) tablet 50 mg  50 mg Oral Q6H PRN  
 hydroxypropyl methylcellulose (ISOPTO TEARS) 0.5 % ophthalmic solution 1 Drop  1 Drop Both Eyes QID  heparin (porcine) injection 5,000 Units  5,000 Units SubCUTAneous Q8H  
 sodium chloride (NS) flush 5-40 mL  5-40 mL IntraVENous Q8H  
 sodium chloride (NS) flush 5-40 mL  5-40 mL IntraVENous PRN  
 senna (SENOKOT) tablet 8.6 mg  1 Tab Oral BID

## 2020-05-08 NOTE — DISCHARGE SUMMARY
Discharge Summary Patient: Billie Chatman               Sex: male          DOA: 4/27/2020 YOB: 1950      Age:  78 y.o.        LOS:  LOS: 10 days Admit Date: 4/27/2020 Discharge Date: 5/8/2020 Discharge Medications:    
Current Discharge Medication List  
  
CONTINUE these medications which have CHANGED Details  
metFORMIN (GLUCOPHAGE) 500 mg tablet Take 1 Tab by mouth daily (with breakfast). Qty: 30 Tab, Refills: 0 CONTINUE these medications which have NOT CHANGED Details  
fenofibrate micronized (LOFIBRA) 200 mg capsule Take  by mouth every morning. promethazine (Phenergan) 12.5 mg suppository Insert  into rectum every six (6) hours as needed for Nausea. multivitamin,tx-iron-ca-min (Thera-M) 27-0.4 mg tab Take 1 Tab by mouth daily. traMADoL (ULTRAM) 50 mg tablet Take 50 mg by mouth every six (6) hours as needed for Pain.  
  
terazosin (HYTRIN) 5 mg capsule Take 1 Cap by mouth daily. Qty: 30 Cap, Refills: 0  
  
finasteride (PROSCAR) 5 mg tablet Take 1 Tab by mouth daily. Qty: 30 Tab, Refills: 0  
  
polyvinyl alcohol (LIQUIFILM TEARS) 1.4 % ophthalmic solution Administer 1 Drop to both eyes four (4) times daily. aspirin 81 mg chewable tablet Take 81 mg by mouth daily. polyethylene glycol (MIRALAX) 17 gram packet Take 17 g by mouth every other day. lisinopril (PRINIVIL, ZESTRIL) 2.5 mg tablet Take 2.5 mg by mouth daily. acetaminophen (TYLENOL ARTHRITIS PAIN) 650 mg CR tablet Take 650 mg by mouth every six (6) hours as needed for Pain. STOP taking these medications  
  
 sodium hypochlorite (Dakin's Solution) external solution Comments:  
Reason for Stopping:   
   
 meclizine (ANTIVERT) 12.5 mg tablet Comments:  
Reason for Stopping:   
   
 baclofen (LIORESAL) 10 mg tablet Comments:  
Reason for Stopping:   
   
 calcium carbonate (TUMS) 200 mg calcium (500 mg) chew Comments:  
Reason for Stopping: Follow-up: PCP, neurology Procedures:  none Discharge Condition: Stable Activity: Activity as tolerated Diet: Resume previous diet Labs: 
Labs: Results:  
   
Chemistry Recent Labs 05/08/20 0420 05/07/20 
0500 05/06/20 
0400 GLU 87 164* 88  
 145 146*  
K 4.2 4.0 3.6 * 116* 117* CO2 24 23 25 BUN 10 13 9 CREA 0.26* 0.41* 0.42* CA 8.1* 7.9* 7.7* AGAP 5 6 4 BUCR 38* 32* 21* ALB 1.8* 1.7* 1.8* CBC w/Diff Recent Labs 05/08/20 0420 05/07/20 
0500 05/06/20 
0400 WBC 9.8 10.4 13.2 RBC 3.44* 3.34* 3.49* HGB 8.6* 8.1* 8.7* HCT 26.8* 26.0* 27.1*  
* 446* 483* GRANS 46 52 62 LYMPH 39 32 26 EOS 6* 5 3 Cardiac Enzymes No results for input(s): CPK, CKND1, JOHN in the last 72 hours. No lab exists for component: Edwardo Crow Coagulation No results for input(s): PTP, INR, APTT, INREXT in the last 72 hours. Lipid Panel Lab Results Component Value Date/Time Cholesterol, total 159 06/11/2015 10:00 PM  
 HDL Cholesterol 19 (L) 06/11/2015 10:00 PM  
 LDL, calculated 107 (H) 06/11/2015 10:00 PM  
 VLDL, calculated 33 06/11/2015 10:00 PM  
 Triglyceride 165 (H) 06/11/2015 10:00 PM  
 CHOL/HDL Ratio 8.4 (H) 06/11/2015 10:00 PM  
  
BNP No results for input(s): BNPP in the last 72 hours. Liver Enzymes Recent Labs 05/08/20 
8625 ALB 1.8* Thyroid Studies Lab Results Component Value Date/Time TSH 1.76 04/27/2020 08:30 PM  
    
 
Imaging: Xr Chest Sngl V Result Date: 5/5/2020 EXAM: XR CHEST SNGL V CLINICAL INDICATION/HISTORY: leukocytosis -Additional: None COMPARISON: 4/27/2020 TECHNIQUE: Portable frontal view of the chest _______________ FINDINGS: SUPPORT DEVICES: None. HEART AND MEDIASTINUM: Cardiomediastinal silhouette within normal limits. LUNGS AND PLEURAL SPACES: No dense consolidation, large effusion or pneumothorax. Visualized  shunt tubing. _______________ IMPRESSION: No acute cardiopulmonary abnormality. Xr Swallow Func Video Result Date: 4/29/2020 VIDEOSWALLOW WITH CINEFLUOROGRAPHY : HISTORY:  r/o silent aspiration; hx of silent asp PROCEDURE AND FINDINGS: Examination was performed in conjunction with the speech therapy department. The patient was presented with all consistencies of food. Trace penetration and silent aspiration aspiration with thins with cup and straw and nectar. Premature spillage and residuals is noted on all consistencies. . Swallowed delay noted. Fluoroscopic time: 4.4 minutes Fluoroscopic dose (reference air kerma): 21 mGy IMPRESSION: Trace penetration and silent aspiration aspiration with thins with cup and straw and nectar. Ct Chest Abd Pelv Wo Cont Result Date: 4/28/2020 EXAM: CT of the chest, abdomen, and pelvis INDICATION: Septic shock. Altered mental status. COMPARISON: None. TECHNIQUE: Helical volumetric scanning of the chest, abdomen, and pelvis was performed without oral or intravenous contrast. Axial, sagittal and coronal reconstructions were generated. One or more dose reduction techniques were used on this CT: automated exposure control, adjustment of the mAs and/or kVp according to patient size, and iterative reconstruction techniques. The specific techniques used on this CT exam have been documented in the patient's electronic medical record. Digital Imaging and Communications in Medicine (DICOM) format image data are available to nonaffiliated external healthcare facilities or entities on a secure, media free, reciprocally searchable basis with patient authorization for at least a 12-month period after this study. _______________ FINDINGS: CHEST: LUNGS: No suspicious nodule or mass. Patchy consolidation is present in the left lower lobe, anterior basal, posterior basal and lateral basal segments. Mild dependent atelectasis is present posteriorly within the PLEURA/CHEST WALL: Normal, with no effusion or pneumothorax.   shunt tubing is vertically oriented from the visualized left neck within the subcutaneous left anterior chest. AIRWAY: Normal. MEDIASTINUM: Normal heart size. Small amount of pericardial fluid is present. Minimal calcific atherosclerosis is noted involving systemic and coronary arteries. LYMPH NODES: No enlarged lymph nodes. =============== ABDOMEN/PELVIS: LIVER, BILIARY: Within normal range for noncontrast technique. No biliary dilation. Cholecystectomy. PANCREAS: Normal for noncontrast technique. SPLEEN: Normal. ADRENALS: Normal. KIDNEYS: Calcification is present in the dependent aspect of the posterior upper pole right renal calyx with a relatively straight anterior margin, measuring 1.6 cm transverse, 0.4 cm anteroposterior. Similar calcification is present in the dependent renal pelvis measuring 2.9 cm transverse, 0.3 cm anteroposterior. The configuration suggests possible multiple tiny calculi layering dependently. A couple other calculi are noted. Left renal calculi are present, posterior interpolar calyx measuring 10 x 4 mm and posterior lower pole calyx measuring 9 x 7 mm. There may be some mild thickening of the urothelium, although difficult to optimally assess without IV contrast. Mild intermittent hydroureter is present bilaterally. No ureteral calculi and no convincing signs of obstructive uropathy. Bilateral renal low densities are present compatible with a benign etiology, likely cysts, largest on the right measures 2.6 cm, largest on the left measures 1.6 cm. Multiple small focal areas of cortical thinning are present compatible with parenchymal scarring. LYMPH NODES: No enlarged lymph nodes. GASTROINTESTINAL TRACT: Scattered colonic diverticula are present without signs of diverticulitis. Moderate colonic fecal debris is present, most prominently with considerable distention of the rectal vault with mild wall thickening of the distended rectum compatible with stercoral colitis. Normal appendix.  PELVIC ORGANS: A Greenwood catheter is present in the empty urinary bladder. Diffuse bladder wall thickening is present and a small amount of nondependent air/gas is present in the bladder, probably due to the presence of the catheter. VASCULATURE: Right femoral venous catheter is present with its tip within the cephalad aspect of the right common iliac vein. Scattered calcific atherosclerosis is present. No AAA. BONES: No suspicious osseous lesions. Moderate compression fracture deformity with anterior wedging and concavity of the superior endplate of L2 is present. No adjacent fat stranding, so that this compression is not likely acute. Bilateral L5 pars defects are present, with a chronic appearance. OTHER: Mild anasarca is noted within the subcutaneous fat, most prominently lower abdomen and pelvis. _______________ IMPRESSION: 1. Left lower lobe consolidation, probable pneumonia. 2. Multiple bilateral nonobstructing renal calculi. Thick-walled urinary bladder containing a Greenwood catheter and questionable mildly thick urothelium, raising the possibility of UTI. 3. Moderate colonic fecal debris. Considerable distention of the rectal vault by stool with thickening of the rectal wall, possibly constipation/fecal impaction plus or minus stercoral colitis. 4. Right femoral venous catheter with its tip in the right common iliac vein. A preliminary report was provided by the radiology resident on call at the time of the study. Xr Chest [place] Result Date: 4/28/2020 EXAM: CHEST RADIOGRAPH, SINGLE VIEW CLINICAL INDICATION/HISTORY: meets SIRS criteria      <Additional:  Septic shock. COMPARISON: 1/17/2020 TECHNIQUE: Portable frontal view of the chest was obtained. _______________ FINDINGS: SUPPORT DEVICES: None. HEART AND MEDIASTINUM: Cardiomediastinal silhouette appears within normal limits. LUNGS AND PLEURAL SPACES: Focal streaky opacity is present in the left infrahilar region. The retrocardiac region is slightly dense.  Pulmonary vessels are normal. Costophrenic angles are sharp. No pneumothorax. BONY THORAX AND SOFT TISSUES: No acute osseous abnormality. _______________ IMPRESSION: Retrocardiac consolidation, atelectasis and/or pneumonia. Left infrahilar linear atelectasis. Consults: Psychiatry and Pulmonary/Critical Care Treatment Team: Treatment Team: Attending Provider: Amadeo Nieto MD; Consulting Provider: Jonathan Schofield DO; Utilization Review: Renee Matta; Care Manager: Shaq Sorto Significant Diagnostic Studies: labs:  
Recent Results (from the past 24 hour(s)) GLUCOSE, POC Collection Time: 05/07/20  4:54 PM  
Result Value Ref Range Glucose (POC) 98 70 - 110 mg/dL GLUCOSE, POC Collection Time: 05/07/20  9:13 PM  
Result Value Ref Range Glucose (POC) 235 (H) 70 - 110 mg/dL RENAL FUNCTION PANEL Collection Time: 05/08/20  4:20 AM  
Result Value Ref Range Sodium 144 136 - 145 mmol/L Potassium 4.2 3.5 - 5.5 mmol/L Chloride 115 (H) 100 - 111 mmol/L  
 CO2 24 21 - 32 mmol/L Anion gap 5 3.0 - 18 mmol/L Glucose 87 74 - 99 mg/dL BUN 10 7.0 - 18 MG/DL Creatinine 0.26 (L) 0.6 - 1.3 MG/DL  
 BUN/Creatinine ratio 38 (H) 12 - 20 GFR est AA >60 >60 ml/min/1.73m2 GFR est non-AA >60 >60 ml/min/1.73m2 Calcium 8.1 (L) 8.5 - 10.1 MG/DL Phosphorus 3.1 2.5 - 4.9 MG/DL Albumin 1.8 (L) 3.4 - 5.0 g/dL CBC WITH AUTOMATED DIFF Collection Time: 05/08/20  4:20 AM  
Result Value Ref Range WBC 9.8 4.6 - 13.2 K/uL  
 RBC 3.44 (L) 4.70 - 5.50 M/uL HGB 8.6 (L) 13.0 - 16.0 g/dL HCT 26.8 (L) 36.0 - 48.0 % MCV 77.9 74.0 - 97.0 FL  
 MCH 25.0 24.0 - 34.0 PG  
 MCHC 32.1 31.0 - 37.0 g/dL RDW 21.8 (H) 11.6 - 14.5 % PLATELET 007 (H) 247 - 420 K/uL MPV 9.5 9.2 - 11.8 FL  
 NEUTROPHILS 46 40 - 73 % LYMPHOCYTES 39 21 - 52 % MONOCYTES 9 3 - 10 % EOSINOPHILS 6 (H) 0 - 5 % BASOPHILS 0 0 - 2 %  
 ABS. NEUTROPHILS 4.5 1.8 - 8.0 K/UL ABS. LYMPHOCYTES 3.8 (H) 0.9 - 3.6 K/UL  
 ABS. MONOCYTES 0.9 0.05 - 1.2 K/UL  
 ABS. EOSINOPHILS 0.6 (H) 0.0 - 0.4 K/UL  
 ABS. BASOPHILS 0.0 0.0 - 0.1 K/UL  
 DF AUTOMATED    
GLUCOSE, POC Collection Time: 05/08/20  7:20 AM  
Result Value Ref Range Glucose (POC) 96 70 - 110 mg/dL Discharge diagnoses:   
Problem List as of 5/8/2020 Date Reviewed: 4/28/2020 Codes Class Noted - Resolved Complicated UTI (urinary tract infection) ICD-10-CM: N39.0 ICD-9-CM: 599.0  4/28/2020 - Present * (Principal) Septic shock ([facility] ICD-10-CM: A41.9, R65.21 ICD-9-CM: 038.9, 785.52, 995.92  4/28/2020 - Present Stage 2 acute kidney injury ([facility] ICD-10-CM: N17.9 ICD-9-CM: 584.9  4/28/2020 - Present Sacral decubitus ulcer, stage IV (HCC) ICD-10-CM: H75.038 ICD-9-CM: 707.03, 707.24  4/28/2020 - Present Metabolic encephalopathy [id number]: G93.41 
ICD-9-CM: 348.31  1/18/2020 - Present Uncontrolled type 2 diabetes mellitus with hyperosmolar nonketotic hyperglycemia (HCC) ICD-10-CM: E11.00 ICD-9-CM: 250.22  11/17/2019 - Present Hydronephrosis ICD-10-CM: N13.30 ICD-9-CM: 761  11/17/2019 - Present Hypernatremia ICD-10-CM: E87.0 ICD-9-CM: 276.0  11/17/2019 - Present Sepsis ([facility] ICD-10-CM: A41.9 ICD-9-CM: 038.9, 995.91  11/17/2019 - Present Elevated troponin ICD-10-CM: R79.89 ICD-9-CM: 790.6  11/17/2019 - Present Urinary retention ICD-10-CM: R33.9 ICD-9-CM: 788.20  11/17/2019 - Present MATTIE (acute kidney injury) ([facility] ICD-10-CM: N17.9 ICD-9-CM: 584.9  11/17/2019 - Present Small bowel obstruction ([facility] ICD-10-CM: Z22.761 ICD-9-CM: 560.9  5/12/2018 - Present Cognitive developmental delay ICD-10-CM: F81.9 ICD-9-CM: 315.9  5/12/2018 - Present Hypotension ICD-10-CM: I95.9 ICD-9-CM: 458.9  6/12/2015 - Present Tachycardia ICD-10-CM: R00.0 ICD-9-CM: 785.0  6/12/2015 - Present Lactic acidosis ICD-10-CM: E87.2 ICD-9-CM: 276.2  6/12/2015 - Present Cerebral palsy (HCC) (Chronic) ICD-10-CM: G80.9 ICD-9-CM: 343.9  6/12/2015 - Present History of post-polio syndrome (Chronic) ICD-10-CM: S26.82 
ICD-9-CM: V12.02  6/12/2015 - Present Hypertension (Chronic) ICD-10-CM: I10 
ICD-9-CM: 401.9  6/12/2015 - Present Mild intellectual disability (Chronic) ICD-10-CM: F70 
ICD-9-CM: 317  6/12/2015 - Present UTI (urinary tract infection) ICD-10-CM: N39.0 ICD-9-CM: 599.0  6/11/2015 - Present Overview Signed 1/21/2020 12:49 PM by Oanh Franco MD  
  Urinary tract infection associated with indwelling urethral catheter Hospital Course:  
Major issues addressed during hospitalization outlined  below. 1. Community acquired pneumonia:  
- CT Chest on my review shows a small LLL infiltrate consistent with a pneumonia 
-patient was treated with IV abx for full course 
  
Cardiovascular: 1. Shock : likely septic in etiology. Cortisol 26. Received 3L IVF bolus and continued on pressor only needed for first approximate 24 hrs.  
  
Renal: 1. MATTIE (improving); Hyperkalemia (resolved): 
-MATTIE and hyperkalemia resolved with fluids 
  
2. Hypomagnesemia and Hypophosphatemia 
- both repleted  
  Infectious Disease: 1. Septic shock (resolved) due to UTI and Pneumonia (CAP vs Asp) - treated with full course of rocephin, completed abx during admission Will need EEG for seizure like activity during admission as OP Can dc to snf today Required guardianship with hearing on 5/8 Tere Betancourt MD 
May 8, 2020 Total time spent 55 minutes

## 2020-05-08 NOTE — PROGRESS NOTES
Received patient Shannon Kirkland RN. Pt awake and in bed. No distress noted at this time. Bed locked in lowest position. Frequently used items and call light within reach. 7505: AM labs completed. incontinence care performed. Greenwood care performed. Central line dressing change performed. Pt resting quietly in bed. Uneventful night for pt. Pt rested and was treated per eMAR. No further complaints and no distress noted. 8820: Bedside shift change report given to Rodríguez Velez (oncoming nurse) by Farhan Arango RN (offgoing nurse). Report included the following information SBAR, Intake/Output, MAR and Quality Measures. Opportunity for questions and clarification provided.

## 2020-05-08 NOTE — PROGRESS NOTES
NUTRITION FOLLOW-UP/PLAN OF CARE 
 
RECOMMENDATIONS:  
1. Soft Solid Diet with NTL (2) per SLP ; no concentrated sweets and SF CIB BID 2. Monitor labs, weight and document all PO intake 3. Feeding assist with encouragement GOALS:  
1. Progressing/Ongoing: PO intake meets >75% of protein/calorie needs by 5/13 ASSESSMENT:  
Wt status is classified as overweight per Body mass index is 24.67 kg/m². PO intake improving overall. Labs noted. BG range () over the past 24 hours; A1c (10.0%). Nutrition recommendations listed. RD to follow. SUBJECTIVE/OBJECTIVE:  
(5/8): Pt seen in room after lunch resting. Appetite/PO intake is good and patient does well at meals with feeding assist. Last BM on (5/6) per I/Os. Used bed scale during visit 144.8 lb. Plan to D/C later today. (5/4): Placed parameters for no concentrated sweets to assist with BG control. SLP following: s/p MBS on (4/29) Pt presents with mild-moderate oral and moderate-severe pharyngeal dysphagia with recommendations for Mech soft solids NECTAR/mildly thick liquids VIA TSP ONLY. Noted wounds including stage 1 pressure injury to right ischial and a stage 4 pressure njury to sacral/coccyx documented by wound care on (4/29). Last BM on (5/3) per I/Os. Pt seen in room this afternoon; observed good PO intake Used bed scale during visit; 149.8 lb. Encouraged to continue to increase PO intake as tolerated. Pt became tearful so was able to calm him by turning the TV on for him. Will continue to monitor. Information Obtained From:  
[x] Chart Review [x] Patient 
[] Family/Caregiver [x] Nurse/Physician  
[] Patient Rounds/Interdisciplinary Meeting Diet: Dental Soft (NDD3) with NTL (2) Patient Vitals for the past 100 hrs: 
 % Diet Eaten 05/07/20 1749 75 % 05/07/20 1246 0 % 05/07/20 0836 80 % 05/06/20 1730 90 % 05/06/20 1300 90 % 05/05/20 0900 75 % 05/04/20 1843 90 % Medications: [x] Reviewed Colace, heparin, Lantus, Humalog, Miralax, Senokot, Hytrin, MVI Encounter Diagnoses ICD-10-CM ICD-9-CM 1. Acute hyperkalemia E87.5 276.7 2. Septic shock (HCC) A41.9 038.9  
 R65.21 785.52  
  995.92  
3. Uremia N19 586  
4. Acute UTI N39.0 599.0 Past Medical History:  
Diagnosis Date  Cerebral palsy ([facility]  Cognitive developmental delay  Decubitus ulcer of sacral region, stage 4 ([facility]  Diabetes mellitus, type II ([facility]  Hepatic steatosis ?  History of hepatomegaly  History of recurrent UTIs  History of small bowel obstruction  Hypertension  Polio  Splenic artery aneurysm (HCC)   
 ?; 1.7 cm  
 Urinary retention Labs:   
Lab Results Component Value Date/Time Sodium 144 05/08/2020 04:20 AM  
 Potassium 4.2 05/08/2020 04:20 AM  
 Chloride 115 (H) 05/08/2020 04:20 AM  
 CO2 24 05/08/2020 04:20 AM  
 Anion gap 5 05/08/2020 04:20 AM  
 Glucose 87 05/08/2020 04:20 AM  
 BUN 10 05/08/2020 04:20 AM  
 Creatinine 0.26 (L) 05/08/2020 04:20 AM  
 Calcium 8.1 (L) 05/08/2020 04:20 AM  
 Magnesium 1.8 05/05/2020 04:05 AM  
 Phosphorus 3.1 05/08/2020 04:20 AM  
 Albumin 1.8 (L) 05/08/2020 04:20 AM  
 
Anthropometrics: BMI (calculated): 24.7 Last 3 Recorded Weights in this Encounter 05/05/20 2663 05/06/20 4985 05/07/20 8317 Weight: 67.9 kg (149 lb 11.1 oz) 68 kg (150 lb) 65.2 kg (143 lb 11.2 oz) Ht Readings from Last 1 Encounters:  
04/27/20 5' 4\" (1.626 m) Documented Weight History: 
Weight Metrics 5/7/2020 4/27/2020 1/22/2020 1/3/2020 11/24/2019 5/17/2018 6/12/2015 Weight 143 lb 11.2 oz - 138 lb 7.2 oz 134 lb 135 lb 124 lb 100 lb BMI - 24.67 kg/m2 23.76 kg/m2 23 kg/m2 23.17 kg/m2 20.63 kg/m2 19.53 kg/m2 []  Weight Loss 
[x]  Weight Gain 
[]  Weight Stable  
[x]  New wts on record variable Estimated Nutrition Needs:  
5 Kcals/day Protein (g): 87 g Nutrition Problems Identified:  
[] Suboptimal PO intake  
[] Food Allergies [] Difficulty chewing/swallowing/poor dentition 
[] Constipation/Diarrhea  
[] Nausea/Vomiting  
[] None 
[x] Other: Wound healing (stage 1 right ischial, stage 4 sacral/coccyx Plan:  
[] Therapeutic Diet 
[]  Obtained/adjusted food preferences/tolerances and/or snacks options [x]  Continue supplements added  
[] Occupational therapy following for feeding techniques []  HS snack added  
[x]  Modify diet texture  
[]  Modify diet for food allergies []  Educate patient  
[]  Assist with menu selection  
[x]  Monitor PO intake on meal rounds  
[x]  Continue inpatient monitoring and intervention  
[x]  Participated in discharge planning/Interdisciplinary rounds/Team meetings  
[]  Other:  
 
Education Needs: 
 [x] Not appropriate for teaching at this time 
 [] Identified and addressed Nutrition Monitoring and Evaluation: 
 [x] Continue inpatient monitoring and interventions [] Other:  
 
Carmina Phillips

## 2020-05-08 NOTE — PROGRESS NOTES
Guardianship hearing today. Spoke with Gurdeep Sorto @ [facility] he states ok to send pt back to . [address]. Called [facility] SAR[facility], to schedule trip, # [phone], requested [facility]. [facility], spoke with Aly Holliday,  time is 1700. Copy of guardianship paperwork placed on chart & in envelope. Nursing made aware of above. Jamia OrtizRN,ext [phone]. Care Management Interventions PCP Verified by CM: Yes(lives at [facility] and sees in house MD) Palliative Care Criteria Met (RRAT>21 & CHF Dx)?: No 
Mode of Transport at Discharge: BLS Transition of Care Consult (CM Consult): Discharge Planning Physical Therapy Consult: No 
Occupational Therapy Consult: No 
Speech Therapy Consult: No 
Current Support Network: [address] [place] [address] Confirm Follow Up Transport: Other (see comment)(in house MD for follow up) The Plan for Transition of Care is Related to the Following Treatment Goals : return to long term care facility The Patient and/or Patient Representative was Provided with a Choice of Provider and Agrees with the Discharge Plan?: No 
Freedom of Choice List was Provided with Basic Dialogue that Supports the Patient's Individualized Plan of Care/Goals, Treatment Preferences and Shares the Quality Data Associated with the Providers?: No 
 Resource Information Provided?: No 
Discharge Location Discharge Placement: Long Term Care(pt will retun back to [facility])

## 2020-05-08 NOTE — PROGRESS NOTES
1560-  Bedside and Verbal shift change report given to Aileen Benoit RN (oncoming nurse) by Rhiannon Parra RN (offgoing nurse). Report included the following information SBAR, Kardex, Intake/Output, MAR and Recent Results. 1615-  Report called to [employer]. [id number] 12- Patient taken to [facility] by rescue squad.

## 2020-05-08 NOTE — PROGRESS NOTES
Problem: Diabetes Self-Management Goal: *Disease process and treatment process Description: Define diabetes and identify own type of diabetes; list 3 options for treating diabetes. Outcome: Progressing Towards Goal 
Goal: *Incorporating nutritional management into lifestyle Description: Describe effect of type, amount and timing of food on blood glucose; list 3 methods for planning meals. Outcome: Progressing Towards Goal 
Goal: *Incorporating physical activity into lifestyle Description: State effect of exercise on blood glucose levels. Outcome: Progressing Towards Goal 
Goal: *Developing strategies to promote health/change behavior Description: Define the ABC's of diabetes; identify appropriate screenings, schedule and personal plan for screenings. Outcome: Progressing Towards Goal 
Goal: *Using medications safely Description: State effect of diabetes medications on diabetes; name diabetes medication taking, action and side effects. Outcome: Progressing Towards Goal 
Goal: *Monitoring blood glucose, interpreting and using results Description: Identify recommended blood glucose targets  and personal targets. Outcome: Progressing Towards Goal 
Goal: *Prevention, detection, treatment of acute complications Description: List symptoms of hyper- and hypoglycemia; describe how to treat low blood sugar and actions for lowering  high blood glucose level. Outcome: Progressing Towards Goal 
Goal: *Prevention, detection and treatment of chronic complications Description: Define the natural course of diabetes and describe the relationship of blood glucose levels to long term complications of diabetes. Outcome: Progressing Towards Goal 
Goal: *Developing strategies to address psychosocial issues Description: Describe feelings about living with diabetes; identify support needed and support network Outcome: Progressing Towards Goal 
Goal: *Insulin pump training Outcome: Progressing Towards Goal 
 Goal: *Sick day guidelines Outcome: Progressing Towards Goal 
Goal: *Patient Specific Goal (EDIT GOAL, INSERT TEXT) Outcome: Progressing Towards Goal 
  
Problem: Patient Education: Go to Patient Education Activity Goal: Patient/Family Education Outcome: Progressing Towards Goal 
  
Problem: Discharge Planning Goal: *Discharge to safe environment Outcome: Progressing Towards Goal 
  
Problem: Discharge Planning Goal: *Discharge to safe environment Outcome: Progressing Towards Goal 
  
Problem: Pressure Injury - Risk of 
Goal: *Prevention of pressure injury Description: Document Alex Scale and appropriate interventions in the flowsheet. Outcome: Progressing Towards Goal 
Note: Pressure Injury Interventions: 
Sensory Interventions: Assess need for specialty bed, Check visual cues for pain, Discuss PT/OT consult with provider, Keep linens dry and wrinkle-free, Float heels, Maintain/enhance activity level, Minimize linen layers, Pressure redistribution bed/mattress (bed type) Moisture Interventions: Check for incontinence Q2 hours and as needed, Internal/External urinary devices, Limit adult briefs, Maintain skin hydration (lotion/cream), Minimize layers, Moisture barrier, Offer toileting Q_hr Activity Interventions: Increase time out of bed, Pressure redistribution bed/mattress(bed type), PT/OT evaluation Mobility Interventions: HOB 30 degrees or less, Pressure redistribution bed/mattress (bed type), PT/OT evaluation Nutrition Interventions: Offer support with meals,snacks and hydration Friction and Shear Interventions: HOB 30 degrees or less, Lift sheet, Minimize layers, Foam dressings/transparent film/skin sealants, Apply protective barrier, creams and emollients Problem: Patient Education: Go to Patient Education Activity Goal: Patient/Family Education Outcome: Progressing Towards Goal 
  
Problem: Falls - Risk of 
Goal: *Absence of Falls Description: Document Sole Zavala Fall Risk and appropriate interventions in the flowsheet. Outcome: Progressing Towards Goal 
Note: Fall Risk Interventions: 
  
 
Mentation Interventions: More frequent rounding, Room close to nurse's station, Reorient patient, Bed/chair exit alarm, Door open when patient unattended Medication Interventions: Patient to call before getting OOB, Teach patient to arise slowly Elimination Interventions: Toileting schedule/hourly rounds, Call light in reach Problem: Patient Education: Go to Patient Education Activity Goal: Patient/Family Education Outcome: Progressing Towards Goal 
  
Problem: Nutrition Deficit Goal: *Optimize nutritional status Outcome: Progressing Towards Goal 
  
Problem: General Wound Care Goal: *Non-infected wound: Improvement of existing wound, absence of infection, and maintenance of skin integrity Outcome: Progressing Towards Goal 
Goal: *Infected Wound: Prevention of further infection and promotion of healing Description: Infection control procedures (eg: clean dressings, clean gloves, hand washing, precautions to isolate wound from contamination, sterile instruments used for wound debridement) should be implemented. Outcome: Progressing Towards Goal 
Goal: Interventions Outcome: Progressing Towards Goal 
  
Problem: Pain Goal: *Control of Pain Outcome: Progressing Towards Goal 
  
Problem: Patient Education: Go to Patient Education Activity Goal: Patient/Family Education Outcome: Progressing Towards Goal 
  
Problem: Urinary Tract Infection - Adult Goal: *Absence of infection signs and symptoms Outcome: Progressing Towards Goal 
  
Problem: Patient Education: Go to Patient Education Activity Goal: Patient/Family Education Outcome: Progressing Towards Goal

## 2020-05-08 NOTE — DISCHARGE INSTRUCTIONS
DISCHARGE SUMMARY from Nurse    PATIENT INSTRUCTIONS:    After general anesthesia or intravenous sedation, for 24 hours or while taking prescription Narcotics:  · Limit your activities  · Do not drive and operate hazardous machinery  · Do not make important personal or business decisions  · Do  not drink alcoholic beverages  · If you have not urinated within 8 hours after discharge, please contact your surgeon on call. Report the following to your surgeon:  · Excessive pain, swelling, redness or odor of or around the surgical area  · Temperature over 100.5  · Nausea and vomiting lasting longer than 4 hours or if unable to take medications  · Any signs of decreased circulation or nerve impairment to extremity: change in color, persistent  numbness, tingling, coldness or increase pain  · Any questions    What to do at Home:  Recommended activity: Activity as tolerated and PT/OT Eval and Treat    If you experience any of the following symptoms fever, chills, or uncontrolled pain, please follow up with rehab staff. *  Please give a list of your current medications to your Primary Care Provider. *  Please update this list whenever your medications are discontinued, doses are      changed, or new medications (including over-the-counter products) are added. *  Please carry medication information at all times in case of emergency situations. These are general instructions for a healthy lifestyle:    No smoking/ No tobacco products/ Avoid exposure to second hand smoke  Surgeon General's Warning:  Quitting smoking now greatly reduces serious risk to your health.     Obesity, smoking, and sedentary lifestyle greatly increases your risk for illness    A healthy diet, regular physical exercise & weight monitoring are important for maintaining a healthy lifestyle    You may be retaining fluid if you have a history of heart failure or if you experience any of the following symptoms:  Weight gain of 3 pounds or more overnight or 5 pounds in a week, increased swelling in our hands or feet or shortness of breath while lying flat in bed. Please call your doctor as soon as you notice any of these symptoms; do not wait until your next office visit. The discharge information has been reviewed with the patient and rehab staff. The patient and rehab staff verbalized understanding. Discharge medications reviewed with the patient and rehab staff and appropriate educational materials and side effects teaching were provided. Patient discharged without removing armband and transfered to another healthcare acute, sub acute , or extended care facility. Informed of privacy risks if armband lost or stolen.

## 2020-08-26 NOTE — ED NOTES
No changes. Pt is resting. Map continues to be greater than 65. Pt wakes when spoken to and will answer. No distress at this time. 36.8

## 2020-10-15 PROBLEM — E11.65 HYPERGLYCEMIA DUE TO TYPE 2 DIABETES MELLITUS (HCC): Status: ACTIVE | Noted: 2020-01-01

## 2020-10-15 PROBLEM — J18.9 BILATERAL PNEUMONIA: Status: ACTIVE | Noted: 2020-01-01

## 2020-10-15 PROBLEM — I82.412: Status: ACTIVE | Noted: 2020-01-01

## 2020-10-15 NOTE — ED TRIAGE NOTES
Received report from nurse at [facility] stating that the patient who is normally total care and alert and oriented was being fed by CNA and patient began choking and then became unresponsive. Not opening eyes or responding.

## 2020-10-15 NOTE — ED PROVIDER NOTES
28-year-old male limited history and review of systems due to altered mental status by report from EMS and nursing home patient slightly altered all day then worsening over the past 20 minutes there is a concern for possible aspiration when eating food also blood sugar elevation of 542 currently his blood sugar today is rating as high in the ER patient clearly this is a worsening altered mental status but unclear of the actual timing or etiology. Patient arrives on nonrebreather nonverbal unable to follow commands. Is a history of cerebral palsy and developmental delays in nursing home patient. He has a history of diabetes hypertension stroke. This note dictated in dragon software. There may be grammatical and spelling errors that are missed during review Review of systems: all other systems negative unless otherwise specified The history is provided by the EMS personnel. Altered mental status This is a new problem. The current episode started 6 to 12 hours ago. The problem has been gradually worsening. Associated symptoms include unresponsiveness. Pertinent negatives include no confusion, no somnolence, no seizures, no weakness and no agitation. Mental status baseline is moderate dementia. Risk factors include dementia. His past medical history is significant for diabetes, CVA and heart disease. Past Medical History:  
Diagnosis Date  Cerebral palsy ([address]  Cognitive developmental delay  Decubitus ulcer of sacral region, stage 4 ([address]  Diabetes mellitus, type II ([address]  Hepatic steatosis ?  History of hepatomegaly  History of recurrent UTIs  History of small bowel obstruction  Hypertension  Polio  Splenic artery aneurysm (HCC)   
 ?; 1.7 cm  
 Urinary retention Past Surgical History:  
Procedure Laterality Date  HX CHOLECYSTECTOMY No family history on file. Social History Socioeconomic History  Marital status: SINGLE Spouse name: Not on file  Number of children: Not on file  Years of education: Not on file  Highest education level: Not on file Occupational History  Not on file Social Needs  Financial resource strain: Not on file  Food insecurity Worry: Not on file Inability: Not on file  Transportation needs Medical: Not on file Non-medical: Not on file Tobacco Use  Smoking status: Never Smoker  Smokeless tobacco: Never Used Substance and Sexual Activity  Alcohol use: No  
 Drug use: Not on file  Sexual activity: Not on file Lifestyle  Physical activity Days per week: Not on file Minutes per session: Not on file  Stress: Not on file Relationships  Social connections Talks on phone: Not on file Gets together: Not on file Attends Holiness service: Not on file Active member of club or organization: Not on file Attends meetings of clubs or organizations: Not on file Relationship status: Not on file  Intimate partner violence Fear of current or ex partner: Not on file Emotionally abused: Not on file Physically abused: Not on file Forced sexual activity: Not on file Other Topics Concern [address] Service Not Asked  Blood Transfusions Not Asked  Caffeine Concern Not Asked  Occupational Exposure Not Asked [place] [employer] Hazards Not Asked  Sleep Concern Not Asked  Stress Concern Not Asked  Weight Concern Not Asked  Special Diet Not Asked  Back Care Not Asked  Exercise Not Asked  Bike Helmet Not Asked  Seat Belt Not Asked  Self-Exams Not Asked Social History Narrative  Not on file ALLERGIES: Patient has no known allergies. Review of Systems Unable to perform ROS: Mental status change Neurological: Negative for seizures and weakness. Psychiatric/Behavioral: Negative for agitation and confusion. There were no vitals filed for this visit. Physical Exam 
Vitals signs and nursing note reviewed. Constitutional:   
   General: He is in acute distress. Appearance: He is ill-appearing, toxic-appearing and diaphoretic. HENT:  
   Head:  
   Comments: Scar noted left frontal 
   Mouth/Throat:  
   Mouth: Mucous membranes are dry. Eyes:  
   General: No scleral icterus. Comments: Pupils do react no obvious extraocular muscle movement Neck:  
   Comments: No obvious issues or deformities Cardiovascular:  
   Rate and Rhythm: Tachycardia present. Pulmonary:  
   Effort: Respiratory distress present. Breath sounds: Rhonchi present. Comments: Increased respiratory effort with respiratory distress noted Abdominal:  
   Palpations: Abdomen is soft. Musculoskeletal:     
   General: No signs of injury. Skin: 
   General: Skin is warm. Capillary Refill: Capillary refill takes 2 to 3 seconds. Neurological:  
   Comments: Altered mental status patient not following commands is a history of cerebral palsy not moving his extremities Psychiatric:  
   Comments: Mental status unable to assess MDM Number of Diagnoses or Management Options Diagnosis management comments: Patient presents with acute distress respiratory distress and altered mental status confusing history is obtained from EMS and nursing home patient has been altered all day with worsening over the past hour there is a concern for possible aspiration of food his Accu-Chek is reading is greater than high.  
 
 
Continue to deteriorate in the emergency department with increased respiratory rate into the 40s minimally responsive only to noxious stimuli temperature 100.5 heart rate in the 130s this point we checked his paperwork he is a full code believe indicated for intubation at this time for increased work of breathing concern for airway compromise while his lungs are rhonchorous O2 sats were 96% on a nonrebreather. Decision made to intubate was performed with a 7 oh ET tube via glide scope without difficulty. Attempted EJ access on him able patient received an IO line left tibia however a right femoral central venous catheter was placed. This was placed because patient's anatomy is somewhat unusual and I believe he is slightly dehydrated was difficult to ascertain artery versus venous specifically in his neck and his anatomy seem more clear in his femoral area. At this point will treat for sepsis COVID swab. Will need admission. EKG shows a sinus tachycardia, normal axis no ectopy no obvious ischemia, this EKG was interpreted by me Called to bedside patient decreased blood pressure with map of 50 he is maxed out on Levophed 16 mcg/min we will add vasopressin he is received 2 L of IV fluids agree with third also antibiotics glucose stabilizer for his insulin discussed with hospitalist for admission no clear etiology of his sepsis urinalysis pending, chest x-ray without obvious infiltrate patient is extremely dehydrated with a sodium of 169 normal BMP and troponin COVID swab pending Critical Care Time: The services I provided to this patient were to treat and/or prevent clinically significant deterioration that could result in the failure of one or more body systems and/or organ systems due to multiorgan system failure respiratory distress electrolyte abnormalities chart documentation multiple reassessments. Services included the following: 
-reviewing nursing notes and old charts 
-vital sign assessments 
-direct patient care 
-medication orders and management 
-interpreting and reviewing diagnostic studies/labs 
-re-evaluations 
-documentation time Aggregate critical care time was 85 minutes, which includes only time during which I was engaged in work directly related to the patient's care as described above, whether I was at bedside or elsewhere in the Emergency Department. It did not include time spent performing other reported procedures or the services of residents, students, nurses, or advance practice providers. Uri Reyes MD 
 
9:36 PM 
 
Discussed with hospitalist we will CT head and chest without contrast rule out likely what I believe is aspiration pneumonia or possibly COVID-19 infection also concern for report of possible altered mental status all day for acute stroke. Continue with treatment of his blood pressure and treatment of sepsis pending admission. Care turned over to the night physician pending CT results and discussion with hospitalist 
 
 
  
 
Intubation Date/Time: 10/15/2020 7:54 PM 
Performed by: Uri Reyes MD 
Authorized by: Uri Reyes MD  
 
Consent:  
  Consent obtained:  Emergent situation Risks discussed:  Aspiration Alternatives discussed:  No treatment Pre-procedure details:  
  Patient status:  Altered mental status Pretreatment medications:  None Paralytics:  Pancuronium and rocuronium Procedure details:  
  Preoxygenation:  Bag valve mask CPR in progress: no Intubation method:  Oral 
  Oral intubation technique:  Video-assisted Laryngoscope blade: Mac 4 Tube size (mm):  7.0 Tube type:  Cuffed Number of attempts:  1 Ventilation between attempts: no   
  Cricoid pressure: no   
  Tube visualized through cords: yes Placement assessment: ETT to lip:  23 Tube secured with:  ETT mckeon Breath sounds:  Equal 
  Placement verification: chest rise, condensation, CXR verification, equal breath sounds and ETCO2 detector CXR findings:  ETT in proper place Post-procedure details:  
  Patient tolerance of procedure: Tolerated well, no immediate complications Central Line 
 
Date/Time: 10/15/2020 7:56 PM 
Performed by: Uri Reyes MD 
Authorized by: Uri Reyes MD  
 
 Consent:  
  Consent obtained:  Emergent situation Consent given by:  Patient Risks discussed:  Arterial puncture Alternatives discussed:  No treatment Pre-procedure details:  
  Hand hygiene: Hand hygiene performed prior to insertion Sterile barrier technique: All elements of maximal sterile technique followed Skin preparation:  2% chlorhexidine Skin preparation agent: Skin preparation agent completely dried prior to procedure Anesthesia (see MAR for exact dosages): Anesthesia method:  None Procedure details: Location:  R femoral 
  Site selection rationale:  Ultrasound used and his anatomy was visualized in his jugular area was difficult to actually ascertain his vein versus artery anatomy much easier to visualize in his femoral area likely due to his chronic medical issues Patient position:  Flat Procedural supplies:  Triple lumen Landmarks identified: yes Ultrasound guidance: yes Sterile ultrasound techniques: Sterile gel and sterile probe covers were used Number of attempts:  1 Successful placement: yes Post-procedure details: Post-procedure:  Dressing applied and line sutured Assessment:  Blood return through all ports and free fluid flow Patient tolerance of procedure: Tolerated well, no immediate complications

## 2020-10-16 PROBLEM — J96.00 ACUTE RESPIRATORY FAILURE REQUIRING REINTUBATION (HCC): Status: ACTIVE | Noted: 2020-01-01

## 2020-10-16 PROBLEM — R94.31 QT PROLONGATION: Status: ACTIVE | Noted: 2020-01-01

## 2020-10-16 PROBLEM — Z97.8 ENDOTRACHEALLY INTUBATED: Status: ACTIVE | Noted: 2020-01-01

## 2020-10-16 NOTE — REMOTE MONITORING
Left message with Indu Baker, Unit secretary regarding sepsis bundle. Amanda Lynch, RN [phone] [facility] [phone]

## 2020-10-16 NOTE — ED NOTES
Vitals: 
Patient Vitals for the past 12 hrs: 
 Temp Pulse Resp BP SpO2  
10/15/20 2251    101/85   
10/15/20 2221 98.2 °F (36.8 °C) (!) 123  100/66   
10/15/20 2215 98.2 °F (36.8 °C) (!) 122  (!) 102/57   
10/15/20 2212 98.3 °F (36.8 °C) (!) 123  (!) 101/59   
10/15/20 2209 98.3 °F (36.8 °C) (!) 123  (!) 103/58   
10/15/20 2206 98.3 °F (36.8 °C) (!) 122  101/61 100 % 10/15/20 2203 98.3 °F (36.8 °C) (!) 124  (!) 101/59 100 % 10/15/20 2200 98.4 °F (36.9 °C) (!) 125  (!) 100/56 100 % 10/15/20 2157 98.4 °F (36.9 °C) (!) 125  (!) 100/54 100 % 10/15/20 2154 98.5 °F (36.9 °C) (!) 128  (!) 95/55 100 % 10/15/20 2151 98.5 °F (36.9 °C) (!) 131  (!) 93/53 100 % 10/15/20 2148 98.5 °F (36.9 °C) (!) 132  (!) 93/50 100 % 10/15/20 2145 98.5 °F (36.9 °C) (!) 132  (!) 89/48 100 % 10/15/20 2142 98.6 °F (37 °C) (!) 132  (!) 82/46 100 % 10/15/20 2139 98.6 °F (37 °C) (!) 133  (!) 85/47   
10/15/20 2136 98.6 °F (37 °C) (!) 133  (!) 87/50   
10/15/20 2133 98.6 °F (37 °C) (!) 133  (!) 88/48 100 % 10/15/20 2130 98.5 °F (36.9 °C) (!) 133  (!) 85/49   
10/15/20 2127 98.5 °F (36.9 °C) (!) 133  (!) 81/48   
10/15/20 2124 98.4 °F (36.9 °C) (!) 134  (!) 85/47   
10/15/20 2121 98.4 °F (36.9 °C) (!) 134  (!) 83/50   
10/15/20 2118 98.2 °F (36.8 °C) (!) 134  (!) 79/43 100 % 10/15/20 2115 98 °F (36.7 °C) (!) 134  (!) 73/40   
10/15/20 2112 97.6 °F (36.4 °C) (!) 135  (!) 73/46   
10/15/20 2109 97.1 °F (36.2 °C) (!) 136  (!) 75/42   
10/15/20 2106 (!) 96.3 °F (35.7 °C) (!) 137  (!) 76/50   
10/15/20 2103 (!) 94.8 °F (34.9 °C) (!) 137  (!) 73/45   
10/15/20 2100  (!) 137  (!) 80/52   
10/15/20 2055  (!) 137  (!) 82/50   
10/15/20 2050  (!) 134  (!) 80/53 100 % 10/15/20 2045  (!) 134  (!) 83/54 100 % 10/15/20 2030  (!) 137  (!) 81/61   
10/15/20 2025  (!) 135   100 % 10/15/20 2020  (!) 134   100 % 10/15/20 2015  (!) 135  (!) 81/50 100 % 10/15/20 2014  (!) 135 22  100 % 10/15/20 2000  (!) 132  (!) 81/50   
10/15/20 1945  (!) 129  (!) 81/52   
10/15/20 1920  (!) 127 22 101/67   
10/15/20 1910  (!) 123 22 96/63   
10/15/20 1900  (!) 124 22 (!) 87/61   
10/15/20 1850  (!) 131 22 (!) 61/40   
10/15/20 1844  (!) 129 22  100 % 10/15/20 1840  (!) 130 20  99 % 10/15/20 1830  (!) 146 (!) 39    
10/15/20 1806 (!) 100.5 °F (38.1 °C) (!) 153  (!) 153/50 97 % 10/15/20 1803  (!) 154  (!) 153/90 96 % Medications ordered:  
Medications  
piperacillin-tazobactam (ZOSYN) 3.375 g in 0.9% sodium chloride (MBP/ADV) 100 mL MBP (0 g IntraVENous IV Completed 10/15/20 2113) NOREPINephrine (LEVOPHED) 8 mg in 0.9% NS 250ml infusion (16 mcg/min IntraVENous Rate Change 10/15/20 2104) midazolam (VERSED) injection 4 mg (has no administration in time range)  
vasopressin (VASOSTRICT) 20 Units in 0.9% sodium chloride 100 mL infusion (0.03 Units/min IntraVENous New Bag 10/15/20 2112) insulin regular (NOVOLIN R, HUMULIN R) 100 Units in 0.9% sodium chloride 100 mL infusion (has no administration in time range) glucose chewable tablet 16 g (has no administration in time range) glucagon (GLUCAGEN) injection 1 mg (has no administration in time range) dextrose (D50) infusion 12.5-25 g (has no administration in time range)  
vasopressin (VASOSTRICT) 20 unit/mL injection (has no administration in time range)  
sodium chloride 0.9 % bolus infusion 1,000 mL (has no administration in time range)  
lactated ringers bolus infusion 1,000 mL (has no administration in time range)  
fentaNYL (PF) 1,500 mcg/30 mL (50 mcg/mL) infusion (has no administration in time range)  
midazolam in normal saline (VERSED) 2 mg/mL infusion (has no administration in time range)  
sodium chloride 0.9 % bolus infusion 1,000 mL (0 mL IntraVENous IV Completed 10/15/20 2048) etomidate (AMIDATE) 2 mg/mL injection (20 mg  Given 10/15/20 1835) rocuronium 10 mg/mL injection (  Given 10/15/20 1837) rocuronium 10 mg/mL injection (  Given 10/15/20 1840) midazolam (VERSED) 1 mg/mL injection (  Given 10/15/20 1841) levoFLOXacin (LEVAQUIN) 750 mg in D5W IVPB (750 mg IntraVENous New Bag 10/15/20 2104) vancomycin (VANCOCIN) 1500 mg in  ml infusion (1,500 mg IntraVENous New Bag 10/15/20 2115)  
etomidate (AMIDATE) 2 mg/mL injection 20 mg (20 mg IntraVENous Given 10/15/20 1800) rocuronium injection 100 mg (100 mg IntraVENous Given 10/15/20 1836) midazolam (VERSED) injection 2 mg (2 mg IntraVENous Given 10/15/20 1841)  
sodium chloride 0.9 % bolus infusion 1,000 mL (1,000 mL IntraVENous New Bag 10/15/20 2115) iopamidoL (ISOVUE-370) 76 % injection 100 mL (90 mL IntraVENous Given 10/15/20 2255)  
0.9% sodium chloride infusion 100 mL (93 mL IntraVENous Bolus 10/15/20 2256) Lab findings: 
Recent Results (from the past 12 hour(s)) GLUCOSE, POC Collection Time: 10/15/20  5:57 PM  
Result Value Ref Range Glucose (POC) >600 (HH) 70 - 110 mg/dL GLUCOSE, POC Collection Time: 10/15/20  5:58 PM  
Result Value Ref Range Glucose (POC) >600 (HH) 70 - 110 mg/dL EKG, 12 LEAD, INITIAL Collection Time: 10/15/20  6:05 PM  
Result Value Ref Range Ventricular Rate 149 BPM  
 QRS Duration 64 ms Q-T Interval 340 ms QTC Calculation (Bezet) 535 ms Calculated R Axis 112 degrees Calculated T Axis 75 degrees Diagnosis Critical Test Result: High HR Supraventricular tachycardia with premature ventricular complexes or fusion  
complexes Left posterior fascicular block Abnormal ECG When compared with ECG of 27-APR-2020 20:29, 
fusion complexes are now present 
premature ventricular complexes are now present ST no longer elevated in Inferior leads ST now depressed in Anterior leads Nonspecific T wave abnormality no longer evident in Inferior leads CBC WITH AUTOMATED DIFF  Collection Time: 10/15/20  8:15 PM  
 Result Value Ref Range WBC 13.1 4.6 - 13.2 K/uL  
 RBC 3.69 (L) 4.70 - 5.50 M/uL HGB 9.7 (L) 13.0 - 16.0 g/dL HCT 32.2 (L) 36.0 - 48.0 % MCV 87.3 74.0 - 97.0 FL  
 MCH 26.3 24.0 - 34.0 PG  
 MCHC 30.1 (L) 31.0 - 37.0 g/dL  
 RDW 17.2 (H) 11.6 - 14.5 % PLATELET 300 (H) 465 - 420 K/uL MPV 11.5 9.2 - 11.8 FL  
 NEUTROPHILS PENDING % LYMPHOCYTES PENDING % MONOCYTES PENDING % EOSINOPHILS PENDING % BASOPHILS PENDING %  
 ABS. NEUTROPHILS PENDING K/UL  
 ABS. LYMPHOCYTES PENDING K/UL  
 ABS. MONOCYTES PENDING K/UL  
 ABS. EOSINOPHILS PENDING K/UL  
 ABS. BASOPHILS PENDING K/UL  
 DF PENDING   
TYPE & SCREEN Collection Time: 10/15/20  8:15 PM  
Result Value Ref Range Crossmatch Expiration 10/18/2020 ABO/Rh(D) O POSITIVE Antibody screen NEG PROTHROMBIN TIME + INR Collection Time: 10/15/20  8:15 PM  
Result Value Ref Range Prothrombin time 16.6 (H) 11.5 - 15.2 sec INR 1.4 (H) 0.8 - 1.2 D DIMER Collection Time: 10/15/20  8:15 PM  
Result Value Ref Range D DIMER 8.89 (H) <0.46 ug/ml(FEU) METABOLIC PANEL, BASIC Collection Time: 10/15/20  8:15 PM  
Result Value Ref Range Sodium 161 (HH) 136 - 145 mmol/L Potassium 3.6 3.5 - 5.5 mmol/L Chloride 133 (H) 100 - 111 mmol/L  
 CO2 21 21 - 32 mmol/L Anion gap 7 3.0 - 18 mmol/L Glucose 573 (HH) 74 - 99 mg/dL BUN 36 (H) 7.0 - 18 MG/DL Creatinine 0.91 0.6 - 1.3 MG/DL  
 BUN/Creatinine ratio 40 (H) 12 - 20 GFR est AA >60 >60 ml/min/1.73m2 GFR est non-AA >60 >60 ml/min/1.73m2 Calcium 7.8 (L) 8.5 - 10.1 MG/DL  
NT-PRO BNP Collection Time: 10/15/20  8:15 PM  
Result Value Ref Range NT pro- 0 - 900 PG/ML  
TROPONIN I Collection Time: 10/15/20  8:15 PM  
Result Value Ref Range Troponin-I, QT <0.02 0.0 - 0.045 NG/ML  
HEPATIC FUNCTION PANEL Collection Time: 10/15/20  8:15 PM  
Result Value Ref Range Protein, total 5.5 (L) 6.4 - 8.2 g/dL Albumin 1.5 (L) 3.4 - 5.0 g/dL Globulin 4.0 2.0 - 4.0 g/dL A-G Ratio 0.4 (L) 0.8 - 1.7 Bilirubin, total 0.5 0.2 - 1.0 MG/DL Bilirubin, direct 0.3 (H) 0.0 - 0.2 MG/DL Alk. phosphatase 152 (H) 45 - 117 U/L  
 AST (SGOT) 44 (H) 10 - 38 U/L  
 ALT (SGPT) 37 16 - 61 U/L  
LIPASE Collection Time: 10/15/20  8:15 PM  
Result Value Ref Range Lipase 143 73 - 393 U/L  
POC LACTIC ACID Collection Time: 10/15/20  8:23 PM  
Result Value Ref Range Lactic Acid (POC) 2.68 (HH) 0.40 - 2.00 mmol/L  
POC G3 Collection Time: 10/15/20  8:42 PM  
Result Value Ref Range Device: VENT    
 FIO2 (POC) 100 % pH (POC) 7.27 (L) 7.35 - 7.45    
 pCO2 (POC) 42.5 35.0 - 45.0 MMHG  
 pO2 (POC) 186 (H) 80 - 100 MMHG  
 HCO3 (POC) 19.1 (L) 22 - 26 MMOL/L  
 sO2 (POC) 99 (H) 92 - 97 % Base deficit (POC) 8 mmol/L Mode ASSIST CONTROL Tidal volume 365 ml Set Rate 22 bpm  
 PEEP/CPAP (POC) 5.0 cmH2O Allens test (POC) N/A Inspiratory Time 0.90 sec Site RIGHT RADIAL Patient temp. 100.5 Specimen type (POC) ARTERIAL Performed by Pato Mancia URINALYSIS W/ RFLX MICROSCOPIC Collection Time: 10/15/20  9:15 PM  
Result Value Ref Range Color YELLOW Appearance CLOUDY Specific gravity >1.030 (H) 1.005 - 1.030  
 pH (UA) 5.5 5.0 - 8.0 Protein TRACE (A) NEG mg/dL Glucose >1,000 (A) NEG mg/dL Ketone Negative NEG mg/dL Bilirubin Negative NEG Blood SMALL (A) NEG Urobilinogen 0.2 0.2 - 1.0 EU/dL Nitrites Negative NEG Leukocyte Esterase MODERATE (A) NEG    
SARS-COV-2 Collection Time: 10/15/20  9:15 PM  
Result Value Ref Range Specimen source Nasopharyngeal    
 COVID-19 rapid test Not detected NOTD Specimen type NP Swab Health status NP Swab EKG interpretation by ED Physician: 
 
Pulse ox interpretation: 100% on FiO2 of 50% on mechanical ventilation, normal 
 
X-Ray, CT or other radiology findings or impressions: 
CT HEAD WO CONT    (Results Pending) XR CHEST PORT    (Results Pending) CTA CHEST W OR W WO CONT    (Results Pending) CT ABD PELV W CONT    (Results Pending) CT head: Nothing clearly acute. Left frontal  shunt with midline lateral ventricles. Chronic appearing left hemispheric subdural hygroma. Left brain atrophy. CTA chest no acute PE. Cardiomegaly with signs of right heart dysfunction. Dependent right lower opacities with areas of poor enhancement concerning for pneumonia with superimposed atelectasis. ET tube in appropriate position CT abdomen pelvis: Acute appearing thrombus in the femoral vein. Large 12 cm stool ball. Borderline thickening of the bladder. Progress notes, Consult notes or additional Procedure notes: Made aware of patient by Dr. Rajwinder Esparza who already talked to the hospitalist that needed consultation to the ICU attending Dr. Monisha Landry. I talk to Dr. Monisha Landry on the phone regarding the patient and went through with lab testing along with some imaging and current clinical situation. He agrees with current medical plan and will arrange consult upstairs in the ICU Additional fluid boluses also ordered for the patient as well along with sedation for mechanical ventilation I have updated Dr. Fiona Vaughn regarding the CT results who is aware and will treat appropriately ED Critical Care Note System at risk for life threatening failure: Neuro, cardiac, pulmonary, renal, respiratory Associated problems: Septic shock, pneumonia, UTI, hyperglycemia, hypernatremia, respiratory failure Critical Care services provided: Bedside management of septic shock, UTI, hyperglycemia, hypernatremia, respiratory failure, documentation, consultation, bedside reassessment Excluded procedures (time not included in critical care): Pulse ox interpretation Total Critical Care Time (in minutes) 33 Reevaluation of patient:  
Critical condition Disposition: 
Diagnosis: 1. Septic shock ([address] 2. Acute hyperglycemia 3. Acute UTI 4. Acute respiratory failure with hypoxia and hypercapnia (HCC) Disposition: admit Follow-up Information None Patient's Medications Start Taking No medications on file Continue Taking ACETAMINOPHEN (TYLENOL ARTHRITIS PAIN) 650 MG CR TABLET    Take 650 mg by mouth every six (6) hours as needed for Pain. ASPIRIN 81 MG CHEWABLE TABLET    Take 81 mg by mouth daily. FENOFIBRATE MICRONIZED (LOFIBRA) 200 MG CAPSULE    Take  by mouth every morning. FINASTERIDE (PROSCAR) 5 MG TABLET    Take 1 Tab by mouth daily. LISINOPRIL (PRINIVIL, ZESTRIL) 2.5 MG TABLET    Take 2.5 mg by mouth daily. METFORMIN (GLUCOPHAGE) 500 MG TABLET    Take 1 Tab by mouth daily (with breakfast). MULTIVITAMIN,TX-IRON-CA-MIN (THERA-M) 27-0.4 MG TAB    Take 1 Tab by mouth daily. POLYETHYLENE GLYCOL (MIRALAX) 17 GRAM PACKET    Take 17 g by mouth every other day. POLYVINYL ALCOHOL (LIQUIFILM TEARS) 1.4 % OPHTHALMIC SOLUTION    Administer 1 Drop to both eyes four (4) times daily. PROMETHAZINE (PHENERGAN) 12.5 MG SUPPOSITORY    Insert  into rectum every six (6) hours as needed for Nausea. TERAZOSIN (HYTRIN) 5 MG CAPSULE    Take 1 Cap by mouth daily. TRAMADOL (ULTRAM) 50 MG TABLET    Take 50 mg by mouth every six (6) hours as needed for Pain. These Medications have changed No medications on file Stop Taking No medications on file

## 2020-10-16 NOTE — CONSULTS
1. Septic shock ([address] 2. Acute hyperglycemia 3. Acute UTI 4. Acute respiratory failure with hypoxia and hypercapnia (HCC) 5. Community acquired pneumonia, unspecified laterality ASSESSMENT:  
Urinary retention Gross hematuria Misplaced holm when replaced hematuria cleared PLAN:   
Cont current holm Will need cysto as OP to evaluate bladder calcification Will sign off available as needed Steve Altman MD 
 
[phone] Chief Complaint Patient presents with  Unresponsive  High Blood Sugar HISTORY OF PRESENT ILLNESS:  Jocelyne Aldana is a 78 y.o. male who is seen in consultation as referred by   for Gross hematuria. HPI: 
We were asked to see the patient for gross hematuria. Pt has a history of retention and has a cath that is changed monthly. We were called this AM for gross hematuria. The RN said the catheter was changed to a smaller cath and has been clear ever since. CT yesterday showed catheter not in the bladder which is likley the cause of hematuria but also shows a stone in the bladder . No flowsheet data found. Past Medical History:  
Diagnosis Date  Cerebral palsy ([address]  Cognitive developmental delay  CVA (cerebral vascular accident) ([address]  Decubitus ulcer of sacral region, stage 4 ([address]  Diabetes mellitus, type II ([address] Last HbA1c 5.9% on 9/14/2019  Dysphagia 04/29/2020 Trace Penetration and Silent Aspiration of Thins with Cup & Staw and [place]-Thick Liquids  Hepatic steatosis ?  History of hepatomegaly  History of recurrent UTIs H/O Enterococcus faecium UTI  History of small bowel obstruction  HLD (hyperlipidemia)  Hypertension  Nephrolithiasis  Polio  Splenic artery aneurysm (HCC)   
 ?; 1.7 cm  
 Urinary retention  Vitamin D deficiency 07/07/2014 Past Surgical History:  
Procedure Laterality Date  HX CHOLECYSTECTOMY  HX OTHER SURGICAL Ventriculoperitoneal Shunt Placement Social History Tobacco Use  Smoking status: Never Smoker  Smokeless tobacco: Never Used Substance Use Topics  Alcohol use: No  
 Drug use: Not on file No Known Allergies History reviewed. No pertinent family history. Current Facility-Administered Medications Medication Dose Route Frequency Provider Last Rate Last Dose  finasteride (PROSCAR) tablet 5 mg  5 mg Oral DAILY Rebel Tillman, DO   5 mg at 10/16/20 1038  lisinopriL (PRINIVIL, ZESTRIL) tablet 2.5 mg  2.5 mg Oral DAILY Rebel Tillman DO   2.5 mg at 10/16/20 1002  polyethylene glycol (MIRALAX) packet 17 g  17 g Oral EVERY OTHER DAY Shana Ramirez DO   17 g at 10/16/20 0144  terazosin (HYTRIN) capsule 5 mg  5 mg Oral DAILY Rebel Tillman, DO   5 mg at 10/16/20 1008  sodium chloride (NS) flush 5-40 mL  5-40 mL IntraVENous Q8H Rebel Tillman, DO   10 mL at 10/16/20 1342  sodium chloride (NS) flush 5-40 mL  5-40 mL IntraVENous PRN Rebel Tillman,       
 dextrose (D50) infusion 12.5-25 g  25-50 mL IntraVENous PRN Rebel Tillman,       
 ondansetron [facility]) injection 4 mg  4 mg IntraVENous Q6H PRN Rebel Tillman,       
 docusate sodium (COLACE) capsule 100 mg  100 mg Oral BID PRN Shana Ramirez, DO      
 melatonin tablet 12 mg  12 mg Oral QHS PRN Rebel Tillman,       
 albuterol-ipratropium (DUO-NEB) 2.5 MG-0.5 MG/3 ML  3 mL Nebulization Q6H PRN Shana Ramirez, DO      
 acetaminophen (TYLENOL) suppository 650 mg  650 mg Rectal Q4H PRN Shana Ramirez, DO      
 pantoprazole (PROTONIX) injection 40 mg  40 mg IntraVENous DAILY PRN Rebel Tillman,       
 piperacillin-tazobactam (ZOSYN) 3.375 g in 0.9% sodium chloride (MBP/ADV) 100 mL MBP Extended-Interval infusion #######  3.375 g IntraVENous Q8H Dian Shone H, DO 25 mL/hr at 10/16/20 0847 3.375 g at 10/16/20 0847  influenza vaccine 2020-21 (6 mos+)(PF) (FLUARIX/FLULAVAL/FLUZONE QUAD) injection 0.5 mL  0.5 mL IntraMUSCular PRIOR TO DISCHARGE Saurabh COBB DO      
 potassium chloride 20 mEq in 50 ml IVPB  20 mEq IntraVENous Q2H Saurabh COBB DO 25 mL/hr at 10/16/20 1242 20 mEq at 10/16/20 1242  insulin regular (MYXREDLIN, NOVOLIN, HUMULIN) 100 units/100 ml NS infusion (premix)  0-50 Units/hr IntraVENous TITRATE Saurabh COBB DO 1.6 mL/hr at 10/16/20 1419 1.6 Units/hr at 10/16/20 1419  
 dextrose 5% infusion  75 mL/hr IntraVENous CONTINUOUS Kirill Ramos MD 75 mL/hr at 10/16/20 1007 75 mL/hr at 10/16/20 1007  hydroxypropyl methylcellulose (ISOPTO TEARS) 0.5 % ophthalmic solution 1 Drop  1 Drop Both Eyes QID Saurabh COBB DO   1 Drop at 10/16/20 1258  heparin 25,000 units in D5W 250 ml infusion  18-36 Units/kg/hr IntraVENous TITRATE Saurabh COBB DO 8.3 mL/hr at 10/16/20 1108 15 Units/kg/hr at 10/16/20 1108  PHENYLephrine (MILAD-SYNEPHRINE) 30 mg in 0.9% sodium chloride 250 mL infusion   mcg/min IntraVENous TITRATE Kirill Ramos MD 62.5 mL/hr at 10/16/20 1416 125 mcg/min at 10/16/20 1416  NOREPINephrine (LEVOPHED) 8 mg in 0.9% NS 250ml infusion  0.5-30 mcg/min IntraVENous TITRATE Rebel Tillman DO 56.3 mL/hr at 10/16/20 1422 30 mcg/min at 10/16/20 1422  
 vasopressin (VASOSTRICT) 20 Units in 0.9% sodium chloride 100 mL infusion  0-0.03 Units/min IntraVENous TITRATE Vincent OROURKE, DO 9 mL/hr at 10/16/20 0843 0.03 Units/min at 10/16/20 0843  
 glucose chewable tablet 16 g  4 Tab Oral PRN Jeyson Olguin,       
 glucagon (GLUCAGEN) injection 1 mg  1 mg IntraMUSCular PRN Jeyson Olguin DO      
 fentaNYL (PF) 1,500 mcg/30 mL (50 mcg/mL) infusion  25-50 mcg/hr IntraVENous TITRATE Jeyson Olguni DO      
 midazolam in normal saline (VERSED) 2 mg/mL infusion  1-3 mg/hr IntraVENous TITRATE Rebel Tillman, DO 1.5 mL/hr at 10/16/20 1112 3 mg/hr at 10/16/20 1112 Review of Systems ROS is: 
 
 Not obtainable due to patient factors pt intubated PHYSICAL EXAMINATION:  
Visit Vitals BP (!) 73/53 Pulse 87 Temp 99.3 °F (37.4 °C) Resp 27 Ht 5' 4\" (1.626 m) Wt 122 lb 1.6 oz (55.4 kg) SpO2 100% BMI 20.96 kg/m² Constitutional: Well developed, well nourished male. No acute distress. HEENT: Normocephalic, Atraumatic, EOM's intact CV:  no edema Respiratory: No respiratory distress or difficulties breathing Abdomen:  soft and non tender No SP distenstion  Male:   
 
SCROTUM:  No scrotal rash or lesions noticed. Some edema Normal bilateral testes and epididymis. PENIS: Urethral meatus normal in location and size. No urethral discharge. [place] in place urine clear Skin: No evidence of jaundice. Normal color Neuro/Psych:  Alert and oriented. Affect appropriate. Lymphatic:   No enlarged inguinal lymph nodes. REVIEW OF LABS AND IMAGING:   
 
Labs: Results:  
Chemistry Recent Labs 10/16/20 
1146 10/16/20 
0545 10/15/20 
2015 * 339* 573* * 162* 161*  
K 3.6 2.7* 3.6 * 135* 133* CO2 19* 18* 21 BUN 27* 30* 36* CREA 0.49* 0.70 0.91  
CA 7.8* 8.0* 7.8* AGAP 9 9 7 BUCR 55* 43* 40* AP  --  123* 152* TP  --  5.8* 5.5* ALB  --  1.3* 1.5*  
GLOB  --  4.5* 4.0 AGRAT  --  0.3* 0.4* CBC w/Diff Recent Labs 10/16/20 
0545 10/15/20 
2015 WBC 17.3* 13.1 RBC 3.62* 3.69* HGB 9.6* 9.7* HCT 31.6* 32.2*  
* 427* GRANS 89* 73 LYMPH 9* 12* EOS 0 1 Cultures Recent Labs 10/15/20 
2015 10/15/20 
2000 CULT NO GROWTH AFTER 10 HOURS NO GROWTH AFTER 10 HOURS All Micro Results Procedure Component Value Units Date/Time Gladys Jorgensen [[id number]] Collected:  10/15/20 2115 Order Status:  Completed Specimen:  Urine from Clean catch Updated:  10/16/20 1412 CULTURE, BLOOD [[id number]] Collected:  10/15/20 2000 Order Status:  Completed Specimen:  Blood Updated:  10/16/20 9007 Special Requests: NO SPECIAL REQUESTS Culture result: NO GROWTH AFTER 10 HOURS     
 CULTURE, BLOOD [[id number]] Collected:  10/15/20 2015 Order Status:  Completed Specimen:  Blood Updated:  10/16/20 8675 Special Requests: NO SPECIAL REQUESTS Culture result: NO GROWTH AFTER 10 HOURS     
 CULTURE, RESPIRATORY/SPUTUM/BRONCH Duard Mallory STAIN [[id number]] Order Status:  Sent Specimen:  Sputum Urinalysis Color Date Value Ref Range Status 10/15/2020 YELLOW   Final  
 
Appearance Date Value Ref Range Status 10/15/2020 CLOUDY   Final  
 
Specific gravity Date Value Ref Range Status 10/15/2020 >1.030 (H) 1.005 - 1.030 Final  
 
pH (UA) Date Value Ref Range Status 10/15/2020 5.5 5.0 - 8.0   Final  
 
Protein Date Value Ref Range Status 10/15/2020 TRACE (A) NEG mg/dL Final  
 
Ketone Date Value Ref Range Status 10/15/2020 Negative NEG mg/dL Final  
 
Bilirubin Date Value Ref Range Status 10/15/2020 Negative NEG   Final  
 
Blood Date Value Ref Range Status 10/15/2020 SMALL (A) NEG   Final  
 
Urobilinogen Date Value Ref Range Status 10/15/2020 0.2 0.2 - 1.0 EU/dL Final  
 
Nitrites Date Value Ref Range Status 10/15/2020 Negative NEG   Final  
 
Leukocyte Esterase Date Value Ref Range Status 10/15/2020 MODERATE (A) NEG   Final  
 
Potassium Date Value Ref Range Status 10/16/2020 3.6 3.5 - 5.5 mmol/L Final  
 
Creatinine Date Value Ref Range Status 10/16/2020 0.49 (L) 0.6 - 1.3 MG/DL Final  
 
BUN Date Value Ref Range Status 10/16/2020 27 (H) 7.0 - 18 MG/DL Final  
  
PSA No results for input(s): PSA in the last 72 hours. Coagulation Lab Results Component Value Date/Time  Prothrombin time 16.6 (H) 10/15/2020 08:15 PM  
 Prothrombin time 16.8 (H) 04/28/2020 02:41 PM  
 INR 1.4 (H) 10/15/2020 08:15 PM  
 INR 1.4 (H) 04/28/2020 02:41 PM  
 aPTT >180.0 () 10/16/2020 08:00 AM  
 aPTT 30.0 10/16/2020 01:50 AM

## 2020-10-16 NOTE — PROGRESS NOTES
Internal Medicine Progress Note Patient's Name: Dru Christensen Admit Date: 10/15/2020 Length of Stay: 1 Assessment/Plan Active Hospital Problems Diagnosis Date Noted  Bilateral pneumonia 10/15/2020  Femoral vein thrombosis, left ([facility] 10/15/2020  Hyperglycemia due to type 2 diabetes mellitus ([facility] [facility] 10/15/2020  Acute respiratory failure requiring reintubation ([facility] [facility] 10/15/2020  QT prolongation 10/15/2020 QT Prolongation (QTc 535 ms on EKG) on 10/15/2020.  Septic shock ([address] 04/28/2020  Sacral decubitus ulcer, stage IV ([facility] [address] 04/28/2020  Complicated UTI (urinary tract infection) 04/28/2020  Metabolic encephalopathy 20/45/2408  Hypernatremia 11/17/2019  Cerebral palsy ([facility] [address] [facility] 06/12/2015  History of post-polio syndrome 06/12/2015  Hypertension 06/12/2015  Mild intellectual disability 06/12/2015  
 
- Vent mgmt per ICU 
- Vent bundle - Afebrile, WBCs elevated - Cont broad spec IVAB 
- F/u cultures - Cont IV pressors, now on 3 
- Sodium relatively stable, but elevated - Cont free water fluids - Cont glucose stabilizer - Appreciate pulm assistance - Prognosis very grim, recommend palliative care/hospice - Apparently, Dr. Bill Yoo contacting [address] [place] [address] to discuss today 
- Cont acceptable home medications for chronic conditions  
- DVT/GI protocol I have personally reviewed all pertinent labs and films that have officially resulted over the last 24 hours. I have personally checked for all pending labs that are awaiting final results. Subjective Pt s/e @ bedside Remains intubated and sedated On 2 pressors maxed out 3rd being started Objective Visit Vitals BP (!) 71/46 Pulse 98 Temp 97.6 °F (36.4 °C) Resp 28 Ht 5' 4\" (1.626 m) Wt 55.4 kg (122 lb 1.6 oz) SpO2 98% BMI 20.96 kg/m² Physical Exam: 
General Appearance: Intubated, sedated HENT: normocephalic/atraumatic, + ETT Neck: No JVD, supple Lungs: B/L rhonchi with vent-assisted rate CV: RRR, no m/r/g Abdomen: soft, non-tender, normal bowel sounds Extremities: no cyanosis, no peripheral edema, contracted Neuro: No focal deficits, motor/sensory intact Skin: Normal color, intact Lymphatics: No cervical or supraclavicular lymphadenopathy Psych: not agitated, sedated Intake and Output: 
Current Shift:  No intake/output data recorded. Last three shifts:  10/14 1901 - 10/16 0700 In: 1677.8 [I.V.:1647.8] Out: 1010 [Urine:1010] Lab/Data Reviewed: 
CMP:  
Lab Results Component Value Date/Time  (HH) 10/16/2020 11:46 AM  
 K 3.6 10/16/2020 11:46 AM  
  (H) 10/16/2020 11:46 AM  
 CO2 19 (L) 10/16/2020 11:46 AM  
 AGAP 9 10/16/2020 11:46 AM  
  (H) 10/16/2020 11:46 AM  
 BUN 27 (H) 10/16/2020 11:46 AM  
 CREA 0.49 (L) 10/16/2020 11:46 AM  
 GFRAA >60 10/16/2020 11:46 AM  
 GFRNA >60 10/16/2020 11:46 AM  
 CA 7.8 (L) 10/16/2020 11:46 AM  
 MG 2.1 10/16/2020 11:46 AM  
 PHOS 2.8 10/15/2020 08:15 PM  
 ALB 1.3 (L) 10/16/2020 05:45 AM  
 TP 5.8 (L) 10/16/2020 05:45 AM  
 GLOB 4.5 (H) 10/16/2020 05:45 AM  
 AGRAT 0.3 (L) 10/16/2020 05:45 AM  
 ALT 45 10/16/2020 05:45 AM  
 
CBC:  
Lab Results Component Value Date/Time WBC 17.3 (H) 10/16/2020 05:45 AM  
 HGB 9.6 (L) 10/16/2020 05:45 AM  
 HCT 31.6 (L) 10/16/2020 05:45 AM  
  (H) 10/16/2020 05:45 AM  
 
 
Imaging Reviewed: 
Xr Abd (kub) Result Date: 10/16/2020 Abdomen, 1 radiograph COMPARISON: CT: 10/15/2020 INDICATION: OGT placement verification. FINDINGS: Supine view of the abdomen obtained. LINES/DEVICES:   > Single enteric tube courses beneath the diaphragm, tip and side-port project over the upper abdomen presumably within the stomach.   > Right vascular catheter tip terminates in the region of the common femoral vessels. > Catheter coursing along the left body terminates in the left lateral abdomen, possible shunt catheter.  LOWER THORAX: The heart is normal size. Opacities at both lung bases concerning for pneumonia. BOWEL: No gas-filled loops of bowel to suggest high-grade obstruction. There is stool throughout the colon, correlate for constipation. SOFT TISSUES: Contrast fills the dilated bilateral urinary collecting systems. Cholecystectomy clips are seen in the right upper abdomen. No appreciable organomegaly or suspicious calcification. BONES: No acute osseous abnormality. Bones are diffusely demineralized. Impression: 1. Single enteric tube tip and side-port project over the stomach. 2.  Likely  shunt catheter terminating in the left abdomen. 3.  Right femoral vascular catheter stably positioned. 4.  Large amount of stool, correlate for constipation with fecal impaction. 5.  Dilation of both urinary collecting systems may be related to mass effect from the large stool ball in the rectum, recommend fecal disimpaction. 6.  Bibasilar lung opacities likely reflect aspiration/pneumonia. Ct Head Wo Cont Result Date: 10/16/2020 EXAM: CT head INDICATION: Altered mental status. COMPARISON: None. TECHNIQUE: Axial CT imaging of the head was performed without intravenous contrast. Standard multiplanar coronal and sagittal reformatted images were obtained and are included in interpretation. One or more dose reduction techniques were used on this CT: automated exposure control, adjustment of the mAs and/or kVp according to patient size, and iterative reconstruction techniques. The specific techniques used on this CT exam have been documented in the patient's electronic medical record. Digital Imaging and Communications in Medicine (DICOM) format image data are available to nonaffiliated external healthcare facilities or entities on a secure, media free, reciprocally searchable basis with patient authorization for at least a 12-month period after this study.  _______________ FINDINGS: BRAIN AND POSTERIOR FOSSA: Global left cerebral atrophy. Left frontal approach  shunt terminates in the posterior midline lateral ventricles. No prior study available however ventricles do not appear overly dilated. Patchy left cerebral subcortical matter hypoattenuation which is nonspecific but likely represents chronic ischemic changes. No evidence of acute large vessel transcortical infarct or acute parenchymal hemorrhage. EXTRA-AXIAL SPACES AND MENINGES: Dural calcifications. Left hemispheric subdural hygroma. CALVARIUM: Intact. SINUSES: Ethmoid sinusitis mucosal thickening involving and right sphenoid sinus opacification. OTHER: None. _______________ IMPRESSION: No acute intracranial abnormality. Left global cerebral atrophy and chronic ischemic changes. Left frontal approach  shunt. No prior study available however ventricles do not appear overly dilated. Left hemispheric subdural hygroma and dural calcifications. Ethmoid sinusitis mucosal thickening involving and right sphenoid sinus opacification. Note: Preliminary report sent to the Emergency Department by the radiology resident at the time of the study. Cta Chest W Or W Wo Cont Result Date: 10/16/2020 EXAM: CTA chest CLINICAL INDICATION/HISTORY: Sepsis. Patient presents after choking and becoming unresponsive. COMPARISON: CT abdomen and pelvis: 10/15/2020. CT chest, abdomen, pelvis: 4/28/2020 TECHNIQUE: Axial CT imaging from the thoracic inlet through the diaphragm with intravenous contrast. Coronal and sagittal MIP reformats were generated. One or more dose reduction techniques were used on this CT: automated exposure control, adjustment of the mAs and/or kVp according to patient size, and iterative reconstruction techniques. The specific techniques used on this CT exam have been documented in the patient's electronic medical record.  Digital Imaging and Communications in Medicine (DICOM) format image data are available to nonaffiliated external healthcare facilities or entities on a secured, media free, reciprocally searchable basis with patient authorization for at least a 12-month period after this study. _______________ FINDINGS: EXAM QUALITY: Adequate PULMONARY ARTERIES: No pulmonary embolism identified. Enlarged pulmonary artery measuring 3.3 cm suggestive of pulmonary arterial hypertension. MEDIASTINUM: Cardiomegaly. There is reflux of contrast into the IVC and hepatic veins. RV/LV ratio is normal with no evidence of right heart strain. Atherosclerosis including the coronary arteries. Aorta is normal caliber. No pericardial effusion. LUNGS: Dependent opacities are seen throughout both lower lungs. AIRWAY: Normal. PLEURA: Normal. LYMPH NODES: No enlarged nodes. UPPER ABDOMEN: Unremarkable. BONES: No acute or aggressive osseous abnormalities identified. L1 and L2 vertebral body compression fractures better visualized on contemporaneous CT abdomen and pelvis. OTHER:   > Endotracheal tube tip terminates less than 1 cm above the elian, recommend retraction with repeat imaging to confirm placement.   > Enteric tube tip terminates near the gastroesophageal junction, recommend advancing at least 10 cm with repeat imaging to confirm placement. _______________ IMPRESSION: 1. No pulmonary embolism identified. 2.  Cardiomegaly with evidence of right heart dysfunction. 3.  Dependent opacities in both lower lungs concerning for pneumonia which may be related to aspiration. Recommend repeat imaging following completion of appropriate medical therapy. 4.  Endotracheal tube tip terminates less than 1 cm above the elian, recommend retraction with repeat imaging to confirm placement. 5.  Enteric tube tip terminates near the gastroesophageal junction, recommend advancing at least 10 cm with repeat imaging to confirm placement. Note: Preliminary report sent to the Emergency Department by the radiology resident at the time of the study. Ct Abd Pelv W Cont Result Date: 10/16/2020 EXAM: CT ABD PELV W CONT CLINICAL INDICATION/HISTORY: sepsis; SEPSIS COMPARISON: Same date CTA chest. CT chest, abdomen and pelvis: 4/28/2020 TECHNIQUE:   CT of the abdomen and pelvis following intravenous contrast administration. Coronal and sagittal reformats were generated and reviewed. One or more dose reduction techniques were used on this CT: automated exposure control, adjustment of the mAs and/or kVp according to patient size, and iterative reconstruction techniques. The specific techniques used on this CT exam have been documented in the patient's electronic medical record. Digital Imaging and Communications in Medicine (DICOM) format image data are available to nonaffiliated external healthcare facilities or entities on a secure, media free, reciprocally searchable basis with patient authorization for at least a 12-month period after this study. _______________ FINDINGS: LOWER THORAX: No global cardiomegaly or pericardial effusion. Atherosclerosis including the coronary arteries. Bilateral dependent consolidative opacities. No pleural effusion. Small amount of breast tissue present bilaterally. Please see contemporaneous CTA chest for additional details. LIVER: No enhancing hepatic mass. The portal and hepatic veins are patent. Diffuse hepatic steatosis BILIARY: Prior cholecystectomy. No biliary dilation. SPLEEN: Normal. PANCREAS: Normal. ADRENALS: Normal. KIDNEYS: Benign-appearing cysts are seen in both kidneys; beam hardening artifact somewhat limits evaluation. No hydronephrosis. GI TRACT:  Enteric tube tip terminates in the gastroesophageal junction, recommend advancing at least 10 cm with repeat imaging to confirm placement. Normal appendix. There is a large stool ball distends the rectum measuring up to 11 x 10.7 cm. There is a large amount of stool throughout the remaining nondilated colon. No evidence of small bowel obstruction.  BLADDER: Calcific densities in the dependent bladder may represent stones and/or wall calcifications. Greenwood catheter tube curls in the penile urethra. PELVIC ORGANS: The prostate is normal size. VASCULATURE: No arterial aneurysm. Nonocclusive thrombus is seen in the left femoral vein and deep femoral vein (axial image 181). Right femoral venous catheter terminates in the right common femoral vein. LYMPH NODES: No mesenteric or retroperitoneal lymphadenopathy. OTHER: No free intraperitoneal air. No ascites. OSSEOUS STRUCTURES: L1 and L2 vertebral body compression fractures, the L1 vertebral body compression fractures new since 4/28/2020 the L2 vertebral body fracture appears similar to prior study. The bones are diffusely demineralized. _______________ IMPRESSION: 1. Enteric tube tip terminates at the gastroesophageal junction, recommend advancing at least 10 cm with repeat imaging to confirm placement. 2.  Greenwood catheter tube curls in the penile urethra, recommend removal. 3.  Consolidative opacities are seen in the lower lungs. Please see contemporaneous CTA chest for additional findings. 4.  Nonocclusive thrombus is seen in the left femoral vein and deep left femoral vein. 5.  Very large stool ball distends the rectum, correlate for fecal impaction with colitis. 6.  The L1 vertebral body compression fracture is new since prior study, the L2 vertebral body compression fracture appears similar to 4/28/2020. Note: Preliminary report sent to the Emergency Department by the radiology resident at the time of the study. Xr Chest [facility] Result Date: 10/16/2020 EXAM: PORTABLE  FRONTAL CHEST RADIOGRAPH CLINICAL INDICATION/HISTORY: Altered mental status, endotracheal tube placement COMPARISON: 5/5/2020 TECHNIQUE: Portable frontal view of the chest _______________ FINDINGS: SUPPORT DEVICES:   > Endotracheal tube tip terminates approximately 2.2 cm above the elian.   > Enteric tube courses along the diaphragm, tip terminates near the gastroesophageal junction, recommend advancing at least 10 cm with repeat imaging to confirm placement.   >  EKG leads overlie the patient. HEART AND MEDIASTINUM: Normal heart size. Mild pulmonary vascular congestion. LUNGS: Patchy perihilar opacities with lower lung predominance (left. The right). PLEURAL SPACES:No large pneumothorax. Moderate volume left pleural effusion. BONY THORAX AND SOFT TISSUES: No acute abnormality. Bones are diffusely demineralized. Surgical clips in the right upper quadrant likely related to prior cholecystectomy. _______________ IMPRESSION: 1. Endotracheal tube appears appropriately positioned terminating approximately 2.2 cm above the elian. 2.  Enteric tube tip terminates near the gastroesophageal junction, recommend advancing at least 10 cm with repeat imaging to confirm placement. 3.  New patchy perihilar opacities concerning for multifocal pulmonary infiltrate (pneumonia) as may be seen in the setting add COVID or other atypical pneumonia. 4.  Moderate volume left pleural effusion. Medications Reviewed: 
Current Facility-Administered Medications Medication Dose Route Frequency  finasteride (PROSCAR) tablet 5 mg  5 mg Oral DAILY  lisinopriL (PRINIVIL, ZESTRIL) tablet 2.5 mg  2.5 mg Oral DAILY  polyethylene glycol (MIRALAX) packet 17 g  17 g Oral EVERY OTHER DAY  terazosin (HYTRIN) capsule 5 mg  5 mg Oral DAILY  sodium chloride (NS) flush 5-40 mL  5-40 mL IntraVENous Q8H  
 sodium chloride (NS) flush 5-40 mL  5-40 mL IntraVENous PRN  
 dextrose (D50) infusion 12.5-25 g  25-50 mL IntraVENous PRN  
 ondansetron (ZOFRAN) injection 4 mg  4 mg IntraVENous Q6H PRN  
 docusate sodium (COLACE) capsule 100 mg  100 mg Oral BID PRN  
 melatonin tablet 12 mg  12 mg Oral QHS PRN  
 albuterol-ipratropium (DUO-NEB) 2.5 MG-0.5 MG/3 ML  3 mL Nebulization Q6H PRN  
 acetaminophen (TYLENOL) suppository 650 mg  650 mg Rectal Q4H PRN  
  pantoprazole (PROTONIX) injection 40 mg  40 mg IntraVENous DAILY PRN  piperacillin-tazobactam (ZOSYN) 3.375 g in 0.9% sodium chloride (MBP/ADV) 100 mL MBP Extended-Interval infusion #######  3.375 g IntraVENous Q8H  
 influenza vaccine 2020-21 (6 mos+)(PF) (FLUARIX/FLULAVAL/FLUZONE QUAD) injection 0.5 mL  0.5 mL IntraMUSCular PRIOR TO DISCHARGE  potassium chloride 20 mEq in 50 ml IVPB  20 mEq IntraVENous Q2H  
 insulin regular (MYXREDLIN, NOVOLIN, HUMULIN) 100 units/100 ml NS infusion (premix)  0-50 Units/hr IntraVENous TITRATE  dextrose 5% infusion  75 mL/hr IntraVENous CONTINUOUS  
 hydroxypropyl methylcellulose (ISOPTO TEARS) 0.5 % ophthalmic solution 1 Drop  1 Drop Both Eyes QID  heparin 25,000 units in D5W 250 ml infusion  18-36 Units/kg/hr IntraVENous TITRATE  PHENYLephrine (MILAD-SYNEPHRINE) 30 mg in 0.9% sodium chloride 250 mL infusion   mcg/min IntraVENous TITRATE  
 NOREPINephrine (LEVOPHED) 8 mg in 0.9% NS 250ml infusion  0.5-30 mcg/min IntraVENous TITRATE  vasopressin (VASOSTRICT) 20 Units in 0.9% sodium chloride 100 mL infusion  0-0.03 Units/min IntraVENous TITRATE  glucose chewable tablet 16 g  4 Tab Oral PRN  
 glucagon (GLUCAGEN) injection 1 mg  1 mg IntraMUSCular PRN  
 fentaNYL (PF) 1,500 mcg/30 mL (50 mcg/mL) infusion  25-50 mcg/hr IntraVENous TITRATE  midazolam in normal saline (VERSED) 2 mg/mL infusion  1-3 mg/hr IntraVENous TITRATE Evangelina Go DO Internal Medicine, Hospitalist 
Pager: [phone] [facility]

## 2020-10-16 NOTE — CONSULTS
[facility] Pulmonary, Critical Care, and Sleep Medicine Name: Belen Live MRN: [id number] : 1950 Hospital: [address] [place] [address] Date: 2020 Critical Care Initial Patient Consult Requesting MD:                                                  Reason for CC Consult: 
IMPRESSION:  
· Respiratory Failure · Cerebral Palsy/dementia · Aspiration Pneumonia · hypergylcemia · Hypernatremia · Altered mental status RECOMMENDATIONS:  
· Neuro-  patient on ventilator no sedation use low dose versed to keep comfortable no propofol with hypotension. Sepsis and aspiration pneumonia after choking event in demented NH resident now in shock with sepsis. Has cerebral palsy as well · Cardiac-  Hypotensive septic shock 3 liters of NS and LR now needs free water. On Levophed and vasopressin goal MAP of 60. Baseline afib not requiring rate control on heparin drip. No coronary ischemia. Volume resuscitated now needs free water. Pulmonary- aspirated acutely after choking event intubated on arrival to ER with perihilar opacities and     LLL infiltrate and effusion on CXR agree with zosyn and vancomycin Support until not hemodynamically unstable as he is currently. Metabolic acidosis with base deficit and hyperglycemia · Renal-   Greenwood follow urine output and replace free water deficit D5W as well as potassium · Heme-  H/H stable anemic  no active bleeding  Anticoagulated with history of afib 
 
· ID-  pneumonia infection a concern send lactate and procalcitonin  Agree with dose adjusted vanc and zosyn for aspiration pneumonia. Endo- insulin drip for sepsis and hyperglycemia fingersticks hourly next 24 hours · DVT prophylaxis  systemically anticoagulated · GI- start gi prophylaxis and start enteral trickle feeds Subjective/History:  
 
Carol Banerjee is a 78 y.o. male who hails from [address] [place] [address] who presents to [facility] ER with complaint of Unresponsive and High Blood Sugar. Patient was reportedly confused for 6 to 12 hour prior to being brought to [facility] ER and was particularly bad 45 minutes prior to arrival.  Patient was reportedly being fed when he seemed to choke---possibly aspirating---and then became unresponsive. Patient arrives on Non-Rebreather. Patient is Intubated and Sedated upon interview; Subjective is largely derived from Nursing Staff, [facility] ER Physician, and EHR. Patient has a history of Cerebral Palsy, Intellectual Disability, and Recurrent UTIs. Previous admission shows that the Patient typically is verbal, but only expresses brief agreements before complex questions are asked.   
  
Notably, [facility]'s [facility] [place] [facility] 1945 with appropriate paperwork in our EHR. 
  
In [facility] ER, Patient is noted to have Temperature 100.5°F, Heart Rate 154 bpm, 39 bpm, Blood Pressure 61/40 mm Hg, SpO2 100% on 12 L/min O2 via Mask, Glucose >600 mg/dL, Lactic Acid 2.65, WBC 13.1, Hgb 9.7, Platelet 536, INR 1.4, D-dimer 8.81, Na+ 161 (Sodium Corrected to 170.5), Cl- 133, Anion Gap 7, BUN 36, Creatinine 0.91, Ca+ 7.8, Direct Bilirubin 0.3, Total Protein 5.5, Albumin 1.5, AST 44, Alk Phos 152, Troponin <0.02, Pro-, Arterial Blood Gas pH 7.27, pCO2 42.5, pO2 186, and HCO3 19.1. Intensivist services were consulted by [facility] ER Physician in [facility] ER. 
  
The patient is critically ill and can not provide additional history due to unconscious on ventilator. PMH atrial fib Cerebral palsy Dementia Diabetes 
dysphagia HTN 
CVA Stage 4 sacral decub Recurrent UTIs PSHx:  cholycystectomy  shunt HMEDS metformin, fenofibrate, MVI, phenergan, ultram, hytrin, proscar, zestril,  
 
Consulate NH patient ALLERGY NKDA Review of Systems: 
unable ro obtain on mechanical ventilation Objective:  
Vital Signs:   
Visit Vitals BP (!) 71/46 Pulse (!) 106 Temp 97.6 °F (36.4 °C) Resp 26 Wt 55.4 kg (122 lb 1.6 oz) SpO2 100% BMI 20.96 kg/m² O2 Device: Ventilator O2 Flow Rate (L/min): 12 l/min Temp (24hrs), Av.3 °F (36.8 °C), Min:94.8 °F (34.9 °C), Max:100.5 °F (38.1 °C) I Intake/Output Summary (Last 24 hours) at 10/16/2020 0971 Last data filed at 10/16/2020 0700 Gross per 24 hour Intake 1677.82 ml Output 1010 ml Net 667.82 ml Physical Exam: 
 
General:  Intubated sedated and on mechanical ventilation does not open eyes to commands. Head:  Normocephalic, without obvious abnormality, atraumatic. Eyes:  Conjunctivae/corneas clear. PERRL, Nose: Nares normal. Septum midline. Mucosa normal. No drainage or sinus tenderness. NG tube in place Throat: Lips, mucosa, and tongue normal. edentulous Neck: Supple, symmetrical, trachea midline, no adenopathy, thyroid: no enlargment/tenderness/nodules,.  
Back:   Symmetric, no curvature. ROM normal.  
Lungs:   Rhonchi throughout left side Chest wall:  No tenderness or deformity. Heart:  irregular irregular normal, no murmur, click, rub or gallop. Abdomen:   Soft, non-tender. decreased bowel sounds normal. No masses, Extremities: Extremities normal, atraumatic, no cyanosis or  4 plus edema. Neurologic: Responds only to painful stimuli Data:  
Labs reviewed Telemetry:afib 100 Imaging: 
I have personally reviewed the patients radiographs and have reviewed the reports: 
cxr perihilar congestion LLL infiltrate and effusion Best practice : 
Core measures; Antibiotic choice: vanc and zosyn Severe Sepsis bundles;SIRS screen met? Yes Fluids Lactic acid ordered- initial and repeat Q6hrs if elevated till normalized. Cultures drawn. Antibiotic administered within 1hr-ICU  And 3hrs ED Large bore IV- 2 sites Pressors aim MAP >60mmHg Glycemic control insulin drip Mech. Ventilated patients- aim to keep peak plateau pressure 65-13LZ H2O. Sress ulcer prophylaxis DVT prophylaxis Total critical care time exclusive of procedures: 45 minutes Vero Pleitez.  Hannah Lane MD, [facility]

## 2020-10-16 NOTE — DIABETES MGMT
Initial Nutrition Assessment and Glycemic Control Plan of Care Type and Reason for Visit: Initial 
 
Nutrition Recommendations/Plan: 1. For tube feeding, suggest trickle feeds of Osmolite 1.2 amira at 10 ml/hr for now. When ready to advance, suggest final goal rate of 70 ml/hr. This will provide 2016 calories, 93 grams protein, 1378 ml free water/day (from tube feeding), closely meeting estimated nutrient needs. 2. When IVF discontinued, suggest 150 ml water flushes q 6 hours. 3. When ready to transition off insulin drip, suggest 11 units Lantus/day (0.2 units/kg) with insulin drip continuing x 2 hours after first Lantus dose. 4. Monitor TF, labs, weights. Nutrition Assessment:    
Pt known from previous admissions. Pt admitted with septic shock, sally pneumonia, femoral vein thrombosis, hyperglycemia, hypernatremia. PMHx includes T2DM, cerebral palsy, post polio syndrome, cognitive developmental delay, multiple decubiti stage 3 and 4, dysphagia. Pt now intubated. Widely varied weights noted over past year with net weight loss of 13 lbs over past 11 months (10% weight loss). Nutrition History and Allergies: From [facility]. History of dysphagia. NH record indicate pt was receiving mechanical soft diet with nectar thick liquids. Malnutrition Assessment: 
Malnutrition Status: At risk for malnutrition related to wounds, intubated, low BMI for age Diabetes Management:  
Pt admitted with hyperglycemia (lab blood glucose - 573 mg/dL) and has been on insulin drip with last BG in target range. Recent blood glucose:   
Lab Results Component Value Date/Time Glucose 339 (H) 10/16/2020 05:45 AM  
 Glucose (POC) 147 (H) 10/16/2020 10:42 AM  
  
Within target range (non-ICU: <140; ICU<180):  Yes, last reading Current Insulin regimen:  
Glucostabilizer insulin drip currently infusing at 10.7 units/hr for last BG of 147 mg/dL Home medication/insulin regimen: per [facility] records pulled from EMR  
metformin 500 mg/d Sliding scale lispro Trulicity 2.55 mg/week HbA1c: 8.2% equivalent  to ave Blood Glucose of 186 mg/dl for 2-3 months prior to admission Adequate glycemic control PTA:    Yes considering co-morbidities Estimated Daily Nutrient Needs: 
Energy (kcal):  1939 Protein (g):  97 Fluid (ml/day):  1900 Nutrition Related Findings:  Last BM - 10/16/20; Pt with R hand contracture. On insulin drip. Receiving IVF - D5 (75 ml/hr). Miralax Wounds:   
Multiple, Stage III, Stage IV Current Nutrition Therapies: DIET NPO Except Meds Additional Caloric Sources: D5 at 75 ml/hr (306 calories/day) Anthropometric Measures: 
· Height:  5' 4\" (162.6 cm) · Current Body Wt:  55.4 kg (122 lb 2.2 oz)(10/16/20) · Ideal Body Wt:  130 lbs:  93.9 % · BMI Category:  Underweight (BMI less than 22) age over 72 Last Weight Metrics: 
Weight Loss Metrics 10/16/2020 9/22/2020 5/7/2020 4/27/2020 1/22/2020 1/3/2020 11/24/2019 Today's Wt 122 lb 1.6 oz 143 lb 143 lb 11.2 oz - 138 lb 7.2 oz 134 lb 135 lb BMI 20.96 kg/m2 24.55 kg/m2 - 24.67 kg/m2 23.76 kg/m2 23 kg/m2 23.17 kg/m2 Wt Readings from Last 3 Encounters:  
10/16/20 55.4 kg (122 lb 1.6 oz)  
09/22/20 64.9 kg (143 lb) 05/07/20 65.2 kg (143 lb 11.2 oz) Nutrition Diagnosis:  
· Inadequate oral intake related to impaired respiratory function as evidenced by intubation · Increased nutrient needs related to increased demand for energy/nutrients as evidenced by wounds · Altered nutrition-related lab values related to endocrine dysfunction as evidenced by lab values(blood glucose  573 mg/dL; A1C - 8.2%) · Unintentional weight loss due to decreased energy intake as evidenced by 13 lb net weight loss in past 11 months (10%). Nutrition Interventions:  
Food and/or Nutrient Delivery: Start tube feeding Nutrition Education and Counseling: No recommendations at this time Coordination of Nutrition Care: Continued inpatient monitoring Goals: 
Blood glucose will be in target range (70 to 180 mg/dL) within 3 days. Nutrient intake will meet >75% of patient estimated nutritional needs within the next 7 days. Nutrition Monitoring and Evaluation:  
Food/Nutrient Intake Outcomes: Enteral nutrition intake/tolerance, IVF intake Physical Signs/Symptoms Outcomes: Biochemical data, Skin, Weight Discharge Planning: Too soon to determine Electronically signed by Oli Francois RD on 10/16/2020 at 11:00 AM 
Contact:  
Oli Francois RD, CDE Office:  [phone] Long Range Pager:  [phone]

## 2020-10-16 NOTE — PROGRESS NOTES
Problem: Ventilator Management Goal: *Adequate oxygenation and ventilation 10/16/2020 0258 by Justino Watkins RN Outcome: Progressing Towards Goal 
10/16/2020 0255 by Justino Watkins RN Outcome: Progressing Towards Goal 
Goal: *Patient maintains clear airway/free of aspiration 10/16/2020 0258 by Justino Watkins RN Outcome: Progressing Towards Goal 
10/16/2020 0255 by Justino Watkins RN Outcome: Progressing Towards Goal 
Goal: *Absence of infection signs and symptoms 10/16/2020 0258 by Justino Watkins RN Outcome: Progressing Towards Goal 
10/16/2020 0255 by Justino Watkins RN Outcome: Progressing Towards Goal 
Goal: *Normal spontaneous ventilation 10/16/2020 0258 by Justino Watkins RN Outcome: Progressing Towards Goal 
10/16/2020 0255 by Justino Watkins RN Outcome: Progressing Towards Goal 
  
Problem: Pressure Injury - Risk of 
Goal: *Prevention of pressure injury Description: Document Alex Scale and appropriate interventions in the flowsheet. Outcome: Progressing Towards Goal 
Note: Pressure Injury Interventions: 
Sensory Interventions: Keep linens dry and wrinkle-free, Pressure redistribution bed/mattress (bed type), Turn and reposition approx. every two hours (pillows and wedges if needed), Avoid rigorous massage over bony prominences Moisture Interventions: Absorbent underpads, Apply protective barrier, creams and emollients, Internal/External urinary devices Activity Interventions: Pressure redistribution bed/mattress(bed type) Mobility Interventions: Pressure redistribution bed/mattress (bed type), Turn and reposition approx. every two hours(pillow and wedges) Nutrition Interventions: Document food/fluid/supplement intake, Discuss nutritional consult with provider Friction and Shear Interventions: Apply protective barrier, creams and emollients Problem: Patient Education: Go to Patient Education Activity Goal: Patient/Family Education Outcome: Progressing Towards Goal 
  
Problem: Pain Goal: *Control of Pain Outcome: Progressing Towards Goal 
Goal: *PALLIATIVE CARE:  Alleviation of Pain Outcome: Progressing Towards Goal 
  
Problem: Patient Education: Go to Patient Education Activity Goal: Patient/Family Education Outcome: Progressing Towards Goal 
  
Problem: Falls - Risk of 
Goal: *Absence of Falls Description: Document Chante Barrera Fall Risk and appropriate interventions in the flowsheet. Outcome: Progressing Towards Goal 
Note: Fall Risk Interventions: 
  
 
  
 
Medication Interventions: Bed/chair exit alarm Problem: Patient Education: Go to Patient Education Activity Goal: Patient/Family Education Outcome: Progressing Towards Goal 
  
Problem: Patient Education: Go to Patient Education Activity Goal: Patient/Family Education Outcome: Progressing Towards Goal 
  
Problem: Sepsis: Day of Diagnosis Goal: Off Pathway (Use only if patient is Off Pathway) Outcome: Progressing Towards Goal 
Goal: *Fluid resuscitation Outcome: Progressing Towards Goal 
Goal: *Paired blood cultures prior to first dose of antibiotic Outcome: Progressing Towards Goal 
Goal: *First dose of  appropriate antibiotic within 3 hours of arrival to ED, within 1 hour of arrival to ICU Outcome: Progressing Towards Goal 
Goal: *Lactic acid with first set of blood cultures Outcome: Progressing Towards Goal 
Goal: *Pneumococcal immunization (if eligible) Outcome: Progressing Towards Goal 
Goal: *Influenza immunization (if eligible) Outcome: Progressing Towards Goal 
Goal: Activity/Safety Outcome: Progressing Towards Goal 
Goal: Consults, if ordered Outcome: Progressing Towards Goal 
Goal: Diagnostic Test/Procedures Outcome: Progressing Towards Goal 
Goal: Nutrition/Diet Outcome: Progressing Towards Goal 
Goal: Discharge Planning Outcome: Progressing Towards Goal 
Goal: Medications Outcome: Progressing Towards Goal 
Goal: Respiratory Outcome: Progressing Towards Goal 
Goal: Treatments/Interventions/Procedures Outcome: Progressing Towards Goal 
Goal: Psychosocial 
Outcome: Progressing Towards Goal 
  
Problem: Sepsis: Day 2 Goal: Off Pathway (Use only if patient is Off Pathway) Outcome: Progressing Towards Goal 
Goal: *Central Venous Pressure maintained at 8-12 mm Hg Outcome: Progressing Towards Goal 
Goal: *Hemodynamically stable Outcome: Progressing Towards Goal 
Goal: *Tolerating diet Outcome: Progressing Towards Goal 
Goal: Activity/Safety Outcome: Progressing Towards Goal 
Goal: Consults, if ordered Outcome: Progressing Towards Goal 
Goal: Diagnostic Test/Procedures Outcome: Progressing Towards Goal 
Goal: Nutrition/Diet Outcome: Progressing Towards Goal 
Goal: Discharge Planning Outcome: Progressing Towards Goal 
Goal: Medications Outcome: Progressing Towards Goal 
Goal: Respiratory Outcome: Progressing Towards Goal 
Goal: Treatments/Interventions/Procedures Outcome: Progressing Towards Goal 
Goal: Psychosocial 
Outcome: Progressing Towards Goal 
  
Problem: Sepsis: Day 3 Goal: Off Pathway (Use only if patient is Off Pathway) Outcome: Progressing Towards Goal 
Goal: *Central Venous Pressure maintained at 8-12 mm Hg Outcome: Progressing Towards Goal 
Goal: *Oxygen saturation within defined limits Outcome: Progressing Towards Goal 
Goal: *Vital sign stability Outcome: Progressing Towards Goal 
Goal: *Tolerating diet Outcome: Progressing Towards Goal 
Goal: *Demonstrates progressive activity Outcome: Progressing Towards Goal 
Goal: Activity/Safety Outcome: Progressing Towards Goal 
Goal: Consults, if ordered Outcome: Progressing Towards Goal 
Goal: Diagnostic Test/Procedures Outcome: Progressing Towards Goal 
Goal: Nutrition/Diet Outcome: Progressing Towards Goal 
Goal: Discharge Planning Outcome: Progressing Towards Goal 
Goal: Medications Outcome: Progressing Towards Goal 
Goal: Respiratory Outcome: Progressing Towards Goal 
Goal: Treatments/Interventions/Procedures Outcome: Progressing Towards Goal 
Goal: Psychosocial 
Outcome: Progressing Towards Goal 
  
Problem: Sepsis: Day 4 Goal: Off Pathway (Use only if patient is Off Pathway) Outcome: Progressing Towards Goal 
Goal: Activity/Safety Outcome: Progressing Towards Goal 
Goal: Consults, if ordered Outcome: Progressing Towards Goal 
Goal: Diagnostic Test/Procedures Outcome: Progressing Towards Goal 
Goal: Nutrition/Diet Outcome: Progressing Towards Goal 
Goal: Discharge Planning Outcome: Progressing Towards Goal 
Goal: Medications Outcome: Progressing Towards Goal 
Goal: Respiratory Outcome: Progressing Towards Goal 
Goal: Treatments/Interventions/Procedures Outcome: Progressing Towards Goal 
Goal: Psychosocial 
Outcome: Progressing Towards Goal 
Goal: *Oxygen saturation within defined limits Outcome: Progressing Towards Goal 
Goal: *Hemodynamically stable Outcome: Progressing Towards Goal 
Goal: *Vital signs within defined limits Outcome: Progressing Towards Goal 
Goal: *Tolerating diet Outcome: Progressing Towards Goal 
Goal: *Demonstrates progressive activity Outcome: Progressing Towards Goal 
Goal: *Fluid volume maintenance Outcome: Progressing Towards Goal 
  
Problem: Sepsis: Day 6 Goal: Off Pathway (Use only if patient is Off Pathway) Outcome: Progressing Towards Goal 
Goal: *Oxygen saturation within defined limits Outcome: Progressing Towards Goal 
Goal: *Vital signs within defined limits Outcome: Progressing Towards Goal 
Goal: *Tolerating diet Outcome: Progressing Towards Goal 
Goal: *Demonstrates progressive activity Outcome: Progressing Towards Goal 
Goal: Influenza immunization Outcome: Progressing Towards Goal 
Goal: *Pneumococcal immunization Outcome: Progressing Towards Goal 
Goal: Activity/Safety Outcome: Progressing Towards Goal 
Goal: Diagnostic Test/Procedures Outcome: Progressing Towards Goal 
 Goal: Nutrition/Diet Outcome: Progressing Towards Goal 
Goal: Discharge Planning Outcome: Progressing Towards Goal 
Goal: Medications Outcome: Progressing Towards Goal 
Goal: Respiratory Outcome: Progressing Towards Goal 
Goal: Treatments/Interventions/Procedures Outcome: Progressing Towards Goal 
Goal: Psychosocial 
Outcome: Progressing Towards Goal 
  
Problem: Sepsis: Discharge Outcomes Goal: *Vital signs within defined limits Outcome: Progressing Towards Goal 
Goal: *Tolerating diet Outcome: Progressing Towards Goal 
Goal: *Verbalizes understanding and describes prescribed diet Outcome: Progressing Towards Goal 
Goal: *Demonstrates progressive activity Outcome: Progressing Towards Goal 
Goal: *Describes follow-up/return visits to physicians Outcome: Progressing Towards Goal 
Goal: *Verbalizes name, dosage, time, side effects, and number of days to continue medications Outcome: Progressing Towards Goal 
Goal: *Influenza immunization (Oct-Mar only) Outcome: Progressing Towards Goal 
Goal: *Pneumococcal immunization Outcome: Progressing Towards Goal 
Goal: *Lungs clear or at baseline Outcome: Progressing Towards Goal 
Goal: *Oxygen saturation returns to baseline or 90% or better on room air Outcome: Progressing Towards Goal 
Goal: *Glycemic control Outcome: Progressing Towards Goal 
Goal: *Absence of deep venous thrombosis signs and symptoms(Stroke Metric) Outcome: Progressing Towards Goal 
Goal: *Describes available resources and support systems Outcome: Progressing Towards Goal 
Goal: *Optimal pain control at patient's stated goal 
Outcome: Progressing Towards Goal

## 2020-10-16 NOTE — PROGRESS NOTES
Problem: Discharge Planning Goal: *Discharge to safe environment Outcome: Progressing Towards Goal 
Plan is back to [facility] Reason for Admission:   Apiration, respiratory failure RUR Score: 31% PCP: First and Last name: facility MD Name of Practice: 
 Are you a current patient: Yes/No: 
 Approximate date of last visit:  
 Can you do a virtual visit with your PCP:  
          
Resources/supports as identified by patient/family:   Lives at [facility] Top Challenges facing patient (as identified by patient/family and CM): Finances/Medication cost?    Medicaid and medicare Transportation?  [employer] Support system or lack thereof? Living arrangements? Self-care/ADLs/Cognition? All adl done for pt Current Advanced Directive/Advance Care Plan:  JFS CM Reilly Lauren I left a message on her machine and asked the facility to please page pt to my phone. Plan for utilizing home health:     
              
Transition of Care Plan:  Plan is for return to [facility] when medically able to. [address] [facility]  [phone]/ [phone] [facility] [phone] Care Management Interventions PCP Verified by CM: No 
Mode of Transport at Discharge: Other (see comment)(transport) Transition of Care Consult (CM Consult): [address] [facility] Current Support Network: [address] [place] [address] Confirm Follow Up Transport: Other (see comment) The Plan for Transition of Care is Related to the Following Treatment Goals : resolution of acute symptoms Discharge Location Discharge Placement: Home(lives at [facility]) Of note, [facility] [employer] Ms Jacob Ibarra had not been aware that pt was in house. Zhane Rahman. Hiram Cordon RN, BSN [facility] [phone], Pager [phone] 
[email or web address]

## 2020-10-16 NOTE — PROGRESS NOTES
Assisting primary RN 
 
0100 Dr Faith Perez contacted. Unable to aspirate urine from holm. Advised to replace if comfortable. 8485 With the assistance of Juan M Krishnan and Fabiola Ace RN. Holm changed to 20fr cath under sterile technique with RN assisting. PT tolerated well. Drained 700cc of clear yellow urine. 1475 Femoral CVL dressing changed.

## 2020-10-16 NOTE — PROGRESS NOTES
0028- TRANSFER - IN REPORT: 
 
Verbal report received from Haja Hinson RN(name) on Chyna Garay  being received from ED(unit) for routine progression of care Report consisted of patients Situation, Background, Assessment and  
Recommendations(SBAR). Information from the following report(s) SBAR, Kardex, ED Summary, Intake/Output, MAR, Accordion, Recent Results and Cardiac Rhythm ST was reviewed with the receiving nurse. Opportunity for questions and clarification was provided. Assessment completed upon patients arrival to unit and care assumed. Received patient in stretcher with RN and RT. Patient on ventilator, appears to accept ventilator without resistance. Patient MAP >65 at this time all VSS. All orders and drips verified. 0200- RN noticed OGT coiled inside patient mouth, wrapped around patient tongue. RN remove OGT, will be re-inserted. KUB to be ordered for placement verification. 0400- Reassessment completed. Patient more responsive to voice and withdrawing from pain. Patient continues to maintain MAP >65.  
 
0430- RN spoke via telephone to Dr. Mel Acosta in regards to patient holm catheter. Since admission, patient has only put out 5mL of urine. Upon bladder scanning, patient was found to have 500+ cc of urine in bladder. This RN has attempted to power flush, deflate and advance balloon cuff, however unsuccessful. Dr. Mel Acosta giving permission to exchange holm catheter. 5754- With help from Isha Bae, YAS and Calvin Graves RN, new 20Fr holm catheter was successfully placed using sterile technique. 700cc of clear yellow urine into drainage bag upon insertion. R fem CVL dressing change. 3999- Critical results. Dr. Mel Acosta paged. 6900- Critical results. Dr. Mel Acosta paged a second time. 5544- Bedside shift change report given to Jose Raul Treadwell RN (oncoming nurse) by Rosa Pino RN (offgoing nurse).  Report included the following information SBAR, Kardex, ED Summary, Intake/Output, MAR, Accordion, Recent Results and Cardiac Rhythm ST.

## 2020-10-16 NOTE — PROGRESS NOTES
0715: report received from [facility]. Pt is intubated, on pressors, insulin gtt, VSS. 1000: rounded on pt with intensivist, new orders received. 1645: Verbal confirmation received over the phone from [facility] representative Lena Eng to make the patient a DNR and comfort measures only. Her number is [phone] and email [email or web address].  I contacted Dr. Chino Purcell, intensivist and received telephone orders to place the comfort care measures order set.   
1646: Placing call to [employer] prior to initiating comfort care measures per policy.   
1715: [employer] declined pt for donation. 1753: pt compassionately extubated, RTx2 and RNx2 at bedside. 1915: Bedside and Verbal shift change report given to Abel Dickson RN (oncoming nurse) by Frank Avila 
 (offgoing nurse). Report included the following information SBAR, Kardex, Intake/Output, MAR, Recent Results and Cardiac Rhythm sinus tach, .

## 2020-10-16 NOTE — H&P
History and Physical 
 
Patient: Kal Alexander MRN: [id number]  SSN: [id number] YOB: 1950  Age: 78 y.o. Sex: male Subjective:  
  
Kal Alexander is a 78 y.o. male who hails from [address] [place] [address] who presents to [facility] ER with complaint of Unresponsive and High Blood Sugar. Patient was reportedly confused for 6 to 12 hour prior to being brought to [facility] ER and was particularly bad 45 minutes prior to arrival.  Patient was reportedly being fed when he seemed to choke---possibly aspirating---and then became unresponsive. Patient arrives on Non-Rebreather. Patient is Intubated and Sedated upon interview; Subjective is largely derived from Nursing Staff, [facility] ER Physician, and EHR. Patient has a history of Cerebral Palsy, Intellectual Disability, and Recurrent UTIs. Previous admission shows that the Patient typically is verbal, but only expresses brief agreements before complex questions are asked. Notably, Patient's Guardian is Master Concepcion with appropriate paperwork in our EHR. In [facility] ER, Patient is noted to have Temperature 100.5°F, Heart Rate 154 bpm, 39 bpm, Blood Pressure 61/40 mm Hg, SpO2 100% on 12 L/min O2 via Mask, Glucose >600 mg/dL, Lactic Acid 2.65, WBC 13.1, Hgb 9.7, Platelet 787, INR 1.4, D-dimer 8.81, Na+ 161 (Sodium Corrected to 170.5), Cl- 133, Anion Gap 7, BUN 36, Creatinine 0.91, Ca+ 7.8, Direct Bilirubin 0.3, Total Protein 5.5, Albumin 1.5, AST 44, Alk Phos 152, Troponin <0.02, Pro-, Arterial Blood Gas pH 7.27, pCO2 42.5, pO2 186, and HCO3 19.1. Intensivist services were consulted by [facility] ER Physician in [facility]. Patient is admitted to [facility] ICU (Non-Covid-19 Cohort) for management of Metabolic Encephalopathy, Septic Shock 2°/2 Bilateral Community-Acquired Pneumonia (concern for Aspiration), Acute Respiratory Failure S/P Endotracheal Intubation, Left Femoral Vein Thrombosis, Hypernatremia, and Hyperglycemia. Past Medical History:  
Diagnosis Date  Cerebral palsy ([address]  Cognitive developmental delay  CVA (cerebral vascular accident) ([address]  Decubitus ulcer of sacral region, stage 4 ([address]  Diabetes mellitus, type II ([address] Last HbA1c 5.9% on 9/14/2019  Dysphagia 04/29/2020 Trace Penetration and Silent Aspiration of Thins with Cup & Staw and [place]-Thick Liquids  Hepatic steatosis ?  History of hepatomegaly  History of recurrent UTIs H/O Enterococcus faecium UTI  History of small bowel obstruction  HLD (hyperlipidemia)  Hypertension  Nephrolithiasis  Polio  Splenic artery aneurysm (HCC)   
 ?; 1.7 cm  
 Urinary retention  Vitamin D deficiency 07/07/2014 Past Surgical History:  
Procedure Laterality Date  HX CHOLECYSTECTOMY  HX OTHER SURGICAL Ventriculoperitoneal Shunt Placement History reviewed. No pertinent family history. Social History Tobacco Use  Smoking status: Never Smoker  Smokeless tobacco: Never Used Substance Use Topics  Alcohol use: No  
  
Patient lives at [facility]. Prior to Admission medications Medication Sig Start Date End Date Taking? Authorizing Provider  
metFORMIN (GLUCOPHAGE) 500 mg tablet Take 1 Tab by mouth daily (with breakfast). 5/8/20   Ortiz Latham MD  
fenofibrate micronized (LOFIBRA) 200 mg capsule Take  by mouth every morning. Mak Gannon MD  
promethazine (Phenergan) 12.5 mg suppository Insert  into rectum every six (6) hours as needed for Nausea. Mak Gannon MD  
multivitamin,tx-iron-ca-min (Thera-M) 27-0.4 mg tab Take 1 Tab by mouth daily. Mak Gannon MD  
traMADoL (ULTRAM) 50 mg tablet Take 50 mg by mouth every six (6) hours as needed for Pain. Mak Gannon MD  
terazosin (HYTRIN) 5 mg capsule Take 1 Cap by mouth daily. 11/26/19   Amina Mcdonald MD  
finasteride (PROSCAR) 5 mg tablet Take 1 Tab by mouth daily.  11/25/19 Mayte Owens MD  
polyvinyl alcohol (LIQUIFILM TEARS) 1.4 % ophthalmic solution Administer 1 Drop to both eyes four (4) times daily. Mak Gannon MD  
aspirin 81 mg chewable tablet Take 81 mg by mouth daily. Mak Gannon MD  
polyethylene glycol (MIRALAX) 17 gram packet Take 17 g by mouth every other day. Mak Gannon MD  
lisinopril (PRINIVIL, ZESTRIL) 2.5 mg tablet Take 2.5 mg by mouth daily. Mak Gannon MD  
acetaminophen (TYLENOL ARTHRITIS PAIN) 650 mg CR tablet Take 650 mg by mouth every six (6) hours as needed for Pain. Mak Gannon MD  
  
 
No Known Allergies Review of Systems: 
Patient is Intubated and Sedated and cannot provide ROS at this time. Objective:  
 
Vitals:  
 10/16/20 0345 10/16/20 0400 10/16/20 0500 10/16/20 4659 BP: 99/62 108/69 (!) 81/58 Pulse: (!) 126 (!) 127 (!) 112 (!) 108 Resp: 28 26 27 24 Temp:  97.6 °F (36.4 °C) SpO2: 97% 97% 99% 98% Weight:   55.4 kg (122 lb 1.6 oz) Physical Exam: 
General:  Older Adult male lying in bed in no acute distress (Sedated & Intubated) HEENT:  Atraumatic, normocephalic; Pupils equally round and reactive to light; Extraocular muscles intact; (+) Dry Oropharynx with (+) Endotracheal Tube in place at 27 cm to the lips attached to Invasive Mechanical Ventilator in Assist Control Mode with FiO2 90%, Rate 22 bpm, Total Volume 365 mL, and PEEP 5.0 cm H2O Neck:  No Bruits; No Lymphadenopathy Chest:  No pectus carinatum; No pectus excavatum Cardiovascular:  (+) Tachycardic rate, questionable irregular rhythm without rubs, gallops, or murmurs Respiratory:  (+) Mild to Moderately reduced lung sounds over Bilateral lung bases; otherwise, Clear to Auscultation Bilaterally without wheezes, rales, or rhonchi; normal effort of breathing (+) on Assist Control with Invasive Mechanical Ventilation Abdominal:  Soft, non-tense, non-tender abdomen; BS present without guarding, rebound, or masses :  (+) [place] Catheter is in place with no urine returning to an empty Collecting Bag Extremities:  Pulses 2+ x4 with (+) Minimal Pitting Edema of Left Distal Thigh and (+) 1+ Pitting Edema to Right Proximal Tibia without cyanosis; (+) Bandage and IV line extending from Left Tibial Plateau; (+) Right Femoral CVC; (+) Atrophy of Left greater than Right Upper Extremity; (+) Mild to Moderate Contracture of Right 4th (?) Phalange Musculoskeletal:  (+) Patient cannot cooperate with Strength Testing (Sedated & Intubated) Integument:  No rash on face, forearms, or legs Neurological:  (+) A&O x0/4 (Patient is Intubated and Sedated); (+) Patient cannot cooperate with Cranial Nerve evaluation, as Patient is Intubated and Sedated Psychiatric:  (+) Affect is Severely Constricted (Sedated & Intubated); (+) Language is absent (Sedated & Intubated); (+) Behavior is Severely Constricted (Sedated & Intubated) I have personally reviewed the CXR and have found, per my read, Endotracheal Tube is 3-4 cm from elian, Left Mid lung-field opacities and Right Lower lung-field opacities concerning for bilateral pneumonia. I have personally reviewed the EKG and have found, per my read, Tachycardia (149 bpm) in SVT with QTc Prolongation (535 bpm). Assessment:  
 
Hospital Problems  Date Reviewed: 10/15/2020 Codes Class Noted POA * (Principal) Metabolic encephalopathy [id number]: G93.41 
ICD-9-CM: 348.31  1/18/2020 Yes Acute respiratory failure requiring reintubation ([facility] ICD-10-CM: J96.00 
ICD-9-CM: 518.81  10/15/2020 Yes Septic shock (HCC) ICD-10-CM: A41.9, R65.21 ICD-9-CM: 038.9, 785.52, 995.92  4/28/2020 Yes Bilateral pneumonia ICD-10-CM: J18.9 ICD-9-CM: 490  10/15/2020 Yes Femoral vein thrombosis, left (HCC) ICD-10-CM: W10.833 ICD-9-CM: 453.6  10/15/2020 Yes Hyperglycemia due to type 2 diabetes mellitus ([facility] ICD-10-CM: E11.65 ICD-9-CM: 250.00  10/15/2020 Yes QT prolongation ICD-10-CM: R94.31 
ICD-9-CM: 794.31  10/15/2020 Yes Overview Signed 10/16/2020  5:54 AM by Pablo Padilla DO  
  QT Prolongation (QTc 535 ms on EKG) on 10/15/2020. Complicated UTI (urinary tract infection) ICD-10-CM: N39.0 ICD-9-CM: 599.0  4/28/2020 Yes Hypernatremia ICD-10-CM: E87.0 ICD-9-CM: 276.0  11/17/2019 Yes Sacral decubitus ulcer, stage IV (HCC) ICD-10-CM: T39.635 ICD-9-CM: 707.03, 707.24  4/28/2020 Yes Cerebral palsy (HCC) (Chronic) ICD-10-CM: G80.9 ICD-9-CM: 343.9  6/12/2015 Yes History of post-polio syndrome (Chronic) ICD-10-CM: O86.25 
ICD-9-CM: V12.02  6/12/2015 Yes Hypertension (Chronic) ICD-10-CM: I10 
ICD-9-CM: 401.9  6/12/2015 Yes Mild intellectual disability (Chronic) ICD-10-CM: F70 
ICD-9-CM: 317  6/12/2015 Yes Plan:  
 
Telemetry, Pulse Oximetry, Invasive Mechanical Ventilation as needed, IV Fentanyl gtt, IV Norepinephrine gtt, IV Vasopressin, IV Heparin gtt, IV Insulin gtt, IV Zosyn, IV Vancomycin, IV Levofloxacin, Sputum Culture, Urine Culture, Blood Culture, and SARS-CoV-2 (Novel Coronavirus Covid-19) Test (which was negative). Will start Patient on D5-1/2NS 75 mL/hr and BMP q6hrs x7. Replace Magnesium to address QTc Prolongation. Intensive services were consulted by [facility] ER Physician in [facility]. Will consult Urological services for management of Catheter. Continue home medications for BPH, Eye Drops, Constipation, and HTN. POC Glucose checks q6hrs with Corrective Insulin. Check HbA1c. DVT chemoprophylaxis is achieved by continuing IV Heparin gtt. Signed By: Last Delcid DO October 16, 2020

## 2020-10-16 NOTE — PROGRESS NOTES
Problem: Diabetes Self-Management Goal: *Disease process and treatment process Description: Define diabetes and identify own type of diabetes; list 3 options for treating diabetes. Outcome: Progressing Towards Goal 
Goal: *Incorporating nutritional management into lifestyle Description: Describe effect of type, amount and timing of food on blood glucose; list 3 methods for planning meals. Outcome: Progressing Towards Goal 
Goal: *Incorporating physical activity into lifestyle Description: State effect of exercise on blood glucose levels. Outcome: Progressing Towards Goal 
Goal: *Developing strategies to promote health/change behavior Description: Define the ABC's of diabetes; identify appropriate screenings, schedule and personal plan for screenings. Outcome: Progressing Towards Goal 
Goal: *Using medications safely Description: State effect of diabetes medications on diabetes; name diabetes medication taking, action and side effects. Outcome: Progressing Towards Goal 
Goal: *Monitoring blood glucose, interpreting and using results Description: Identify recommended blood glucose targets  and personal targets. Outcome: Progressing Towards Goal 
Goal: *Prevention, detection, treatment of acute complications Description: List symptoms of hyper- and hypoglycemia; describe how to treat low blood sugar and actions for lowering  high blood glucose level. Outcome: Progressing Towards Goal 
Goal: *Prevention, detection and treatment of chronic complications Description: Define the natural course of diabetes and describe the relationship of blood glucose levels to long term complications of diabetes. Outcome: Progressing Towards Goal 
Goal: *Developing strategies to address psychosocial issues Description: Describe feelings about living with diabetes; identify support needed and support network Outcome: Progressing Towards Goal 
Goal: *Insulin pump training Outcome: Progressing Towards Goal 
 Goal: *Sick day guidelines Outcome: Progressing Towards Goal 
Goal: *Patient Specific Goal (EDIT GOAL, INSERT TEXT) Outcome: Progressing Towards Goal 
  
Problem: Patient Education: Go to Patient Education Activity Goal: Patient/Family Education Outcome: Progressing Towards Goal 
  
Problem: Infection - Risk of, Urinary Catheter-Associated Urinary Tract Infection Goal: *Absence of infection signs and symptoms Outcome: Progressing Towards Goal 
  
Problem: Patient Education: Go to Patient Education Activity Goal: Patient/Family Education Outcome: Progressing Towards Goal

## 2020-10-16 NOTE — PROGRESS NOTES
18- Received report from Jesica Proctor, [facility].

## 2020-10-16 NOTE — PROGRESS NOTES
Problem: Ventilator Management Goal: *Adequate oxygenation and ventilation Outcome: Progressing Towards Goal 
 VENTILATOR CARE PLAN Problem: Ventilator Management Goal: *Adequate oxygenation/ ventilation/ and extubation Patient: 
   
 
German Fournier     78 y.o.   male     10/16/2020  8:54 AM 
Patient Active Problem List  
Diagnosis Code  UTI (urinary tract infection) N39.0  Hypotension I95.9  Tachycardia R00.0  Lactic acidosis E87.2  Cerebral palsy ([facility] G80.9  History of post-polio syndrome Z86.12  
 Hypertension I10  
 Mild intellectual disability F70  
 Small bowel obstruction ([address] K56.609  Cognitive developmental delay F81.9  
 Uncontrolled type 2 diabetes mellitus with hyperosmolar nonketotic hyperglycemia (HCC) E11.00  Hydronephrosis N13.30  Hypernatremia E87.0  Sepsis ([address] A41.9  Elevated troponin R77.8  Urinary retention R33.9  MATTIE (acute kidney injury) ([facility] N17.9  Metabolic encephalopathy Z25.46  
 Complicated UTI (urinary tract infection) N39.0  Septic shock (HCC) A41.9, R65.21  
 Stage 2 acute kidney injury ([address] N17.9  Sacral decubitus ulcer, stage IV (HCC) L89.154  Bilateral pneumonia J18.9  Femoral vein thrombosis, left (HCC) I82.412  
 Hyperglycemia due to type 2 diabetes mellitus ([address] E11.65  Acute respiratory failure requiring reintubation (HCC) J96.00  
 QT prolongation R94.31 Septic shock ([address] [A41.9, R65.21] Bilateral pneumonia [J18.9] Hypernatremia [E87.0] Femoral vein thrombosis, left (HCC) [I82.412] Hyperglycemia due to type 2 diabetes mellitus ([address] [E11.65] Reason patient intubated: Resp distress, possible asp pneumonia Ventilator day: 2 Ventilator settings: ACVC+ 22/365/70%/PEEP 5 
 
ETT Size/Placement: 7.0 /24cm @lip ABG: 
Date:10/16/2020 Lab Results Component Value Date/Time  PHI 7.26 (L) 10/16/2020 04:59 AM  
 PCO2I 36.2 10/16/2020 04:59 AM  
 PO2I 105 (H) 10/16/2020 04:59 AM  
 HCO3I 16.3 (L) 10/16/2020 04:59 AM  
 FIO2I 80 10/16/2020 04:59 AM  
 
 
Chest X-ray: 
Date:10/16/2020 Results from Hospital Encounter encounter on 10/15/20 XR CHEST PORT Narrative EXAM: PORTABLE  FRONTAL CHEST RADIOGRAPH 
 
CLINICAL INDICATION/HISTORY: Altered mental status, endotracheal tube placement COMPARISON: 5/5/2020 TECHNIQUE: Portable frontal view of the chest 
 
_______________ FINDINGS: 
 
SUPPORT DEVICES:  
  > Endotracheal tube tip terminates approximately 2.2 cm above the elian. 
  > Enteric tube courses along the diaphragm, tip terminates near the 
gastroesophageal junction, recommend advancing at least 10 cm with repeat 
imaging to confirm placement.  
  >  EKG leads overlie the patient. HEART AND MEDIASTINUM: Normal heart size. Mild pulmonary vascular congestion. LUNGS: Patchy perihilar opacities with lower lung predominance (left. The 
right). PLEURAL SPACES:No large pneumothorax. Moderate volume left pleural effusion. BONY THORAX AND SOFT TISSUES: No acute abnormality. Bones are diffusely 
demineralized. Surgical clips in the right upper quadrant likely related to 
prior cholecystectomy. _______________ Impression IMPRESSION: 
1. Endotracheal tube appears appropriately positioned terminating approximately 2.2 cm above the elian. 2.  Enteric tube tip terminates near the gastroesophageal junction, recommend 
advancing at least 10 cm with repeat imaging to confirm placement. 3.  New patchy perihilar opacities concerning for multifocal pulmonary 
infiltrate (pneumonia) as may be seen in the setting add COVID or other atypical 
pneumonia. 4.  Moderate volume left pleural effusion. Lab Test: 
Date:10/16/2020 WBC:  
Lab Results Component Value Date/Time WBC 17.3 (H) 10/16/2020 05:45 AM  
HGB:  
Lab Results Component Value Date/Time HGB 9.6 (L) 10/16/2020 05:45 AM  
 PLTS:  
Lab Results Component Value Date/Time PLATELET 664 (H) 14/96/3766 05:45 AM  
 
 
SaO2%/flow: @ASETXGQ8(1)@ Vital Signs:   Patient Vitals for the past 8 hrs: 
 Temp Pulse Resp BP SpO2  
10/16/20 0800  (!) 106 26 (!) 71/46 100 % 10/16/20 0747  (!) 107 27  100 % 10/16/20 0700  (!) 106 26 (!) 79/54 100 % 10/16/20 0600  (!) 105 25 (!) 76/53 99 % 10/16/20 0514  (!) 108 24  98 % 10/16/20 0500  (!) 112 27 (!) 81/58 99 % 10/16/20 0400 97.6 °F (36.4 °C) (!) 127 26 108/69 97 % 10/16/20 0345  (!) 126 28 99/62 97 % 10/16/20 0300  (!) 127 24 (!) 88/57 97 % 10/16/20 0200  (!) 131 27 (!) 97/59 100 % 10/16/20 0102  (!) 131 24  98 % 10/16/20 0100  (!) 131 24 100/61 97 % 10/16/20 0056     99 % Wean Screen Pass (Yes or No): NA Wean Screen Reason for Failure: NA 
Duration of Weaning Trial: NA Additional Comments: PLAN OF CARE: Invasive ventilation, 02 w/PEEP  
  
 
GOAL: Wean from invasive mechanical ventilation, decrease Fi02 to maintain Sp02 >92%

## 2020-10-16 NOTE — PROGRESS NOTES
Received patient on ventilator: 
Tidal volume : 365ml Rate:  22  RR Ilm9=944% Peep=5cm Vent check is done./ ETT is secured Ambu bag and mask are at the bedside. ABG  is drawn and results are shown to Dr. Angela Santiago Heartrate 137, Oxygen Saturation 100%. Will continue to monitor. 10/15/2020 7014-0115: 
 
RT called to ER. Patient is tranferred to and from CT via Transport vent without incident. Will continue to monitor. 10/16/2020 0028: 
Patient is transported to ICU via transport vent without incident.

## 2020-10-16 NOTE — PROGRESS NOTES
1645: Verbal confirmation received over the phone from [facility] representative Susy Hodge to make the patient a DNR and comfort measures only. Her number is [phone] and email [email or web address].  I contacted Dr. Jasmine Choi, intensivist and received telephone orders to place the comfort care measures order set. 1646: Placing call to [facility] prior to initiating comfort care measures per policy. [phone]: [employer] declined pt for donation.

## 2020-10-16 NOTE — ED NOTES
1815  Late entry Just finished from drawing labs in another patients room. Saw patient in room 14, Noelle Sheridan was in room getting vitals and Rachel (Charge nurse) was getting an EKG. Patient was on a non rebreather and not doing well, he was struggling for air. Tachycardic and tachypneic, fever 100.5. Tech and myself tried to get labs from his IV that he came in with, however we were unsuccessful. Dr. Juan Young tried to get an IJ but was unsuccessful and I/O had to be started and fluids placed on a pressure bag due to dropping blood pressure. Dr. Juan Young stated that the patient was needing to be intubated, so I got things prepared before staff got here. 1835 Etomodate and rocuronium was given prior to intubation. Patient was intubated lexx 23 at the lip, Dr. Juan Young then tried to put in an OG tube which is 75 at the lip. Then he gowned up to place a central line at 1905. Cleaned patient up and got ready to draw labs and cultures. Patient had already received 2 liters of fluid. Gave report to incoming RN Marie Pryor.

## 2020-10-16 NOTE — ED NOTES
ICU nurse in ER to assume care. Report to Jenny Rodriguez RN at this time. No questions at time of report.

## 2020-10-16 NOTE — PROGRESS NOTES
conducted an initial consultation and Spiritual Assessment for Ricardo Ortega, who is a 78 y. o.,male. Patients Primary Language is: [place]. According to the patients EMR Bahai Affiliation is: Non Gnosticism.  
 
The reason the Patient came to the hospital is:  
Patient Active Problem List  
 Diagnosis Date Noted  Bilateral pneumonia 10/15/2020  Femoral vein thrombosis, left ([address] 10/15/2020  Hyperglycemia due to type 2 diabetes mellitus ([address] 10/15/2020  Acute respiratory failure requiring reintubation ([address] 10/15/2020  QT prolongation 10/15/2020  Complicated UTI (urinary tract infection) 04/28/2020  Septic shock ([address] 04/28/2020  Stage 2 acute kidney injury ([address] 04/28/2020  Sacral decubitus ulcer, stage IV ([address] 04/28/2020  Metabolic encephalopathy 98/59/2460  Uncontrolled type 2 diabetes mellitus with hyperosmolar nonketotic hyperglycemia ([address] 11/17/2019  Hydronephrosis 11/17/2019  Hypernatremia 11/17/2019  Sepsis ([address] 11/17/2019  Elevated troponin 11/17/2019  Urinary retention 11/17/2019  MATTIE (acute kidney injury) ([address] 11/17/2019  Small bowel obstruction ([address] 05/12/2018  Cognitive developmental delay 05/12/2018  Hypotension 06/12/2015  Tachycardia 06/12/2015  Lactic acidosis 06/12/2015  Cerebral palsy ([address] 06/12/2015  History of post-polio syndrome 06/12/2015  Hypertension 06/12/2015  Mild intellectual disability 06/12/2015  UTI (urinary tract infection) 06/11/2015 The  provided the following Interventions: 
 attempted to Initiate a relationship of care and support with patient in room 2602 today but found the patient currently on life support and unable to communicate now. Tonye Cogan Provided information about Spiritual Care Services. Offered prayer  on patients behalf. Assessment: 
Patient does not have any Samaritan/cultural needs that will affect patients preferences in health care. There are no further spiritual or Advent issues which require Spiritual Care Services interventions at this time. Plan: 
Chaplains will continue to follow and will provide pastoral care on an as needed/requested basis Lynda Alexander 3 Board Certified [address] [place] [address]  
[phone]

## 2020-10-17 NOTE — ROUTINE PROCESS
1930- assumed care of pt stuporous with comfort care measures only. O2 NC 2L in use. 1950- versed stopped. Fentanyl continues at 50 mcg/hr. All ports of CVL flushed  And capped. Fentanyl and NS KVO infusing via RT upper arm IV. Mouth care and holm care done. Assessment done. Lt hand PIV flushed and capped. All PIVs with good blood return. Pt repositioned. 2200- pt repositioned. 0001- reassessment done. 0400- reassessment done, mouth care and holm care done and pt repositioned. 0730- Bedside and Verbal shift change report given to [address] [place] [address] [address] (oncoming nurse) by Gloria Mckenzie RN 
 (offgoing nurse). Report included the following information SBAR, Kardex, Intake/Output, MAR, Recent Results and Cardiac Rhythm NSR  
0718- CVL removed from rt groin. Pressure held for 5 minutes and pressure dressing applied.

## 2020-10-17 NOTE — PROGRESS NOTES
Problem: Patient Education: Go to Patient Education Activity Goal: Patient/Family Education Outcome: Progressing Towards Goal 
  
Problem: Pressure Injury - Risk of 
Goal: *Prevention of pressure injury Description: Document Alex Scale and appropriate interventions in the flowsheet. Outcome: Progressing Towards Goal 
Note: Pressure Injury Interventions: 
Sensory Interventions: Check visual cues for pain, Keep linens dry and wrinkle-free, Pressure redistribution bed/mattress (bed type), Turn and reposition approx. every two hours (pillows and wedges if needed) Moisture Interventions: Absorbent underpads, Apply protective barrier, creams and emollients, Check for incontinence Q2 hours and as needed, Internal/External urinary devices Activity Interventions: Pressure redistribution bed/mattress(bed type) Mobility Interventions: HOB 30 degrees or less, Pressure redistribution bed/mattress (bed type), Turn and reposition approx. every two hours(pillow and wedges) Nutrition Interventions: Document food/fluid/supplement intake Friction and Shear Interventions: Foam dressings/transparent film/skin sealants, HOB 30 degrees or less, Lift sheet, Lift team/patient mobility team, Minimize layers Problem: Pain Goal: *Control of Pain Outcome: Progressing Towards Goal 
  
Problem: Falls - Risk of 
Goal: *Absence of Falls Description: Document Yanira Plummer Fall Risk and appropriate interventions in the flowsheet. Outcome: Progressing Towards Goal 
Note: Fall Risk Interventions: 
  
 
  
 
Medication Interventions: Bed/chair exit alarm Elimination Interventions: Call light in reach, Bed/chair exit alarm Problem: Pain Goal: *Control of Pain Outcome: Progressing Towards Goal

## 2020-10-17 NOTE — PROGRESS NOTES
Internal Medicine Progress Note Patient's Name: Rola Voss Admit Date: 10/15/2020 Length of Stay: 2 Assessment/Plan Active Hospital Problems Diagnosis Date Noted  Bilateral pneumonia 10/15/2020  Femoral vein thrombosis, left ([facility] [address] 10/15/2020  Hyperglycemia due to type 2 diabetes mellitus ([facility] [address] 10/15/2020  Acute respiratory failure requiring reintubation ([facility] [address] 10/15/2020  QT prolongation 10/15/2020 QT Prolongation (QTc 535 ms on EKG) on 10/15/2020.  Septic shock ([address] 04/28/2020  Sacral decubitus ulcer, stage IV ([facility] [address] 04/28/2020  Complicated UTI (urinary tract infection) 04/28/2020  Metabolic encephalopathy 81/24/4513  Hypernatremia 11/17/2019  Cerebral palsy ([facility] [address] 06/12/2015  History of post-polio syndrome 06/12/2015  Hypertension 06/12/2015  Mild intellectual disability 06/12/2015  
 
- Cont comfort measures - On fentanyl gtt and versed - Ativan as needed - DNR status - I suspect he will not survive next 24 hours Subjective Pt s/e @ bedside Remains on comfort measures On IV fentanyl gtt Objective Visit Vitals BP (!) 48/32 Pulse 77 Temp (!) 96.6 °F (35.9 °C) Resp 9 Ht 5' 4\" (1.626 m) Wt 55.4 kg (122 lb 1.6 oz) SpO2 100% BMI 20.96 kg/m² Physical Exam: 
General Appearance: Ill-appearing, non-conversant HENT: normocephalic/atraumatic, + dry mucosa CV: RRR, no m/r/g Extremities: no cyanosis, no peripheral edema, contracted Intake and Output: 
Current Shift:  10/17 0701 - 10/17 1900 In: 2 [I.V.:2] 
Out: - Last three shifts:  10/15 1901 - 10/17 0700 In: 3720.1 [I.V.:3690.1] Out: 1915 [PLXFT:7838] Lab/Data Reviewed: 
CMP:  
Lab Results Component Value Date/Time   (HH) 10/16/2020 11:46 AM  
 K 3.6 10/16/2020 11:46 AM  
  (H) 10/16/2020 11:46 AM  
 CO2 19 (L) 10/16/2020 11:46 AM  
 AGAP 9 10/16/2020 11:46 AM  
  (H) 10/16/2020 11:46 AM  
 BUN 27 (H) 10/16/2020 11:46 AM  
 CREA 0.49 (L) 10/16/2020 11:46 AM  
 GFRAA >60 10/16/2020 11:46 AM  
 GFRNA >60 10/16/2020 11:46 AM  
 CA 7.8 (L) 10/16/2020 11:46 AM  
 MG 2.1 10/16/2020 11:46 AM  
 
CBC:  
No results found for: WBC, HGB, HGBEXT, HCT, HCTEXT, PLT, PLTEXT, HGBEXT, HCTEXT, PLTEXT Imaging Reviewed: 
No results found. Medications Reviewed: 
Current Facility-Administered Medications Medication Dose Route Frequency  sodium chloride (NS) flush 5-40 mL  5-40 mL IntraVENous PRN  
 dextrose (D50) infusion 12.5-25 g  25-50 mL IntraVENous PRN  
 ondansetron (ZOFRAN) injection 4 mg  4 mg IntraVENous Q6H PRN  
 albuterol-ipratropium (DUO-NEB) 2.5 MG-0.5 MG/3 ML  3 mL Nebulization Q6H PRN  
 acetaminophen (TYLENOL) suppository 650 mg  650 mg Rectal Q4H PRN  
 LORazepam (ATIVAN) injection 1 mg  1 mg IntraVENous Q15MIN PRN  
 ondansetron (ZOFRAN) injection 4 mg  4 mg IntraVENous Q4H PRN  
 scopolamine (TRANSDERM-SCOP) 1 mg over 3 days 1 Patch  1 Patch TransDERmal Q72H PRN  
 morphine 10 mg/ml injection 10 mg  10 mg Inhalation Q1H PRN  
 naloxone (NARCAN) injection 0.4 mg  0.4 mg IntraVENous PRN  
 fentaNYL (PF) 1,500 mcg/30 mL (50 mcg/mL) infusion  25-50 mcg/hr IntraVENous TITRATE  midazolam in normal saline (VERSED) 2 mg/mL infusion  1-3 mg/hr IntraVENous TITRATE Queenie Andersen DO Internal Medicine, Hospitalist 
Pager: [phone] [facility]

## 2020-10-17 NOTE — PROGRESS NOTES
[facility]. Pulmonary, Critical Care, and Sleep Medicine Name: David Valenzuela MRN: [id number] : 1950 Hospital: [facility] Date: 10/17/2020  Admission Date: 10/15/2020 Chart and notes reviewed. Data reviewed. I have evaluated all findings. [x]I have reviewed the flowsheet and previous days notes. [x]The patient is unable to give any meaningful history or review of systems because the patient is: 
[x]Intubated [x]Sedated [x]Unresponsive [x]The patient is critically ill on     
[x]Mechanical ventilation [x]Pressors []BiPAP [] ROS:Review of systems not obtained due to patient factors. dementia, CP and intubation Patient now DNR comfort measures per Erlinda discussed with them yesterday stop all further care orders discontinued and comfort care orders Events and notes from last 24 hours reviewed. Patient Active Problem List  
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  
 Hypertension I10  
 Mild intellectual disability F70  
 Small bowel obstruction ([address] K56.609  Cognitive developmental delay F81.9  
 Uncontrolled type 2 diabetes mellitus with hyperosmolar nonketotic hyperglycemia (HCC) E11.00  Hydronephrosis N13.30  Hypernatremia E87.0  Sepsis ([address] A41.9  Elevated troponin R77.8  Urinary retention R33.9  MATTIE (acute kidney injury) ([address] N17.9  Metabolic encephalopathy K98.18  
 Complicated UTI (urinary tract infection) N39.0  Septic shock (HCC) A41.9, R65.21  
 Stage 2 acute kidney injury ([address] N17.9  Sacral decubitus ulcer, stage IV (HCC) L89.154  Bilateral pneumonia J18.9  Femoral vein thrombosis, left (HCC) I82.412  
 Hyperglycemia due to type 2 diabetes mellitus ([address] E11.65  
  Acute respiratory failure requiring reintubation ([facility]) J96.00  
 QT prolongation R94.31 Vital Signs: 
Visit Vitals BP (!) 48/32 Pulse 77 Temp (!) 96.6 °F (35.9 °C) Resp 9 Ht 5' 4\" (1.626 m) Wt 55.4 kg (122 lb 1.6 oz) SpO2 100% BMI 20.96 kg/m² O2 Device: Nasal cannula O2 Flow Rate (L/min): 2 l/min Temp (24hrs), Av.2 °F (37.3 °C), Min:96.6 °F (35.9 °C), Max:100.2 °F (37.9 °C) Intake/Output:  
Last shift:      10/17 0701 - 10/17 1900 In: 2 [I.V.:2] 
Out: 85 [Urine:85] Last 3 shifts: 10/15 1901 - 10/17 0700 In: 3720.1 [I.V.:3690.1] Out: 1915 [RWAED:2282] Intake/Output Summary (Last 24 hours) at 10/17/2020 1037 Last data filed at 10/17/2020 1000 Gross per 24 hour Intake 2044.24 ml Output 990 ml Net 1054.24 ml Ventilator Settings: 
Ventilator Mode: Assist control, VC+ Respiratory Rate Back-Up Rate: 22 Insp Time (sec): 0.9 sec Ventilator Volumes Vt Set (ml): 365 ml Vt Exhaled (Machine Breath) (ml): 560 ml Vt Spont (ml): 406 ml Ve Observed (l/min): 13.9 l/min Ventilator Pressures PIP Observed (cm H2O): 17 cm H2O Plateau Pressure (cm H2O): 20 cm H2O 
MAP (cm H2O): 12 PEEP/VENT (cm H2O): 5 cm H20 Auto PEEP Observed (cm H2O): 3 cm H2O Current Facility-Administered Medications Medication Dose Route Frequency  fentaNYL (PF) 1,500 mcg/30 mL (50 mcg/mL) infusion  25-50 mcg/hr IntraVENous TITRATE  midazolam in normal saline (VERSED) 2 mg/mL infusion  1-3 mg/hr IntraVENous TITRATE Telemetry:  
 
Physical Exam:  
General: severely ill and cachectic HEENT: PERRLA, EOMI, fundi benign Neck: No abnormally enlarged lymph nodes. Chest: normal 
 Lungs: rhonchi throughout, no dullness/ tenderness/ rash Heart: irregularly irregular Abdomen: non distended, bowel sounds normoactive, tympanic, abdomen is soft without significant tenderness, masses, organomegaly or guarding Extremity: cachetic Neuro: stuporous Skin: Skin color, texture, turgor normal. No rashes or lesions DATA: 
MAR reviewed and pertinent medications noted or modified as needed Labs: 
Recent Labs 10/16/20 
0545 10/15/20 
2015 WBC 17.3* 13.1 HGB 9.6* 9.7* HCT 31.6* 32.2*  
* 427* Recent Labs 10/16/20 
1146 10/16/20 
0545 10/16/20 
0150 10/15/20 
2015 * 162*  --  161*  
K 3.6 2.7*  --  3.6 * 135*  --  133* CO2 19* 18*  --  21  
* 339*  --  573* BUN 27* 30*  --  36* CREA 0.49* 0.70  --  0.91  
CA 7.8* 8.0*  --  7.8*  
MG 2.1 2.5 1.5* 1.8 PHOS  --   --   --  2.8 ALB  --  1.3*  --  1.5* ALT  --  45  --  37 INR  --   --   --  1.4* Recent Labs 10/16/20 
0459 10/15/20 
2042 FIO2I 80 100 HCO3I 16.3* 19.1* PCO2I 36.2 42.5 PHI 7.26* 7.27* PO2I 105* 186* Imaging: 
[x]                           I have personally reviewed the patients radiographs and reports High complexity decision making was performed during this consultation and evaluation. [x]       Pt is at high risk for further organ failure and dysfunction. Critical care time spent  minutes with patient exclusive of procedures. IMPRESSION:  
· Respiratory Failure · Cerebral Palsy/dementia · Aspiration Pneumonia · hypergylcemia · Hypernatremia · Altered mental status PLAN:  
· Neuro-  patient on ventilator no sedation use low dose versed to keep comfortable no propofol with hypotension. Sepsis and aspiration pneumonia after choking event in demented NH resident now in shock with sepsis. Has cerebral palsy as well 
  
· Cardiac-  Hypotensive septic shock 3 liters of NS and LR now needs free water. On Levophed and vasopressin goal MAP of 60. Baseline afib not requiring rate control on heparin drip. No coronary ischemia.   Volume resuscitated now needs free water. 
  
      Pulmonary- aspirated acutely after choking event intubated on arrival to ER with perihilar opacities and     LLL infiltrate and effusion on CXR all abx stopped 
  
· Renal-   [place] follow urine output and replace free water deficit D5W as well as potassium 
  
Heme-  H/H stable anemic  no active bleeding   
  
· ID-  pneumonia infection a concern send lactate and procalcitonin  . 
  
Endo- insulin drip stopped 
  
  
· DVT prophylaxis  systemically anticoagulated 
  
· GI- start gi prophylaxis and start enteral trickle feeds Comfort care death likely and iminent no further active measures comfort care will SIGN OFF Robles Banks MD

## 2020-10-17 NOTE — PROGRESS NOTES
Physical Exam 
Skin: 
 
    
 
Obtained bedside report from Demetrio Frost, YAS. All gtts verified and checked. Yoandy Wright new fentanyl syringe [id number] Assessment completed Partial bath given Removed #20 L hand PIV Holm and mouth care performed Emptied approx 50ml angelica/sediment urine Incontinent of medium amount of soft formed brown stool Pericare performed Changed outer mepilex to sacral dressing Changed R medial mepilex and applied mepilex to R knee (open area draining) Pt pulled up, turned and repositioned [address] Pt turned and repositioned Emptied approx 35ml angelica/sediment urine [address] Gave PRN ativan per [facility] 
 
0023 Reassessment complete Paged MD Tillman, pt's HR continuing to increase Would like to increase max of fentanyl PCA titration ADDN: MD to enter orders Increased fentanyl to 75mcg and gave PRN ativan Mouth and holm care performed 6324 Gave PRN meds per MAR 
 
0330 Cessation of breathing and heartbeat Confirmed after 1 minute of auscultation [facility] [place] [facility] death pronunciation Paged María Elena Beck on-call [phone] Official time of death noted [address], spoke to [facility] [address] Paged [phone] [facility] guardian Lena Eng ([phone]) ADDN: Updated NGUYỄN Garcia on events [id number] Body prepped for morgue All LDAs removed Pt placed in body bag No belongings noted Taken down to morgue

## 2020-10-17 NOTE — PROGRESS NOTES
Problem: Ventilator Management Goal: *Adequate oxygenation and ventilation Outcome: Resolved/Met Goal: *Patient maintains clear airway/free of aspiration Outcome: Resolved/Met Goal: *Absence of infection signs and symptoms Outcome: Resolved/Met Goal: *Normal spontaneous ventilation Outcome: Resolved/Met Problem: Patient Education: Go to Patient Education Activity Goal: Patient/Family Education Outcome: Resolved/Not Met Problem: Pressure Injury - Risk of 
Goal: *Prevention of pressure injury Description: Document Alex Scale and appropriate interventions in the flowsheet. Outcome: Progressing Towards Goal 
Note: Pressure Injury Interventions: 
Sensory Interventions: Check visual cues for pain, Keep linens dry and wrinkle-free, Pressure redistribution bed/mattress (bed type), Turn and reposition approx. every two hours (pillows and wedges if needed) Moisture Interventions: Absorbent underpads, Apply protective barrier, creams and emollients, Check for incontinence Q2 hours and as needed, Internal/External urinary devices Activity Interventions: Pressure redistribution bed/mattress(bed type) Mobility Interventions: HOB 30 degrees or less, Pressure redistribution bed/mattress (bed type), Turn and reposition approx. every two hours(pillow and wedges) Nutrition Interventions: Document food/fluid/supplement intake Friction and Shear Interventions: Foam dressings/transparent film/skin sealants, HOB 30 degrees or less, Lift sheet, Lift team/patient mobility team, Minimize layers Problem: Patient Education: Go to Patient Education Activity Goal: Patient/Family Education Outcome: Not Progressing Towards Goal 
  
Problem: Pain Goal: *Control of Pain Outcome: Progressing Towards Goal 
Goal: *PALLIATIVE CARE:  Alleviation of Pain Outcome: Progressing Towards Goal 
  
Problem: Patient Education: Go to Patient Education Activity Goal: Patient/Family Education Outcome: Not Progressing Towards Goal 
  
Problem: Pain Goal: *Control of Pain Outcome: Progressing Towards Goal 
  
Problem: Patient Education: Go to Patient Education Activity Goal: Patient/Family Education Outcome: Not Progressing Towards Goal 
  
Problem: Falls - Risk of 
Goal: *Absence of Falls Description: Document Chante Barrera Fall Risk and appropriate interventions in the flowsheet. Outcome: Progressing Towards Goal 
Note: Fall Risk Interventions: 
  
 
  
 
Medication Interventions: Bed/chair exit alarm Elimination Interventions: Call light in reach, Bed/chair exit alarm Problem: Patient Education: Go to Patient Education Activity Goal: Patient/Family Education Outcome: Not Progressing Towards Goal 
  
Problem: Patient Education: Go to Patient Education Activity Goal: Patient/Family Education Outcome: Not Progressing Towards Goal 
  
Problem: Sepsis: Day of Diagnosis Goal: Off Pathway (Use only if patient is Off Pathway) Outcome: Resolved/Met Goal: *Fluid resuscitation Outcome: Resolved/Met Goal: *Paired blood cultures prior to first dose of antibiotic Outcome: Resolved/Met Goal: *First dose of  appropriate antibiotic within 3 hours of arrival to ED, within 1 hour of arrival to ICU Outcome: Resolved/Met Goal: *Lactic acid with first set of blood cultures Outcome: Resolved/Met Goal: *Pneumococcal immunization (if eligible) Outcome: Resolved/Met Goal: *Influenza immunization (if eligible) Outcome: Resolved/Met Goal: Activity/Safety Outcome: Resolved/Met Goal: Consults, if ordered Outcome: Resolved/Met Goal: Diagnostic Test/Procedures Outcome: Resolved/Met Goal: Nutrition/Diet Outcome: Resolved/Met Goal: Discharge Planning Outcome: Resolved/Met Goal: Medications Outcome: Resolved/Met Goal: Respiratory Outcome: Resolved/Met Goal: Treatments/Interventions/Procedures Outcome: Resolved/Met Goal: Psychosocial 
Outcome: Resolved/Not Met Problem: Sepsis: Day 2 
 Goal: Off Pathway (Use only if patient is Off Pathway) Outcome: Resolved/Met Goal: *Central Venous Pressure maintained at 8-12 mm Hg Outcome: Resolved/Not Met 
Goal: *Hemodynamically stable Outcome: Resolved/Not Met 
Goal: *Tolerating diet Outcome: Resolved/Met Goal: Activity/Safety Outcome: Resolved/Met Goal: Consults, if ordered Outcome: Resolved/Met Goal: Diagnostic Test/Procedures Outcome: Resolved/Met Goal: Nutrition/Diet Outcome: Resolved/Met Goal: Discharge Planning Outcome: Resolved/Met Goal: Medications Outcome: Resolved/Met Goal: Respiratory Outcome: Resolved/Met Goal: Treatments/Interventions/Procedures Outcome: Resolved/Met Goal: Psychosocial 
Outcome: Resolved/Not Met

## 2020-10-17 NOTE — PROGRESS NOTES
0710: Bedside and verbal report received from [facility]. Pt comfort measures only. Care assumed, will monitor. 0715: Dr Alma Koehler transfer orders to floor.

## 2020-10-18 NOTE — PROGRESS NOTES
INTERIM UPDATE - [id number] EST on 10/18/2020 Called to evaluate an Unresponsive Patient who is DNR/DNI and currently on Comfort Care. Patient was unresponsive to visual, auditory, tactile, and nociceptive stimuli. Patient did not exhibit any self-initiated movement. Patient did not have any heart, lung, or bowel sounds. Radial, Femoral, and Carotid Pulses were absent. Pupils were dilated and fixed. Patient did not exhibit Corneal Reflex. Boris Kirkland was pronounced  at 8703 EST on 10/18/2020 by Mallory Abdi. NADEEM Tillman. Family was not present at bedside; Patient's Legal Guardian is [facility]. Information regarding persons called and times, as well as details of final arrangements are listed elsewhere.

## 2020-10-18 NOTE — PROGRESS NOTES
responded to Death of  Carlos Mullen, who was a 78 y. o.,male, The  provided the following Interventions: 
Was available to offer crisis pastoral care, pastoral support and grief interventions however the nurse informed no family was present and the staff did not need support. Offered prayers on behalf of the patient. Plan: 
Chaplains will continue to follow and will provide pastoral care on an as needed/requested basis and grief support for the family. nav[facility]et [facility]  
[phone]

## 2020-10-18 NOTE — DISCHARGE SUMMARY
Internal Medicine Discharge Summary Patient: Carol Banerjee YOB: 1950 Age:  78 y.o. Admit Date: 10/15/2020 Discharge Date: 10/18/2020 LOS:  LOS: 3 days Discharge To: Patient  Consults: Pulmonary/Critical Care and Urology Admission Diagnoses: Septic shock ([facility] [A41.9, R65.21] Bilateral pneumonia [J18.9] Hypernatremia [E87.0] Femoral vein thrombosis, left (HCC) [I82.412] Hyperglycemia due to type 2 diabetes mellitus ([facility] [E11.65] Discharge Diagnoses:   
Problem List as of 10/18/2020 Date Reviewed: 10/15/2020 Codes Class Noted - Resolved Bilateral pneumonia ICD-10-CM: J18.9 ICD-9-CM: 942  10/15/2020 - Present Femoral vein thrombosis, left (HCC) ICD-10-CM: R51.809 ICD-9-CM: 453.6  10/15/2020 - Present Hyperglycemia due to type 2 diabetes mellitus ([facility] ICD-10-CM: E11.65 ICD-9-CM: 250.00  10/15/2020 - Present Acute respiratory failure requiring reintubation [facility]) ICD-10-CM: J96.00 
ICD-9-CM: 518.81  10/15/2020 - Present QT prolongation ICD-10-CM: R94.31 
ICD-9-CM: 794.31  10/15/2020 - Present Overview Signed 10/16/2020  5:54 AM by Rosa Maria Iglesias DO  
  QT Prolongation (QTc 535 ms on EKG) on 10/15/2020. Complicated UTI (urinary tract infection) ICD-10-CM: N39.0 ICD-9-CM: 599.0  2020 - Present Septic shock (HCC) ICD-10-CM: A41.9, R65.21 ICD-9-CM: 038.9, 785.52, 995.92  2020 - Present Stage 2 acute kidney injury ([facility] ICD-10-CM: N17.9 ICD-9-CM: 584.9  2020 - Present Sacral decubitus ulcer, stage IV (HCC) ICD-10-CM: K19.215 ICD-9-CM: 707.03, 707.24  2020 - Present * (Principal) Metabolic encephalopathy [id number]: G93.41 
ICD-9-CM: 348.31  2020 - Present Uncontrolled type 2 diabetes mellitus with hyperosmolar nonketotic hyperglycemia (HCC) ICD-10-CM: E11.00 ICD-9-CM: 250.22  2019 - Present Hydronephrosis ICD-10-CM: N13.30 ICD-9-CM: 662  2019 - Present Hypernatremia ICD-10-CM: E87.0 ICD-9-CM: 276.0  2019 - Present Sepsis ([facility] ICD-10-CM: A41.9 ICD-9-CM: 038.9, 995.91  2019 - Present Elevated troponin ICD-10-CM: R77.8 ICD-9-CM: 790.6  2019 - Present Urinary retention ICD-10-CM: R33.9 ICD-9-CM: 788.20  2019 - Present MATTIE (acute kidney injury) ([facility] ICD-10-CM: N17.9 ICD-9-CM: 584.9  2019 - Present Small bowel obstruction ([facility] ICD-10-CM: P32.741 ICD-9-CM: 560.9  2018 - Present Cognitive developmental delay ICD-10-CM: F81.9 ICD-9-CM: 315.9  2018 - Present Hypotension ICD-10-CM: I95.9 ICD-9-CM: 458.9  2015 - Present Tachycardia ICD-10-CM: R00.0 ICD-9-CM: 785.0  2015 - Present Lactic acidosis ICD-10-CM: E87.2 ICD-9-CM: 276.2  2015 - Present Cerebral palsy (HCC) (Chronic) ICD-10-CM: G80.9 ICD-9-CM: 343.9  2015 - Present History of post-polio syndrome (Chronic) ICD-10-CM: B36.86 
ICD-9-CM: V12.02  2015 - Present Hypertension (Chronic) ICD-10-CM: I10 
ICD-9-CM: 401.9  2015 - Present Mild intellectual disability (Chronic) ICD-10-CM: F70 
ICD-9-CM: 317  2015 - Present UTI (urinary tract infection) ICD-10-CM: N39.0 ICD-9-CM: 599.0  2015 - Present Overview Signed 2020 12:49 PM by Judithe Siemens, MD  
  Urinary tract infection associated with indwelling urethral catheter Discharge Condition:   Procedures: None HPI: Giovanna Liz is a 78 y.o. male who hails from [address] [place] [address] Street who presents to [facility] ER with complaint of Unresponsive and High Blood Sugar.   Patient was reportedly confused for 6 to 12 hour prior to being brought to [facility] ER and was particularly bad 45 minutes prior to arrival.  Patient was reportedly being fed when he seemed to choke---possibly aspirating---and then became unresponsive. Patient arrives on Non-Rebreather. Patient is Intubated and Sedated upon interview; Subjective is largely derived from Nursing Staff, [facility] ER Physician, and EHR. Patient has a history of Cerebral Palsy, Intellectual Disability, and Recurrent UTIs. Previous admission shows that the Patient typically is verbal, but only expresses brief agreements before complex questions are asked.   
  
Notably, Patient's Guardian is [facility] with appropriate paperwork in our EHR. 
  
In [facility] ER, Patient is noted to have Temperature 100.5°F, Heart Rate 154 bpm, 39 bpm, Blood Pressure 61/40 mm Hg, SpO2 100% on 12 L/min O2 via Mask, Glucose >600 mg/dL, Lactic Acid 2.65, WBC 13.1, Hgb 9.7, Platelet 462, INR 1.4, D-dimer 8.81, Na+ 161 (Sodium Corrected to 170.5), Cl- 133, Anion Gap 7, BUN 36, Creatinine 0.91, Ca+ 7.8, Direct Bilirubin 0.3, Total Protein 5.5, Albumin 1.5, AST 44, Alk Phos 152, Troponin <0.02, Pro-, Arterial Blood Gas pH 7.27, pCO2 42.5, pO2 186, and HCO3 19.1. Intensivist services were consulted by [facility] ER Physician in [facility] ER. 
  
Patient is admitted to [facility] ICU (Non-Covid-19 Cohort) for management of Metabolic Encephalopathy, Septic Shock 2°/2 Bilateral Community-Acquired Pneumonia (concern for Aspiration), Acute Respiratory Failure S/P Endotracheal Intubation, Left Femoral Vein Thrombosis, Hypernatremia, and Hyperglycemia. Hospital Course: The patient remained very sick in septic shock on ventilator. Pulmonology discussed case with SALAZAR, patient's legal guardian, and decision was made to palliative extubated and place on comfort measures. The patient passed away on those measures early AM on 10/18 Visit Vitals BP (!) 36/24 Pulse (!) 122 Temp 97.6 °F (36.4 °C) Resp 18 Ht 5' 4\" (1.626 m) Wt 55.4 kg (122 lb 1.6 oz) SpO2 (!) 55% BMI 20.96 kg/m² Labs Prior to Discharge: 
Labs: Results:  
   
Chemistry Recent Labs 10/16/20 
1146 10/16/20 
0545 10/15/20 
2015 * 339* 573* * 162* 161*  
K 3.6 2.7* 3.6 * 135* 133* CO2 19* 18* 21 BUN 27* 30* 36* CREA 0.49* 0.70 0.91  
CA 7.8* 8.0* 7.8* AGAP 9 9 7 BUCR 55* 43* 40* AP  --  123* 152* TP  --  5.8* 5.5* ALB  --  1.3* 1.5*  
GLOB  --  4.5* 4.0 AGRAT  --  0.3* 0.4* CBC w/Diff Recent Labs 10/16/20 
0545 10/15/20 
2015 WBC 17.3* 13.1 RBC 3.62* 3.69* HGB 9.6* 9.7* HCT 31.6* 32.2*  
* 427* GRANS 89* 73 LYMPH 9* 12* EOS 0 1 Cardiac Enzymes No results for input(s): CPK, CKND1, JOHN in the last 72 hours. No lab exists for component: [place] Coagulation Recent Labs 10/16/20 
1300 10/16/20 
0800  10/15/20 
2015 PTP  --   --   --  16.6* INR  --   --   --  1.4* APTT 72.4* >180.0*   < >  --   
 < > = values in this interval not displayed. Lipid Panel Lab Results Component Value Date/Time Cholesterol, total 159 06/11/2015 10:00 PM  
 HDL Cholesterol 19 (L) 06/11/2015 10:00 PM  
 LDL, calculated 107 (H) 06/11/2015 10:00 PM  
 VLDL, calculated 33 06/11/2015 10:00 PM  
 Triglyceride 165 (H) 06/11/2015 10:00 PM  
 CHOL/HDL Ratio 8.4 (H) 06/11/2015 10:00 PM  
  
BNP No results for input(s): BNPP in the last 72 hours. Liver Enzymes Recent Labs 10/16/20 
0545  
TP 5.8* ALB 1.3* * Thyroid Studies Lab Results Component Value Date/Time TSH 0.55 10/15/2020 08:15 PM  
    
 
 
Significant Imaging: Xr Abd (kub) Result Date: 10/16/2020 Abdomen, 1 radiograph COMPARISON: CT: 10/15/2020 INDICATION: OGT placement verification. FINDINGS: Supine view of the abdomen obtained. LINES/DEVICES:   > Single enteric tube courses beneath the diaphragm, tip and side-port project over the upper abdomen presumably within the stomach.   > Right vascular catheter tip terminates in the region of the common femoral vessels.    > Catheter coursing along the left body terminates in the left lateral abdomen, possible shunt catheter. LOWER THORAX: The heart is normal size. Opacities at both lung bases concerning for pneumonia. BOWEL: No gas-filled loops of bowel to suggest high-grade obstruction. There is stool throughout the colon, correlate for constipation. SOFT TISSUES: Contrast fills the dilated bilateral urinary collecting systems. Cholecystectomy clips are seen in the right upper abdomen. No appreciable organomegaly or suspicious calcification. BONES: No acute osseous abnormality. Bones are diffusely demineralized. Impression: 1. Single enteric tube tip and side-port project over the stomach. 2.  Likely  shunt catheter terminating in the left abdomen. 3.  Right femoral vascular catheter stably positioned. 4.  Large amount of stool, correlate for constipation with fecal impaction. 5.  Dilation of both urinary collecting systems may be related to mass effect from the large stool ball in the rectum, recommend fecal disimpaction. 6.  Bibasilar lung opacities likely reflect aspiration/pneumonia. Ct Head Wo Cont Result Date: 10/16/2020 EXAM: CT head INDICATION: Altered mental status. COMPARISON: None. TECHNIQUE: Axial CT imaging of the head was performed without intravenous contrast. Standard multiplanar coronal and sagittal reformatted images were obtained and are included in interpretation. One or more dose reduction techniques were used on this CT: automated exposure control, adjustment of the mAs and/or kVp according to patient size, and iterative reconstruction techniques. The specific techniques used on this CT exam have been documented in the patient's electronic medical record. Digital Imaging and Communications in Medicine (DICOM) format image data are available to nonaffiliated external healthcare facilities or entities on a secure, media free, reciprocally searchable basis with patient authorization for at least a 12-month period after this study. _______________ FINDINGS: BRAIN AND POSTERIOR FOSSA: Global left cerebral atrophy. Left frontal approach  shunt terminates in the posterior midline lateral ventricles. No prior study available however ventricles do not appear overly dilated. Patchy left cerebral subcortical matter hypoattenuation which is nonspecific but likely represents chronic ischemic changes. No evidence of acute large vessel transcortical infarct or acute parenchymal hemorrhage. EXTRA-AXIAL SPACES AND MENINGES: Dural calcifications. Left hemispheric subdural hygroma. CALVARIUM: Intact. SINUSES: Ethmoid sinusitis mucosal thickening involving and right sphenoid sinus opacification. OTHER: None. _______________ IMPRESSION: No acute intracranial abnormality. Left global cerebral atrophy and chronic ischemic changes. Left frontal approach  shunt. No prior study available however ventricles do not appear overly dilated. Left hemispheric subdural hygroma and dural calcifications. Ethmoid sinusitis mucosal thickening involving and right sphenoid sinus opacification. Note: Preliminary report sent to the Emergency Department by the radiology resident at the time of the study. Cta Chest W Or W Wo Cont Result Date: 10/16/2020 EXAM: CTA chest CLINICAL INDICATION/HISTORY: Sepsis. Patient presents after choking and becoming unresponsive. COMPARISON: CT abdomen and pelvis: 10/15/2020. CT chest, abdomen, pelvis: 4/28/2020 TECHNIQUE: Axial CT imaging from the thoracic inlet through the diaphragm with intravenous contrast. Coronal and sagittal MIP reformats were generated. One or more dose reduction techniques were used on this CT: automated exposure control, adjustment of the mAs and/or kVp according to patient size, and iterative reconstruction techniques. The specific techniques used on this CT exam have been documented in the patient's electronic medical record.  Digital Imaging and Communications in Medicine (DICOM) format image data are available to nonaffiliated external healthcare facilities or entities on a secured, media free, reciprocally searchable basis with patient authorization for at least a 12-month period after this study. _______________ FINDINGS: EXAM QUALITY: Adequate PULMONARY ARTERIES: No pulmonary embolism identified. Enlarged pulmonary artery measuring 3.3 cm suggestive of pulmonary arterial hypertension. MEDIASTINUM: Cardiomegaly. There is reflux of contrast into the IVC and hepatic veins. RV/LV ratio is normal with no evidence of right heart strain. Atherosclerosis including the coronary arteries. Aorta is normal caliber. No pericardial effusion. LUNGS: Dependent opacities are seen throughout both lower lungs. AIRWAY: Normal. PLEURA: Normal. LYMPH NODES: No enlarged nodes. UPPER ABDOMEN: Unremarkable. BONES: No acute or aggressive osseous abnormalities identified. L1 and L2 vertebral body compression fractures better visualized on contemporaneous CT abdomen and pelvis. OTHER:   > Endotracheal tube tip terminates less than 1 cm above the elian, recommend retraction with repeat imaging to confirm placement.   > Enteric tube tip terminates near the gastroesophageal junction, recommend advancing at least 10 cm with repeat imaging to confirm placement. _______________ IMPRESSION: 1. No pulmonary embolism identified. 2.  Cardiomegaly with evidence of right heart dysfunction. 3.  Dependent opacities in both lower lungs concerning for pneumonia which may be related to aspiration. Recommend repeat imaging following completion of appropriate medical therapy. 4.  Endotracheal tube tip terminates less than 1 cm above the elian, recommend retraction with repeat imaging to confirm placement. 5.  Enteric tube tip terminates near the gastroesophageal junction, recommend advancing at least 10 cm with repeat imaging to confirm placement.  Note: Preliminary report sent to the Emergency Department by the radiology resident at the time of the study. Ct Abd Pelv W Cont Result Date: 10/16/2020 EXAM: CT ABD PELV W CONT CLINICAL INDICATION/HISTORY: sepsis; SEPSIS COMPARISON: Same date CTA chest. CT chest, abdomen and pelvis: 4/28/2020 TECHNIQUE:   CT of the abdomen and pelvis following intravenous contrast administration. Coronal and sagittal reformats were generated and reviewed. One or more dose reduction techniques were used on this CT: automated exposure control, adjustment of the mAs and/or kVp according to patient size, and iterative reconstruction techniques. The specific techniques used on this CT exam have been documented in the patient's electronic medical record. Digital Imaging and Communications in Medicine (DICOM) format image data are available to nonaffiliated external healthcare facilities or entities on a secure, media free, reciprocally searchable basis with patient authorization for at least a 12-month period after this study. _______________ FINDINGS: LOWER THORAX: No global cardiomegaly or pericardial effusion. Atherosclerosis including the coronary arteries. Bilateral dependent consolidative opacities. No pleural effusion. Small amount of breast tissue present bilaterally. Please see contemporaneous CTA chest for additional details. LIVER: No enhancing hepatic mass. The portal and hepatic veins are patent. Diffuse hepatic steatosis BILIARY: Prior cholecystectomy. No biliary dilation. SPLEEN: Normal. PANCREAS: Normal. ADRENALS: Normal. KIDNEYS: Benign-appearing cysts are seen in both kidneys; beam hardening artifact somewhat limits evaluation. No hydronephrosis. GI TRACT:  Enteric tube tip terminates in the gastroesophageal junction, recommend advancing at least 10 cm with repeat imaging to confirm placement. Normal appendix. There is a large stool ball distends the rectum measuring up to 11 x 10.7 cm. There is a large amount of stool throughout the remaining nondilated colon.  No evidence of small bowel obstruction. BLADDER: Calcific densities in the dependent bladder may represent stones and/or wall calcifications. [place] catheter tube curls in the penile urethra. PELVIC ORGANS: The prostate is normal size. VASCULATURE: No arterial aneurysm. Nonocclusive thrombus is seen in the left femoral vein and deep femoral vein (axial image 181). Right femoral venous catheter terminates in the right common femoral vein. LYMPH NODES: No mesenteric or retroperitoneal lymphadenopathy. OTHER: No free intraperitoneal air. No ascites. OSSEOUS STRUCTURES: L1 and L2 vertebral body compression fractures, the L1 vertebral body compression fractures new since 4/28/2020 the L2 vertebral body fracture appears similar to prior study. The bones are diffusely demineralized. _______________ IMPRESSION: 1. Enteric tube tip terminates at the gastroesophageal junction, recommend advancing at least 10 cm with repeat imaging to confirm placement. 2.  Green[place] catheter tube curls in the penile urethra, recommend removal. 3.  Consolidative opacities are seen in the lower lungs. Please see contemporaneous CTA chest for additional findings. 4.  Nonocclusive thrombus is seen in the left femoral vein and deep left femoral vein. 5.  Very large stool ball distends the rectum, correlate for fecal impaction with colitis. 6.  The L1 vertebral body compression fracture is new since prior study, the L2 vertebral body compression fracture appears similar to 4/28/2020. Note: Preliminary report sent to the Emergency Department by the radiology resident at the time of the study. Xr Chest [facility] Result Date: 10/16/2020 EXAM: PORTABLE  FRONTAL CHEST RADIOGRAPH CLINICAL INDICATION/HISTORY: Altered mental status, endotracheal tube placement COMPARISON: 5/5/2020 TECHNIQUE: Portable frontal view of the chest _______________ FINDINGS: SUPPORT DEVICES:   > Endotracheal tube tip terminates approximately 2.2 cm above the elian.   > Enteric tube courses along the diaphragm, tip terminates near the gastroesophageal junction, recommend advancing at least 10 cm with repeat imaging to confirm placement.   >  EKG leads overlie the patient. HEART AND MEDIASTINUM: Normal heart size. Mild pulmonary vascular congestion. LUNGS: Patchy perihilar opacities with lower lung predominance (left. The right). PLEURAL SPACES:No large pneumothorax. Moderate volume left pleural effusion. BONY THORAX AND SOFT TISSUES: No acute abnormality. Bones are diffusely demineralized. Surgical clips in the right upper quadrant likely related to prior cholecystectomy. _______________ IMPRESSION: 1. Endotracheal tube appears appropriately positioned terminating approximately 2.2 cm above the elian. 2.  Enteric tube tip terminates near the gastroesophageal junction, recommend advancing at least 10 cm with repeat imaging to confirm placement. 3.  New patchy perihilar opacities concerning for multifocal pulmonary infiltrate (pneumonia) as may be seen in the setting add COVID or other atypical pneumonia. 4.  Moderate volume left pleural effusion. Patient  Total time spent including time spent on discharge documentation and records reviewed: > 30 minutes Sana Gaytan DO Internal Medicine, Hospitalist 
Pager: [phone] [facility]

## 2020-10-19 NOTE — PROGRESS NOTES
10/19/2020 1000 VMM from Albert Henley at [employer] today requesting updates. Chart reviewed. [facility] to [facility] who is Guardian for patient. Awaiting a return call. 1100-- Received a call from Theodora [facility]. Saw pt was discharged and requested more info. Advised that pt  on 10/18/2020 and had wanted to speak to Janusz Bowen first to make them aware. 1150-Message from Marylou Soto from [address] [place] [address] returning call. Covering for pts Guardian today. [address] with [facility]  back [phone]. She was aware that pt  this weekend. Asked if  in hospital or NF. Advised pt  in hospital. She indicated they were aware. Edyta Smith RN Outcomes Manager 
[phone]

## 2020-10-21 LAB
BACTERIA SPEC CULT: NORMAL
BACTERIA SPEC CULT: NORMAL
SERVICE CMNT-IMP: NORMAL
SERVICE CMNT-IMP: NORMAL

## 2022-07-13 NOTE — PROGRESS NOTES
1900: Bedside and Verbal shift change report given to Oneyda Diaz (oncoming nurse) by Jae Kearns (offgoing nurse). Report included the following information SBAR, Kardex, Intake/Output, MAR, Accordion, Recent Results and Quality Measures. PT eyes open spontaneously, follows commands, no physiological s/s of pain, pts words are not comprehendible but nods appropriately. 2130: Dr. Wilmer Reveles made aware of blood sugar and critical values, see MAR for new orders. 0000: Reassessment completed, no changes noted. Pt resting quietly 0115: [facility] pharmacist called regarding missed potassium dose and not being able to scan it, told me that the only way to fix it was to document missed dose and then re order another bag to complete original order. 0400: Reassessment completed, no changes noted. 0430: Lab called said, needed to recollect 
 
0500: recollect sent down 0530: Lab called again to say specimen was no good, left a message for phlebotomy to come stick him as I have attempted twice and both specimens were not able to be run.  
  
0600: Phelbotemy at bedside to attempt, unable. Requested a finger stick draw, said she would come back to attempt.  
 
0700: Bedside and Verbal shift change report given to Marie Hayward and Sarkis Larry RN (oncoming nurse) by Oneyda Diaz (offgoing nurse). Report included the following information SBAR, Kardex, ED Summary, Intake/Output, MAR, Accordion, Recent Results, Alarm Parameters  and Quality Measures. LRF:  04/08/22    LOV:  11/10/21

## 2023-07-14 NOTE — PROGRESS NOTES
Progress Note Patient: Gonzalez Moore               Sex: male          DOA: 1/17/2020 YOB: 1950      Age:  81 y.o.        LOS:  LOS: 4 days CHIEF COMPLAINT:  UTI, cerebral palsy Subjective:  
 
Patient's mental status appears to be improving Objective:  
  
Visit Vitals /87 (BP 1 Location: Right arm, BP Patient Position: At rest) Pulse 86 Temp 97.7 °F (36.5 °C) Resp 16 Ht 5' 4\" (1.626 m) Wt 61.4 kg (135 lb 5.8 oz) SpO2 96% BMI 23.23 kg/m² Physical Exam: 
Gen:  No distress, no complaint Lungs:  Clear bilaterally, no wheeze or rhonchi Heart:  Regular rate and rhythm, no murmurs or gallops Abdomen:  Soft, non-tender, normal bowel sounds Neuro:  Patient is speaking, there is some garbled nature Lab/Data Reviewed: 
BMP:  
Lab Results Component Value Date/Time  01/22/2020 05:15 AM  
 K 3.7 01/22/2020 05:15 AM  
  01/22/2020 05:15 AM  
 CO2 23 01/22/2020 05:15 AM  
 AGAP 9 01/22/2020 05:15 AM  
  (H) 01/22/2020 05:15 AM  
 BUN 10 01/22/2020 05:15 AM  
 CREA 0.44 (L) 01/22/2020 05:15 AM  
 GFRAA >60 01/22/2020 05:15 AM  
 GFRNA >60 01/22/2020 05:15 AM  
 
 
Assessment/Plan Principal Problem: 
  UTI (urinary tract infection) (6/11/2015) Overview: Urinary tract infection associated with indwelling urethral catheter Active Problems: Hypotension (6/12/2015) Sepsis ([address] (11/17/2019) Metabolic encephalopathy (8/03/7814) Cerebral palsy ([address] (6/12/2015) History of post-polio syndrome (6/12/2015) Cognitive developmental delay (5/12/2018) Plan: 
Continue with antibiotics Monitor mental status Working toward disposition. No